# Patient Record
Sex: FEMALE | Race: WHITE | NOT HISPANIC OR LATINO | Employment: OTHER | URBAN - NONMETROPOLITAN AREA
[De-identification: names, ages, dates, MRNs, and addresses within clinical notes are randomized per-mention and may not be internally consistent; named-entity substitution may affect disease eponyms.]

---

## 2019-09-13 ENCOUNTER — OFFICE VISIT CONVERTED (OUTPATIENT)
Dept: FAMILY MEDICINE CLINIC | Age: 69
End: 2019-09-13
Attending: FAMILY MEDICINE

## 2019-10-09 ENCOUNTER — OFFICE VISIT CONVERTED (OUTPATIENT)
Dept: FAMILY MEDICINE CLINIC | Age: 69
End: 2019-10-09
Attending: FAMILY MEDICINE

## 2019-10-23 ENCOUNTER — HOSPITAL ENCOUNTER (OUTPATIENT)
Dept: OTHER | Facility: HOSPITAL | Age: 69
Discharge: HOME | End: 2019-10-23
Attending: FAMILY MEDICINE

## 2019-10-23 LAB
ALBUMIN SERPL-MCNC: 4.6 G/DL (ref 3.5–5)
ALBUMIN/GLOB SERPL: 1.4 {RATIO} (ref 1.4–2.6)
ALP SERPL-CCNC: 68 U/L (ref 43–160)
ALT SERPL-CCNC: 14 U/L (ref 10–40)
ANION GAP SERPL CALC-SCNC: 19 MMOL/L (ref 8–19)
AST SERPL-CCNC: 22 U/L (ref 15–50)
BASOPHILS # BLD MANUAL: 0.05 10*3/UL (ref 0–0.2)
BASOPHILS NFR BLD MANUAL: 0.7 % (ref 0–3)
BILIRUB SERPL-MCNC: 0.59 MG/DL (ref 0.2–1.3)
BUN SERPL-MCNC: 22 MG/DL (ref 5–25)
BUN/CREAT SERPL: 18 {RATIO} (ref 6–20)
CALCIUM SERPL-MCNC: 9.6 MG/DL (ref 8.7–10.4)
CHLORIDE SERPL-SCNC: 99 MMOL/L (ref 99–111)
CHOLEST SERPL-MCNC: 169 MG/DL (ref 107–200)
CHOLEST/HDLC SERPL: 2.5 {RATIO} (ref 3–6)
CONV CO2: 28 MMOL/L (ref 22–32)
CONV TOTAL PROTEIN: 7.8 G/DL (ref 6.3–8.2)
CREAT UR-MCNC: 1.19 MG/DL (ref 0.5–0.9)
DEPRECATED RDW RBC AUTO: 44.8 FL
EOSINOPHIL # BLD MANUAL: 0.17 10*3/UL (ref 0–0.7)
EOSINOPHIL NFR BLD MANUAL: 2.3 % (ref 0–7)
ERYTHROCYTE [DISTWIDTH] IN BLOOD BY AUTOMATED COUNT: 13.2 % (ref 11.5–14.5)
GFR SERPLBLD BASED ON 1.73 SQ M-ARVRAT: 46 ML/MIN/{1.73_M2}
GLOBULIN UR ELPH-MCNC: 3.2 G/DL (ref 2–3.5)
GLUCOSE SERPL-MCNC: 139 MG/DL (ref 65–99)
GRANS (ABSOLUTE): 5.63 10*3/UL (ref 2–8)
GRANS: 76 % (ref 30–85)
HBA1C MFR BLD: 12.6 G/DL (ref 12–16)
HCT VFR BLD AUTO: 37.6 % (ref 37–47)
HDLC SERPL-MCNC: 68 MG/DL (ref 40–60)
IMM GRANULOCYTES # BLD: 0.02 10*3/UL (ref 0–0.54)
IMM GRANULOCYTES NFR BLD: 0.3 % (ref 0–0.43)
LDLC SERPL CALC-MCNC: 83 MG/DL (ref 70–100)
LYMPHOCYTES # BLD MANUAL: 0.97 10*3/UL (ref 1–5)
LYMPHOCYTES NFR BLD MANUAL: 7.6 % (ref 3–10)
MCH RBC QN AUTO: 30.4 PG (ref 27–31)
MCHC RBC AUTO-ENTMCNC: 33.5 G/DL (ref 33–37)
MCV RBC AUTO: 90.6 FL (ref 81–99)
MONOCYTES # BLD AUTO: 0.56 10*3/UL (ref 0.2–1.2)
OSMOLALITY SERPL CALC.SUM OF ELEC: 302 MOSM/KG (ref 273–304)
PLATELET # BLD AUTO: 171 10*3/UL (ref 130–400)
PMV BLD AUTO: 9.5 FL (ref 7.4–10.4)
POTASSIUM SERPL-SCNC: 3 MMOL/L (ref 3.5–5.3)
RBC # BLD AUTO: 4.15 10*6/UL (ref 4.2–5.4)
SODIUM SERPL-SCNC: 143 MMOL/L (ref 135–147)
TRIGL SERPL-MCNC: 88 MG/DL (ref 40–150)
TSH SERPL-ACNC: 1 M[IU]/L (ref 0.27–4.2)
VARIANT LYMPHS NFR BLD MANUAL: 13.1 % (ref 20–45)
VLDLC SERPL-MCNC: 18 MG/DL (ref 5–37)
WBC # BLD AUTO: 7.4 10*3/UL (ref 4.8–10.8)

## 2019-11-04 ENCOUNTER — HOSPITAL ENCOUNTER (OUTPATIENT)
Dept: OTHER | Facility: HOSPITAL | Age: 69
Discharge: HOME | End: 2019-11-04
Attending: FAMILY MEDICINE

## 2019-11-04 LAB
ANION GAP SERPL CALC-SCNC: 19 MMOL/L (ref 8–19)
BUN SERPL-MCNC: 15 MG/DL (ref 5–25)
BUN/CREAT SERPL: 15 {RATIO} (ref 6–20)
CALCIUM SERPL-MCNC: 9.7 MG/DL (ref 8.7–10.4)
CHLORIDE SERPL-SCNC: 99 MMOL/L (ref 99–111)
CONV CO2: 25 MMOL/L (ref 22–32)
CREAT UR-MCNC: 0.98 MG/DL (ref 0.5–0.9)
EST. AVERAGE GLUCOSE BLD GHB EST-MCNC: 134 MG/DL
GFR SERPLBLD BASED ON 1.73 SQ M-ARVRAT: 58 ML/MIN/{1.73_M2}
GLUCOSE SERPL-MCNC: 118 MG/DL (ref 65–99)
HBA1C MFR BLD: 6.3 % (ref 3.5–5.7)
OSMOLALITY SERPL CALC.SUM OF ELEC: 292 MOSM/KG (ref 273–304)
POTASSIUM SERPL-SCNC: 3.3 MMOL/L (ref 3.5–5.3)
SODIUM SERPL-SCNC: 140 MMOL/L (ref 135–147)

## 2020-01-08 ENCOUNTER — HOSPITAL ENCOUNTER (OUTPATIENT)
Dept: OTHER | Facility: HOSPITAL | Age: 70
Discharge: HOME | End: 2020-01-08
Attending: FAMILY MEDICINE

## 2020-01-08 LAB
ANION GAP SERPL CALC-SCNC: 18 MMOL/L (ref 8–19)
BUN SERPL-MCNC: 19 MG/DL (ref 5–25)
BUN/CREAT SERPL: 21 {RATIO} (ref 6–20)
CALCIUM SERPL-MCNC: 9.7 MG/DL (ref 8.7–10.4)
CHLORIDE SERPL-SCNC: 98 MMOL/L (ref 99–111)
CONV CO2: 27 MMOL/L (ref 22–32)
CREAT UR-MCNC: 0.91 MG/DL (ref 0.5–0.9)
GFR SERPLBLD BASED ON 1.73 SQ M-ARVRAT: >60 ML/MIN/{1.73_M2}
GLUCOSE SERPL-MCNC: 144 MG/DL (ref 65–99)
OSMOLALITY SERPL CALC.SUM OF ELEC: 293 MOSM/KG (ref 273–304)
POTASSIUM SERPL-SCNC: 3.7 MMOL/L (ref 3.5–5.3)
SODIUM SERPL-SCNC: 139 MMOL/L (ref 135–147)

## 2020-01-16 ENCOUNTER — OFFICE VISIT CONVERTED (OUTPATIENT)
Dept: FAMILY MEDICINE CLINIC | Age: 70
End: 2020-01-16
Attending: FAMILY MEDICINE

## 2020-02-25 ENCOUNTER — HOSPITAL ENCOUNTER (OUTPATIENT)
Dept: OTHER | Facility: HOSPITAL | Age: 70
Discharge: HOME | End: 2020-02-25
Attending: FAMILY MEDICINE

## 2020-03-12 ENCOUNTER — HOSPITAL ENCOUNTER (OUTPATIENT)
Dept: OTHER | Facility: HOSPITAL | Age: 70
Discharge: HOME | End: 2020-03-12
Attending: ANESTHESIOLOGY

## 2020-04-16 ENCOUNTER — OFFICE VISIT CONVERTED (OUTPATIENT)
Dept: FAMILY MEDICINE CLINIC | Age: 70
End: 2020-04-16
Attending: FAMILY MEDICINE

## 2020-07-08 ENCOUNTER — HOSPITAL ENCOUNTER (OUTPATIENT)
Dept: OTHER | Facility: HOSPITAL | Age: 70
Discharge: HOME | End: 2020-07-08

## 2020-07-11 LAB
BARBITURATES SERPLBLD QL: NEGATIVE NG/ML
CONV AMPHETAMINE SCREEN (SERUM): NEGATIVE NG/ML
CONV BENZODIAZEPINES SCREEN (SERUM): NEGATIVE NG/ML
CONV BUPRENORPHINE SCREEN (SERUM): NEGATIVE NG/ML
CONV CANNABINOIDS SCREEN (SERUM): NEGATIVE NG/ML
CONV COCAINE SCREEN (SERUM): NEGATIVE NG/ML
CONV METHAMPHETAMINE SCREEN (SERUM): NEGATIVE NG/ML
Lab: NORMAL
METHADONE BLD QL SCN: NEGATIVE NG/ML
OPIATES UR QL SCN: POSITIVE NG/ML
OXYCODONE SERPL-MCNC: NEGATIVE NG/ML
PCP SPEC-MCNC: NEGATIVE NG/ML

## 2020-07-13 LAB — OPIATES UR QL SCN: NORMAL

## 2020-12-17 ENCOUNTER — OFFICE VISIT CONVERTED (OUTPATIENT)
Dept: FAMILY MEDICINE CLINIC | Age: 70
End: 2020-12-17
Attending: FAMILY MEDICINE

## 2020-12-17 ENCOUNTER — HOSPITAL ENCOUNTER (OUTPATIENT)
Dept: OTHER | Facility: HOSPITAL | Age: 70
Discharge: HOME | End: 2020-12-17
Attending: FAMILY MEDICINE

## 2020-12-17 LAB
ALBUMIN SERPL-MCNC: 4.5 G/DL (ref 3.5–5)
ALBUMIN/GLOB SERPL: 1.7 {RATIO} (ref 1.4–2.6)
ALP SERPL-CCNC: 77 U/L (ref 43–160)
ALT SERPL-CCNC: 11 U/L (ref 10–40)
ANION GAP SERPL CALC-SCNC: 13 MMOL/L (ref 8–19)
AST SERPL-CCNC: 18 U/L (ref 15–50)
BILIRUB SERPL-MCNC: 0.56 MG/DL (ref 0.2–1.3)
BUN SERPL-MCNC: 17 MG/DL (ref 5–25)
BUN/CREAT SERPL: 17 {RATIO} (ref 6–20)
CALCIUM SERPL-MCNC: 9.7 MG/DL (ref 8.7–10.4)
CHLORIDE SERPL-SCNC: 96 MMOL/L (ref 99–111)
CHOLEST SERPL-MCNC: 164 MG/DL (ref 107–200)
CHOLEST/HDLC SERPL: 2 {RATIO} (ref 3–6)
CONV CO2: 34 MMOL/L (ref 22–32)
CONV TOTAL PROTEIN: 7.2 G/DL (ref 6.3–8.2)
CREAT UR-MCNC: 1.03 MG/DL (ref 0.5–0.9)
EST. AVERAGE GLUCOSE BLD GHB EST-MCNC: 140 MG/DL
GFR SERPLBLD BASED ON 1.73 SQ M-ARVRAT: 54 ML/MIN/{1.73_M2}
GLOBULIN UR ELPH-MCNC: 2.7 G/DL (ref 2–3.5)
GLUCOSE SERPL-MCNC: 159 MG/DL (ref 65–99)
HBA1C MFR BLD: 6.5 % (ref 3.5–5.7)
HDLC SERPL-MCNC: 81 MG/DL (ref 40–60)
LDLC SERPL CALC-MCNC: 70 MG/DL (ref 70–100)
OSMOLALITY SERPL CALC.SUM OF ELEC: 295 MOSM/KG (ref 273–304)
POTASSIUM SERPL-SCNC: 3.3 MMOL/L (ref 3.5–5.3)
SODIUM SERPL-SCNC: 140 MMOL/L (ref 135–147)
TRIGL SERPL-MCNC: 66 MG/DL (ref 40–150)
TSH SERPL-ACNC: 0.3 M[IU]/L (ref 0.27–4.2)
VLDLC SERPL-MCNC: 13 MG/DL (ref 5–37)

## 2021-03-29 ENCOUNTER — HOSPITAL ENCOUNTER (OUTPATIENT)
Dept: OTHER | Facility: HOSPITAL | Age: 71
Discharge: HOME | End: 2021-03-29
Attending: FAMILY MEDICINE

## 2021-03-31 ENCOUNTER — OFFICE VISIT CONVERTED (OUTPATIENT)
Dept: FAMILY MEDICINE CLINIC | Age: 71
End: 2021-03-31
Attending: FAMILY MEDICINE

## 2021-05-18 NOTE — PROGRESS NOTES
Odalis Espino 1950     Office/Outpatient Visit    Visit Date: Wed, Mar 31, 2021 3:20 pm    Provider: Elkin Bear MD (Assistant: Bridget Manzanares,  )    Location: [facility]        Electronically signed by Elkin Bear MD on  03/31/2021 05:43:25 PM                             Subjective:        CC: Ms. Espino is a 70 year old White female.  This is a follow-up visit.          HPI:           Ms. Espino presents with hypothyroidism, unspecified.  She is currently taking Synthroid, 125 mcg daily.  The result was reported as normal ( 0.297 on 12/17/20 mU/L ).  She denies any related symptoms.  She reports no symptoms suggestive of adverse medication effect.        Pertaining to anxiety, Odalis reports that her sx are currently stable.  Her current regimen includes Zoloft 100 mg twice daily and Klonopin 1 mg daily as needed.  She says her mood is stable. She denies SI or HI.          Additionally, she presents with history of essential (primary) hypertension.  her current cardiac medication regimen includes a diuretic ( HCTZ 25 mg daily ) and a calcium channel blocker ( Amlodipine 5 mg daily ).  Compliance with treatment has been good.  She is tolerating the medication well without side effects.  She has not kept a blood pressure diary, but states that pressures have been okay.  Denies headache, blurry vision, chest pain, palpiations, shortness of breath or edema       Pertaining to GERD, Odalis reports that her sx have been stable. Her current regimen includes omeprazole 20 mg daily.  No dysphagia,  abdominal pain, nausea, vomiting, melena or hematochezia.          Odalis has a longstanding history of chronic low back pain. Her symptoms are daily but are stable. She follows with a pain management  ([facility]). Her current regimen includes gabapentin 600 mg 3 times daily, Norco  mg 4 times daily and Mobic 15 mg. daily.  She denies weakness or numbness/tingling.   No saddle anesthesia or bowel/bladder incontinence.          Dx with hyperlipidemia, unspecified; current treatment includes Lipitor.  Compliance with treatment has been good; she takes her medication as directed and follows up as directed.  She denies experiencing any hypercholesterolemia related symptoms.  Most recent lab tests include Total Cholesterol:  164 (mg/dL) (12/17/2020), HDL:  81 (mg/dL) (12/17/2020), Triglycerides:  66 (mg/dL) (12/17/2020), LDL:  70 (mg/dL) (12/17/2020).  Concurrent health problems include hypertension and hypothyroidism.            With regard to the type 2 diabetes mellitus without complications, specifically, this is type 2, non-insulin requiring diabetes without complications.  Compliance with treatment has been good; she takes her medication as directed and follows up as directed.  She denies experiencing any diabetes related symptoms.  Current meds include an oral hypoglycemic ( Glucophage ( 1000mg bid ) ).  She reports home blood glucose readings have been fairly good, with average fasting glucoses running in the 120-150 mg/dL range.  Most recent lab results include Hemoglobin A1c:  6.5 (%) (12/17/2020).  Concurrent health problems include hypertension and hypercholesterolemia.      ROS:     CONSTITUTIONAL:  Negative for chills, fatigue and fever.      EYES:  Negative for blurred vision.      CARDIOVASCULAR:  Negative for chest pain, dizziness, palpitations and edema.      RESPIRATORY:  Negative for dyspnea and cough.      GASTROINTESTINAL:  Positive for heartburn ( stable ).   Negative for abdominal pain, dysphagia, diarrhea, nausea or vomiting.      MUSCULOSKELETAL:  Positive for back pain.      NEUROLOGICAL:  Negative for headaches, paresthesias and weakness.      ENDOCRINE:  Negative for hair loss, temperature intolerances, polydipsia and polyphagia.      PSYCHIATRIC:  Positive for anxiety ( (stable) ).   Negative for depression or suicidal thoughts.          Past Medical  History / Family History / Social History:         Last Reviewed on 3/31/2021 05:43 PM by Elkin Bear    Past Medical History:             PAST MEDICAL HISTORY         Positive for    Hypertension;     Postive for    Gastroesophageal Reflux Disease;     Positive for    Chronic Pain: affecting the low back; ;     Positive for    Hypothyroidism;     Positive for    Anxiety;         PREVENTIVE HEALTH MAINTENANCE             BONE DENSITY: Reported up to date; records requested     COLORECTAL CANCER SCREENING: Reported up to date; records requested     DENTAL CLEANIN years ago     EYE EXAM: was last done one year ago     Hepatitis C Medicare Screening: Never drawn     LOW DOSE CT: has never been done     MAMMOGRAM: Done within last 2 years and results in are chart was last done 3/30/21 with normal results     PAP SMEAR: No longer indicated due to age and history         Surgical History:         Positive for    Hysterectomy and    Tonsillectomy; ;         Family History:     Father: Lung Cancer     Brother(s): Type 2 Diabetes     Sister(s): Lung Cancer     Daughter(s): Healthy         Social History:     Occupation: Retired (Prior occupation: [employer] - )     Marital Status:      Children: 1 child         Tobacco/Alcohol/Supplements:     Last Reviewed on 3/31/2021 05:43 PM by Elkin Bear    Tobacco: Current Smoker: She currently smokes every day, 1 pack per day.          Substance Abuse History:     Last Reviewed on 3/31/2021 05:43 PM by Elkin Bear    None         Mental Health History:     Last Reviewed on 3/31/2021 05:43 PM by Elkin Bear        Generalized Anxiety Disorder         Communicable Diseases (eg STDs):     Last Reviewed on 3/31/2021 05:43 PM by Elkin Bear    Reportable health conditions; NEGATIVE         Current Problems:     Last Reviewed on 3/31/2021 05:43 PM by Elkin Bear    Hypothyroidism, unspecified    Anxiety disorder, unspecified    Essential (primary)  hypertension    Gastro-esophageal reflux disease without esophagitis    Low back pain    Encounter for screening for depression    Long term (current) use of opiate analgesic    Other abnormal glucose    Hypokalemia    Encounter for screening mammogram for malignant neoplasm of breast    Encounter for screening for malignant neoplasm of colon    Encounter for immunization    Hyperlipidemia, unspecified    Menopausal and female climacteric states    Periapical abscess without sinus    Type 2 diabetes mellitus without complications        Immunizations:     Influenza, high dose seasonal 11/13/2017    influenza, high-dose, quadrivalent (FLUZONE HIGH-DOSE QUAD 2020-21) 12/17/2020    Fluzone High-Dose pf (>=65 yr) 10/9/2019        Allergies:     Last Reviewed on 3/31/2021 05:43 PM by Elkin Bear    Zithromax Z-Vic:          Current Medications:     Last Reviewed on 3/31/2021 05:43 PM by Elkin Bear    gabapentin 600 mg oral tablet [TAKE ONE TABLET BY MOUTH THREE TIMES A DAY]    atorvastatin 20 mg oral tablet [1 tab daily]    sertraline 100 mg oral tablet [1 po bid]    Amlodipine  5 mg oral tablet [1 tab daily]    levothyroxine 125 mcg oral tablet [1 tab daily]    Meloxicam 15 mg oral tablet [1 tab daily]    hydroCHLOROthiazide 25 mg oral tablet [1 tab daily]    omeprazole 20 mg oral capsule,delayed release (enteric coated) [1 capsule daily]    HYDROcodone-acetaminophen  mg oral tablet [1 QID PRN]    cyclobenzaprine 10 mg oral tablet [take 1 tablet (10 mg) by oral route 3 times per day as needed]    albuterol sulfate 2.5 mg /3 mL (0.083 %) Inhalation Solution for Nebulization [inhale 3 milliliters (2.5 mg) by nebulization route 3 times per day]    amoxicillin-pot clavulanate 875-125 mg oral tablet [take 1 tablet by oral route every 12 hours]    metFORMIN 500 mg oral tablet [take  2 tabs twice daily thereafter. ]    blood sugar diagnostic strips  [use to check blood sugar bid for R73.03]    lancets 28 gauge  [use  "to check blood sugar bid for R73.03]    blood sugar diagnostic kit  [use to check blood sugar bid for R73.03]    clonazePAM 1 mg oral tablet [TAKE ONE TABLET BY MOUTH DAILY]        Objective:        Vitals:         Current: 3/31/2021 3:27:02 PM    Ht:  5 ft, 3.5 in;  Wt: 146.4 lbs;  BMI: 25.5T: 97.1 F (temporal);  BP: 126/75 mm Hg (right arm, sitting);  P: 68 bpm (right arm (BP Cuff), sitting);  sCr: 1.03 mg/dL;  GFR: 48.52        Exams:     PHYSICAL EXAM:     GENERAL: vital signs recorded - well developed, well nourished;  no apparent distress;     EYES: conjunctiva and cornea are normal;     E/N/T:  normal EACs, TMs, nasal/oral mucosa, teeth, gingiva, and oropharynx;     NECK:  supple, full ROM; no thyromegaly; no carotid bruits;     RESPIRATORY: CABL; no rales (\"crackles\") present; no rhonchi; no wheezes;     CARDIOVASCULAR: normal rate; rhythm is regular;  No murmurs. clicks, gallops or rubs appreciated; no edema;     BREAST/INTEGUMENT: No significant rashes, lesions or suspicious moles within limits of examination;     NEUROLOGIC: Grossly intact; mental status: alert and oriented x 3;     PSYCHIATRIC: appropriate affect and demeanor; normal speech pattern; Normal behavior;         Lab/Test Results:         Hemoglobin A1c: 6.2 (03/31/2021),     Performed by:: gracie (03/31/2021),             Assessment:         E03.9   Hypothyroidism, unspecified       F41.9   Anxiety disorder, unspecified       I10   Essential (primary) hypertension       K21.9   Gastro-esophageal reflux disease without esophagitis       M54.5   Low back pain       E78.5   Hyperlipidemia, unspecified       E11.9   Type 2 diabetes mellitus without complications           ORDERS:         Lab Orders:       [id number]*  Hgb A1c fast lab  (In-House)              Procedures Ordered:       [id number]  Collection of capillary blood specimen (eg, finger, heel, ear stick)  (In-House)    Right index finger                  Plan:         Hypothyroidism, unspecified- " Stable.  Continue Synthroid 125 mcg daily            Anxiety disorder, unspecified- Stable.  Continue Zoloft 100 mg twice daily and Klonopin 1 mg daily as needed.        Essential (primary) hypertension- Stable.  Continue amlodipine 5 mg daily hydrochlorthiazide 25 mg daily.        Gastro-esophageal reflux disease without esophagitis- Stable.  Omeprazole 20 mg daily.        Low back pain- Stable.  Continue gabapentin 600 mg 3 times daily, Norco  mg 4 times daily, Flexeril 10 mg 3 times daily as needed and Mobic 15 mg daily.  Continue to follow with pain management as directed.        Hyperlipidemia, unspecified- Stable. Continue Lipitor 20 mg daily.        Type 2 diabetes mellitus without complications- At goal. A1C  6.2 % in office today. Continue metformin 1000 mg BID.    LABORATORY:  Labs ordered to be performed today include Hgb A1c inhouse fast lab.            Orders:       [id number]*  Hgb A1c fast lab  (In-House)            59331  Collection of capillary blood specimen (eg, finger, heel, ear stick)  (In-House)    Right index finger              Charge Capture:         Primary Diagnosis:     E03.9  Hypothyroidism, unspecified           Orders:      [id number]  Office/outpatient visit; established patient, level 4  (In-House)              F41.9  Anxiety disorder, unspecified     I10  Essential (primary) hypertension     K21.9  Gastro-esophageal reflux disease without esophagitis     M54.5  Low back pain     E78.5  Hyperlipidemia, unspecified     E11.9  Type 2 diabetes mellitus without complications           Orders:      [id number]*  Hgb A1c fast lab  (In-House)            53795  Collection of capillary blood specimen (eg, finger, heel, ear stick)  (In-House)    Right index finger

## 2021-05-18 NOTE — PROGRESS NOTES
Odalis Espino  1950     Office/Outpatient Visit    Visit Date: Thu, Apr 16, 2020 11:42 am    Provider: Elkin Bear MD (Assistant: Spurling, Sarah C, MA)    Location: [facility]        Electronically signed by Elkin Bear MD on  04/16/2020 12:22:51 PM                             Subjective:        CC: Ms. Espino is a 69 year old White female.  This is a follow-up visit.  routine and refills if needed. discuss gabapentin.; .    TELEMEDICINE VISIT:    - Patient consented to this telemedicine visit. Consent obtained by Sarah Spurling, MA and Elkin Bear MD.    - Persons present during the telemedicine consultation include:  Patient, Dr. Bear    - This visit is being conducted via telephone.            HPI:       Patient reports that her anxiety is currently stable.  Her current regimen includes Zoloft 100 mg twice daily and Klonopin 1 mg daily as needed.  She says her mood is stable. She denies SI or HI.              In regard to the essential (primary) hypertension, her current cardiac medication regimen includes a diuretic ( HCTZ 25 mg daily ) and a calcium channel blocker ( Amlodipine 5 mg daily ).  Compliance with treatment has been good.  She is tolerating the medication well without side effects.  She has not kept a blood pressure diary, but states that pressures have been okay.  Denies headache, blurry vision, chest pain, palpiations, shortness of breath or edema       Patient reports that her reflux has been stable. Her current regimen includes omeprazole 20 mg daily.  No dysphagia,  abdominal pain, nausea, vomiting, melena or hematochezia.          Patient has a longstanding history of chronic low back pain. She had previously followed with a pain clinic in [facility] but at last visit she was referred to [facility] as this is closer to where she lives.  She has since established with them.  They have made no changes to her care regimen.  Her current  regimen includes gabapentin 600 mg 3 times daily, Norco  mg 4 times daily and Mobic 15 mg daily.  She denies weakness or numbness/tingling.  No saddle anesthesia or bowel/bladder incontinence.          Concerning hypothyroidism, unspecified, she is currently taking Synthroid, 125 mcg daily.  The result was reported as normal ( 0.996 on 10/23/2019 mU/L ).  She denies any related symptoms.  She reports no symptoms suggestive of adverse medication effect.      ROS:     CONSTITUTIONAL:  Negative for chills, fatigue, fever, and weight change.      EYES:  Negative for blurred vision.      CARDIOVASCULAR:  Negative for chest pain, dizziness, palpitations and edema.      RESPIRATORY:  Negative for dyspnea and cough.      GASTROINTESTINAL:  Negative for abdominal pain, dysphagia, diarrhea, heartburn, nausea and vomiting.      MUSCULOSKELETAL:  Positive for back pain.      NEUROLOGICAL:  Negative for headaches, paresthesias and weakness.      PSYCHIATRIC:  Positive for anxiety.   Negative for depression or suicidal thoughts.          Past Medical History / Family History / Social History:         Last Reviewed on 2020 12:22 PM by Elkin Bear    Past Medical History:             PAST MEDICAL HISTORY         Positive for    Hypertension;     Postive for    Gastroesophageal Reflux Disease;     Positive for    Chronic Pain: affecting the low back; ;     Positive for    Hypothyroidism;     Positive for    Anxiety;         PREVENTIVE HEALTH MAINTENANCE             BONE DENSITY: Reported up to date; records requested     COLORECTAL CANCER SCREENING: Reported up to date; records requested     DENTAL CLEANIN years ago     EYE EXAM: was last done one year ago     Hepatitis C Medicare Screening: Never drawn     LOW DOSE CT: has never been done     MAMMOGRAM: was last done 10/25/18 with normal results The next one is due  10/25/19-10/25/20     PAP SMEAR: No longer indicated due to age and history         Surgical  History:         Positive for    Hysterectomy and    Tonsillectomy; ;         Family History:     Father: Lung Cancer     Brother(s): Type 2 Diabetes     Sister(s): Lung Cancer     Daughter(s): Healthy         Social History:     Occupation: Retired (Prior occupation: [employer] - )     Marital Status:      Children: 1 child         Tobacco/Alcohol/Supplements:     Last Reviewed on 4/16/2020 12:22 PM by Elkin Bear    Tobacco: Current Smoker: She currently smokes every day, 1 pack per day.          Substance Abuse History:     Last Reviewed on 4/16/2020 12:22 PM by Elkin Bear    None         Mental Health History:     Last Reviewed on 4/16/2020 12:22 PM by Elkin Bear        Generalized Anxiety Disorder         Communicable Diseases (eg STDs):     Last Reviewed on 4/16/2020 12:22 PM by Elkin Bear    Reportable health conditions; NEGATIVE         Current Problems:     Last Reviewed on 4/16/2020 12:22 PM by Elkin Bear    Anxiety disorder, unspecified    Essential (primary) hypertension    Gastro-esophageal reflux disease without esophagitis    Low back pain    Hypothyroidism, unspecified    Encounter for screening for depression    Long term (current) use of opiate analgesic    Hypokalemia    Other abnormal glucose    Encounter for screening mammogram for malignant neoplasm of breast        Immunizations:     Influenza, high dose seasonal 11/13/2017    Fluzone High-Dose pf (>=65 yr) 10/9/2019        Allergies:     Last Reviewed on 4/16/2020 12:22 PM by Elkin Bear    Zithromax Z-Vic:          Current Medications:     Last Reviewed on 4/16/2020 12:22 PM by Elkin Bear    atorvastatin 20 mg oral tablet [1 tab daily]    sertraline 100 mg oral tablet [1 po bid]    Amlodipine  5 mg oral tablet [1 tab daily]    levothyroxine 125 mcg oral tablet [1 tab daily]    Meloxicam 15 mg oral tablet [1 tab daily]    hydroCHLOROthiazide 25 mg oral tablet [1 tab daily]    omeprazole 20 mg oral  capsule,delayed release (enteric coated) [1 capsule daily]    clonazePAM 1 mg oral tablet [take 1 tablet (1 mg) by oral route daily]    Gabapentin 600 mg oral tablet [1 po tid]    HYDROcodone-acetaminophen  mg oral tablet [1 QID PRN]    cyclobenzaprine 10 mg oral tablet [take 1 tablet (10 mg) by oral route 3 times per day as needed]        Assessment:         F41.9   Anxiety disorder, unspecified       I10   Essential (primary) hypertension       K21.9   Gastro-esophageal reflux disease without esophagitis       M54.5   Low back pain       E03.9   Hypothyroidism, unspecified           ORDERS:         Meds Prescribed:       [Refilled] gabapentin 600 mg oral tablet [1 po tid], #90 (ninety) tablets, Refills: 0 (zero)                 Plan:         Anxiety disorder, unspecified- Stable.  Continue Zoloft 100 mg type daily and Klonopin 1 mg daily as needed    Telehealth: Verbal consent obtained for visit to occur via phone call; Total time spent was 15 minutes; [id number]--Viehdeatf E/M 11-20 minutes         Essential (primary) hypertension- Continue amlodipine 5 mg daily and hydrochlorothiazide 25 mg daily        Gastro-esophageal reflux disease without esophagitis- Stable.  Continue omeprazole 20 mg daily        Low back pain- Stable.  Continue gabapentin 600 mg 3 times daily, Norco  mg 4 times daily, Flexeril 10 mg 3 times daily as needed and Mobic 15 mg daily. Continue to follow with pain management as directed.          Prescriptions:       [Refilled] gabapentin 600 mg oral tablet [1 po tid], #90 (ninety) tablets, Refills: 0 (zero)         Hypothyroidism, unspecified- Stable.  Continue Synthroid 125 mcg daily            Charge Capture:         Primary Diagnosis:     F41.9  Anxiety disorder, unspecified           Orders:      [id number]  Office/outpatient visit; established patient, level 4  (In-House)            15549  Phys/[facility] telephone evaluation 11-20 minutes  (In-House)              I10  Essential (primary)  hypertension     K21.9  Gastro-esophageal reflux disease without esophagitis     M54.5  Low back pain     E03.9  Hypothyroidism, unspecified

## 2021-05-18 NOTE — PROGRESS NOTES
Odalis Espino 1950     Office/Outpatient Visit    Visit Date: Wed, Oct 9, 2019 01:43 pm    Provider: Elkin Bear MD (Assistant: Sarah Spurling, MA)    Location: [facility]        Electronically signed by Elkin Bear MD on  10/09/2019 06:14:35 PM                             SUBJECTIVE:        CC:     Ms. Espino is a 69 year old White female.  medicare wellness.;         HPI:         Ms. Espino is here for a Medicare wellness visit.  The required HRA questions are integrated within this visit note. Family medical history and individual medical/surgical history were reviewed and updated.  A current height, weight, BMI, blood pressure, and pulse were recorded in the vitals section of the note and have been reviewed. Patient's medications, including supplements, were recorded in the chart and reviewed.  Current providers and suppliers were reviewed and updated.          Self-Assessment of Health: She rates her health as good. She rates her confidence of being able to control/manage most of her health problems as very confident. Her physical/emotional health has limited her social activites not at all.  A review of cognitive impairment was performed, including ability to drive a car, manage finances, and any memory changes, and was found to be negative.  A review of functional ability, including bathing, dressing, walking, and urine/bowel continence as well as level of safety was performed and was found to be negative.  Falls Risk: Has not had any falls or only one fall without injury in the past year.  In regard to hearing, she reports having trouble hearing the TV/radio when others do not and having to strain to hear or understand conversations, but not wearing hearing aid(s).  Concerning home safety, she reports that at home she DOES have adequate lighting, a skid resistant shower/tub, grab bars in the bath, handrails on stairs, functioning smoke alarms and absence of throw rugs.           "Immunization Status: [facility] ([facility]); Physical Activity: She never excercises.; Type of diet patient normally eats is described as poor--needs improvement.  Tobacco: Current Smoker: She currently smokes every day, 1-1/2 packs per day.  Preventative Health updated today     Patient reports that her back pain symptoms have been stable.  Her current regimen includes gabapentin 600 mg 3 times daily, Norco 10 mg / 325 mg 4 times daily as needed and Mobic 15 mg daily.  She denies weakness or numbness/tingling.  No saddle anesthesia or bowel/bladder incontinence.     Patient reports that her reflux has been stable. However, She notes that over the last several weeks she has begun to develop dysphagia to solids.  She does not experience the same symptoms with liquids.  She notes that her mouth feels dry a lot of the time and when attempting to swallow food it feels as though it gets \"stuck.\"  She does not regurgitate or choke. No vomiting/hematemesis.  No melena or hematochezia. Her current regimen includes omeprazole 20 mg daily. Her weight is been stable.     As for her hypothyroidism, patient denies current signs or symptoms of thyroid disease.  Her last TSH was 0.425 on 3/21/2019.  A repeat test was ordered last visit but this is not yet been drawn. She follows with an outside endocrinologist.  Her current regimen includes Synthroid 125 mcg daily with which she reports compliance without adverse effects.         Additionally, she presents with history of essential hypertension.  her current cardiac medication regimen includes a diuretic ( HCTZ 25 mg daily ) and a calcium channel blocker ( Amlodipine 5 mg daily ).  Compliance with treatment has been good.  She is tolerating the medication well without side effects.  She has not kept a blood pressure diary, but states that pressures have been okay.  Denies headache, blurry vision, chest pain, palpiations, shortness of breath or edema         In " regard to the generalized anxiety disorder, her symptom complex includes fear of dying.  The frequency symptoms is several times per month.  Current treatment includes a p.r.n.-dosed benzodiazepine and Zoloft.  Overall she says her symptoms are stable.      Patient continues to be on several medications that are considered high risk.  Her current regimen includes Klonopin 1 mg daily as needed, Norco 10 mg / 325 mg 4 times daily as needed and gabapentin 600 mg 3 times daily.  She notes that since last visit she has not experienced any new falls.  She signed a pain contract at last visit and underwent urine drug screening that was appropriate.     ROS:     CONSTITUTIONAL:  Negative for chills, fatigue, fever, and weight change.      EYES:  Negative for blurred vision.      CARDIOVASCULAR:  Negative for chest pain, dizziness, palpitations and edema.      RESPIRATORY:  Negative for dyspnea and cough.      GASTROINTESTINAL:  Positive for dysphagia.   Negative for abdominal pain, diarrhea, heartburn, nausea or vomiting.      GENITOURINARY:  Negative for dysuria, hematuria and polyuria.      MUSCULOSKELETAL:  Positive for back pain.      NEUROLOGICAL:  Negative for headaches, paresthesias and weakness.      ENDOCRINE:  Negative for hair loss, heat/cold intolerance, polydipsia, and polyphagia.      PSYCHIATRIC:  Positive for anxiety.   Negative for depression or suicidal thoughts.          PMH/FMH/SH:     Last Reviewed on 10/09/2019 06:14 PM by Elkin Bear    Past Medical History:             PAST MEDICAL HISTORY         Positive for    Hypertension;     Postive for    Gastroesophageal Reflux Disease;     Positive for    Chronic Pain: affecting the low back; ;     Positive for    Hypothyroidism;     Positive for    Anxiety;         PREVENTIVE HEALTH MAINTENANCE             BONE DENSITY: Reported up to date; records requested     COLORECTAL CANCER SCREENING: Reported up to date; records requested     DENTAL CLEANIN  years ago     EYE EXAM: was last done one year ago     Hepatitis C Medicare Screening: Never drawn     LOW DOSE CT: has never been done     MAMMOGRAM: Reported up to date; records requested     PAP SMEAR: No longer indicated due to age and history         Surgical History:         Positive for    Hysterectomy and    Tonsillectomy; ;         Family History:     Father: Lung Cancer     Brother(s): Type 2 Diabetes     Sister(s): Lung Cancer     Daughter(s): Healthy         Social History:     Occupation: Retired (Prior occupation: [employer] - )     Marital Status:      Children: 1 child         Tobacco/Alcohol/Supplements:     Last Reviewed on 10/09/2019 06:14 PM by Elkin Bear    Tobacco: Current Smoker: She currently smokes every day, 1 pack per day.          Substance Abuse History:     Last Reviewed on 10/09/2019 06:14 PM by Elkin Bear    None         Mental Health History:     Last Reviewed on 10/09/2019 06:14 PM by Elkin Bear        Generalized Anxiety Disorder         Communicable Diseases (eg STDs):     Last Reviewed on 10/09/2019 06:14 PM by Elkin Bear    Reportable health conditions; NEGATIVE             Current Problems:     Last Reviewed on 10/09/2019 06:14 PM by Elkin Bear    Use of high risk medications     Generalized anxiety disorder     Essential hypertension     Hypothyroidism     GERD     Low back pain     Chronic low back pain     Dysphagia, unspecified     Screening for depression         Immunizations:     Fluzone High-Dose pf (>=65 yr) 10/9/2019         Allergies:     Last Reviewed on 10/09/2019 06:14 PM by Elkin Bear    Zithromax Z-Vic:        Current Medications:     Last Reviewed on 10/09/2019 06:14 PM by Elkin Bear    Clonazepam 1mg Tablet 1 po q day prn     Atorvastatin Calcium 20mg Tablet 1 tab daily     Gabapentin 600mg Tablet 1 po tid     Hydrochlorothiazide (HCTZ) 25mg Tablet 1 tab daily     Hydrocodone/Acetaminophen 10mg/325mg Tablet 1 QID PRN      "Sertraline HCl 100mg Tablet 1 po bid     Amlodipine  5mg Tablet 1 tab daily     Levothyroxine Sodium 0.125mg Tablet 1 tab daily     Meloxicam 15mg Tablet 1 tab daily     Omeprazole 20mg Capsules, Extended Release 1 capsule daily         OBJECTIVE:        Vitals:         Current: 10/9/2019 1:52:32 PM    Ht:  5 ft, 3.5 in;  Wt: 155.4 lbs;  BMI: 27.1    T: 98.4 F (oral);  BP: 117/63 mm Hg (right arm, sitting);  P: 67 bpm (right arm (BP Cuff), sitting)    VA: 20/60 OD, 20/80 OS (near, without correction)        Exams:     PHYSICAL EXAM:     GENERAL: vital signs recorded - well developed, well nourished;  no apparent distress;     EYES: conjunctiva and cornea are normal;     E/N/T:  normal EACs, TMs, nasal/oral mucosa, teeth, gingiva, and oropharynx;     NECK:  supple, full ROM; no thyromegaly; no carotid bruits;     RESPIRATORY: coarse breath sounds throughout; no rales (\"crackles\") present; no rhonchi; no wheezes;     CARDIOVASCULAR: normal rate; rhythm is regular;  No murmurs. clicks, gallops or rubs appreciated; no edema;     GASTROINTESTINAL: nontender; Soft and nondistended; normal bowel sounds; no organomegaly; no masses;     BREAST/INTEGUMENT: No significant rashes, lesions or suspicious moles within limits of examination;     NEUROLOGIC: Grossly intact; mental status: alert and oriented x 3;     PSYCHIATRIC: appropriate affect and demeanor; normal speech pattern; Normal behavior;         Procedures:     Influenza vaccination     1. Influenza high dose 0.5 ml unit dose, [facility], ABN signed given IM in the right upper arm; administered by [facility];  lot number [id number]; expires 5/16/20             ASSESSMENT           V70.0   Z00.00  Health checkup              DDx:     724.2   M54.5  Low back pain - Stable              DDx:     787.20   R13.0   R13.10  Dysphagia, unspecified - Concerning finding given longstanding history of GERD              DDx: - Barretts vs esophageal dysmotility vs malignancy vs zenker diverticulum vs " esophageal spasm vs esophageal stricture     530.81   K21.9  GERD - Reflux symptoms stable but unclear whether it is actually well controlled given concurrent dysphagia.              DDx:     244.9   E03.9  Hypothyroidism - Stable              DDx:     401.1   I10  Essential hypertension - Stable              DDx:     300.02   F41.9  Generalized anxiety disorder - Stable              DDx:     V58.69   Z79.891  Use of high risk medications -UDS appropriate              DDx:     V04.81   Z23  Influenza vaccination              DDx:         ORDERS:         Lab Orders:       [id number]  TSH - [facility] TSH  (Send-Out)         69964  COMP - [facility] Comp. Metabolic Panel  (Send-Out)         31354  LPDP - [facility] Lipid Panel  (Send-Out)         98884  BDCBC - [facility] CBC with 3 part diff  (Send-Out)         APPTO  Appointment need  (In-House)           Procedures Ordered:       33111  Fluzone High Dose  (In-House)           Other Orders:       1100F  Pt screen for fall risk; document 2+ falls in the past yr or any fall w/injury in past year (RAJEEV)  (In-House)           Administration of influenza virus vaccine (x1)                 PLAN:          Health checkup - Appropriate screenings and vaccinations were reviewed with the patient and offered as indicated.  Patient counseled on healthy lifestyle including balanced diet and adequate physical activity.     MIPS Has fallen 2+ times in the past year or had one fall with an injury in the past year Vaccines Flu and Pneumonia updated in Shot record   Smoking cessation encouraged. Counseling for less than 3 minutes.      FOLLOW-UP: Schedule a follow-up visit in 3 months.:.            Orders:       1100F  Pt screen for fall risk; document 2+ falls in the past yr or any fall w/injury in past year (RAJEEV)  (In-House)         APPTO  Appointment need  (In-House)            Low back pain - Continue current medication regimen of gabapentin 600 mg 3 times daily, Norco 10 mg / 325 mg 4 times daily as needed  and Mobic 15 mg daily.  Will attempt to wean Norco dosage in the future as able.          Dysphagia, unspecified - Will refer to gastroenterology for endoscopy.  Continue omeprazole daily.  ED/return precautions given.          GERD - See plan for dysphagia above     LABORATORY:  Labs ordered to be performed today include CBC.            Orders:       [id number]  BDCBC [facility] CBC with 3 part diff  (Send-Out)            Hypothyroidism - TSH ordered has not drawn at last visit.  Continue Synthroid 125 mcg daily.  Continue to follow with endocrinology as directed.     LABORATORY:  Labs ordered to be performed today include TSH.            Orders:       [id number]  TSH - [facility] TSH  (Send-Out)            Essential hypertension - Labs ordered today including CBC, CMP, TSH and lipid panel. Continue current medication regimen of amlodipine 5 mg daily and hydrochlorothiazide 25 mg daily.     LABORATORY:  Labs ordered to be performed today include Comprehensive metabolic panel and lipid panel.            Orders:       [id number]  COMP - [facility] Comp. Metabolic Panel  (Send-Out)         99519  LPDP - [facility] Lipid Panel  (Send-Out)            Generalized anxiety disorder - Continue current medication regimen of Zoloft 100 mg twice daily and Klonopin 1 mg daily as needed.          Use of high risk medications     Controlled substance documentation: Ángel reviewed; prior drug screen consistent; consent is reviewed and signed and on the chart.  She is aware of risk of addiction on this medication, understands that she will need to follow up for a review every 3 months and her medications will be adjusted or decreased as deemed appropriate at each visit.  No history of drug or alcohol abuse.  No concerns about diversion or abuse. She denies side effects related to the medication.  She is aware that she may be called in for pill counts.  The dosing of this medication will be reviewed on a regular basis and reduced if possible..  Ongoing use of a controlled  substance is necessary for this patient to have a normal quality of life          Influenza vaccination - Vaccine given in office today.  Patient tolerated well without complications.         IMMUNIZATIONS given today: Influenza HIGH Dose.            Orders:       [id number]  Fluzone High Dose  (In-House)                     Administration of influenza virus vaccine (x1)             Patient Recommendations:        For  Health checkup:     Schedule a follow-up visit in 3 months.                APPOINTMENT INFORMATION:        Monday Tuesday Wednesday Thursday Friday Saturday Sunday            Time:___________________AM  PM   Date:_____________________             CHARGE CAPTURE           **Please note: ICD descriptions below are intended for billing purposes only and may not represent clinical diagnoses**        Primary Diagnosis:         V70.0 Health checkup            Z00.00    Encounter for general adult medical examination without abnormal findings              Orders:          50247   Preventive medicine, established patient, age 65+ years  (In-House)             1100F   Pt screen for fall risk; document 2+ falls in the past yr or any fall w/injury in past year (RAJEEV)  (In-House)             APPTO   Appointment need  (In-House)           724.2 Low back pain            M54.5    Low back pain              Orders:          [id number] -25  Office/outpatient visit; established patient, level 3  (In-House)           787.20 Dysphagia, unspecified            R13.0    Aphagia           R13.10    Dysphagia, unspecified    530.81 GERD            K21.9    Gastro-esophageal reflux disease without esophagitis    244.9 Hypothyroidism            E03.9    Hypothyroidism, unspecified    401.1 Essential hypertension            I10    Essential (primary) hypertension    300.02 Generalized anxiety disorder            F41.9    Anxiety disorder, unspecified    V58.69 Use of high risk medications            Z79.891    Long term  (current) use of opiate analgesic    V04.81 Influenza vaccination            Z23    Encounter for immunization              Orders:          [id number]   Fluzone High Dose  (In-House)                                           Administration of influenza virus vaccine (x1)             ADDENDUMS:      ____________________________________    Addendum: 10/09/2019 06:16 PM - Elkin Bear        Orders:     Please remove [id number]    Please add     Please retain [id number]        -mja

## 2021-05-18 NOTE — PROGRESS NOTES
Odalis Espino  1950     Office/Outpatient Visit    Visit Date: Thu, Dec 17, 2020 10:33 am    Provider: Elkin Bear MD (Assistant: Catalina Bhatia MA)    Location: [facility]        Electronically signed by Elkin Bear MD on  03/31/2021 03:52:06 PM                             Subjective:        CC: Ms. Espino is a 70 year old White female.  This is a follow-up visit.          HPI:       Odalis presents to clinic today for f/u. She is requesting a flu shot.       Pertaining to anxiery, Odalis reports that her anxiety is currently stable.  Her current regimen includes Zoloft 100 mg twice daily and Klonopin 1 mg daily as needed.  She says her mood is stable. She denies SI or HI.          Dx with essential (primary) hypertension; her current cardiac medication regimen includes a diuretic ( HCTZ 25 mg daily ) and a calcium channel blocker ( Amlodipine 5 mg daily ).  Compliance with treatment has been good.  She is tolerating the medication well without side effects.  She has not kept a blood pressure diary, but states that pressures have been okay.  Denies headache, blurry vision, chest pain, palpiations, shortness of breath or edema       Pertaining to GERD, Odalis reports that her sx have been stable. Her current regimen includes omeprazole 20 mg daily.  No dysphagia,  abdominal pain, nausea, vomiting, melena or hematochezia.      Odalis has a longstanding history of chronic low back pain. Her symptoms are daily but are stable. She follows with a pain management  ([facility]). Her current regimen includes gabapentin 600 mg 3 times daily, Norco  mg 4 times daily and Mobic 15 mg. daily.  She denies weakness or numbness/tingling.  No saddle anesthesia or bowel/bladder incontinence.          Dx with hypothyroidism, unspecified; she is currently taking Synthroid, 125 mcg daily.  The result was reported as normal ( 0.996 on 10/23/2019 mU/L ).  She denies any related  symptoms.  She reports no symptoms suggestive of adverse medication effect.            Additionally, she presents with history of hyperlipidemia, unspecified.  current treatment includes Lipitor.  Compliance with treatment has been good; she takes her medication as directed and follows up as directed.  She denies experiencing any hypercholesterolemia related symptoms.  Most recent lab tests include Total Cholesterol:  169 (mg/dL) (10/23/2019), HDL:  68 (mg/dL) (10/23/2019), Triglycerides:  88 (mg/dL) (10/23/2019), LDL:  83 (mg/dL) (10/23/2019).  Concurrent health problems include hypertension and hypothyroidism.        Odalis has a known history of prediabetes.  Her last A1c was 6.3% on 11/4/2019.  She is not currently on any medications for this.       Odalis is overdue for colon cancer screening, breast cancer screening and osteoporosis screening.  She is willing to have mammogram, DEXA scan and Cologuard ordered today.    Finally, Odalis reports that she is scheduled to have teeth pulled with her dentist in 2 weeks.  However, she notes that she is having significant pain and swelling in the right lower quadrant of her mouth.  She believes she has an infection and is requesting an antibiotic.      Odalis is overdue for breast cancer screening is willing to have a mammogram ordered.        Odalis is postmenopausal and has never had a bone scan.  She is willing to have this ordered today.        Finally, addition of a chronic condition above, Odalis reports that she has an infected tooth/abscess.  She has an appointment to see her dentist but this is not till next week.  She is wondering she get an antibiotic to help with her symptoms as it is incredibly painful.    ROS:     CONSTITUTIONAL:  Negative for chills, fatigue and fever.      EYES:  Negative for blurred vision.      E/N/T:  Positive for tooth pain.      CARDIOVASCULAR:  Negative for chest pain, dizziness, palpitations and edema.      RESPIRATORY:  Negative  for dyspnea and cough.      GASTROINTESTINAL:  Positive for heartburn ( stable ).   Negative for abdominal pain, dysphagia, diarrhea, nausea or vomiting.      MUSCULOSKELETAL:  Positive for back pain.      NEUROLOGICAL:  Negative for headaches, paresthesias and weakness.      PSYCHIATRIC:  Positive for anxiety ( (stable) ).   Negative for depression or suicidal thoughts.          Past Medical History / Family History / Social History:         Last Reviewed on 3/31/2021 03:51 PM by Elkin Bear    Past Medical History:             PAST MEDICAL HISTORY         Positive for    Hypertension;     Postive for    Gastroesophageal Reflux Disease;     Positive for    Chronic Pain: affecting the low back; ;     Positive for    Hypothyroidism;     Positive for    Anxiety;         PREVENTIVE HEALTH MAINTENANCE             BONE DENSITY: Reported up to date; records requested     COLORECTAL CANCER SCREENING: Reported up to date; records requested     DENTAL CLEANIN years ago     EYE EXAM: was last done one year ago     Hepatitis C Medicare Screening: Never drawn     LOW DOSE CT: has never been done     MAMMOGRAM: Done within last 2 years and results in are chart was last done 3/30/21 with normal results     PAP SMEAR: No longer indicated due to age and history         Surgical History:         Positive for    Hysterectomy and    Tonsillectomy; ;         Family History:     Father: Lung Cancer     Brother(s): Type 2 Diabetes     Sister(s): Lung Cancer     Daughter(s): Healthy         Social History:     Occupation: Retired (Prior occupation: [employer] - )     Marital Status:      Children: 1 child         Tobacco/Alcohol/Supplements:     Last Reviewed on 3/31/2021 03:51 PM by Elkin Bear    Tobacco: Current Smoker: She currently smokes every day, 1 pack per day.          Substance Abuse History:     Last Reviewed on 3/31/2021 03:51 PM by Elkin Bear    None         Mental Health History:     Last Reviewed on  3/31/2021 03:51 PM by Elkin Bear        Generalized Anxiety Disorder         Communicable Diseases (eg STDs):     Last Reviewed on 3/31/2021 03:51 PM by Elkin Bear    Reportable health conditions; NEGATIVE         Current Problems:     Last Reviewed on 3/31/2021 03:51 PM by Elkin Bear    Hypothyroidism, unspecified    Anxiety disorder, unspecified    Essential (primary) hypertension    Gastro-esophageal reflux disease without esophagitis    Low back pain    Encounter for screening for depression    Long term (current) use of opiate analgesic    Hypokalemia    Encounter for screening mammogram for malignant neoplasm of breast    Encounter for screening for malignant neoplasm of colon    Other abnormal glucose    Encounter for immunization    Hyperlipidemia, unspecified    Menopausal and female climacteric states    Periapical abscess without sinus    Type 2 diabetes mellitus without complications        Immunizations:     Influenza, high dose seasonal 11/13/2017    Fluzone High-Dose pf (>=65 yr) 10/9/2019        Allergies:     Last Reviewed on 3/31/2021 03:51 PM by Elkin Bear    Zithromax Z-Vic:          Current Medications:     Last Reviewed on 3/31/2021 03:51 PM by Elkin Bear    HYDROcodone-acetaminophen  mg oral tablet [1 QID PRN]    cyclobenzaprine 10 mg oral tablet [take 1 tablet (10 mg) by oral route 3 times per day as needed]    albuterol sulfate 2.5 mg /3 mL (0.083 %) Inhalation Solution for Nebulization [inhale 3 milliliters (2.5 mg) by nebulization route 3 times per day]    gabapentin 600 mg oral tablet [TAKE ONE TABLET BY MOUTH THREE TIMES A DAY]    atorvastatin 20 mg oral tablet [1 tab daily]    sertraline 100 mg oral tablet [1 po bid]    Amlodipine  5 mg oral tablet [1 tab daily]    levothyroxine 125 mcg oral tablet [1 tab daily]    Meloxicam 15 mg oral tablet [1 tab daily]    hydroCHLOROthiazide 25 mg oral tablet [1 tab daily]    omeprazole 20 mg oral capsule,delayed release  "(enteric coated) [1 capsule daily]    clonazePAM 1 mg oral tablet [TAKE ONE TABLET BY MOUTH DAILY]        Objective:        Vitals:         Current: 12/17/2020 10:42:39 AM    Ht:  5 ft, 3.5 in;  Wt: 152.8 lbs;  BMI: 26.6T: 97.1 F (temporal);  BP: 129/74 mm Hg (left arm, sitting);  P: 62 bpm (left arm (BP Cuff), sitting);  sCr: 0.91 mg/dL;  GFR: 55.93        Exams:     PHYSICAL EXAM:     GENERAL: vital signs recorded - well developed, well nourished;  no apparent distress;     EYES: conjunctiva and cornea are normal;     E/N/T:  normal EACs, TMs, nasal/oral mucosa, teeth, gingiva, and oropharynx;     NECK:  supple, full ROM; no thyromegaly; no carotid bruits;     RESPIRATORY: CABL; no rales (\"crackles\") present; no rhonchi; no wheezes;     CARDIOVASCULAR: normal rate; rhythm is regular;  No murmurs. clicks, gallops or rubs appreciated; no edema;     BREAST/INTEGUMENT: No significant rashes, lesions or suspicious moles within limits of examination;     NEUROLOGIC: Grossly intact; mental status: alert and oriented x 3;     PSYCHIATRIC: appropriate affect and demeanor; normal speech pattern; Normal behavior;         Lab/Test Results:         LABORATORY RESULTS: Advance Beneficiary Notice An Advance Beneficiary Notice is on file for the yes HD flu     Amphetamines Screen, Urin: Negative (12/17/2020),     BAR-Barbiturates Screen, Urin: Negative (12/17/2020),     Buprenorphine: Negative (12/17/2020),     BZO-Benzodiazepines Screen,Ur: Negative (12/17/2020),     Cocaine(Metab.)Screen, Ur: Negative (12/17/2020),     MDMA-Ecstasy: Negative (12/17/2020),     Met-Methamphetamine: Negative (12/17/2020),     MTD-Methadone Screen, Urine: Negative (12/17/2020),     Opiate Screen, Urine: Positive (12/17/2020),     OXY-Oxycodone: Negative (12/17/2020),     PCP-Phencyclidine Screen, Uri: Negative (12/17/2020),     THC Cannabinoids Screen, Urin: Negative (12/17/2020),     Urine temperature: urine did not temp up (12/17/2020),     Date " and time of last pill: Hydrocodone 12/17 @8am Gabapentin 12/17 @8pm (12/17/2020),     Performed by: tls (12/17/2020),     Collection Time: 1330 (12/17/2020),             Procedures:     Encounter for immunization    1. Patient experienced no reaction.              Assessment:         F41.9   Anxiety disorder, unspecified       I10   Essential (primary) hypertension       K21.9   Gastro-esophageal reflux disease without esophagitis       M54.5   Low back pain       E03.9   Hypothyroidism, unspecified       Z79.891   Long term (current) use of opiate analgesic       E78.5   Hyperlipidemia, unspecified       R73.03   Prediabetes       K04.7   Periapical abscess without sinus       Z12.11   Encounter for screening for malignant neoplasm of colon       Z12.31   Encounter for screening mammogram for malignant neoplasm of breast       N95.1   Menopausal and female climacteric states       Z23   Encounter for immunization           ORDERS:         Meds Prescribed:       [New Rx] amoxicillin-pot clavulanate 875-125 mg oral tablet [take 1 tablet by oral route every 12 hours], #14 (fourteen) tablets, Refills: 0 (zero)         Radiology/Test Orders:       37373  DXA, bone density study, 1 or more sites; axial skeleton (eg hips, pelvis, spine)  (Send-Out)            83332  Screening digital breast tomosynthesis bi  (Send-Out)              Lab Orders:       [id number]  TSH - [facility] TSH  (Send-Out)            50310  Comprehensive metabolic panel  (Send-Out)            46360  Lipid panel (total cholesterol, HDL, triglycerides)  (Send-Out)            [facility]  [facility]  [facility]            79588  Drug test prsmv read direct optical obs pr date  (In-House)              Procedures Ordered:       [id number]  Fluzone High Dose  (In-House)              Other Orders:       [id number]  Cologuard  (Send-Out)                      Plan:         Anxiety disorder, unspecified- Stable.  Continue Zoloft 100 mg twice daily and Klonopin 1 mg daily  as needed.        Essential (primary) hypertension- Stable.  Continue amlodipine 5 mg daily hydrochlorthiazide 25 mg daily.          Orders:       [id number]  Comprehensive metabolic panel  (Send-Out)            09737  Lipid panel (total cholesterol, HDL, triglycerides)  (Send-Out)              Gastro-esophageal reflux disease without esophagitis- Stable.  Omeprazole 20 mg daily.        Low back pain- Stable.  Continue gabapentin 600 mg 3 times daily, Norco  mg 4 times daily, Flexeril 10 mg 3 times daily as needed and Mobic 15 mg daily.  Continue to follow with pain management as directed.        Hypothyroidism, unspecified- Stable.  Continue Synthroid 125 mcg daily          Orders:       [id number]  TSH - [facility] TSH  (Send-Out)              Long term (current) use of opiate analgesic- Urine drug screen ordered today.          Orders:       06083  Drug test prsmv read direct optical obs pr date  (In-House)              Hyperlipidemia, unspecified- Continue Lipitor 20 mg daily.        Prediabetes- Repeat A1c ordered today.          Orders:       60756  A1CEG - [facility] Hemoglobin A1C  (Send-Out)              Periapical abscess without sinus- We will cover with Augmentin 875–1 25 daily x1 week.          Prescriptions:       [New Rx] amoxicillin-pot clavulanate 875-125 mg oral tablet [take 1 tablet by oral route every 12 hours], #14 (fourteen) tablets, Refills: 0 (zero)         Encounter for screening for malignant neoplasm of colon- Cologuard ordered.          Orders:       [id number]  Cologuard  (Send-Out)              Encounter for screening mammogram for malignant neoplasm of breast- Mammogram ordered.        RADIOLOGY:  I have ordered Mammogram Bilateral Screening 3D to be done today.            Orders:       [id number]  Screening digital breast tomosynthesis bi  (Send-Out)              Menopausal and female climacteric states- DEXA scan ordered        RADIOLOGY:  I have ordered Dexa Scan to be done today.            Orders:        35302  DXA, bone density study, 1 or more sites; axial skeleton (eg hips, pelvis, spine)  (Send-Out)              Encounter for immunization- Flu shot given today          Immunizations:       97267  Fluzone High Dose  (In-House)                Dose (ml): 0.7  Site: left deltoid  Route: intramuscular  Administered by: Catalina Bhatia          : [employer]  Lot #: [id number]  Exp: 06/30/2021          NDC: [address]-39            Charge Capture:         Primary Diagnosis:     F41.9  Anxiety disorder, unspecified           Orders:      64028  Office/outpatient visit; established patient, level 4  (In-House)              I10  Essential (primary) hypertension     K21.9  Gastro-esophageal reflux disease without esophagitis     M54.5  Low back pain     E03.9  Hypothyroidism, unspecified     Z79.891  Long term (current) use of opiate analgesic           Orders:      66775  Drug test prsmv read direct optical obs pr date  (In-House)              E78.5  Hyperlipidemia, unspecified     R73.03  Prediabetes     K04.7  Periapical abscess without sinus     Z12.11  Encounter for screening for malignant neoplasm of colon     Z12.31  Encounter for screening mammogram for malignant neoplasm of breast     N95.1  Menopausal and female climacteric states     Z23  Encounter for immunization           Orders:      [id number]  Fluzone High Dose  (In-House)                  ADDENDUMS:      ____________________________________    Addendum: 04/09/2021 02:25 PM - Catalina Bhatia        ADD:  (Flu vaccine admin)

## 2021-05-18 NOTE — PROGRESS NOTES
Odalis Espino 1950     Office/Outpatient Visit    Visit Date: Fri, Sep 13, 2019 08:45 am    Provider: Elkin Bear MD (Assistant: Martha Bella MA)    Location: [facility]        Electronically signed by Elkin Bear MD on  09/13/2019 03:43:36 PM                             SUBJECTIVE:        CC: PT IS HERE TODAY TO ESTABLISH CARE   (PT HAD PNEUMONIA VACCINES WITH DR BIANCHI OFFICE) (DISCUSS HEPATITIS VACCINE)         HPI:         PHQ-9 Depression Screening: Completed form scanned and in chart; Total Score 5     Patient has a longstanding history of low back pain due to degenerative arthritis and disc disease. She says her symptoms have been stable.  Her current regimen includes gabapentin 600 mg 3 times daily, Norco 10 mg / 325 mg 4 times daily as needed and Mobic 15 mg daily. She denies weakness or numbness or tingling. No saddle anesthesia or bowel/bladder incontinence.     Patient reports that her reflux has been well controlled with current regimen 20 mg daily.  She says that she is currently asymptomatic.     Patient reports that she has a long-standing history of hypothyroid for which she follows with an outside allergist.  Current regimen includes Synthroid 25 mcg daily.  She denies any current signs or symptoms of thyroid dysfunction.         With regard to the essential hypertension, her current cardiac medication regimen includes a diuretic ( HCTZ 25 mg daily ) and a calcium channel blocker ( Amlodipine 5 mg daily ).  Compliance with treatment has been good.  She is tolerating the medication well without side effects.  She has not kept a blood pressure diary, but states that pressures have been okay.  Denies headache, blurry vision, chest pain, palpiations, shortness of breath or edema         Additionally, she presents with history of generalized anxiety disorder.  her symptom complex includes fear of dying.  The frequency symptoms is several times per month.  Current  treatment includes a p.r.n.-dosed benzodiazepine and Zoloft.  Overall she says her symptoms are stable.      Patient is on several medications that are considered high risk.  Her current regimen includes Klonopin 1 mg daily as needed, Norco 10 mg / 325 mg 4 times daily as needed and gabapentin 600 mg 3 times daily.  She notes that she has experienced occasional falls over the last several years but these are infrequent.  She also notes that she has not seriously injured herself during any of these incidents. She is willing to sign a pain agreement today and undergo urine drug screening.     ROS:     CONSTITUTIONAL:  Negative for chills, fatigue, fever, and weight change.      EYES:  Negative for blurred vision.      CARDIOVASCULAR:  Negative for chest pain, dizziness, palpitations and edema.      RESPIRATORY:  Negative for dyspnea and cough.      GASTROINTESTINAL:  Negative for abdominal pain, diarrhea, heartburn, nausea and vomiting.      GENITOURINARY:  Negative for dysuria, hematuria and polyuria.      MUSCULOSKELETAL:  Positive for back pain.      NEUROLOGICAL:  Negative for headaches, paresthesias and weakness.      ENDOCRINE:  Negative for hair loss, heat/cold intolerance, polydipsia, and polyphagia.      PSYCHIATRIC:  Positive for anxiety.   Negative for depression or suicidal thoughts.          PMH/FMH/SH:     Last Reviewed on 9/13/2019 02:22 PM by Elkin Bear    Past Medical History:             PAST MEDICAL HISTORY         Positive for    Hypertension;     Postive for    Gastroesophageal Reflux Disease;     Positive for    Chronic Pain: affecting the low back; ;     Positive for    Hypothyroidism;     Positive for    Anxiety;         PREVENTIVE HEALTH MAINTENANCE             BONE DENSITY: Reported up to date; records requested     COLORECTAL CANCER SCREENING: Reported up to date; records requested     Hepatitis C Medicare Screening: Never drawn     LOW DOSE CT: has never been done     MAMMOGRAM: Reported  up to date; records requested     PAP SMEAR: No longer indicated due to age and history         Surgical History:         Positive for    Hysterectomy and    Tonsillectomy; ;         Family History:     Father: Lung Cancer     Brother(s): Type 2 Diabetes     Sister(s): Lung Cancer     Daughter(s): Healthy         Social History:     Occupation: Retired (Prior occupation: [employer] - )     Marital Status:      Children: 1 child         Tobacco/Alcohol/Supplements:     Last Reviewed on 9/13/2019 02:22 PM by Elkin Bear    Tobacco: Current Smoker: She currently smokes every day, 1 pack per day.          Substance Abuse History:     Last Reviewed on 9/13/2019 02:22 PM by Elkin Bear    None         Mental Health History:     Last Reviewed on 9/13/2019 02:22 PM by Elkin Bear        Generalized Anxiety Disorder         Communicable Diseases (eg STDs):     Last Reviewed on 9/13/2019 02:22 PM by Elkin Bear    Reportable health conditions; NEGATIVE             Current Problems:     Last Reviewed on 9/13/2019 02:22 PM by Elkin Bear    Use of high risk medications     Generalized anxiety disorder     Essential hypertension     Hypothyroidism     GERD     Low back pain     Chronic low back pain     Screening for depression         Immunizations:     None        Allergies:     Last Reviewed on 9/13/2019 02:22 PM by Elkin Bear    Zithromax Z-Vic:        Current Medications:     Last Reviewed on 9/13/2019 02:22 PM by Elkin Bear    Amlodipine  5mg Tablet 1 tab daily     Atorvastatin Calcium 20mg Tablet 1 tab daily     Clonazepam 1mg Tablet 1 po q day prn     Gabapentin 600mg Tablet 1 po tid     Hydrochlorothiazide (HCTZ) 25mg Tablet 1 tab daily     Hydrocodone/Acetaminophen 10mg/325mg Tablet 1 QID PRN     Levothyroxine Sodium 0.125mg Tablet 1 tab daily     Meloxicam 15mg Tablet 1 tab daily     Omeprazole 20mg Capsules, Extended Release 1 capsule daily     Sertraline HCl 100mg Tablet 1 po bid          "OBJECTIVE:        Vitals:         Current: 9/13/2019 9:01:50 AM    Ht:  5 ft, 3.5 in;  Wt: 155.4 lbs;  BMI: 27.1    T: 97.9 F (oral);  BP: 93/59 mm Hg (left arm, sitting);  P: 74 bpm (left arm (BP Cuff), sitting)        Repeat:     9:03:13 AM     BP:   110/60mm Hg (left arm, sitting, 74)         Exams:     PHYSICAL EXAM:     GENERAL: vital signs recorded - well developed, well nourished;  no apparent distress;     EYES: conjunctiva and cornea are normal;     RESPIRATORY: Clear to auscultation bilaterally; no rales (\"crackles\") present; no rhonchi; no wheezes;     CARDIOVASCULAR: normal rate; rhythm is regular;  No murmurs. clicks, gallops or rubs appreciated; no edema;     GASTROINTESTINAL: nontender; Soft and nondistended; normal bowel sounds; no organomegaly; no masses;     BREAST/INTEGUMENT: No significant rashes, lesions or suspicious moles within limits of examination;     NEUROLOGIC: Grossly intact; mental status: alert and oriented x 3;     PSYCHIATRIC: appropriate affect and demeanor; normal speech pattern; Normal behavior;         Lab/Test Results:             Amphetamines Screen, Urin:  Negative (09/13/2019),     BAR-Barbiturates Screen, Urin:  Negative (09/13/2019),     Buprenorphine:  Negative (09/13/2019),     BZO-Benzodiazepines Screen,Ur:  Negative (09/13/2019),     Cocaine(Metab.)Screen, Ur:  Negative (09/13/2019),     MDMA-Ecstasy:  Negative (09/13/2019),     Met-Methamphetamine:  Negative (09/13/2019),     MTD-Methadone Screen, Urine:  Negative (09/13/2019),     Opiate Screen, Urine:  Positive (09/13/2019),     OXY-Oxycodone:  Negative (09/13/2019),     PCP-Phencyclidine Screen, Uri:  Negative (09/13/2019),     THC Cannabinoids Screen, Urin:  Negative (09/13/2019),     Urine temperature:  confirmed (09/13/2019),     Date and time of last pill:  Hydrocodone 9/13/19 @ 0700, gabapentin 9/13/19 @ 0700, clonazepam 9/13/19 @ 0700   /mnp (09/13/2019),     Performed by:  tls (09/13/2019),     Collection " Time:  0955 (09/13/2019),             ASSESSMENT           401.1   I10  Essential hypertension - Stable and at goal              DDx:     724.2   M54.5  Low back pain - stable              DDx:     530.81   K21.9  GERD - stable              DDx:     244.9   E03.9  Hypothyroidism - stable              DDx:     300.02   F41.9  Generalized anxiety disorder - stable              DDx:     V58.69   Z79.891  Use of high risk medications - stable - Urine drug screen appropriate              DDx:     V79.0   Z13.31  Screening for depression - No depression              DDx:         ORDERS:         Lab Orders:       [id number]  Drug test prsmv qual dir optical obs per day  (In-House)         FUTURE  Future order to be done at patients convenience  (Send-Out)         40101  TSH - [facility] TSH  (Send-Out)         FUTURE  Future order to be done at patients convenience  (Send-Out)         57473  [facility] CMP AND LIPID: [id number], [id number]  (Send-Out)         APPTO  Appointment need  (In-House)           Other Orders:         Depression screen negative  (In-House)         1100F  Pt screen for fall risk; document 2+ falls in the past yr or any fall w/injury in past year (RAJEEV)  (In-House)                   PLAN:          Essential hypertension - Continue current medication regimen of amlodipine 5 mg daily and hydrochlorothiazide 25 mg daily. CMP and lipid panel ordered and patient will return to have these drawn when fasting. She will return in 2 weeks for Medicare wellness visit.     MIPS Vaccines Flu and Pneumonia updated in Shot record     FOLLOW-UP: Schedule a follow-up appointment in 2 weeks.:.:for Medicare Wellness Visit     FOLLOW-UP TESTING #1: FOLLOW-UP LABORATORY:  Labs to be scheduled in the future include HTN/Lipid Panel: CMP, Lipid.            Orders:       FUTURE  Future order to be done at patients convenience  (Send-Out)         69902  [facility] CMP AND LIPID: [id number]  (Send-Out)         APPTO  Appointment need   (In-House)            Low back pain - Continue current medication regimen of gabapentin 600 mg 3 times daily, Norco 10 mg / 325 mg 4 times daily as needed and Mobic 15 mg daily. Will attempt to wean Norco dosage in the future as able.     MIPS Has fallen 2+ times in the past year or had one fall with an injury in the past year   Smoking cessation encouraged. Counseling for less than 3 minutes.            Orders:       1100F  Pt screen for fall risk; document 2+ falls in the past yr or any fall w/injury in past year (RAJEEV)  (In-House)            GERD - Continue current medication regimen of omeprazole 20 mg daily.          Hypothyroidism - Continue current medication regimen of Synthroid 125 mg daily.  TSH ordered. Continue to follow-up with endocrinology as directed.         FOLLOW-UP TESTING #1: FOLLOW-UP LABORATORY:  Labs to be scheduled in the future include TSH.            Orders:       FUTURE  Future order to be done at patients convenience  (Send-Out)         78879  TSH - [facility] TSH  (Send-Out)            Generalized anxiety disorder - Continue current medication regimen of Zoloft 100 mg twice daily and Klonopin 1 mg daily as needed.          Use of high risk medications     LABORATORY:  Labs ordered to be performed today include Drug screen.  Controlled substance documentation: Ángel reviewed; drug screen performed and appropriate; consent is reviewed and signed and on the chart.  She is aware of risk of addiction on this medication, understands that she will need to follow up for a review every 3 months and her medications will be adjusted or decreased as deemed appropriate at each visit.  No history of drug or alcohol abuse.  No concerns about diversion or abuse. She denies side effects related to the medication.  She is aware that she may be called in for pill counts.  The dosing of this medication will be reviewed on a regular basis and reduced if possible..  Ongoing use of a controlled substance is  necessary for this patient to have a normal quality of life           Orders:       [id number]  Drug test prsmv qual dir optical obs per day  (In-House)            Screening for depression     MIPS PHQ-9 Depression Screening: Completed form scanned and in chart; Total Score 5; Positive Depression Screen but after further evaluation the patient does not have a diagnosis of depression.              Other Orders:         Depression screen negative  (In-House)           Patient Recommendations:        For  Essential hypertension:     Schedule a follow-up visit in 2 weeks.                APPOINTMENT INFORMATION:        Monday Tuesday Wednesday Thursday Friday Saturday Sunday            Time:___________________AM  PM   Date:_____________________         The following laboratory testing has been ordered:         For  Hypothyroidism:             The following laboratory testing has been ordered: TSH             CHARGE CAPTURE           **Please note: ICD descriptions below are intended for billing purposes only and may not represent clinical diagnoses**        Primary Diagnosis:         401.1 Essential hypertension            I10    Essential (primary) hypertension              Orders:          [id number]   Office visit - new pt, level 3  (In-House)             APPTO   Appointment need  (In-House)           724.2 Low back pain            M54.5    Low back pain              Orders:          1100F   Pt screen for fall risk; document 2+ falls in the past yr or any fall w/injury in past year (RAJEEV)  (In-House)           530.81 GERD            K21.9    Gastro-esophageal reflux disease without esophagitis    244.9 Hypothyroidism            E03.9    Hypothyroidism, unspecified    300.02 Generalized anxiety disorder            F41.9    Anxiety disorder, unspecified    V58.69 Use of high risk medications            Z79.891    Long term (current) use of opiate analgesic              Orders:          85202   Drug test prsmv qual dir  optical obs per day  (In-House)           V79.0 Screening for depression            Z13.31    Encounter for screening for depression        Other Orders:              Depression screen negative  (In-House)           ADDENDUMS:      ____________________________________    Addendum: 09/17/2019 01:12 PM - Elkin Bear        Remove [id number]    Add [id number]    -MJA

## 2021-05-18 NOTE — PROGRESS NOTES
Odalis Espino  1950     Office/Outpatient Visit    Visit Date: Thu, Jan 16, 2020 11:29 am    Provider: Elkin Bear MD (Assistant: Spurling, Sarah C, MA)    Location: [facility]        Electronically signed by Elkin Bear MD on  01/16/2020 06:19:40 PM                             Subjective:        CC: Ms. Espino is a 69 year old White female.  This is a follow-up visit.  routine follow up - talk about meds.;         HPI:       Patient reports that her anxiety is currently stable.  Her current regimen includes Zoloft 100 mg twice daily and Klonopin 1 mg daily as needed.  She says her mood is stable. She denies SI or HI.          Additionally, she presents with history of essential (primary) hypertension.  her current cardiac medication regimen includes a diuretic ( HCTZ 25 mg daily ) and a calcium channel blocker ( Amlodipine 5 mg daily ).  Compliance with treatment has been good.  She is tolerating the medication well without side effects.  She has not kept a blood pressure diary, but states that pressures have been okay.  Denies headache, blurry vision, chest pain, palpiations, shortness of breath or edema       Patient reports that her reflux has been stable.  Dysphagia that she had mentioned at last visit has improved.  She never did end up seeing gastroenterology for an endoscopy.  Her current regimen includes omeprazole 20 mg daily.  No abdominal pain, nausea, vomiting, melena or hematochezia.          In regard to the hypothyroidism, unspecified, she is currently taking Synthroid, 125 mcg daily.  The result was reported as normal ( 0.996 on 10/23/2019 mU/L ).  She denies any related symptoms.  She reports no symptoms suggestive of adverse medication effect.        Patient has a longstanding history of chronic low back pain for which she follows with a pain clinic in [facility].  She says her pain is acutely worsened over the last 1 week.  She says that the right side of  her lower back feels extremely tight and the pain radiates down the back of her right leg. She denies any known inciting event or injury.  Her current regimen includes gabapentin 600 mg 3 times daily, Norco  mg 4 times daily and Mobic 15 mg daily she denies weakness or numbness/tingling.  No saddle anesthesia or bowel/bladder incontinence. She says that she would like to transfer to a pain clinic closer to her home here in [place].       Patient is overdue for breast cancer screening is willing to have a mammogram ordered today.    ROS:     CONSTITUTIONAL:  Negative for chills, fatigue, fever, and weight change.      EYES:  Negative for blurred vision.      CARDIOVASCULAR:  Negative for chest pain, dizziness, palpitations and edema.      RESPIRATORY:  Negative for dyspnea and cough.      GASTROINTESTINAL:  Negative for abdominal pain, dysphagia, diarrhea, heartburn, nausea and vomiting.      MUSCULOSKELETAL:  Positive for back pain.      NEUROLOGICAL:  Negative for headaches, paresthesias and weakness.      PSYCHIATRIC:  Positive for anxiety.   Negative for depression or suicidal thoughts.          Past Medical History / Family History / Social History:         Last Reviewed on 2020 06:19 PM by Elkin Bear    Past Medical History:             PAST MEDICAL HISTORY         Positive for    Hypertension;     Postive for    Gastroesophageal Reflux Disease;     Positive for    Chronic Pain: affecting the low back; ;     Positive for    Hypothyroidism;     Positive for    Anxiety;         PREVENTIVE HEALTH MAINTENANCE             BONE DENSITY: Reported up to date; records requested     COLORECTAL CANCER SCREENING: Reported up to date; records requested     DENTAL CLEANIN years ago     EYE EXAM: was last done one year ago     Hepatitis C Medicare Screening: Never drawn     LOW DOSE CT: has never been done     MAMMOGRAM: was last done 10/25/18 with normal results The next one is due  10/25/19-10/25/20      PAP SMEAR: No longer indicated due to age and history         Surgical History:         Positive for    Hysterectomy and    Tonsillectomy; ;         Family History:     Father: Lung Cancer     Brother(s): Type 2 Diabetes     Sister(s): Lung Cancer     Daughter(s): Healthy         Social History:     Occupation: Retired (Prior occupation: [employer] - )     Marital Status:      Children: 1 child         Tobacco/Alcohol/Supplements:     Last Reviewed on 1/16/2020 06:19 PM by Elkin Bear    Tobacco: Current Smoker: She currently smokes every day, 1 pack per day.          Substance Abuse History:     Last Reviewed on 1/16/2020 06:19 PM by Elkin Bear    None         Mental Health History:     Last Reviewed on 1/16/2020 06:19 PM by Elkin eBar        Generalized Anxiety Disorder         Communicable Diseases (eg STDs):     Last Reviewed on 1/16/2020 06:19 PM by Elkin Bear    Reportable health conditions; NEGATIVE         Current Problems:     Last Reviewed on 1/16/2020 06:19 PM by Elkin Bear    Anxiety disorder, unspecified    Essential (primary) hypertension    Gastro-esophageal reflux disease without esophagitis    Low back pain    Hypothyroidism, unspecified    Encounter for screening for depression    Long term (current) use of opiate analgesic    Other abnormal glucose    Encounter for screening mammogram for malignant neoplasm of breast    Hypokalemia        Immunizations:     Fluzone High-Dose pf (>=65 yr) 10/9/2019        Allergies:     Last Reviewed on 1/16/2020 06:19 PM by Elkin Bear    Zithromax Z-Vic:          Current Medications:     Last Reviewed on 1/16/2020 06:19 PM by Elkin Bear    Gabapentin 600 mg oral tablet [1 po tid]    hydroCHLOROthiazide 25 mg oral tablet [1 tab daily]    atorvastatin 20 mg oral tablet [1 tab daily]    HYDROcodone-acetaminophen  mg oral tablet [1 QID PRN]    sertraline 100 mg oral tablet [1 po bid]    Amlodipine  5 mg oral tablet [1 tab  "daily]    levothyroxine 125 mcg oral tablet [1 tab daily]    Meloxicam 15 mg oral tablet [1 tab daily]    Omeprazole 20 mg oral capsule,delayed release (enteric coated) [1 capsule daily]    clonazePAM 1 mg oral tablet [take 1 tablet (1 mg) by oral route daily]        Objective:        Vitals:         Current: 1/16/2020 11:36:27 AM    Ht:  5 ft, 3.5 in;  Wt: 156.4 lbs;  BMI: 27.3T: 98.3 F (oral);  BP: 140/87 mm Hg (right arm, sitting);  P: 65 bpm (right arm (BP Cuff), sitting);  sCr: 0.91 mg/dL;  GFR: 57.29        Exams:     PHYSICAL EXAM:     GENERAL: vital signs recorded - well developed, well nourished;  no apparent distress;     EYES: conjunctiva and cornea are normal;     E/N/T:  normal EACs, TMs, nasal/oral mucosa, teeth, gingiva, and oropharynx;     NECK:  supple, full ROM; no thyromegaly; no carotid bruits;     RESPIRATORY: CABL; no rales (\"crackles\") present; no rhonchi; no wheezes;     CARDIOVASCULAR: normal rate; rhythm is regular;  No murmurs. clicks, gallops or rubs appreciated; no edema;     BREAST/INTEGUMENT: No significant rashes, lesions or suspicious moles within limits of examination;     NEUROLOGIC: Grossly intact; mental status: alert and oriented x 3;     PSYCHIATRIC: appropriate affect and demeanor; normal speech pattern; Normal behavior;         Assessment:         F41.9   Anxiety disorder, unspecified       I10   Essential (primary) hypertension       K21.9   Gastro-esophageal reflux disease without esophagitis       E03.9   Hypothyroidism, unspecified       M54.5   Low back pain       Z12.31   Encounter for screening mammogram for malignant neoplasm of breast           ORDERS:         Meds Prescribed:       [New Rx] cyclobenzaprine 10 mg oral tablet [take 1 tablet (10 mg) by oral route 3 times per day as needed], #30 (thirty) tablets, Refills: 0 (zero)         Radiology/Test Orders:       [id number]  Screening digital breast tomosynthesis bi  (Send-Out)              Procedures Ordered:       " REFER  Referral to Specialist or Other Facility  (Send-Out)                      Plan:         Anxiety disorder, unspecifiedStable.  Continue Zoloft 100 mg twice daily and Klonopin 1 mg daily as needed        Essential (primary) hypertension- Borderline but stable.  Continue amlodipine 5 mg daily and hydrochlorothiazide 25 mg daily.        Gastro-esophageal reflux disease without esophagitis- Stable.  Continue omeprazole 20 mg daily.        Hypothyroidism, unspecified- Stable.  Continue Synthroid 125 mcg daily.        Low back pain- Acute on chronic.  Likely sciatica in the setting of degenerative changes.  Continue current regimen of gabapentin 600 mg 3 times daily, Norco  mg 4 times daily and Mobic 15 mg daily.  Will add Flexeril 10 mg 3 times daily as needed for muscle spasm.  We will also refer the patient, at her request, to a pain management clinic closer to her home.        REFERRALS:  Referral initiated to a chronic pain specialist ( [facility] ).            Prescriptions:       [New Rx] cyclobenzaprine 10 mg oral tablet [take 1 tablet (10 mg) by oral route 3 times per day as needed], #30 (thirty) tablets, Refills: 0 (zero)           Orders:       REFER  Referral to Specialist or Other Facility  (Send-Out)              Encounter for screening mammogram for malignant neoplasm of breast- Mammogram ordered.        RADIOLOGY:  I have ordered Mammogram Bilateral Screening 3D to be done today.            Orders:       [id number]  Screening digital breast tomosynthesis bi  (Send-Out)                  Charge Capture:         Primary Diagnosis:     F41.9  Anxiety disorder, unspecified           Orders:      [id number]  Office/outpatient visit; established patient, level 4  (In-House)              I10  Essential (primary) hypertension     K21.9  Gastro-esophageal reflux disease without esophagitis     E03.9  Hypothyroidism, unspecified     M54.5  Low back pain     Z12.31  Encounter for screening  mammogram for malignant neoplasm of breast

## 2021-06-07 ENCOUNTER — TELEPHONE (OUTPATIENT)
Dept: FAMILY MEDICINE CLINIC | Age: 71
End: 2021-06-07

## 2021-06-07 NOTE — TELEPHONE ENCOUNTER
Caller: Odalis Espino    Relationship: Self    Best call back number: [phone]    What is the best time to reach you: ANY    Who are you requesting to speak with (clinical staff, provider,  specific staff member): CLINICAL    What was the call regarding: PATIENT STATES HER MEDICATION CLONAZEPAM WAS DENIED AND WOULD LIKE TO DISCUSS WHY    Do you require a callback: YES

## 2021-06-08 ENCOUNTER — TELEPHONE (OUTPATIENT)
Dept: FAMILY MEDICINE CLINIC | Age: 71
End: 2021-06-08

## 2021-06-08 RX ORDER — GABAPENTIN 600 MG/1
TABLET ORAL
COMMUNITY
Start: 2021-05-05 | End: 2021-06-08 | Stop reason: SDUPTHER

## 2021-06-08 RX ORDER — CLONAZEPAM 1 MG/1
TABLET ORAL
COMMUNITY
Start: 2021-05-04 | End: 2021-06-08 | Stop reason: SDUPTHER

## 2021-06-08 RX ORDER — CLONAZEPAM 1 MG/1
1 TABLET ORAL DAILY
Qty: 30 TABLET | Refills: 0 | Status: SHIPPED | OUTPATIENT
Start: 2021-06-08 | End: 2021-07-07

## 2021-06-08 RX ORDER — GABAPENTIN 600 MG/1
600 TABLET ORAL 3 TIMES DAILY
Qty: 90 TABLET | Refills: 0 | Status: SHIPPED | OUTPATIENT
Start: 2021-06-08 | End: 2021-09-18

## 2021-06-08 NOTE — TELEPHONE ENCOUNTER
giuseppe added to chart, please resend rx of Klonopin and Gabapentin, [facility] cancelled rx from 6/3/21

## 2021-06-08 NOTE — TELEPHONE ENCOUNTER
Caller: Odalis Espino    Relationship: Self    Best call back number: [phone]    Who are you requesting to speak with (clinical staff, provider,  specific staff member): NADER    What was the call regarding: NEEDS A CALL BACK IN REGARDS TO HER MEDICATION REFILL REQUEST YESTERDAY AS IT WAS SENT TO WRONG PHARMACY    Do you require a callback: YES

## 2021-06-28 NOTE — TELEPHONE ENCOUNTER
LAST OV: 3/31/21  NEXT OV: 6/30/21  LAST LAB: 12/17/20    PLEASE SIGN OR ADVISE    Lab Results   Component Value Date    BUN 17 12/17/2020    CREATININE 1.03 (H) 12/17/2020    BCR 17 12/17/2020    K 3.3 (L) 12/17/2020    CO2 34 (H) 12/17/2020    CALCIUM 9.7 12/17/2020    ALBUMIN 4.5 12/17/2020    LABIL2 1.7 12/17/2020    AST 18 12/17/2020    ALT 11 12/17/2020     Lipid Panel    Lipid Panel 12/17/20   Total Cholesterol 164   Triglycerides 66   HDL Cholesterol 81 (A)   VLDL Cholesterol 13   LDL Cholesterol  70   (A) Abnormal value       Comments are available for some flowsheets but are not being displayed.

## 2021-06-29 RX ORDER — HYDROCHLOROTHIAZIDE 25 MG/1
TABLET ORAL
Qty: 90 TABLET | Refills: 0 | Status: SHIPPED | OUTPATIENT
Start: 2021-06-29 | End: 2021-09-24

## 2021-06-29 RX ORDER — OMEPRAZOLE 20 MG/1
CAPSULE, DELAYED RELEASE ORAL
Qty: 90 CAPSULE | Refills: 0 | Status: SHIPPED | OUTPATIENT
Start: 2021-06-29 | End: 2021-09-24

## 2021-07-01 VITALS
HEART RATE: 67 BPM | TEMPERATURE: 98.4 F | WEIGHT: 155.4 LBS | DIASTOLIC BLOOD PRESSURE: 63 MMHG | BODY MASS INDEX: 26.53 KG/M2 | HEIGHT: 64 IN | SYSTOLIC BLOOD PRESSURE: 117 MMHG

## 2021-07-01 VITALS
DIASTOLIC BLOOD PRESSURE: 60 MMHG | SYSTOLIC BLOOD PRESSURE: 110 MMHG | WEIGHT: 155.4 LBS | TEMPERATURE: 97.9 F | HEIGHT: 64 IN | HEART RATE: 74 BPM | BODY MASS INDEX: 26.53 KG/M2

## 2021-07-02 VITALS
SYSTOLIC BLOOD PRESSURE: 129 MMHG | HEIGHT: 64 IN | DIASTOLIC BLOOD PRESSURE: 74 MMHG | WEIGHT: 152.8 LBS | TEMPERATURE: 97.1 F | HEART RATE: 62 BPM | BODY MASS INDEX: 26.09 KG/M2

## 2021-07-02 VITALS
TEMPERATURE: 98.3 F | DIASTOLIC BLOOD PRESSURE: 87 MMHG | HEIGHT: 64 IN | BODY MASS INDEX: 26.7 KG/M2 | HEART RATE: 65 BPM | WEIGHT: 156.4 LBS | SYSTOLIC BLOOD PRESSURE: 140 MMHG

## 2021-07-02 VITALS
HEIGHT: 64 IN | DIASTOLIC BLOOD PRESSURE: 75 MMHG | TEMPERATURE: 97.1 F | BODY MASS INDEX: 25 KG/M2 | HEART RATE: 68 BPM | WEIGHT: 146.4 LBS | SYSTOLIC BLOOD PRESSURE: 126 MMHG

## 2021-07-07 RX ORDER — CLONAZEPAM 1 MG/1
TABLET ORAL
Qty: 30 TABLET | Refills: 0 | Status: SHIPPED | OUTPATIENT
Start: 2021-07-07 | End: 2021-08-10

## 2021-07-07 NOTE — TELEPHONE ENCOUNTER
LAST OV: 3/31/21  NEXT OV: 7/8/21  LAST REFILL: 6/8/21 #30  LAST UDS: 12/17/20    PLEASE SIGN OR ADVISE

## 2021-07-08 ENCOUNTER — OFFICE VISIT (OUTPATIENT)
Dept: FAMILY MEDICINE CLINIC | Age: 71
End: 2021-07-08

## 2021-07-08 ENCOUNTER — LAB (OUTPATIENT)
Dept: LAB | Facility: HOSPITAL | Age: 71
End: 2021-07-08

## 2021-07-08 VITALS
HEIGHT: 64 IN | BODY MASS INDEX: 23.63 KG/M2 | SYSTOLIC BLOOD PRESSURE: 129 MMHG | WEIGHT: 138.4 LBS | HEART RATE: 67 BPM | DIASTOLIC BLOOD PRESSURE: 67 MMHG

## 2021-07-08 DIAGNOSIS — H91.93 BILATERAL HEARING LOSS, UNSPECIFIED HEARING LOSS TYPE: ICD-10-CM

## 2021-07-08 DIAGNOSIS — E03.9 HYPOTHYROIDISM, UNSPECIFIED TYPE: ICD-10-CM

## 2021-07-08 DIAGNOSIS — Z72.0 TOBACCO USE: ICD-10-CM

## 2021-07-08 DIAGNOSIS — K21.9 GASTROESOPHAGEAL REFLUX DISEASE WITHOUT ESOPHAGITIS: ICD-10-CM

## 2021-07-08 DIAGNOSIS — I10 ESSENTIAL HYPERTENSION: ICD-10-CM

## 2021-07-08 DIAGNOSIS — E78.49 OTHER HYPERLIPIDEMIA: ICD-10-CM

## 2021-07-08 DIAGNOSIS — E11.9 TYPE 2 DIABETES MELLITUS WITHOUT COMPLICATION, WITHOUT LONG-TERM CURRENT USE OF INSULIN (HCC): ICD-10-CM

## 2021-07-08 DIAGNOSIS — M51.36 DEGENERATION OF LUMBAR INTERVERTEBRAL DISC: ICD-10-CM

## 2021-07-08 DIAGNOSIS — F41.9 ANXIETY: ICD-10-CM

## 2021-07-08 DIAGNOSIS — I10 ESSENTIAL HYPERTENSION: Primary | ICD-10-CM

## 2021-07-08 PROBLEM — M51.369 DEGENERATION OF LUMBAR INTERVERTEBRAL DISC: Status: ACTIVE | Noted: 2021-07-08

## 2021-07-08 PROBLEM — Z79.4 ENCOUNTER FOR LONG-TERM (CURRENT) USE OF INSULIN: Status: RESOLVED | Noted: 2020-12-10 | Resolved: 2021-07-08

## 2021-07-08 PROBLEM — Z79.4 ENCOUNTER FOR LONG-TERM (CURRENT) USE OF INSULIN (HCC): Status: ACTIVE | Noted: 2020-12-10

## 2021-07-08 LAB
ALBUMIN SERPL-MCNC: 4.1 G/DL (ref 3.5–5.2)
ALBUMIN/GLOB SERPL: 1.8 G/DL
ALP SERPL-CCNC: 52 U/L (ref 39–117)
ALT SERPL W P-5'-P-CCNC: 12 U/L (ref 1–33)
ANION GAP SERPL CALCULATED.3IONS-SCNC: 11.5 MMOL/L (ref 5–15)
AST SERPL-CCNC: 19 U/L (ref 1–32)
BASOPHILS # BLD AUTO: 0.03 10*3/MM3 (ref 0–0.2)
BASOPHILS NFR BLD AUTO: 0.5 % (ref 0–1.5)
BILIRUB SERPL-MCNC: 0.4 MG/DL (ref 0–1.2)
BUN SERPL-MCNC: 18 MG/DL (ref 8–23)
BUN/CREAT SERPL: 13.5 (ref 7–25)
CALCIUM SPEC-SCNC: 8.5 MG/DL (ref 8.6–10.5)
CHLORIDE SERPL-SCNC: 103 MMOL/L (ref 98–107)
CHOLEST SERPL-MCNC: 141 MG/DL (ref 0–200)
CO2 SERPL-SCNC: 26.5 MMOL/L (ref 22–29)
CREAT SERPL-MCNC: 1.33 MG/DL (ref 0.57–1)
DEPRECATED RDW RBC AUTO: 44.7 FL (ref 37–54)
EOSINOPHIL # BLD AUTO: 0.14 10*3/MM3 (ref 0–0.4)
EOSINOPHIL NFR BLD AUTO: 2.2 % (ref 0.3–6.2)
ERYTHROCYTE [DISTWIDTH] IN BLOOD BY AUTOMATED COUNT: 13.2 % (ref 12.3–15.4)
GFR SERPL CREATININE-BSD FRML MDRD: 39 ML/MIN/1.73
GLOBULIN UR ELPH-MCNC: 2.3 GM/DL
GLUCOSE SERPL-MCNC: 97 MG/DL (ref 65–99)
HBA1C MFR BLD: 5.7 % (ref 4.8–5.6)
HCT VFR BLD AUTO: 31 % (ref 34–46.6)
HDLC SERPL-MCNC: 66 MG/DL (ref 40–60)
HGB BLD-MCNC: 10.3 G/DL (ref 12–15.9)
IMM GRANULOCYTES # BLD AUTO: 0 10*3/MM3 (ref 0–0.05)
IMM GRANULOCYTES NFR BLD AUTO: 0 % (ref 0–0.5)
LDLC SERPL CALC-MCNC: 60 MG/DL (ref 0–100)
LDLC/HDLC SERPL: 0.9 {RATIO}
LYMPHOCYTES # BLD AUTO: 1.21 10*3/MM3 (ref 0.7–3.1)
LYMPHOCYTES NFR BLD AUTO: 19.1 % (ref 19.6–45.3)
MCH RBC QN AUTO: 30.3 PG (ref 26.6–33)
MCHC RBC AUTO-ENTMCNC: 33.2 G/DL (ref 31.5–35.7)
MCV RBC AUTO: 91.2 FL (ref 79–97)
MONOCYTES # BLD AUTO: 0.36 10*3/MM3 (ref 0.1–0.9)
MONOCYTES NFR BLD AUTO: 5.7 % (ref 5–12)
NEUTROPHILS NFR BLD AUTO: 4.61 10*3/MM3 (ref 1.7–7)
NEUTROPHILS NFR BLD AUTO: 72.5 % (ref 42.7–76)
PLATELET # BLD AUTO: 145 10*3/MM3 (ref 140–450)
PMV BLD AUTO: 9.9 FL (ref 6–12)
POTASSIUM SERPL-SCNC: 3.6 MMOL/L (ref 3.5–5.2)
PROT SERPL-MCNC: 6.4 G/DL (ref 6–8.5)
RBC # BLD AUTO: 3.4 10*6/MM3 (ref 3.77–5.28)
SODIUM SERPL-SCNC: 141 MMOL/L (ref 136–145)
TRIGL SERPL-MCNC: 79 MG/DL (ref 0–150)
TSH SERPL DL<=0.05 MIU/L-ACNC: 0.04 UIU/ML (ref 0.27–4.2)
VLDLC SERPL-MCNC: 15 MG/DL (ref 5–40)
WBC # BLD AUTO: 6.35 10*3/MM3 (ref 3.4–10.8)

## 2021-07-08 PROCEDURE — 83036 HEMOGLOBIN GLYCOSYLATED A1C: CPT

## 2021-07-08 PROCEDURE — 85025 COMPLETE CBC W/AUTO DIFF WBC: CPT

## 2021-07-08 PROCEDURE — 99214 OFFICE O/P EST MOD 30 MIN: CPT | Performed by: FAMILY MEDICINE

## 2021-07-08 PROCEDURE — 84443 ASSAY THYROID STIM HORMONE: CPT

## 2021-07-08 PROCEDURE — 80061 LIPID PANEL: CPT

## 2021-07-08 PROCEDURE — 36415 COLL VENOUS BLD VENIPUNCTURE: CPT

## 2021-07-08 PROCEDURE — 80053 COMPREHEN METABOLIC PANEL: CPT

## 2021-07-08 RX ORDER — HYDROCODONE BITARTRATE AND ACETAMINOPHEN 10; 325 MG/1; MG/1
3 TABLET ORAL EVERY 8 HOURS PRN
COMMUNITY
Start: 2021-06-14

## 2021-07-08 RX ORDER — SERTRALINE HYDROCHLORIDE 100 MG/1
1 TABLET, FILM COATED ORAL DAILY
COMMUNITY
Start: 2021-05-25 | End: 2022-01-05 | Stop reason: SDUPTHER

## 2021-07-08 RX ORDER — MELOXICAM 15 MG/1
TABLET ORAL
COMMUNITY
Start: 2021-06-28 | End: 2022-01-05 | Stop reason: SDUPTHER

## 2021-07-08 RX ORDER — LEVOTHYROXINE SODIUM 0.12 MG/1
1 TABLET ORAL DAILY
COMMUNITY
Start: 2021-05-25 | End: 2022-01-05 | Stop reason: SDUPTHER

## 2021-07-08 RX ORDER — AMLODIPINE BESYLATE 5 MG/1
1 TABLET ORAL DAILY
COMMUNITY
Start: 2021-05-25 | End: 2022-01-05 | Stop reason: SDUPTHER

## 2021-07-08 RX ORDER — ATORVASTATIN CALCIUM 20 MG/1
1 TABLET, FILM COATED ORAL NIGHTLY
COMMUNITY
Start: 2021-05-25 | End: 2022-01-05 | Stop reason: SDUPTHER

## 2021-07-08 NOTE — PROGRESS NOTES
[facility]  Office Visit Note    Odalis Espino presents to [facility]  Encounter Date: 07/08/2021     Chief Complaint  Hyperlipidemia (Follow up), Hyperthyroidism (Follow up), and Hypertension (Follow up)    Subjective    History of Present Illness:    1.  Hypertension: Pertaining hypertension, Odalis reports that she does not check her blood pressure regularly when she does it has been good.  Her current regimen includes amlodipine 5 mg daily and HCTZ 25 mg daily.  2.  Hyperlipidemia: Pertaining to hyperlipidemia, Odalis reports that she has been taking Lipitor 20 mg daily.  Her last lipid panel showed good control in December 2020.  3.  Hypothyroidism: Pertaining to thyroid dysfunction, Odalis takes Synthroid 125 mcg daily with which she reports compliance without adverse effects.  Her last TSH was normal in December 2020.  She denies signs or symptoms of thyroid dysfunction.  4.  DM2: Pertaining to type 2 diabetes, Odalis does not check her blood sugar regularly.  Her current regimen includes Metformin 500 mg twice daily.  Her last A1c was 6.5% on 12/17/2020.  5.  GERD: Pertaining to GERD, Odalis reports that her sx have been stable. Her current regimen includes omeprazole 20 mg daily.  No dysphagia,  abdominal pain, nausea, vomiting, melena or hematochezia.  6.  Anxiety: Pertaining to anxiety, Odalis reports that her sx are currently stable.  Her current regimen includes Zoloft 100 mg twice daily and Klonopin 1 mg daily as needed.  She says her mood is stable. She denies SI or HI.  7.  Chronic back pain:  Odalis has a longstanding history of chronic low back pain. Her symptoms are daily but are stable. She follows with a [facility] management  [facility] Her current regimen includes gabapentin 600 mg 3 times daily, Norco  mg 4 times daily and Mobic 15 mg. daily.  She denies weakness or numbness/tingling.  No saddle anesthesia or  "bowel/bladder incontinence.  8.  Hearing loss: Odalis reports for the last several months she has been experiencing progressive hearing loss in both her ears.  She denies tinnitus.  No drainage.  No exposure to loud noises regularly.    Review of Systems:  Review of Systems   Constitutional: Positive for fatigue. Negative for chills, fever and unexpected weight loss.   HENT: Positive for hearing loss. Negative for tinnitus and trouble swallowing.    Eyes: Negative for blurred vision.   Respiratory: Negative for cough and shortness of breath.    Cardiovascular: Negative for chest pain, palpitations and leg swelling.   Gastrointestinal: Positive for GERD. Negative for abdominal pain, blood in stool, constipation, diarrhea, nausea and vomiting.   Endocrine: Negative for cold intolerance, heat intolerance, polydipsia, polyphagia and polyuria.   Musculoskeletal: Positive for back pain.   Neurological: Negative for dizziness, weakness, numbness and headache.   Psychiatric/Behavioral: Negative for sleep disturbance, suicidal ideas and depressed mood. The patient is nervous/anxious.         Objective   Vital Signs:  /67 (BP Location: Right arm, Patient Position: Sitting, Cuff Size: Adult)   Pulse 67   Ht 161.3 cm (63.5\")   Wt 62.8 kg (138 lb 6.4 oz)   BMI 24.13 kg/m²      Physical Examination:  Physical Exam  Vitals reviewed.   Constitutional:       General: She is not in acute distress.     Appearance: Normal appearance.   HENT:      Head: Normocephalic and atraumatic.      Right Ear: Tympanic membrane, ear canal and external ear normal.      Left Ear: Tympanic membrane, ear canal and external ear normal.   Eyes:      Conjunctiva/sclera: Conjunctivae normal.   Cardiovascular:      Rate and Rhythm: Normal rate and regular rhythm.      Heart sounds: No murmur heard.     Pulmonary:      Effort: Pulmonary effort is normal. No respiratory distress.      Breath sounds: Normal breath sounds.   Musculoskeletal:      " Right lower leg: No edema.      Left lower leg: No edema.   Skin:     General: Skin is warm and dry.      Findings: No rash.   Neurological:      General: No focal deficit present.      Mental Status: She is alert and oriented to person, place, and time.   Psychiatric:         Mood and Affect: Mood normal.         Behavior: Behavior normal.         Result Review   The following data was reviewed by: Elkin Bear MD on 07/08/2021:  TSH (12/17/2020 12:04)  Lipid Panel (12/17/2020 12:04)  Comprehensive Metabolic Panel (12/17/2020 12:04)  Hemoglobin A1C With EAG (12/17/2020 12:04)    Procedures:    No procedures associated with this visit.     Assessment and Plan:  Diagnoses and all orders for this visit:    1. Essential hypertension (Primary)  Assessment & Plan:  Stable.  Continue amlodipine 5 mg daily and HCTZ 25 mg daily.    Orders:  -     TSH; Future  -     Comprehensive metabolic panel; Future    2. Other hyperlipidemia  Assessment & Plan:  Stable.  Continue Lipitor 20 mg daily.    Orders:  -     Lipid panel; Future    3. Hypothyroidism, unspecified type  Assessment & Plan:  Stable.  Continue Synthroid 125 micrograms daily.    Orders:  -     TSH; Future  -     CBC w AUTO Differential; Future    4. Type 2 diabetes mellitus without complication, without long-term current use of insulin (CMS/[facility])  Assessment & Plan:  Controlled.  Continue Metformin 500 mg twice daily.    Orders:  -     Hemoglobin A1c; Future    5. Gastroesophageal reflux disease without esophagitis  Assessment & Plan:  Stable.  Continue omeprazole 20 mg daily.      6. Anxiety  Assessment & Plan:  Stable.  Continue Zoloft 100 mg twice daily and Klonopin 1 mg daily as needed.      7. Degeneration of lumbar intervertebral disc  Assessment & Plan:  Chronic/persistent.  Continue gabapentin 60 mg 3 times daily, Norco  mg 4 times daily and Mobic 15 mg daily.  Continue to follow with pain management as directed.      8. Tobacco use  - Odalis Espino   reports that she has been smoking cigarettes. She has a 40.00 pack-year smoking history. She has never used smokeless tobacco.. I have educated her on the risk of diseases from using tobacco products such as cancer, COPD and heart disease.     I advised her to quit and she is not willing to quit.    I spent 3  minutes counseling the patient.       9. Bilateral hearing loss, unspecified hearing loss type  -     Ambulatory Referral to Audiology      Return in about 6 months (around 1/8/2022).    Patient was given instructions and counseling regarding her condition or for health maintenance advice. Please see specific information pulled into the AVS if appropriate.      Elkin Bear MD   7/8/2021

## 2021-07-08 NOTE — ASSESSMENT & PLAN NOTE
Chronic/persistent.  Continue gabapentin 60 mg 3 times daily, Norco  mg 4 times daily and Mobic 15 mg daily.  Continue to follow with pain management as directed.

## 2021-07-09 DIAGNOSIS — E03.9 HYPOTHYROIDISM, UNSPECIFIED TYPE: Primary | ICD-10-CM

## 2021-07-09 DIAGNOSIS — R79.89 ELEVATED SERUM CREATININE: ICD-10-CM

## 2021-07-09 DIAGNOSIS — D64.9 ANEMIA, UNSPECIFIED TYPE: ICD-10-CM

## 2021-08-03 ENCOUNTER — TELEPHONE (OUTPATIENT)
Dept: FAMILY MEDICINE CLINIC | Age: 71
End: 2021-08-03

## 2021-08-03 NOTE — TELEPHONE ENCOUNTER
Caller: Odalis Espino    Relationship: Self    Best call back number: [phone]     What is the medical concern/diagnosis: HEARING    What specialty or service is being requested: ENT    What is the provider, practice or medical service name:     What is the office location:     What is the office phone number:     Any additional details: PLEASE CALL AND ADVISE

## 2021-08-04 NOTE — TELEPHONE ENCOUNTER
Call to pt who stated she didn't know who referral was to for her hearing loss, she is still waiting on this referral, inf pt someone would be contacting her, will forward to referrals so they can update pt.

## 2021-08-09 DIAGNOSIS — F41.9 ANXIETY: Primary | ICD-10-CM

## 2021-08-10 RX ORDER — CLONAZEPAM 1 MG/1
TABLET ORAL
Qty: 30 TABLET | Refills: 0 | Status: SHIPPED | OUTPATIENT
Start: 2021-08-10 | End: 2021-09-16 | Stop reason: SDUPTHER

## 2021-08-12 ENCOUNTER — TELEPHONE (OUTPATIENT)
Dept: FAMILY MEDICINE CLINIC | Age: 71
End: 2021-08-12

## 2021-08-12 NOTE — TELEPHONE ENCOUNTER
Hub staff attempted to follow warm transfer process and was unsuccessful     Caller: Odalis Espino    Relationship to patient: Self    Best call back number: [phone]    Patient is needing: MAGDALENOEINLONNY STATED: AT LAST APPOINTMENT REFERRAL ABOUT HEARING LOSS WAS MENTIONED AND SHE HAS NOT RECEIVED INFORMATION ABOUT THIS, REQUESTING CALL BACK

## 2021-08-17 ENCOUNTER — TELEPHONE (OUTPATIENT)
Dept: OTOLARYNGOLOGY | Facility: CLINIC | Age: 71
End: 2021-08-17

## 2021-08-17 NOTE — TELEPHONE ENCOUNTER
Left message with patient to let them know we had to reschedule her appointment on 8/19/2021 to 8/31/2021 due to no audiologist here that day.

## 2021-08-31 ENCOUNTER — OFFICE VISIT (OUTPATIENT)
Dept: OTOLARYNGOLOGY | Facility: CLINIC | Age: 71
End: 2021-08-31

## 2021-08-31 ENCOUNTER — PROCEDURE VISIT (OUTPATIENT)
Dept: OTOLARYNGOLOGY | Facility: CLINIC | Age: 71
End: 2021-08-31

## 2021-08-31 VITALS — HEIGHT: 64 IN | TEMPERATURE: 96.9 F | WEIGHT: 134.2 LBS | BODY MASS INDEX: 22.91 KG/M2

## 2021-08-31 DIAGNOSIS — H90.3 SENSORINEURAL HEARING LOSS (SNHL) OF BOTH EARS: Primary | ICD-10-CM

## 2021-08-31 DIAGNOSIS — H90.3 SENSORY HEARING LOSS, BILATERAL: ICD-10-CM

## 2021-08-31 DIAGNOSIS — H61.21 IMPACTED CERUMEN OF RIGHT EAR: ICD-10-CM

## 2021-08-31 PROCEDURE — 99202 OFFICE O/P NEW SF 15 MIN: CPT | Performed by: NURSE PRACTITIONER

## 2021-08-31 PROCEDURE — 69210 REMOVE IMPACTED EAR WAX UNI: CPT | Performed by: NURSE PRACTITIONER

## 2021-08-31 PROCEDURE — 92557 COMPREHENSIVE HEARING TEST: CPT | Performed by: AUDIOLOGIST

## 2021-08-31 PROCEDURE — 92567 TYMPANOMETRY: CPT | Performed by: AUDIOLOGIST

## 2021-08-31 NOTE — PROGRESS NOTES
Patient Name: Odalis Espino   Visit Date: 08/31/2021   Patient ID: [id number]  Provider: BAMBI Stone    Sex: female  Location: [facility]   YOB: 1950  Location Address: [address] [place] [address], [address],?[place]?[address]    Primary Care Provider Elkin Bear MD  Location Phone: [phone]    Referring Provider: Elkin Bear MD        Chief Complaint  Hearing Loss    Subjective   Odalis Espino is a 71 y.o. female who presents to [facility] today as a consult from Elkin Bear MD for evaluation of her hearing.  She states that for several years she has noticed a decline in her hearing.  She feels like it has been worsening over the past year and she has noticed that she is having a difficult time understanding people that are wearing a mask.  She denies any tinnitus symptoms.  She denies any recurrent infection, otalgia or otorrhea.  She has never had any ear surgeries.  She denies significant noise exposure history.      Current Outpatient Medications on File Prior to Visit   Medication Sig   • amLODIPine (NORVASC) 5 MG tablet Take 1 tablet by mouth Daily.   • atorvastatin (LIPITOR) 20 MG tablet Take 1 tablet by mouth Every Night.   • clonazePAM (KlonoPIN) 1 MG tablet TAKE ONE TABLET BY MOUTH DAILY   • hydroCHLOROthiazide (HYDRODIURIL) 25 MG tablet TAKE 1 TABLET DAILY   • HYDROcodone-acetaminophen (NORCO)  MG per tablet    • levothyroxine (SYNTHROID, LEVOTHROID) 125 MCG tablet Take 1 tablet by mouth Daily.   • meloxicam (MOBIC) 15 MG tablet    • metFORMIN (GLUCOPHAGE) 500 MG tablet TAKE 2 TABLETS TWICE A DAY   • omeprazole (priLOSEC) 20 MG capsule TAKE 1 CAPSULE DAILY   • sertraline (ZOLOFT) 100 MG tablet Take 1 tablet by mouth Daily.   • gabapentin (NEURONTIN) 600 MG tablet Take 1 tablet by mouth 3 (Three) Times a Day for 30 days.     No current facility-administered medications on file prior to visit.        Social History  "    Tobacco Use   • Smoking status: Current Every Day Smoker     Packs/day: 1.00     Years: 40.00     Pack years: 40.00     Types: Cigarettes   • Smokeless tobacco: Never Used   Vaping Use   • Vaping Use: Never used   Substance Use Topics   • Alcohol use: Yes     Alcohol/week: 1.0 standard drinks     Types: 1 Glasses of wine per week   • Drug use: Never       Objective     Vital Signs:   Temp 96.9 °F (36.1 °C) (Temporal)   Ht 161.3 cm (63.5\")   Wt 60.9 kg (134 lb 3.2 oz)   BMI 23.40 kg/m²       Physical Exam  Constitutional:       General: She is not in acute distress.     Appearance: Normal appearance. She is not ill-appearing.   HENT:      Head: Normocephalic and atraumatic.      Jaw: There is normal jaw occlusion. No tenderness or pain on movement.      Salivary Glands: Right salivary gland is not diffusely enlarged or tender. Left salivary gland is not diffusely enlarged or tender.      Right Ear: Tympanic membrane, ear canal and external ear normal. There is impacted cerumen.      Left Ear: Tympanic membrane, ear canal and external ear normal.      Nose: Nose normal. No septal deviation.      Right Sinus: No maxillary sinus tenderness or frontal sinus tenderness.      Left Sinus: No maxillary sinus tenderness or frontal sinus tenderness.      Mouth/Throat:      Lips: Pink. No lesions.      Mouth: Mucous membranes are moist. No oral lesions.      Dentition: Normal dentition.      Tongue: No lesions.      Palate: No mass and lesions.      Pharynx: Oropharynx is clear. Uvula midline.      Tonsils: No tonsillar exudate.   Eyes:      Extraocular Movements: Extraocular movements intact.      Conjunctiva/sclera: Conjunctivae normal.      Pupils: Pupils are equal, round, and reactive to light.   Neck:      Thyroid: No thyroid mass, thyromegaly or thyroid tenderness.      Trachea: Trachea normal.   Pulmonary:      Effort: Pulmonary effort is normal. No respiratory distress.   Musculoskeletal:         General: Normal " range of motion.      Cervical back: Normal range of motion and neck supple. No tenderness.   Lymphadenopathy:      Cervical: No cervical adenopathy.   Skin:     General: Skin is warm and dry.   Neurological:      General: No focal deficit present.      Mental Status: She is alert and oriented to person, place, and time.      Cranial Nerves: No cranial nerve deficit.      Motor: No weakness.      Gait: Gait normal.   Psychiatric:         Mood and Affect: Mood normal.         Behavior: Behavior normal.         Thought Content: Thought content normal.         Judgment: Judgment normal.         Ear Cerumen Removal    Date/Time: 8/31/2021 1:46 PM  Performed by: Fe Jose APRN  Authorized by: Fe Jose APRN     Anesthesia:  Local Anesthetic: none  Location details: right ear  Patient tolerance: patient tolerated the procedure well with no immediate complications  Comments: Right cerumen impaction cleared under the microscope using alligator forceps  Procedure type: instrumentation   Sedation:  Patient sedated: no             Result Review :              Assessment and Plan    Diagnoses and all orders for this visit:    1. Sensorineural hearing loss (SNHL) of both ears (Primary)    2. Impacted cerumen of right ear    Other orders  -     Audiometry With Tympanometry; Future  -     Ear Cerumen Removal    Audiogram and tympanogram testing was performed in clinic today.  Audiogram shows a mild to severe sensorineural hearing loss bilaterally.  Speech reception threshold was at 35 dB on the right and 45 dB on the left.  Word discrimination scores 100% on the right ear at 75 dB and 96% on the left ear at 85 dB.  Tympanograms were normal bilaterally.  Based on her speech audiometry scores she is a good candidate for hearing aid and that is what I would recommend for her as I do think this would be beneficial for her.  I have given her copy of her audiogram and also information on the [facility] and she  will call for consultation.  I will plan to see her back on an as-needed basis.       Follow Up   No follow-ups on file.  Patient was given instructions and counseling regarding her condition or for health maintenance advice. Please see specific information pulled into the AVS if appropriate.      BAMBI Stone

## 2021-09-09 DIAGNOSIS — F41.9 ANXIETY: ICD-10-CM

## 2021-09-09 RX ORDER — CLONAZEPAM 1 MG/1
1 TABLET ORAL DAILY
Qty: 30 TABLET | Refills: 0 | Status: CANCELLED | OUTPATIENT
Start: 2021-09-09

## 2021-09-10 NOTE — TELEPHONE ENCOUNTER
Rx Refill Note  Requested Prescriptions     Pending Prescriptions Disp Refills   • clonazePAM (KlonoPIN) 1 MG tablet 30 tablet 0     Sig: Take 1 tablet by mouth Daily.      Last office visit with prescribing clinician: 7/8/2021      Next office visit with prescribing clinician: 1/11/2022            Catalina Bhatia  09/10/21, 10:43 EDT

## 2021-09-16 ENCOUNTER — TELEPHONE (OUTPATIENT)
Dept: FAMILY MEDICINE CLINIC | Age: 71
End: 2021-09-16

## 2021-09-16 DIAGNOSIS — F41.9 ANXIETY: ICD-10-CM

## 2021-09-16 DIAGNOSIS — M51.36 DEGENERATION OF LUMBAR INTERVERTEBRAL DISC: Primary | ICD-10-CM

## 2021-09-16 RX ORDER — CLONAZEPAM 1 MG/1
1 TABLET ORAL DAILY PRN
Qty: 30 TABLET | Refills: 0 | Status: SHIPPED | OUTPATIENT
Start: 2021-09-16 | End: 2021-10-18 | Stop reason: SDUPTHER

## 2021-09-16 NOTE — TELEPHONE ENCOUNTER
appt made for pt for uri on 9/17/21. Pt asking for refill on med, will send to on call provider.    Rx Refill Note  Requested Prescriptions     Pending Prescriptions Disp Refills   • clonazePAM (KlonoPIN) 1 MG tablet 30 tablet 0     Sig: Take 1 tablet by mouth Daily.      Last office visit with prescribing clinician: 3/31/21   Next office visit with prescribing clinician: Visit date not found   LAST FILLED-8/10/21  Kristie Schilling LPN  09/16/21, 16:08 EDT

## 2021-09-16 NOTE — TELEPHONE ENCOUNTER
Caller: Odalis Espino    Relationship to patient: Self    Best call back number:[phone]    COVID19 symptoms: CONGESTION    Date of initial quarantine: MONDAY    Additional information or concerns: PATIENT STATES THAT THERE IS A  IN THE HOME AND WANTS TO BE SAFE.     What is the patients preferred pharmacy: [facility] [address] [place] [address], [place] - [address]  - [phone]  - [phone]   [phone]

## 2021-09-17 ENCOUNTER — OFFICE VISIT (OUTPATIENT)
Dept: FAMILY MEDICINE CLINIC | Age: 71
End: 2021-09-17

## 2021-09-17 ENCOUNTER — HOSPITAL ENCOUNTER (OUTPATIENT)
Dept: GENERAL RADIOLOGY | Facility: HOSPITAL | Age: 71
Discharge: HOME | End: 2021-09-17
Admitting: NURSE PRACTITIONER

## 2021-09-17 VITALS
BODY MASS INDEX: 21.89 KG/M2 | WEIGHT: 128.2 LBS | HEIGHT: 64 IN | SYSTOLIC BLOOD PRESSURE: 148 MMHG | HEART RATE: 72 BPM | DIASTOLIC BLOOD PRESSURE: 71 MMHG

## 2021-09-17 DIAGNOSIS — R05.9 COUGH: Primary | ICD-10-CM

## 2021-09-17 DIAGNOSIS — R05.9 COUGH: ICD-10-CM

## 2021-09-17 LAB
EXPIRATION DATE: NORMAL
INTERNAL CONTROL: NORMAL
Lab: NORMAL
SARS-COV-2 AG UPPER RESP QL IA.RAPID: NOT DETECTED

## 2021-09-17 PROCEDURE — 71046 X-RAY EXAM CHEST 2 VIEWS: CPT

## 2021-09-17 PROCEDURE — 87426 SARSCOV CORONAVIRUS AG IA: CPT | Performed by: NURSE PRACTITIONER

## 2021-09-17 PROCEDURE — 99213 OFFICE O/P EST LOW 20 MIN: CPT | Performed by: NURSE PRACTITIONER

## 2021-09-17 RX ORDER — BENZONATATE 100 MG/1
100 CAPSULE ORAL 3 TIMES DAILY PRN
Qty: 30 CAPSULE | Refills: 0 | Status: SHIPPED | OUTPATIENT
Start: 2021-09-17 | End: 2022-01-05

## 2021-09-17 NOTE — PROGRESS NOTES
"Chief Complaint  Cough (productive)    Subjective          Odalis Espino presents to [facility]  Symptoms started Monday.  Has been vaccinated against COVID.    Cough  This is a new problem. The current episode started in the past 7 days. The problem has been gradually improving. The cough is productive of sputum (\"white stuff\"). Associated symptoms include headaches (Resolved) and rhinorrhea. Pertinent negatives include no chills, fever, nasal congestion, postnasal drip, sore throat, shortness of breath or wheezing. Associated symptoms comments: Admits: sinus pressure  Denies: loss of taste and smell. Exacerbated by: laughing. Risk factors for lung disease include smoking/tobacco exposure. Treatments tried: antihistamine. The treatment provided mild relief. Her past medical history is significant for environmental allergies. There is no history of asthma, COPD, emphysema or pneumonia.       Objective   Vital Signs:   /71 (BP Location: Right arm, Patient Position: Sitting)   Pulse 72   Ht 161.3 cm (63.5\")   Wt 58.2 kg (128 lb 3.2 oz)   BMI 22.35 kg/m²     Physical Exam  Constitutional:       General: She is not in acute distress.     Appearance: Normal appearance. She is normal weight.   HENT:      Head: Normocephalic.   Eyes:      Pupils: Pupils are equal, round, and reactive to light.      Visual Fields: Right eye visual fields normal and left eye visual fields normal.   Neck:      Trachea: Trachea normal.   Cardiovascular:      Rate and Rhythm: Normal rate and regular rhythm.      Heart sounds: Normal heart sounds.   Pulmonary:      Effort: Pulmonary effort is normal.      Breath sounds: Normal air entry. Rales (RLL) present.   Musculoskeletal:      Right lower leg: No edema.      Left lower leg: No edema.   Skin:     General: Skin is warm and dry.   Neurological:      Mental Status: She is alert and oriented to person, place, and time.   Psychiatric:         Mood and " Affect: Mood and affect normal.         Behavior: Behavior normal.         Thought Content: Thought content normal.        Result Review :                 Assessment and Plan    Diagnoses and all orders for this visit:    1. Cough (Primary)  -     POCT SARS-CoV-2 Antigen ENRIQEU  -     XR Chest PA & Lateral; Future  -     benzonatate (Tessalon Perles) 100 MG capsule; Take 1 capsule by mouth 3 (Three) Times a Day As Needed for Cough.  Dispense: 30 capsule; Refill: 0    Is negative for Covid.  We will send her for chest x-ray.  After chest x-ray shows pneumonia, I will send in an antibiotic.  I have recommended that she try increasing her fluid intake to help with her cough, as she did not like Mucinex.  We will send in Tessalon Perles for her to take at night.    Follow Up   Return for Pending test results.  Patient was given instructions and counseling regarding her condition or for health maintenance advice. Please see specific information pulled into the AVS if appropriate.

## 2021-09-17 NOTE — PATIENT INSTRUCTIONS
Benzonatate capsules  What is this medicine?  BENZONATATE (hyun TERESA na paez) is used to treat cough.  This medicine may be used for other purposes; ask your health care provider or pharmacist if you have questions.  COMMON BRAND NAME(S): Corin Shields  What should I tell my health care provider before I take this medicine?  They need to know if you have any of these conditions:  · kidney or liver disease  · an unusual or allergic reaction to benzonatate, anesthetics, other medicines, foods, dyes, or preservatives  · pregnant or trying to get pregnant  · breast-feeding  How should I use this medicine?  Take this medicine by mouth with a glass of water. Follow the directions on the prescription label. Avoid breaking, chewing, or sucking the capsule, as this can cause serious side effects. Take your medicine at regular intervals. Do not take your medicine more often than directed.  Talk to your pediatrician regarding the use of this medicine in children. While this drug may be prescribed for children as young as 10 years old for selected conditions, precautions do apply.  Overdosage: If you think you have taken too much of this medicine contact a poison control center or emergency room at once.  NOTE: This medicine is only for you. Do not share this medicine with others.  What if I miss a dose?  If you miss a dose, take it as soon as you can. If it is almost time for your next dose, take only that dose. Do not take double or extra doses.  What may interact with this medicine?  Do not take this medicine with any of the following medications:  · MAOIs like Carbex, Eldepryl, Marplan, Nardil, and Parnate  This list may not describe all possible interactions. Give your health care provider a list of all the medicines, herbs, non-prescription drugs, or dietary supplements you use. Also tell them if you smoke, drink alcohol, or use illegal drugs. Some items may interact with your medicine.  What should I watch for  while using this medicine?  Tell your doctor if your symptoms do not improve or if they get worse. If you have a high fever, skin rash, or headache, see your health care professional.  You may get drowsy or dizzy. Do not drive, use machinery, or do anything that needs mental alertness until you know how this medicine affects you. Do not sit or stand up quickly, especially if you are an older patient. This reduces the risk of dizzy or fainting spells.  What side effects may I notice from receiving this medicine?  Side effects that you should report to your doctor or health care professional as soon as possible:  · allergic reactions like skin rash, itching or hives, swelling of the face, lips, or tongue  · breathing problems  · chest pain  · confusion or hallucinations  · irregular heartbeat  · numbness of mouth or throat  · seizures  Side effects that usually do not require medical attention (report to your doctor or health care professional if they continue or are bothersome):  · burning feeling in the eyes  · constipation  · headache  · nasal congestion  · stomach upset  This list may not describe all possible side effects. Call your doctor for medical advice about side effects. You may report side effects to FDA at 1-467-FDA-0670.  Where should I keep my medicine?  Keep out of the reach of children.  Store at room temperature between 15 and 30 degrees C (59 and 86 degrees F). Keep tightly closed. Protect from light and moisture. Throw away any unused medicine after the expiration date.  NOTE: This sheet is a summary. It may not cover all possible information. If you have questions about this medicine, talk to your doctor, pharmacist, or health care provider.  © 2021 [employer]vier/Gold Standard (2009-03-18 14:52:56)

## 2021-09-17 NOTE — TELEPHONE ENCOUNTER
Rx Refill Note  Requested Prescriptions     Pending Prescriptions Disp Refills   • gabapentin (NEURONTIN) 600 MG tablet [Pharmacy Med Name: GABAPENTIN 600 MG TABLET] 90 tablet      Sig: TAKE ONE TABLET BY MOUTH THREE TIMES A DAY      Last office visit with prescribing clinician: 7/8/2021      Next office visit with prescribing clinician: STEWART SOTO 1/12/22 WITH CARMITA     LAST FILL: 6/8/21 #90       {TIP  Is Refill Pharmacy correct?YES  Catalina Bhatia  09/17/21, 10:23 EDT

## 2021-09-18 RX ORDER — GABAPENTIN 600 MG/1
TABLET ORAL
Qty: 90 TABLET | Refills: 0 | Status: SHIPPED | OUTPATIENT
Start: 2021-09-18 | End: 2022-01-05 | Stop reason: SDUPTHER

## 2021-09-24 RX ORDER — OMEPRAZOLE 20 MG/1
CAPSULE, DELAYED RELEASE ORAL
Qty: 90 CAPSULE | Refills: 0 | Status: SHIPPED | OUTPATIENT
Start: 2021-09-24 | End: 2022-01-05 | Stop reason: SDUPTHER

## 2021-09-24 RX ORDER — HYDROCHLOROTHIAZIDE 25 MG/1
TABLET ORAL
Qty: 90 TABLET | Refills: 0 | Status: SHIPPED | OUTPATIENT
Start: 2021-09-24 | End: 2022-01-05 | Stop reason: SDUPTHER

## 2021-09-24 NOTE — TELEPHONE ENCOUNTER
Rx Refill Note  Requested Prescriptions     Pending Prescriptions Disp Refills   • metFORMIN (GLUCOPHAGE) 500 MG tablet [Pharmacy Med Name: METFORMIN TAB 500MG] 360 tablet 0     Sig: TAKE 2 TABLETS TWICE A DAY   • omeprazole (priLOSEC) 20 MG capsule [Pharmacy Med Name: OMEPRAZOL RX CAP 20MG] 90 capsule 0     Sig: TAKE 1 CAPSULE DAILY   • hydroCHLOROthiazide (HYDRODIURIL) 25 MG tablet [Pharmacy Med Name: HYDROCHLOROT TAB 25MG] 90 tablet 0     Sig: TAKE 1 TABLET DAILY      Last office visit with prescribing clinician: 7/8/2021      Next office visit with prescribing clinician:1/12/21 WITH CARMITA      {TIP  Please add Last Relevant Lab Date if appropriate:   Lab Results   Component Value Date    GLUCOSE 97 07/08/2021    BUN 18 07/08/2021    CREATININE 1.33 (H) 07/08/2021    EGFRIFNONA 39 (L) 07/08/2021    BCR 13.5 07/08/2021    K 3.6 07/08/2021    CO2 26.5 07/08/2021    CALCIUM 8.5 (L) 07/08/2021    ALBUMIN 4.10 07/08/2021    LABIL2 1.7 12/17/2020    AST 19 07/08/2021    ALT 12 07/08/2021     Lipid Panel    Lipid Panel 12/17/20 7/8/21   Total Cholesterol  141   Total Cholesterol 164    Triglycerides 66 79   HDL Cholesterol 81 (A) 66 (A)   VLDL Cholesterol 13 15   LDL Cholesterol  70 60   LDL/HDL Ratio  0.90   (A) Abnormal value       Comments are available for some flowsheets but are not being displayed.           WBC   Date Value Ref Range Status   07/08/2021 6.35 3.40 - 10.80 10*3/mm3 Final   10/23/2019 7.40 4.80 - 10.80 10*3/uL Final     RBC   Date Value Ref Range Status   07/08/2021 3.40 (L) 3.77 - 5.28 10*6/mm3 Final     Hemoglobin   Date Value Ref Range Status   07/08/2021 10.3 (L) 12.0 - 15.9 g/dL Final     Hematocrit   Date Value Ref Range Status   07/08/2021 31.0 (L) 34.0 - 46.6 % Final     MCV   Date Value Ref Range Status   07/08/2021 91.2 79.0 - 97.0 fL Final     MCH   Date Value Ref Range Status   07/08/2021 30.3 26.6 - 33.0 pg Final     MCHC   Date Value Ref Range Status   07/08/2021 33.2 31.5 - 35.7 g/dL  Final     RDW   Date Value Ref Range Status   07/08/2021 13.2 12.3 - 15.4 % Final     RDW-SD   Date Value Ref Range Status   07/08/2021 44.7 37.0 - 54.0 fl Final     MPV   Date Value Ref Range Status   07/08/2021 9.9 6.0 - 12.0 fL Final     Platelets   Date Value Ref Range Status   07/08/2021 145 140 - 450 10*3/mm3 Final     Neutrophil %   Date Value Ref Range Status   07/08/2021 72.5 42.7 - 76.0 % Final     Lymphocyte %   Date Value Ref Range Status   07/08/2021 19.1 (L) 19.6 - 45.3 % Final     Monocyte %   Date Value Ref Range Status   07/08/2021 5.7 5.0 - 12.0 % Final     Eosinophil %   Date Value Ref Range Status   07/08/2021 2.2 0.3 - 6.2 % Final     Basophil %   Date Value Ref Range Status   07/08/2021 0.5 0.0 - 1.5 % Final     Immature Grans %   Date Value Ref Range Status   07/08/2021 0.0 0.0 - 0.5 % Final     Neutrophils, Absolute   Date Value Ref Range Status   07/08/2021 4.61 1.70 - 7.00 10*3/mm3 Final     Lymphocytes, Absolute   Date Value Ref Range Status   07/08/2021 1.21 0.70 - 3.10 10*3/mm3 Final     Monocytes, Absolute   Date Value Ref Range Status   07/08/2021 0.36 0.10 - 0.90 10*3/mm3 Final     Eosinophils, Absolute   Date Value Ref Range Status   07/08/2021 0.14 0.00 - 0.40 10*3/mm3 Final     Basophils, Absolute   Date Value Ref Range Status   07/08/2021 0.03 0.00 - 0.20 10*3/mm3 Final     Immature Grans, Absolute   Date Value Ref Range Status   07/08/2021 0.00 0.00 - 0.05 10*3/mm3 Final        {TIP  Is Refill Pharmacy correct?YES  Catalina Bhatia  09/24/21, 12:16 EDT

## 2021-10-18 DIAGNOSIS — F41.9 ANXIETY: ICD-10-CM

## 2021-10-18 NOTE — TELEPHONE ENCOUNTER
Rx Refill Note  Requested Prescriptions     Pending Prescriptions Disp Refills   • clonazePAM (KlonoPIN) 1 MG tablet 30 tablet 0     Sig: Take 1 tablet by mouth Daily As Needed for Anxiety.      Last office visit with prescribing clinician: Visit date not found      Next office visit with prescribing clinician: V01/12/2022  Kristie Schilling LPN  10/18/21, 16:22 EDT     -9/16/21

## 2021-10-19 RX ORDER — CLONAZEPAM 1 MG/1
1 TABLET ORAL DAILY PRN
Qty: 30 TABLET | Refills: 0 | Status: SHIPPED | OUTPATIENT
Start: 2021-10-19 | End: 2021-11-11 | Stop reason: SDUPTHER

## 2021-11-07 ENCOUNTER — HOSPITAL ENCOUNTER (EMERGENCY)
Dept: HOSPITAL 49 - FER | Age: 71
Discharge: HOME | End: 2021-11-07
Payer: COMMERCIAL

## 2021-11-07 DIAGNOSIS — R42: ICD-10-CM

## 2021-11-07 DIAGNOSIS — Z79.84: ICD-10-CM

## 2021-11-07 DIAGNOSIS — E11.9: ICD-10-CM

## 2021-11-07 DIAGNOSIS — F17.210: ICD-10-CM

## 2021-11-07 DIAGNOSIS — Z88.1: ICD-10-CM

## 2021-11-07 DIAGNOSIS — Z79.899: ICD-10-CM

## 2021-11-07 DIAGNOSIS — J42: Primary | ICD-10-CM

## 2021-11-07 LAB
ALBUMIN SERPL-MCNC: 4 G/DL (ref 3.4–5)
ALKALINE PHOSHATASE: 61 U/L (ref 46–116)
ALT SERPL-CCNC: 21 U/L (ref 14–59)
AST: 19 U/L (ref 15–37)
BASOPHIL: 0.5 % (ref 0–2)
BILIRUBIN - TOTAL: 0.5 MG/DL (ref 0.2–1)
BILIRUBIN: NEGATIVE MG/DL
BLOOD: NEGATIVE ERY/UL
BUN SERPL-MCNC: 19 MG/DL (ref 7–18)
BUN/CREAT RATIO (CALC): 20 RATIO
CHLORIDE: 102 MMOL/L (ref 98–107)
CLARITY UR: CLEAR
CO2 (BICARBONATE): 33 MMOL/L (ref 21–32)
COLOR: YELLOW
CORONAVIRUS 2019 SARS-COV-2: NEGATIVE
CREATININE: 0.95 MG/DL (ref 0.51–0.95)
EOSINOPHIL: 2.4 % (ref 0–7)
GLOBULIN (CALCULATION): 3.3 G/DL
GLUCOSE (U): (no result) MG/DL
GLUCOSE SERPL-MCNC: 158 MG/DL (ref 74–106)
HCT: 32.9 % (ref 37–47)
HGB BLD-MCNC: 11.2 G/DL (ref 12.5–16)
INFLUENZA A NAA: NEGATIVE
LEUKOCYTES: NEGATIVE LEU/UL
LYMPHOCYTE: 11 % (ref 15–48)
MCH RBC QN AUTO: 30.9 PG (ref 25–31)
MCHC RBC AUTO-ENTMCNC: 34 G/DL (ref 32–36)
MCV: 90.6 FL (ref 78–100)
MONOCYTE: 5.7 % (ref 0–12)
MPV: 10.1 FL (ref 6–9.5)
NEUTROPHIL: 79.8 % (ref 41–80)
NITRITE: NEGATIVE MG/DL
NRBC: 0
PLT: 140 K/UL (ref 150–400)
POTASSIUM: 3.3 MMOL/L (ref 3.5–5.1)
PROTEIN: NEGATIVE MG/DL
RBC MORPHOLOGY: NORMAL
RBC: 3.63 M/UL (ref 4.2–5.4)
RDW: 12.7 % (ref 11.5–14)
SPECIFIC GRAVITY: 1.02 (ref 1–1.03)
TOTAL PROTEIN: 7.3 G/DL (ref 6.4–8.2)
UROBILINOGEN: 0.2 MG/DL (ref 0.2–1)
WBC: 6.3 K/UL (ref 4–10.5)

## 2021-11-07 PROCEDURE — U0002 COVID-19 LAB TEST NON-CDC: HCPCS

## 2021-11-11 ENCOUNTER — OFFICE VISIT (OUTPATIENT)
Dept: FAMILY MEDICINE CLINIC | Age: 71
End: 2021-11-11

## 2021-11-11 VITALS
BODY MASS INDEX: 21.85 KG/M2 | SYSTOLIC BLOOD PRESSURE: 127 MMHG | DIASTOLIC BLOOD PRESSURE: 76 MMHG | WEIGHT: 128 LBS | HEIGHT: 64 IN | HEART RATE: 74 BPM | OXYGEN SATURATION: 96 % | TEMPERATURE: 98.7 F

## 2021-11-11 DIAGNOSIS — Z23 NEED FOR IMMUNIZATION AGAINST INFLUENZA: ICD-10-CM

## 2021-11-11 DIAGNOSIS — H65.93 MIDDLE EAR EFFUSION, BILATERAL: Primary | ICD-10-CM

## 2021-11-11 DIAGNOSIS — F41.9 ANXIETY: ICD-10-CM

## 2021-11-11 PROCEDURE — G0008 ADMIN INFLUENZA VIRUS VAC: HCPCS | Performed by: NURSE PRACTITIONER

## 2021-11-11 PROCEDURE — 99213 OFFICE O/P EST LOW 20 MIN: CPT | Performed by: NURSE PRACTITIONER

## 2021-11-11 PROCEDURE — 90662 IIV NO PRSV INCREASED AG IM: CPT | Performed by: NURSE PRACTITIONER

## 2021-11-11 RX ORDER — MECLIZINE HYDROCHLORIDE 25 MG/1
1 TABLET ORAL 3 TIMES DAILY
COMMUNITY
Start: 2021-11-08 | End: 2021-12-20 | Stop reason: SDUPTHER

## 2021-11-11 RX ORDER — IPRATROPIUM BROMIDE 17 UG/1
AEROSOL, METERED RESPIRATORY (INHALATION)
COMMUNITY
Start: 2021-11-08 | End: 2022-05-05

## 2021-11-11 RX ORDER — CLONAZEPAM 1 MG/1
1 TABLET ORAL DAILY PRN
Qty: 30 TABLET | Refills: 0 | Status: SHIPPED | OUTPATIENT
Start: 2021-11-11 | End: 2021-11-19 | Stop reason: SDUPTHER

## 2021-11-11 RX ORDER — METHYLPREDNISOLONE 4 MG/1
TABLET ORAL
Qty: 21 EACH | Refills: 0 | Status: SHIPPED | OUTPATIENT
Start: 2021-11-11 | End: 2022-01-05

## 2021-11-11 NOTE — PROGRESS NOTES
"Chief Complaint  No chief complaint on file.    Subjective    Patient is a 71 year old female in today with complaitns of dizziness that has been going on for about one week. Patient went to the ER on Sunday where they did an EKG, CXR and labs and diagnosed her with dehydration but patient reports she did not receive any fluids there. Patient is a smoker and denies increase in cough or shortness of breath and denies fever.          Odalis Espino presents to [facility]  Dizziness  This is a new problem. The current episode started in the past 7 days. The problem occurs intermittently. The problem has been waxing and waning. Associated symptoms include fatigue, headaches and vertigo. Pertinent negatives include no fever. She has tried position changes for the symptoms. The treatment provided mild relief.       Objective   Vital Signs:   /76 (BP Location: Left arm, Patient Position: Sitting)   Pulse 74   Temp 98.7 °F (37.1 °C)   Ht 161.3 cm (63.5\")   Wt 58.1 kg (128 lb)   SpO2 96%   BMI 22.32 kg/m²     Physical Exam  HENT:      Head: Normocephalic.      Right Ear: A middle ear effusion is present.      Left Ear: A middle ear effusion is present.      Nose: Rhinorrhea present.      Mouth/Throat:      Mouth: Mucous membranes are moist.      Pharynx: Oropharynx is clear. No posterior oropharyngeal erythema.   Cardiovascular:      Rate and Rhythm: Normal rate and regular rhythm.      Pulses: Normal pulses.      Heart sounds: Normal heart sounds.   Pulmonary:      Effort: Pulmonary effort is normal. No respiratory distress.      Breath sounds: No stridor. Wheezing present. No rhonchi or rales.   Skin:     General: Skin is warm and dry.   Neurological:      Mental Status: She is alert and oriented to person, place, and time.   Psychiatric:         Mood and Affect: Mood normal.        Result Review :                 Assessment and Plan    Diagnoses and all orders for this " visit:    1. Middle ear effusion, bilateral (Primary)  -     methylPREDNISolone (MEDROL) 4 MG dose pack; Take as directed on package instructions.  Dispense: 21 each; Refill: 0    2. Anxiety  -     clonazePAM (KlonoPIN) 1 MG tablet; Take 1 tablet by mouth Daily As Needed for Anxiety.  Dispense: 30 tablet; Refill: 0    3. Need for immunization against influenza  -     Fluzone High-Dose 65+yrs ([id number])        Follow Up   Return in about 2 months (around 1/11/2022), or if symptoms worsen or fail to improve.  Patient was given instructions and counseling regarding her condition or for health maintenance advice. Please see specific information pulled into the AVS if appropriate.

## 2021-11-18 DIAGNOSIS — F41.9 ANXIETY: ICD-10-CM

## 2021-11-18 RX ORDER — CLONAZEPAM 1 MG/1
TABLET ORAL
Qty: 30 TABLET | OUTPATIENT
Start: 2021-11-18

## 2021-11-19 DIAGNOSIS — F41.9 ANXIETY: ICD-10-CM

## 2021-11-19 RX ORDER — CLONAZEPAM 1 MG/1
1 TABLET ORAL DAILY PRN
Qty: 30 TABLET | Refills: 0 | Status: SHIPPED | OUTPATIENT
Start: 2021-11-19 | End: 2021-12-21 | Stop reason: SDUPTHER

## 2021-11-19 RX ORDER — CLONAZEPAM 1 MG/1
1 TABLET ORAL DAILY PRN
Qty: 30 TABLET | Refills: 0 | Status: SHIPPED | OUTPATIENT
Start: 2021-11-19 | End: 2021-11-19 | Stop reason: SDUPTHER

## 2021-11-19 RX ORDER — CLONAZEPAM 1 MG/1
TABLET ORAL
Qty: 30 TABLET | OUTPATIENT
Start: 2021-11-19

## 2021-11-19 NOTE — TELEPHONE ENCOUNTER
Med was sent 11/11/21 by Wilda Stephenson, per [facility] they did not receive, can you please resend?

## 2021-11-19 NOTE — TELEPHONE ENCOUNTER
Problem was that prescription was set to print instead of E-prescribed.  I accidentally printed one copy as well (this has been shredded) and then sent a new prescription electronically to [facility].  Issue should be resolved.  Thanks, MARK

## 2021-12-13 ENCOUNTER — LAB (OUTPATIENT)
Dept: LAB | Facility: HOSPITAL | Age: 71
End: 2021-12-13

## 2021-12-13 ENCOUNTER — OFFICE VISIT (OUTPATIENT)
Dept: FAMILY MEDICINE CLINIC | Age: 71
End: 2021-12-13

## 2021-12-13 ENCOUNTER — TELEPHONE (OUTPATIENT)
Dept: FAMILY MEDICINE CLINIC | Age: 71
End: 2021-12-13

## 2021-12-13 VITALS
HEIGHT: 64 IN | WEIGHT: 127.6 LBS | HEART RATE: 69 BPM | BODY MASS INDEX: 21.78 KG/M2 | DIASTOLIC BLOOD PRESSURE: 70 MMHG | SYSTOLIC BLOOD PRESSURE: 158 MMHG

## 2021-12-13 DIAGNOSIS — Z00.00 MEDICARE ANNUAL WELLNESS VISIT, SUBSEQUENT: Primary | ICD-10-CM

## 2021-12-13 DIAGNOSIS — Z11.59 NEED FOR HEPATITIS C SCREENING TEST: ICD-10-CM

## 2021-12-13 LAB — HCV AB SER DONR QL: NORMAL

## 2021-12-13 PROCEDURE — 1159F MED LIST DOCD IN RCRD: CPT | Performed by: NURSE PRACTITIONER

## 2021-12-13 PROCEDURE — G0439 PPPS, SUBSEQ VISIT: HCPCS | Performed by: NURSE PRACTITIONER

## 2021-12-13 PROCEDURE — 86803 HEPATITIS C AB TEST: CPT

## 2021-12-13 PROCEDURE — 1170F FXNL STATUS ASSESSED: CPT | Performed by: NURSE PRACTITIONER

## 2021-12-13 PROCEDURE — 36415 COLL VENOUS BLD VENIPUNCTURE: CPT

## 2021-12-13 RX ORDER — ONDANSETRON 4 MG/1
TABLET, ORALLY DISINTEGRATING ORAL
COMMUNITY
End: 2022-01-05

## 2021-12-13 NOTE — PROGRESS NOTES
The ABCs of the Annual Wellness Visit  Subsequent Medicare Wellness Visit    Chief Complaint   Patient presents with   • Medicare Wellness-subsequent      Subjective    History of Present Illness:  Odalis Espino is a 71 y.o. female who presents for a Subsequent Medicare Wellness Visit.    The following portions of the patient's history were reviewed and   updated as appropriate: allergies, current medications, past family history, past medical history, past social history, past surgical history and problem list.    Compared to one year ago, the patient feels her physical   health is worse.  She reports that she can't do as much as she did last year.     Compared to one year ago, the patient feels her mental   health is the same.    Recent Hospitalizations:  She was admitted within the past 365 days at [facility]. She was not admitted, but was seen at [facility]'s ER for dizziness and dehydration, which was about a month.  She reports that all that she had done was that she had blood drawn, but no IV fluids.      Current Medical Providers:  Patient Care Team:  Elkin Bear MD as PCP - General (Family Medicine)  She has an appt next month w/ Dr. Cochran.      Outpatient Medications Prior to Visit   Medication Sig Dispense Refill   • amLODIPine (NORVASC) 5 MG tablet Take 1 tablet by mouth Daily.     • atorvastatin (LIPITOR) 20 MG tablet Take 1 tablet by mouth Every Night.     • Atrovent HFA 17 MCG/ACT inhaler      • benzonatate (Tessalon Perles) 100 MG capsule Take 1 capsule by mouth 3 (Three) Times a Day As Needed for Cough. 30 capsule 0   • clonazePAM (KlonoPIN) 1 MG tablet Take 1 tablet by mouth Daily As Needed for Anxiety. 30 tablet 0   • gabapentin (NEURONTIN) 600 MG tablet TAKE ONE TABLET BY MOUTH THREE TIMES A DAY 90 tablet 0   • hydroCHLOROthiazide (HYDRODIURIL) 25 MG tablet TAKE 1 TABLET DAILY 90 tablet 0   • HYDROcodone-acetaminophen (NORCO)  MG per tablet      • levothyroxine (SYNTHROID, LEVOTHROID)  125 MCG tablet Take 1 tablet by mouth Daily.     • meclizine (ANTIVERT) 25 MG tablet Take 1 tablet by mouth 3 (Three) Times a Day.     • meloxicam (MOBIC) 15 MG tablet      • metFORMIN (GLUCOPHAGE) 500 MG tablet Take 2 tablets by mouth 2 (Two) Times a Day. 360 tablet 0   • methylPREDNISolone (MEDROL) 4 MG dose pack Take as directed on package instructions. 21 each 0   • omeprazole (priLOSEC) 20 MG capsule TAKE 1 CAPSULE DAILY 90 capsule 0   • ondansetron ODT (ZOFRAN-ODT) 4 MG disintegrating tablet ondansetron 4 mg disintegrating tablet     • sertraline (ZOLOFT) 100 MG tablet Take 1 tablet by mouth Daily.       No facility-administered medications prior to visit.       Opioid medication/s are on active medication list.  and I have evaluated her active treatment plan and pain score trends (see table).  There were no vitals filed for this visit.  I have reviewed the chart for potential of high risk medication and harmful drug interactions in the elderly.          She reports that she takes clonazepam for anxiety and as a anti-depressant.  She does report taking the medication once a day.  She takes gabapentin for the pain in her back and legs.  She takes the hydrocodone for back and leg pain.  We have discussed that these medications have abuse potential and can have significant side effects of the medication.  Aspirin is not on active medication list.  Aspirin use is not indicated based on review of current medical condition/s. Risk of harm outweighs potential benefits.  .    Patient Active Problem List   Diagnosis   • Degeneration of lumbar intervertebral disc   • Hypothyroid   • Anxiety   • Essential hypertension   • Other hyperlipidemia   • Type 2 diabetes mellitus without complication, without long-term current use of insulin ([facility])   • Gastroesophageal reflux disease without esophagitis     Advance Care Planning  Advance Directive is not on file.  ACP discussion was held with the patient during this visit. Patient  "has an advance directive (not in EMR), copy requested.    Review of Systems   Constitutional: Positive for activity change. Negative for fatigue.   HENT: Positive for hearing loss.    Eyes: Positive for discharge (a little). Negative for pain.   Respiratory: Positive for cough and shortness of breath (\"a little bit\").    Cardiovascular: Negative for chest pain.   Gastrointestinal: Negative for abdominal pain, blood in stool, nausea and vomiting.   Endocrine: Negative for cold intolerance and heat intolerance.   Genitourinary: Negative for dysuria and hematuria.   Musculoskeletal: Positive for arthralgias.   Allergic/Immunologic: Negative for environmental allergies.   Neurological: Negative for dizziness and light-headedness.   Psychiatric/Behavioral: The patient is nervous/anxious.         Objective    Vitals:    12/13/21 0905   BP: 158/70   BP Location: Left arm   Patient Position: Sitting   Pulse: 69   Weight: 57.9 kg (127 lb 9.6 oz)   Height: 161.3 cm (63.5\")     BMI Readings from Last 1 Encounters:   12/13/21 22.25 kg/m²   BMI is within normal parameters. No follow-up required.    Does the patient have evidence of cognitive impairment? No    Physical Exam  Constitutional:       General: She is not in acute distress.     Appearance: Normal appearance. She is normal weight.   HENT:      Head: Normocephalic.   Eyes:      Pupils: Pupils are equal, round, and reactive to light.      Visual Fields: Right eye visual fields normal and left eye visual fields normal.   Neck:      Trachea: Trachea normal.   Cardiovascular:      Rate and Rhythm: Normal rate and regular rhythm.      Heart sounds: Normal heart sounds.   Pulmonary:      Effort: Pulmonary effort is normal.      Breath sounds: Normal breath sounds and air entry.   Musculoskeletal:      Right lower leg: No edema.      Left lower leg: No edema.   Skin:     General: Skin is warm and dry.   Neurological:      Mental Status: She is alert and oriented to person, " place, and time.   Psychiatric:         Mood and Affect: Mood and affect normal.         Behavior: Behavior normal.         Thought Content: Thought content normal.                 HEALTH RISK ASSESSMENT    Smoking Status:  Social History     Tobacco Use   Smoking Status Current Every Day Smoker   • Packs/day: 1.00   • Years: 40.00   • Pack years: 40.00   • Types: Cigarettes   Smokeless Tobacco Never Used     Alcohol Consumption:  Social History     Substance and Sexual Activity   Alcohol Use Yes   • Alcohol/week: 1.0 standard drink   • Types: 1 Glasses of wine per week     Fall Risk Screen:    DARELL Fall Risk Assessment was completed, and patient is at LOW risk for falls.Assessment completed on:7/8/2021    Depression Screening:  PHQ-2/PHQ-9 Depression Screening 12/13/2021   Little interest or pleasure in doing things 0   Feeling down, depressed, or hopeless 0   Total Score 0       Health Habits and Functional and Cognitive Screening:  Functional & Cognitive Status 12/13/2021   Do you have difficulty preparing food and eating? No   Do you have difficulty bathing yourself, getting dressed or grooming yourself? No   Do you have difficulty using the toilet? No   Do you have difficulty moving around from place to place? No   Do you have trouble with steps or getting out of a bed or a chair? No   Current Diet Other        Current Diet Comment in between, well balanced, junk food   Dental Exam Up to date   Eye Exam Up to date   Exercise (times per week) 7 times per week   Current Exercises Include Walking   Do you need help using the phone?  No   Are you deaf or do you have serious difficulty hearing?  No   Do you need help with transportation? No   Do you need help shopping? No   Do you need help preparing meals?  No   Do you need help with housework?  No   Do you need help with laundry? No   Do you need help taking your medications? No   Do you need help managing money? No   Do you ever drive or ride in a car without  wearing a seat belt? No   Have you felt unusual stress, anger or loneliness in the last month? No   Who do you live with? Child   If you need help, do you have trouble finding someone available to you? No   Do you have difficulty concentrating, remembering or making decisions? No       Age-appropriate Screening Schedule:  Refer to the list below for future screening recommendations based on patient's age, sex and/or medical conditions. Orders for these recommended tests are listed in the plan section. The patient has been provided with a written plan.    Health Maintenance   Topic Date Due   • URINE MICROALBUMIN  Never done   • TDAP/TD VACCINES (1 - Tdap) Never done   • ZOSTER VACCINE (1 of 2) Never done   • DIABETIC FOOT EXAM  Never done   • DIABETIC EYE EXAM  Never done   • HEMOGLOBIN A1C  01/08/2022   • LIPID PANEL  07/08/2022   • DXA SCAN  03/29/2023   • MAMMOGRAM  03/30/2023   • INFLUENZA VACCINE  Completed              Assessment/Plan   CMS Preventative Services Quick Reference  Risk Factors Identified During Encounter  Cardiovascular Disease  Depression/Dysphoria  Immunizations Discussed/Encouraged (specific Immunizations; Tdap, Shingrix and COVID19  Tobacco Use/Dependance (use dotphrase .tobaccocessation for documentation)  The above risks/problems have been discussed with the patient.  Follow up actions/plans if indicated are seen below in the Assessment/Plan Section.  Pertinent information has been shared with the patient in the After Visit Summary.    Diagnoses and all orders for this visit:    1. Medicare annual wellness visit, subsequent (Primary)    2. Need for hepatitis C screening test  -     Hepatitis C Antibody; Future    Refuses lung cancer screening.  We have discussed shingles vaccine.    Follow Up:   Return for Next scheduled follow up.     An After Visit Summary and PPPS were made available to the patient.    {Optional Chart Navigation Links Wrapup  Review (Popup)  Advance Care Planning   Labs  CC  Problem List  Visit Diagnosis  Medications  Result Review  Imaging  Health Maintenance  Quality  BestPractice  SmartSets  SnapShot  Encounters  Notes  Media  Procedures :23}

## 2021-12-13 NOTE — TELEPHONE ENCOUNTER
HUB TO READ    PT CAME IN OFFICE REQUESTING THAT SHE COMES BY TO  RECENT COPIES OF MAMMO,COLONOSCOPY, AND OFFICE VISIT. SHE WOULD LIKE A CALL WHEN THESE ARE READY FOR .

## 2021-12-16 ENCOUNTER — TELEPHONE (OUTPATIENT)
Dept: FAMILY MEDICINE CLINIC | Age: 71
End: 2021-12-16

## 2021-12-16 NOTE — TELEPHONE ENCOUNTER
Caller: Odalis Espino    Relationship: Self    Best call back number: [phone]    Who is your current provider: DR. VIZCARRA     Who would you like your new provider to be:      What are your reasons for transferring care: PATIENT HAS NOT HEARD GOOD THINGS ABOUT DR. VIZCARRA AND WOULD LIKE TO BEGIN CARE UNDER DR. RODRIGUEZ.

## 2021-12-20 RX ORDER — MECLIZINE HYDROCHLORIDE 25 MG/1
25 TABLET ORAL 3 TIMES DAILY
Qty: 90 TABLET | Refills: 0 | Status: SHIPPED | OUTPATIENT
Start: 2021-12-20 | End: 2022-01-05

## 2021-12-20 NOTE — TELEPHONE ENCOUNTER
Rx Refill Note  Requested Prescriptions     Pending Prescriptions Disp Refills   • meclizine (ANTIVERT) 25 MG tablet       Sig: Take 1 tablet by mouth 3 (Three) Times a Day.      Last office visit with prescribing clinician: Dr Bear pt     Next office visit with prescribing clinician: 1/5/2022 to establish care with Dr CLARE Langford  Not filled here before.     Eunice Omalley LPN  12/20/21, 17:31 EST

## 2021-12-21 DIAGNOSIS — F41.9 ANXIETY: ICD-10-CM

## 2021-12-21 RX ORDER — CLONAZEPAM 1 MG/1
1 TABLET ORAL DAILY PRN
Qty: 30 TABLET | Refills: 0 | Status: SHIPPED | OUTPATIENT
Start: 2021-12-21 | End: 2022-01-05 | Stop reason: SDUPTHER

## 2022-01-05 ENCOUNTER — OFFICE VISIT (OUTPATIENT)
Dept: FAMILY MEDICINE CLINIC | Age: 72
End: 2022-01-05

## 2022-01-05 VITALS
DIASTOLIC BLOOD PRESSURE: 70 MMHG | SYSTOLIC BLOOD PRESSURE: 127 MMHG | BODY MASS INDEX: 21.85 KG/M2 | HEIGHT: 64 IN | TEMPERATURE: 98.3 F | HEART RATE: 69 BPM | WEIGHT: 128 LBS

## 2022-01-05 DIAGNOSIS — E11.65 TYPE 2 DIABETES MELLITUS WITH HYPERGLYCEMIA, WITHOUT LONG-TERM CURRENT USE OF INSULIN: Primary | ICD-10-CM

## 2022-01-05 DIAGNOSIS — E03.9 ACQUIRED HYPOTHYROIDISM: ICD-10-CM

## 2022-01-05 DIAGNOSIS — I10 ESSENTIAL HYPERTENSION: ICD-10-CM

## 2022-01-05 DIAGNOSIS — M54.41 CHRONIC RIGHT-SIDED LOW BACK PAIN WITH RIGHT-SIDED SCIATICA: ICD-10-CM

## 2022-01-05 DIAGNOSIS — K21.9 GASTROESOPHAGEAL REFLUX DISEASE WITHOUT ESOPHAGITIS: ICD-10-CM

## 2022-01-05 DIAGNOSIS — E78.00 PURE HYPERCHOLESTEROLEMIA: ICD-10-CM

## 2022-01-05 DIAGNOSIS — Z79.899 LONG TERM CURRENT USE OF THERAPEUTIC DRUG: ICD-10-CM

## 2022-01-05 DIAGNOSIS — F41.9 ANXIETY: ICD-10-CM

## 2022-01-05 DIAGNOSIS — Z72.0 TOBACCO ABUSE: ICD-10-CM

## 2022-01-05 DIAGNOSIS — M51.36 DEGENERATION OF LUMBAR INTERVERTEBRAL DISC: ICD-10-CM

## 2022-01-05 DIAGNOSIS — D64.9 ANEMIA, UNSPECIFIED TYPE: ICD-10-CM

## 2022-01-05 DIAGNOSIS — G89.29 CHRONIC RIGHT-SIDED LOW BACK PAIN WITH RIGHT-SIDED SCIATICA: ICD-10-CM

## 2022-01-05 DIAGNOSIS — R42 DIZZINESS: ICD-10-CM

## 2022-01-05 LAB
AMPHET+METHAMPHET UR QL: NEGATIVE
AMPHETAMINES UR QL: NEGATIVE
BARBITURATES UR QL SCN: NEGATIVE
BENZODIAZ UR QL SCN: NEGATIVE
BUPRENORPHINE SERPL-MCNC: NEGATIVE NG/ML
CANNABINOIDS SERPL QL: NEGATIVE
COCAINE UR QL: NEGATIVE
EXPIRATION DATE: NORMAL
Lab: NORMAL
MDMA UR QL SCN: NEGATIVE
METHADONE UR QL SCN: NEGATIVE
OPIATES UR QL: NEGATIVE
OXYCODONE UR QL SCN: NEGATIVE
PCP UR QL SCN: NEGATIVE

## 2022-01-05 PROCEDURE — 99214 OFFICE O/P EST MOD 30 MIN: CPT | Performed by: FAMILY MEDICINE

## 2022-01-05 PROCEDURE — 80305 DRUG TEST PRSMV DIR OPT OBS: CPT | Performed by: FAMILY MEDICINE

## 2022-01-05 RX ORDER — MELOXICAM 15 MG/1
15 TABLET ORAL DAILY
Qty: 90 TABLET | Refills: 1 | Status: SHIPPED | OUTPATIENT
Start: 2022-01-05 | End: 2022-06-30

## 2022-01-05 RX ORDER — CLONAZEPAM 1 MG/1
1 TABLET ORAL DAILY PRN
Qty: 30 TABLET | Refills: 2 | Status: SHIPPED | OUTPATIENT
Start: 2022-01-05 | End: 2022-04-22

## 2022-01-05 RX ORDER — OMEPRAZOLE 20 MG/1
20 CAPSULE, DELAYED RELEASE ORAL DAILY
Qty: 90 CAPSULE | Refills: 1 | Status: SHIPPED | OUTPATIENT
Start: 2022-01-05 | End: 2022-01-05 | Stop reason: SDUPTHER

## 2022-01-05 RX ORDER — GABAPENTIN 600 MG/1
600 TABLET ORAL 3 TIMES DAILY
Qty: 270 TABLET | Refills: 1 | Status: SHIPPED | OUTPATIENT
Start: 2022-01-05 | End: 2022-02-07 | Stop reason: SDUPTHER

## 2022-01-05 RX ORDER — LEVOTHYROXINE SODIUM 0.12 MG/1
125 TABLET ORAL DAILY
Qty: 90 TABLET | Refills: 1 | Status: SHIPPED | OUTPATIENT
Start: 2022-01-05 | End: 2022-01-13

## 2022-01-05 RX ORDER — HYDROCHLOROTHIAZIDE 25 MG/1
25 TABLET ORAL DAILY
Qty: 90 TABLET | Refills: 1 | Status: SHIPPED | OUTPATIENT
Start: 2022-01-05 | End: 2022-01-05 | Stop reason: SDUPTHER

## 2022-01-05 RX ORDER — ATORVASTATIN CALCIUM 20 MG/1
20 TABLET, FILM COATED ORAL NIGHTLY
Qty: 90 TABLET | Refills: 1 | Status: SHIPPED | OUTPATIENT
Start: 2022-01-05 | End: 2022-07-05

## 2022-01-05 RX ORDER — SERTRALINE HYDROCHLORIDE 100 MG/1
100 TABLET, FILM COATED ORAL DAILY
Qty: 90 TABLET | Refills: 1 | Status: SHIPPED | OUTPATIENT
Start: 2022-01-05 | End: 2022-06-30

## 2022-01-05 RX ORDER — AMLODIPINE BESYLATE 5 MG/1
5 TABLET ORAL DAILY
Qty: 90 TABLET | Refills: 1 | Status: SHIPPED | OUTPATIENT
Start: 2022-01-05 | End: 2022-07-05

## 2022-01-05 RX ORDER — HYDROCHLOROTHIAZIDE 25 MG/1
25 TABLET ORAL DAILY
Qty: 30 TABLET | Refills: 0 | Status: SHIPPED | OUTPATIENT
Start: 2022-01-05 | End: 2022-06-30

## 2022-01-05 RX ORDER — OMEPRAZOLE 20 MG/1
20 CAPSULE, DELAYED RELEASE ORAL DAILY
Qty: 30 CAPSULE | Refills: 0 | Status: SHIPPED | OUTPATIENT
Start: 2022-01-05 | End: 2022-06-30

## 2022-01-05 NOTE — PROGRESS NOTES
Odalis Espino presents to [facility].    Chief Complaint:    Subjective       History of Present Illness:  HPI      Chronic back pain and she is overall stable and functional with her pain meds and seh is managed by pain management  (Formerly [facility]). Her current regimen includes gabapentin 600 mg 3 times daily, Norco  mg 4 times daily and Mobic 15 mg. daily.  She denies weakness or numbness/tingling.  No saddle anesthesia or bowel/bladder incontinence.      Hypertension:  Oadlis reports that she does not check her blood pressure regularly when she does it has been good.  Her current regimen includes amlodipine 5 mg daily and HCTZ 25 mg daily.      Hyperlipidemia: Pertaining to hyperlipidemia, Odalis reports that she has been taking Lipitor 20 mg daily.  Her last lipid panel showed good control in December 2020.    Hypothyroidism: Odalis takes Synthroid 125 mcg daily with which she reports compliance without adverse effects.  Her last TSH was normal in December 2020.  She denies signs or symptoms of thyroid dysfunction.     Type 2 diabetes, Odalis does not check her blood sugar regularly.  Her current regimen includes Metformin 500 mg twice daily.  Her last A1c was 5.7%     chronic GERD is stable on omeprazole   Her current regimen includes omeprazole 20 mg daily.  No dysphagia,  abdominal pain, nausea, vomiting, melena or hematochezia.    Chronic anxiety and she is overall stable and well controlled on zoloft and klonopin 1 mg daily as needed.  She says her mood is stable. She denies SI or HI.    She has a bad smokers cough and she continues to smoke a pack per day.     Complains of dizziness, can be severe, comes and goes, she has been to ER and told she had anemia with dehydration and sent home and told to drink more fluids.       Review of Systems:  Review of Systems   Constitutional: Positive for fatigue. Negative for chills and fever.   HENT: Negative for ear  pain and sore throat.    Eyes: Negative for blurred vision and double vision.   Respiratory: Positive for cough. Negative for shortness of breath and wheezing.    Cardiovascular: Negative for chest pain, palpitations and leg swelling.   Gastrointestinal: Negative for abdominal pain, blood in stool, constipation, diarrhea, nausea and vomiting.   Skin: Negative for rash.   Neurological: Positive for dizziness. Negative for headache.   Psychiatric/Behavioral: Negative for sleep disturbance and suicidal ideas.        Objective   Medical History:  Past Medical History:   • Hepatitis   • HL (hearing loss)     Past Surgical History:   • EYE SURGERY   • HYSTERECTOMY   • TONSILLECTOMY      Family History   Problem Relation Age of Onset   • Cancer Father    • Cancer Sister    • Diabetes Brother    • Asthma Other    • Seizures Other      Social History     Tobacco Use   • Smoking status: Current Every Day Smoker     Packs/day: 1.00     Years: 40.00     Pack years: 40.00     Types: Cigarettes   • Smokeless tobacco: Never Used   Substance Use Topics   • Alcohol use: Yes     Alcohol/week: 1.0 standard drink     Types: 1 Glasses of wine per week       Health Maintenance Due   Topic Date Due   • URINE MICROALBUMIN  Never done   • TDAP/TD VACCINES (1 - Tdap) Never done   • ZOSTER VACCINE (1 of 2) Never done   • DIABETIC FOOT EXAM  Never done   • DIABETIC EYE EXAM  Never done        Immunization History   Administered Date(s) Administered   • COVID-19 (MODERNA) 1st, 2nd, 3rd Dose Only 03/17/2021, 04/15/2021, 11/03/2021   • Flu Vaccine Quad PF >36MO 10/09/2018   • Fluzone High Dose =>65 Years ([employer] ONLY) 11/13/2017   • Fluzone High-Dose 65+yrs 11/11/2021   • Pneumococcal Conjugate 13-Valent (PCV13) 10/18/2018   • Pneumococcal Polysaccharide (PPSV23) 01/01/2016       Allergies   Allergen Reactions   • Azithromycin Hives        Medications:  Current Outpatient Medications on File Prior to Visit   Medication Sig   • Atrovent HFA 17  "MCG/ACT inhaler    • HYDROcodone-acetaminophen (NORCO)  MG per tablet    • metFORMIN (GLUCOPHAGE) 500 MG tablet Take 2 tablets by mouth 2 (Two) Times a Day.   • [DISCONTINUED] amLODIPine (NORVASC) 5 MG tablet Take 1 tablet by mouth Daily.   • [DISCONTINUED] atorvastatin (LIPITOR) 20 MG tablet Take 1 tablet by mouth Every Night.   • [DISCONTINUED] clonazePAM (KlonoPIN) 1 MG tablet Take 1 tablet by mouth Daily As Needed for Anxiety.   • [DISCONTINUED] gabapentin (NEURONTIN) 600 MG tablet TAKE ONE TABLET BY MOUTH THREE TIMES A DAY   • [DISCONTINUED] hydroCHLOROthiazide (HYDRODIURIL) 25 MG tablet TAKE 1 TABLET DAILY   • [DISCONTINUED] levothyroxine (SYNTHROID, LEVOTHROID) 125 MCG tablet Take 1 tablet by mouth Daily.   • [DISCONTINUED] meclizine (ANTIVERT) 25 MG tablet Take 1 tablet by mouth 3 (Three) Times a Day.   • [DISCONTINUED] meloxicam (MOBIC) 15 MG tablet    • [DISCONTINUED] omeprazole (priLOSEC) 20 MG capsule TAKE 1 CAPSULE DAILY   • [DISCONTINUED] sertraline (ZOLOFT) 100 MG tablet Take 1 tablet by mouth Daily.   • [DISCONTINUED] benzonatate (Tessalon Perles) 100 MG capsule Take 1 capsule by mouth 3 (Three) Times a Day As Needed for Cough.   • [DISCONTINUED] methylPREDNISolone (MEDROL) 4 MG dose pack Take as directed on package instructions.   • [DISCONTINUED] ondansetron ODT (ZOFRAN-ODT) 4 MG disintegrating tablet ondansetron 4 mg disintegrating tablet     No current facility-administered medications on file prior to visit.       Vital Signs:   /70   Pulse 69   Temp 98.3 °F (36.8 °C) (Oral)   Ht 161.3 cm (63.5\")   Wt 58.1 kg (128 lb)   BMI 22.32 kg/m²       Physical Exam:  Physical Exam  Vitals and nursing note reviewed.   Constitutional:       General: She is not in acute distress.     Appearance: Normal appearance. She is not ill-appearing, toxic-appearing or diaphoretic.   HENT:      Head: Normocephalic and atraumatic.      Right Ear: Tympanic membrane, ear canal and external ear normal.     "  Left Ear: Tympanic membrane, ear canal and external ear normal.      Nose: No congestion or rhinorrhea.      Mouth/Throat:      Pharynx: Oropharynx is clear. No oropharyngeal exudate or posterior oropharyngeal erythema.   Eyes:      Extraocular Movements: Extraocular movements intact.      Conjunctiva/sclera: Conjunctivae normal.   Cardiovascular:      Rate and Rhythm: Normal rate and regular rhythm.      Pulses:           Dorsalis pedis pulses are 2+ on the right side and 2+ on the left side.      Heart sounds: Normal heart sounds.   Pulmonary:      Breath sounds: Normal breath sounds. No wheezing, rhonchi or rales.   Abdominal:      General: Abdomen is flat.      Palpations: Abdomen is soft.   Musculoskeletal:      Cervical back: Neck supple. No rigidity.      Comments: 5 out of 5 musculoskeletal strength in the lower extremity bilaterally with normal sensory exam in the lower extremity bilaterally.  2/4 patellar DTR bilaterally.  Negative straight leg raise bilaterally.  Nontender to palpation over the lumbar spine.  No muscle spasm noted in the paralumbar muscles bilaterally     Feet:      Right foot:      Protective Sensation: 7 sites tested. 7 sites sensed.      Skin integrity: Skin integrity normal.      Left foot:      Protective Sensation: 7 sites tested. 7 sites sensed.      Skin integrity: Skin integrity normal.   Lymphadenopathy:      Cervical: No cervical adenopathy.   Skin:     General: Skin is warm and dry.   Neurological:      Mental Status: She is alert and oriented to person, place, and time.   Psychiatric:         Mood and Affect: Mood normal.         Behavior: Behavior normal.         Result Review      The following data was reviewed by Karina Langford MD on 01/05/2022.  Lab Results   Component Value Date    WBC 6.35 07/08/2021    HGB 10.3 (L) 07/08/2021    HCT 31.0 (L) 07/08/2021    MCV 91.2 07/08/2021     07/08/2021     Lab Results   Component Value Date    GLUCOSE 97 07/08/2021     BUN 18 07/08/2021    CREATININE 1.33 (H) 07/08/2021    EGFRIFNONA 39 (L) 07/08/2021    BCR 13.5 07/08/2021    K 3.6 07/08/2021    CO2 26.5 07/08/2021    CALCIUM 8.5 (L) 07/08/2021    ALBUMIN 4.10 07/08/2021    LABIL2 1.7 12/17/2020    AST 19 07/08/2021    ALT 12 07/08/2021     Lab Results   Component Value Date    CHOL 141 07/08/2021    CHLPL 164 12/17/2020    TRIG 79 07/08/2021    HDL 66 (H) 07/08/2021    LDL 60 07/08/2021     Lab Results   Component Value Date    TSH 0.039 (L) 07/08/2021     Lab Results   Component Value Date    HGBA1C 5.70 (H) 07/08/2021     No results found for: PSA                    Assessment and Plan:          Diagnoses and all orders for this visit:    1. Type 2 diabetes mellitus with hyperglycemia, without long-term current use of insulin (HCC) (Primary)  Comments:  stable and well controlled, recommend yearly flu vaccine and eye exams (sched this Friday).  Orders:  -     Hemoglobin A1c; Future  -     Microalbumin / Creatinine Urine Ratio - Urine, Clean Catch; Future    2. Essential hypertension  Comments:  stable and well controlled on meds  Orders:  -     Comprehensive Metabolic Panel; Future  -     Discontinue: hydroCHLOROthiazide (HYDRODIURIL) 25 MG tablet; Take 1 tablet by mouth Daily.  Dispense: 90 tablet; Refill: 1  -     amLODIPine (NORVASC) 5 MG tablet; Take 1 tablet by mouth Daily.  Dispense: 90 tablet; Refill: 1  -     hydroCHLOROthiazide (HYDRODIURIL) 25 MG tablet; Take 1 tablet by mouth Daily.  Dispense: 30 tablet; Refill: 0    3. Pure hypercholesterolemia  Comments:  stable and well controlled on meds  Orders:  -     Lipid Panel; Future  -     atorvastatin (LIPITOR) 20 MG tablet; Take 1 tablet by mouth Every Night.  Dispense: 90 tablet; Refill: 1    4. Chronic right-sided low back pain with right-sided sciatica  Comments:  gabapentin refilled, cont pain management  Orders:  -     gabapentin (NEURONTIN) 600 MG tablet; Take 1 tablet by mouth 3 (Three) Times a Day.  Dispense:  270 tablet; Refill: 1  -     meloxicam (MOBIC) 15 MG tablet; Take 1 tablet by mouth Daily.  Dispense: 90 tablet; Refill: 1    5. Acquired hypothyroidism  Comments:  STable and well-controlled on medication.  Due for labs  Orders:  -     TSH+Free T4; Future  -     levothyroxine (SYNTHROID, LEVOTHROID) 125 MCG tablet; Take 1 tablet by mouth Daily.  Dispense: 90 tablet; Refill: 1    6. Tobacco abuse  Comments:  Counseled on cessation    7. Long term current use of therapeutic drug  -     POC Urine Drug Screen [facility]    8. Dizziness  Comments:  Due to orthostatic hypotension.  Patient advised she needs to drink 64 ounces of fluids daily and take her time going from laying down to sitting up    9. Anemia, unspecified type  Comments:  We will repeat labs.  She is off iron due to constipation issues  Orders:  -     CBC (No Diff); Future    10. Degeneration of lumbar intervertebral disc  -     gabapentin (NEURONTIN) 600 MG tablet; Take 1 tablet by mouth 3 (Three) Times a Day.  Dispense: 270 tablet; Refill: 1    11. Gastroesophageal reflux disease without esophagitis  Comments:  Stable and well-controlled on Prilosec  Orders:  -     Discontinue: omeprazole (priLOSEC) 20 MG capsule; Take 1 capsule by mouth Daily.  Dispense: 90 capsule; Refill: 1  -     omeprazole (priLOSEC) 20 MG capsule; Take 1 capsule by mouth Daily.  Dispense: 30 capsule; Refill: 0    12. Anxiety  Comments:  Overall stable and she is doing well with her Zoloft 100 mg daily  Orders:  -     sertraline (ZOLOFT) 100 MG tablet; Take 1 tablet by mouth Daily.  Dispense: 90 tablet; Refill: 1  -     clonazePAM (KlonoPIN) 1 MG tablet; Take 1 tablet by mouth Daily As Needed for Anxiety.  Dispense: 30 tablet; Refill: 2          Follow Up   Return in about 6 months (around 7/5/2022) for Recheck.

## 2022-01-12 ENCOUNTER — LAB (OUTPATIENT)
Dept: LAB | Facility: HOSPITAL | Age: 72
End: 2022-01-12

## 2022-01-12 DIAGNOSIS — I10 ESSENTIAL HYPERTENSION: ICD-10-CM

## 2022-01-12 DIAGNOSIS — E11.65 TYPE 2 DIABETES MELLITUS WITH HYPERGLYCEMIA, WITHOUT LONG-TERM CURRENT USE OF INSULIN: ICD-10-CM

## 2022-01-12 DIAGNOSIS — E78.00 PURE HYPERCHOLESTEROLEMIA: ICD-10-CM

## 2022-01-12 DIAGNOSIS — D64.9 ANEMIA, UNSPECIFIED TYPE: ICD-10-CM

## 2022-01-12 DIAGNOSIS — E03.9 ACQUIRED HYPOTHYROIDISM: ICD-10-CM

## 2022-01-12 LAB
ALBUMIN SERPL-MCNC: 4.3 G/DL (ref 3.5–5.2)
ALBUMIN UR-MCNC: 4.3 MG/DL
ALBUMIN/GLOB SERPL: 1.5 G/DL
ALP SERPL-CCNC: 57 U/L (ref 39–117)
ALT SERPL W P-5'-P-CCNC: 10 U/L (ref 1–33)
ANION GAP SERPL CALCULATED.3IONS-SCNC: 8.9 MMOL/L (ref 5–15)
AST SERPL-CCNC: 18 U/L (ref 1–32)
BILIRUB SERPL-MCNC: 0.4 MG/DL (ref 0–1.2)
BUN SERPL-MCNC: 31 MG/DL (ref 8–23)
BUN/CREAT SERPL: 19.6 (ref 7–25)
CALCIUM SPEC-SCNC: 9.1 MG/DL (ref 8.6–10.5)
CHLORIDE SERPL-SCNC: 99 MMOL/L (ref 98–107)
CHOLEST SERPL-MCNC: 120 MG/DL (ref 0–200)
CO2 SERPL-SCNC: 35.1 MMOL/L (ref 22–29)
CREAT SERPL-MCNC: 1.58 MG/DL (ref 0.57–1)
CREAT UR-MCNC: 236 MG/DL
DEPRECATED RDW RBC AUTO: 45.3 FL (ref 37–54)
ERYTHROCYTE [DISTWIDTH] IN BLOOD BY AUTOMATED COUNT: 13.3 % (ref 12.3–15.4)
GFR SERPL CREATININE-BSD FRML MDRD: 32 ML/MIN/1.73
GLOBULIN UR ELPH-MCNC: 2.9 GM/DL
GLUCOSE SERPL-MCNC: 101 MG/DL (ref 65–99)
HBA1C MFR BLD: 6.64 % (ref 4.8–5.6)
HCT VFR BLD AUTO: 30.1 % (ref 34–46.6)
HDLC SERPL-MCNC: 51 MG/DL (ref 40–60)
HGB BLD-MCNC: 10.2 G/DL (ref 12–15.9)
LDLC SERPL CALC-MCNC: 54 MG/DL (ref 0–100)
LDLC/HDLC SERPL: 1.07 {RATIO}
MCH RBC QN AUTO: 30.8 PG (ref 26.6–33)
MCHC RBC AUTO-ENTMCNC: 33.9 G/DL (ref 31.5–35.7)
MCV RBC AUTO: 90.9 FL (ref 79–97)
MICROALBUMIN/CREAT UR: 18.2 MG/G
PLATELET # BLD AUTO: 149 10*3/MM3 (ref 140–450)
PMV BLD AUTO: 10.2 FL (ref 6–12)
POTASSIUM SERPL-SCNC: 3 MMOL/L (ref 3.5–5.2)
PROT SERPL-MCNC: 7.2 G/DL (ref 6–8.5)
RBC # BLD AUTO: 3.31 10*6/MM3 (ref 3.77–5.28)
SODIUM SERPL-SCNC: 143 MMOL/L (ref 136–145)
T4 FREE SERPL-MCNC: 1.41 NG/DL (ref 0.93–1.7)
TRIGL SERPL-MCNC: 71 MG/DL (ref 0–150)
TSH SERPL DL<=0.05 MIU/L-ACNC: 0.22 UIU/ML (ref 0.27–4.2)
VLDLC SERPL-MCNC: 15 MG/DL (ref 5–40)
WBC NRBC COR # BLD: 5.45 10*3/MM3 (ref 3.4–10.8)

## 2022-01-12 PROCEDURE — 82043 UR ALBUMIN QUANTITATIVE: CPT

## 2022-01-12 PROCEDURE — 80061 LIPID PANEL: CPT

## 2022-01-12 PROCEDURE — 36415 COLL VENOUS BLD VENIPUNCTURE: CPT

## 2022-01-12 PROCEDURE — 84443 ASSAY THYROID STIM HORMONE: CPT

## 2022-01-12 PROCEDURE — 85027 COMPLETE CBC AUTOMATED: CPT

## 2022-01-12 PROCEDURE — 83036 HEMOGLOBIN GLYCOSYLATED A1C: CPT

## 2022-01-12 PROCEDURE — 84439 ASSAY OF FREE THYROXINE: CPT

## 2022-01-12 PROCEDURE — 82570 ASSAY OF URINE CREATININE: CPT

## 2022-01-12 PROCEDURE — 80053 COMPREHEN METABOLIC PANEL: CPT

## 2022-01-13 DIAGNOSIS — E03.9 ACQUIRED HYPOTHYROIDISM: Primary | ICD-10-CM

## 2022-01-13 RX ORDER — LEVOTHYROXINE SODIUM 0.1 MG/1
100 TABLET ORAL DAILY
Qty: 90 TABLET | Refills: 1 | Status: SHIPPED | OUTPATIENT
Start: 2022-01-13 | End: 2022-01-20

## 2022-01-20 DIAGNOSIS — N17.9 ACUTE RENAL FAILURE SUPERIMPOSED ON STAGE 3B CHRONIC KIDNEY DISEASE, UNSPECIFIED ACUTE RENAL FAILURE TYPE: Primary | ICD-10-CM

## 2022-01-20 DIAGNOSIS — E03.9 ACQUIRED HYPOTHYROIDISM: Primary | ICD-10-CM

## 2022-01-20 DIAGNOSIS — N18.32 ACUTE RENAL FAILURE SUPERIMPOSED ON STAGE 3B CHRONIC KIDNEY DISEASE, UNSPECIFIED ACUTE RENAL FAILURE TYPE: Primary | ICD-10-CM

## 2022-01-20 RX ORDER — LEVOTHYROXINE SODIUM 0.07 MG/1
75 TABLET ORAL DAILY
Qty: 90 TABLET | Refills: 1 | Status: SHIPPED | OUTPATIENT
Start: 2022-01-20 | End: 2022-05-16 | Stop reason: DRUGHIGH

## 2022-01-21 ENCOUNTER — OFFICE VISIT (OUTPATIENT)
Dept: FAMILY MEDICINE CLINIC | Age: 72
End: 2022-01-21

## 2022-01-21 VITALS
DIASTOLIC BLOOD PRESSURE: 72 MMHG | TEMPERATURE: 98.4 F | WEIGHT: 124 LBS | HEART RATE: 74 BPM | SYSTOLIC BLOOD PRESSURE: 136 MMHG | OXYGEN SATURATION: 99 % | BODY MASS INDEX: 21.17 KG/M2 | HEIGHT: 64 IN

## 2022-01-21 DIAGNOSIS — J06.9 UPPER RESPIRATORY INFECTION WITH COUGH AND CONGESTION: Primary | ICD-10-CM

## 2022-01-21 PROCEDURE — 99213 OFFICE O/P EST LOW 20 MIN: CPT | Performed by: NURSE PRACTITIONER

## 2022-01-21 PROCEDURE — 96372 THER/PROPH/DIAG INJ SC/IM: CPT | Performed by: NURSE PRACTITIONER

## 2022-01-21 PROCEDURE — U0004 COV-19 TEST NON-CDC HGH THRU: HCPCS | Performed by: NURSE PRACTITIONER

## 2022-01-21 RX ORDER — TRIAMCINOLONE ACETONIDE 40 MG/ML
40 INJECTION, SUSPENSION INTRA-ARTICULAR; INTRAMUSCULAR ONCE
Status: COMPLETED | OUTPATIENT
Start: 2022-01-21 | End: 2022-01-21

## 2022-01-21 RX ORDER — DEXTROMETHORPHAN HYDROBROMIDE AND PROMETHAZINE HYDROCHLORIDE 15; 6.25 MG/5ML; MG/5ML
5 SYRUP ORAL 4 TIMES DAILY PRN
Qty: 240 ML | Refills: 0 | Status: SHIPPED | OUTPATIENT
Start: 2022-01-21 | End: 2022-07-14

## 2022-01-21 RX ORDER — ALBUTEROL SULFATE 90 UG/1
2 AEROSOL, METERED RESPIRATORY (INHALATION) EVERY 4 HOURS PRN
Qty: 18 G | Refills: 0 | Status: SHIPPED | OUTPATIENT
Start: 2022-01-21 | End: 2022-04-14

## 2022-01-21 RX ADMIN — TRIAMCINOLONE ACETONIDE 40 MG: 40 INJECTION, SUSPENSION INTRA-ARTICULAR; INTRAMUSCULAR at 15:33

## 2022-01-21 NOTE — PROGRESS NOTES
Chief Complaint  Odalis Espino presents to [facility] for URI    Subjective          History of Present Illness    Amaris is here today with c/o cough, sinus congestion. Denies ear pain, fever, wheezing. Reports symptoms started about 1 1/2 weeks ago. She has been taking shaji seltzer cold plus, mucinex, and using vicks. She denies any known ill exposures. UTD influenza and covid vaccine.     Review of Systems      Allergies   Allergen Reactions   • Azithromycin Hives      Past Medical History:   Diagnosis Date   • Hepatitis    • HL (hearing loss)      Current Outpatient Medications   Medication Sig Dispense Refill   • amLODIPine (NORVASC) 5 MG tablet Take 1 tablet by mouth Daily. 90 tablet 1   • atorvastatin (LIPITOR) 20 MG tablet Take 1 tablet by mouth Every Night. 90 tablet 1   • Atrovent HFA 17 MCG/ACT inhaler      • clonazePAM (KlonoPIN) 1 MG tablet Take 1 tablet by mouth Daily As Needed for Anxiety. 30 tablet 2   • gabapentin (NEURONTIN) 600 MG tablet Take 1 tablet by mouth 3 (Three) Times a Day. 270 tablet 1   • hydroCHLOROthiazide (HYDRODIURIL) 25 MG tablet Take 1 tablet by mouth Daily. 30 tablet 0   • HYDROcodone-acetaminophen (NORCO)  MG per tablet      • levothyroxine (SYNTHROID, LEVOTHROID) 75 MCG tablet Take 1 tablet by mouth Daily. 90 tablet 1   • meloxicam (MOBIC) 15 MG tablet Take 1 tablet by mouth Daily. 90 tablet 1   • metFORMIN (GLUCOPHAGE) 500 MG tablet Take 2 tablets by mouth 2 (Two) Times a Day. 360 tablet 0   • omeprazole (priLOSEC) 20 MG capsule Take 1 capsule by mouth Daily. 30 capsule 0   • sertraline (ZOLOFT) 100 MG tablet Take 1 tablet by mouth Daily. 90 tablet 1   • albuterol sulfate  (90 Base) MCG/ACT inhaler Inhale 2 puffs Every 4 (Four) Hours As Needed for Wheezing or Shortness of Air. 18 g 0   • promethazine-dextromethorphan (PROMETHAZINE-DM) 6.25-15 MG/5ML syrup Take 5 mL by mouth 4 (Four) Times a Day As Needed for Cough. 240 mL 0     No  "current facility-administered medications for this visit.     Past Surgical History:   Procedure Laterality Date   • EYE SURGERY     • HYSTERECTOMY     • TONSILLECTOMY        Social History     Tobacco Use   • Smoking status: Current Every Day Smoker     Packs/day: 1.00     Years: 40.00     Pack years: 40.00     Types: Cigarettes   • Smokeless tobacco: Never Used   Vaping Use   • Vaping Use: Never used   Substance Use Topics   • Alcohol use: Yes     Alcohol/week: 1.0 standard drink     Types: 1 Glasses of wine per week   • Drug use: Never     Family History   Problem Relation Age of Onset   • Cancer Father    • Cancer Sister    • Diabetes Brother    • Asthma Other    • Seizures Other      Health Maintenance Due   Topic Date Due   • TDAP/TD VACCINES (1 - Tdap) Never done   • ZOSTER VACCINE (1 of 2) Never done   • DIABETIC FOOT EXAM  Never done   • DIABETIC EYE EXAM  Never done      Immunization History   Administered Date(s) Administered   • COVID-19 (MODERNA) 1st, 2nd, 3rd Dose Only 03/17/2021, 04/15/2021, 11/03/2021   • Flu Vaccine Quad PF >36MO 10/09/2018   • Fluzone High Dose =>65 Years ([employer] ONLY) 11/13/2017   • Fluzone High-Dose 65+yrs 11/11/2021   • Pneumococcal Conjugate 13-Valent (PCV13) 10/18/2018   • Pneumococcal Polysaccharide (PPSV23) 01/01/2016        Objective     Vitals:    01/21/22 1504   BP: 136/72   Pulse: 74   Temp: 98.4 °F (36.9 °C)   SpO2: 99%   Weight: 56.2 kg (124 lb)   Height: 162.6 cm (64\")     Body mass index is 21.28 kg/m².     Physical Exam  Vitals reviewed.   Constitutional:       General: She is not in acute distress.     Appearance: Normal appearance. She is well-developed.   HENT:      Head: Normocephalic and atraumatic.      Right Ear: Tympanic membrane and ear canal normal.      Left Ear: Tympanic membrane and ear canal normal.      Nose: Nose normal.      Mouth/Throat:      Mouth: Mucous membranes are moist.   Eyes:      Extraocular Movements: Extraocular movements intact.      " Pupils: Pupils are equal, round, and reactive to light.   Cardiovascular:      Rate and Rhythm: Normal rate and regular rhythm.   Pulmonary:      Effort: Pulmonary effort is normal.      Breath sounds: Normal breath sounds.   Neurological:      Mental Status: She is alert and oriented to person, place, and time.   Psychiatric:         Mood and Affect: Mood and affect normal.           Result Review :     The following data was reviewed by: BAMBI Alvarez on 01/21/2022:          Hemoglobin A1c (01/12/2022 08:16)  Lipid Panel (01/12/2022 08:16)  TSH+Free T4 (01/12/2022 08:16)  CBC (No Diff) (01/12/2022 08:16)  Comprehensive Metabolic Panel (01/12/2022 08:16)  Microalbumin / Creatinine Urine Ratio - Urine, Clean Catch (01/12/2022 08:16)                  Assessment and Plan      Diagnoses and all orders for this visit:    1. Upper respiratory infection with cough and congestion (Primary)  Comments:  Moods.  Symptomatic treatment discussed.  Quarantined discussed and handout provided.  Await PCR.  Orders:  -     COVID-19,APTIMA PANTHER(SKY),[facility]/ [facility], NP/OP SWAB IN UTM/VTM/[facility]ALINE TRANSPORT MEDIA,24 HR TAT - Swab, Nasopharynx  -     albuterol sulfate  (90 Base) MCG/ACT inhaler; Inhale 2 puffs Every 4 (Four) Hours As Needed for Wheezing or Shortness of Air.  Dispense: 18 g; Refill: 0  -     promethazine-dextromethorphan (PROMETHAZINE-DM) 6.25-15 MG/5ML syrup; Take 5 mL by mouth 4 (Four) Times a Day As Needed for Cough.  Dispense: 240 mL; Refill: 0  -     triamcinolone acetonide (KENALOG-40) injection 40 mg              Follow Up     Return for As needed for persistent or worsening symptoms.

## 2022-01-23 LAB — SARS-COV-2 RNA PNL SPEC NAA+PROBE: NOT DETECTED

## 2022-01-27 ENCOUNTER — TELEPHONE (OUTPATIENT)
Dept: FAMILY MEDICINE CLINIC | Age: 72
End: 2022-01-27

## 2022-01-27 NOTE — TELEPHONE ENCOUNTER
----- Message from Rosa Dobbs LPN sent at 1/14/2022  8:36 AM EST -----  Regarding: pill count  See drug screen [place] pill count for clonazepam and gabapentin

## 2022-01-28 ENCOUNTER — CLINICAL SUPPORT (OUTPATIENT)
Dept: FAMILY MEDICINE CLINIC | Age: 72
End: 2022-01-28

## 2022-01-28 VITALS — SYSTOLIC BLOOD PRESSURE: 114 MMHG | HEART RATE: 80 BPM | DIASTOLIC BLOOD PRESSURE: 77 MMHG

## 2022-01-28 DIAGNOSIS — Z79.899 LONG TERM CURRENT USE OF THERAPEUTIC DRUG: Primary | ICD-10-CM

## 2022-01-28 LAB
AMPHET+METHAMPHET UR QL: NEGATIVE
AMPHETAMINES UR QL: NEGATIVE
BARBITURATES UR QL SCN: NEGATIVE
BENZODIAZ UR QL SCN: NEGATIVE
BUPRENORPHINE SERPL-MCNC: NEGATIVE NG/ML
COCAINE UR QL: NEGATIVE
EXPIRATION DATE: ABNORMAL
Lab: ABNORMAL
MDMA UR QL SCN: NEGATIVE
METHADONE UR QL SCN: NEGATIVE
OPIATES UR QL: NEGATIVE
OXYCODONE UR QL SCN: POSITIVE
PCP UR QL SCN: NEGATIVE

## 2022-01-28 PROCEDURE — 80305 DRUG TEST PRSMV DIR OPT OBS: CPT | Performed by: FAMILY MEDICINE

## 2022-02-07 DIAGNOSIS — M54.41 CHRONIC RIGHT-SIDED LOW BACK PAIN WITH RIGHT-SIDED SCIATICA: ICD-10-CM

## 2022-02-07 DIAGNOSIS — G89.29 CHRONIC RIGHT-SIDED LOW BACK PAIN WITH RIGHT-SIDED SCIATICA: ICD-10-CM

## 2022-02-07 DIAGNOSIS — M51.36 DEGENERATION OF LUMBAR INTERVERTEBRAL DISC: ICD-10-CM

## 2022-02-08 RX ORDER — GABAPENTIN 600 MG/1
600 TABLET ORAL 3 TIMES DAILY
Qty: 270 TABLET | Refills: 1 | Status: SHIPPED | OUTPATIENT
Start: 2022-02-08 | End: 2022-02-15 | Stop reason: SDUPTHER

## 2022-02-15 DIAGNOSIS — M54.41 CHRONIC RIGHT-SIDED LOW BACK PAIN WITH RIGHT-SIDED SCIATICA: ICD-10-CM

## 2022-02-15 DIAGNOSIS — M51.36 DEGENERATION OF LUMBAR INTERVERTEBRAL DISC: ICD-10-CM

## 2022-02-15 DIAGNOSIS — G89.29 CHRONIC RIGHT-SIDED LOW BACK PAIN WITH RIGHT-SIDED SCIATICA: ICD-10-CM

## 2022-02-15 RX ORDER — GABAPENTIN 600 MG/1
600 TABLET ORAL 3 TIMES DAILY
Qty: 90 TABLET | Refills: 0 | Status: SHIPPED | OUTPATIENT
Start: 2022-02-15

## 2022-04-14 ENCOUNTER — OFFICE VISIT (OUTPATIENT)
Dept: FAMILY MEDICINE CLINIC | Age: 72
End: 2022-04-14

## 2022-04-14 VITALS
SYSTOLIC BLOOD PRESSURE: 106 MMHG | BODY MASS INDEX: 21.21 KG/M2 | TEMPERATURE: 98.3 F | DIASTOLIC BLOOD PRESSURE: 67 MMHG | HEART RATE: 70 BPM | HEIGHT: 64 IN | OXYGEN SATURATION: 95 % | WEIGHT: 124.2 LBS

## 2022-04-14 DIAGNOSIS — E78.49 OTHER HYPERLIPIDEMIA: ICD-10-CM

## 2022-04-14 DIAGNOSIS — F41.9 ANXIETY: ICD-10-CM

## 2022-04-14 DIAGNOSIS — N18.32 STAGE 3B CHRONIC KIDNEY DISEASE (CKD): ICD-10-CM

## 2022-04-14 DIAGNOSIS — I10 ESSENTIAL HYPERTENSION: Primary | ICD-10-CM

## 2022-04-14 DIAGNOSIS — E11.9 TYPE 2 DIABETES MELLITUS WITHOUT COMPLICATION, WITHOUT LONG-TERM CURRENT USE OF INSULIN: ICD-10-CM

## 2022-04-14 DIAGNOSIS — Z79.899 HIGH RISK MEDICATION USE: ICD-10-CM

## 2022-04-14 DIAGNOSIS — E03.9 ACQUIRED HYPOTHYROIDISM: ICD-10-CM

## 2022-04-14 DIAGNOSIS — K21.9 GASTROESOPHAGEAL REFLUX DISEASE WITHOUT ESOPHAGITIS: ICD-10-CM

## 2022-04-14 DIAGNOSIS — Z79.899 HIGH RISK MEDICATION USE: Primary | ICD-10-CM

## 2022-04-14 LAB
AMPHET+METHAMPHET UR QL: NEGATIVE
AMPHETAMINES UR QL: NEGATIVE
BARBITURATES UR QL SCN: NEGATIVE
BENZODIAZ UR QL SCN: NEGATIVE
BUPRENORPHINE SERPL-MCNC: NEGATIVE NG/ML
CANNABINOIDS SERPL QL: NEGATIVE
COCAINE UR QL: NEGATIVE
EXPIRATION DATE: ABNORMAL
Lab: ABNORMAL
MDMA UR QL SCN: NEGATIVE
METHADONE UR QL SCN: NEGATIVE
OPIATES UR QL: POSITIVE
OXYCODONE UR QL SCN: NEGATIVE
PCP UR QL SCN: NEGATIVE

## 2022-04-14 PROCEDURE — G0480 DRUG TEST DEF 1-7 CLASSES: HCPCS | Performed by: FAMILY MEDICINE

## 2022-04-14 PROCEDURE — 80305 DRUG TEST PRSMV DIR OPT OBS: CPT | Performed by: FAMILY MEDICINE

## 2022-04-14 PROCEDURE — 99214 OFFICE O/P EST MOD 30 MIN: CPT | Performed by: FAMILY MEDICINE

## 2022-04-14 NOTE — PROGRESS NOTES
Odalis Espino presents to [facility].    Chief Complaint:  Diabetes follow up    Subjective       History of Present Illness:  HPI  Acute worsening of kidney function, GFR down to low 30's, she has a referral in place to nephrology and her appointment is soon    Chronic back pain, she sees pain management and is pending spinal steroid shots, she is fighting with her insurance Co. About paying for the shop. She sees [facility]. Her current regimen includes gabapentin 600 mg 3 times daily, Norco  mg 4 times daily and Mobic 15 mg. daily.  She denies weakness or numbness/tingling.  No saddle anesthesia or bowel/bladder incontinence. She is on omeprazole for GERD and to protect her stomach.        Hypertension: blood pressure regularly when she does it has been good.  Her current regimen includes amlodipine 5 mg daily and HCTZ 25 mg daily. She tolerates meds well.        Hyperlipidemia:  Odalis reports that she has been taking Lipitor 20 mg daily. Labs reviewed, chol well controlled     Hypothyroidism: Odalis takes Synthroid 100 mcg daily with which she reports compliance without adverse effects.  Her last TSH was low in Jan-she was decreased to 75 mcg and she never changed her dose.  She will now.   She denies signs or symptoms of thyroid dysfunction.      Type 2 diabetes, Odalis does not check her blood sugar regularly and cant remember last BS.  Her current regimen includes Metformin 500 mg twice daily.  Her last A1c was 6.64%       Chronic anxiety and she is well controlled on zoloft and klonopin 1 mg daily as needed.  She says her mood is stable.  No suicidal or homicidal thoughts, she is sleeping well.  Today is her F2F for the benzo-she is aware of the increased risk of benzo and opiate SE with decrease respiratory drive, confusion, falls.      She has a bad smokers cough and she continues to smoke a pack per day. She defers quitting today        Review of  Systems:  Review of Systems   Constitutional: Negative for chills and fever.   HENT: Negative for ear pain, sinus pressure and sore throat.    Eyes: Negative for blurred vision and double vision.   Respiratory: Negative for cough, shortness of breath and wheezing.    Cardiovascular: Negative for chest pain and palpitations.   Gastrointestinal: Negative for abdominal pain, blood in stool, constipation, diarrhea, nausea and vomiting.   Skin: Negative for rash.   Neurological: Negative for dizziness and headache.   Psychiatric/Behavioral: Negative for depressed mood.        Objective   Medical History:  Past Medical History:   • Hepatitis   • HL (hearing loss)     Past Surgical History:   • EYE SURGERY   • HYSTERECTOMY   • TONSILLECTOMY      Family History   Problem Relation Age of Onset   • Cancer Father    • Cancer Sister    • Diabetes Brother    • Asthma Other    • Seizures Other      Social History     Tobacco Use   • Smoking status: Current Every Day Smoker     Packs/day: 1.00     Years: 40.00     Pack years: 40.00     Types: Cigarettes   • Smokeless tobacco: Never Used   Substance Use Topics   • Alcohol use: Yes     Alcohol/week: 1.0 standard drink     Types: 1 Glasses of wine per week     Comment: occasional       Health Maintenance Due   Topic Date Due   • TDAP/TD VACCINES (1 - Tdap) Never done   • ZOSTER VACCINE (1 of 2) Never done   • DIABETIC FOOT EXAM  Never done        Immunization History   Administered Date(s) Administered   • COVID-19 (MODERNA) 1st, 2nd, 3rd Dose Only 03/17/2021, 04/15/2021, 11/03/2021   • Flu Vaccine Quad PF >36MO 10/09/2018   • Fluzone High Dose =>65 Years ([employer] ONLY) 11/13/2017   • Fluzone High-Dose 65+yrs 11/11/2021   • Pneumococcal Conjugate 13-Valent (PCV13) 10/18/2018   • Pneumococcal Polysaccharide (PPSV23) 01/01/2016       Allergies   Allergen Reactions   • Azithromycin Hives        Medications:  Current Outpatient Medications on File Prior to Visit   Medication Sig   •  "amLODIPine (NORVASC) 5 MG tablet Take 1 tablet by mouth Daily.   • atorvastatin (LIPITOR) 20 MG tablet Take 1 tablet by mouth Every Night.   • Atrovent HFA 17 MCG/ACT inhaler    • clonazePAM (KlonoPIN) 1 MG tablet Take 1 tablet by mouth Daily As Needed for Anxiety.   • gabapentin (NEURONTIN) 600 MG tablet Take 1 tablet by mouth 3 (Three) Times a Day.   • hydroCHLOROthiazide (HYDRODIURIL) 25 MG tablet Take 1 tablet by mouth Daily.   • HYDROcodone-acetaminophen (NORCO)  MG per tablet    • levothyroxine (SYNTHROID, LEVOTHROID) 75 MCG tablet Take 1 tablet by mouth Daily.   • meloxicam (MOBIC) 15 MG tablet Take 1 tablet by mouth Daily.   • metFORMIN (GLUCOPHAGE) 500 MG tablet Take 2 tablets by mouth 2 (Two) Times a Day.   • omeprazole (priLOSEC) 20 MG capsule Take 1 capsule by mouth Daily.   • promethazine-dextromethorphan (PROMETHAZINE-DM) 6.25-15 MG/5ML syrup Take 5 mL by mouth 4 (Four) Times a Day As Needed for Cough.   • sertraline (ZOLOFT) 100 MG tablet Take 1 tablet by mouth Daily.   • [DISCONTINUED] albuterol sulfate  (90 Base) MCG/ACT inhaler Inhale 2 puffs Every 4 (Four) Hours As Needed for Wheezing or Shortness of Air.     No current facility-administered medications on file prior to visit.       Vital Signs:   /67 (BP Location: Left arm, Patient Position: Sitting, Cuff Size: Adult)   Pulse 70   Temp 98.3 °F (36.8 °C) (Oral)   Ht 162.6 cm (64\")   Wt 56.3 kg (124 lb 3.2 oz)   SpO2 95%   BMI 21.32 kg/m²       Physical Exam:  Physical Exam  Vitals and nursing note reviewed.   Constitutional:       General: She is not in acute distress.     Appearance: Normal appearance. She is not ill-appearing, toxic-appearing or diaphoretic.   HENT:      Head: Normocephalic and atraumatic.      Right Ear: Tympanic membrane, ear canal and external ear normal.      Left Ear: Tympanic membrane, ear canal and external ear normal.      Nose: No congestion or rhinorrhea.      Mouth/Throat:      Mouth: Mucous " membranes are moist.      Pharynx: Oropharynx is clear. No oropharyngeal exudate or posterior oropharyngeal erythema.   Eyes:      Extraocular Movements: Extraocular movements intact.      Conjunctiva/sclera: Conjunctivae normal.      Pupils: Pupils are equal, round, and reactive to light.   Cardiovascular:      Rate and Rhythm: Normal rate and regular rhythm.      Pulses:           Dorsalis pedis pulses are 2+ on the right side and 2+ on the left side.      Heart sounds: Normal heart sounds.   Pulmonary:      Effort: Pulmonary effort is normal.      Breath sounds: Normal breath sounds. No wheezing, rhonchi or rales.   Abdominal:      General: Abdomen is flat.      Palpations: Abdomen is soft.   Musculoskeletal:      Cervical back: Neck supple. No rigidity.   Feet:      Right foot:      Protective Sensation: 7 sites tested. 7 sites sensed.      Skin integrity: Skin integrity normal. No ulcer or blister.      Toenail Condition: Right toenails are normal.      Left foot:      Protective Sensation: 7 sites tested. 7 sites sensed.      Skin integrity: Skin integrity normal. No ulcer or blister.      Toenail Condition: Left toenails are normal.      Comments: Diabetic Foot Exam Performed and Monofilament Test Performed     Lymphadenopathy:      Cervical: No cervical adenopathy.   Skin:     General: Skin is warm and dry.   Neurological:      Mental Status: She is alert and oriented to person, place, and time.   Psychiatric:         Mood and Affect: Mood normal.         Behavior: Behavior normal.         Result Review      The following data was reviewed by Karina Langford MD on 04/14/2022.  Lab Results   Component Value Date    WBC 5.45 01/12/2022    HGB 10.2 (L) 01/12/2022    HCT 30.1 (L) 01/12/2022    MCV 90.9 01/12/2022     01/12/2022     Lab Results   Component Value Date    GLUCOSE 101 (H) 01/12/2022    BUN 31 (H) 01/12/2022    CREATININE 1.58 (H) 01/12/2022    EGFRIFNONA 32 (L) 01/12/2022    BCR 19.6  01/12/2022    K 3.0 (L) 01/12/2022    CO2 35.1 (H) 01/12/2022    CALCIUM 9.1 01/12/2022    ALBUMIN 4.30 01/12/2022    LABIL2 1.7 12/17/2020    AST 18 01/12/2022    ALT 10 01/12/2022     Lab Results   Component Value Date    CHOL 120 01/12/2022    CHLPL 164 12/17/2020    TRIG 71 01/12/2022    HDL 51 01/12/2022    LDL 54 01/12/2022     Lab Results   Component Value Date    TSH 0.220 (L) 01/12/2022     Lab Results   Component Value Date    HGBA1C 6.64 (H) 01/12/2022     No results found for: PSA                    Assessment and Plan:          Diagnoses and all orders for this visit:    1. Essential hypertension (Primary)  Comments:  stable and well controlled    2. Acquired hypothyroidism  Comments:   Her last TSH was low in Jan-she was decreased to 75 mcg and she never changed her dose.  She will now.     3. High risk medication use  -     POC Urine Drug Screen [facility]    4. Other hyperlipidemia    5. Type 2 diabetes mellitus without complication, without long-term current use of insulin ([facility])  Comments:  well controlled, no changes needed in current treatment plan, recommend yearly eye exam and foot exam    6. Gastroesophageal reflux disease without esophagitis  Comments:  well controlled on omeprazole    7. Stage 3b chronic kidney disease (CKD) ([facility])  Comments:  nephrology appt in place, she is off all nsaids, may need to stop PPI omeprazole, too    8. Anxiety  Comments:  Chronic anxiety and she is well controlled on zoloft and klonopin 1 mg daily as needed. F@F today, no si of diversion or abuse          Follow Up   Return in about 3 months (around 7/14/2022) for Recheck.

## 2022-04-19 ENCOUNTER — PATIENT MESSAGE (OUTPATIENT)
Dept: FAMILY MEDICINE CLINIC | Age: 72
End: 2022-04-19

## 2022-04-19 DIAGNOSIS — F41.9 ANXIETY: ICD-10-CM

## 2022-04-20 NOTE — TELEPHONE ENCOUNTER
Rx Refill Note  Requested Prescriptions     Pending Prescriptions Disp Refills   • clonazePAM (KlonoPIN) 1 MG tablet [Pharmacy Med Name: clonazePAM 1 MG TABLET] 30 tablet      Sig: TAKE ONE TABLET BY MOUTH DAILY AS NEEDED FOR ANXIETY      Last office visit with prescribing clinician: 4/14/2022      Next office visit with prescribing clinician: 7/14/2022     Please add Last Relevant Lab Date if appropriate: POC Urine Drug Screen [facility] (04/14/2022 14:25)     Is Refill Pharmacy correct? yes  Saba Anderson LPN  04/20/22, 10:00 EDT

## 2022-04-22 RX ORDER — CLONAZEPAM 1 MG/1
TABLET ORAL
Qty: 30 TABLET | Refills: 0 | Status: SHIPPED | OUTPATIENT
Start: 2022-04-22 | End: 2022-05-05

## 2022-04-22 NOTE — TELEPHONE ENCOUNTER
Nothing was wrong with it, it just hasn't finished yet per Emma     I asked when to expect it and she said They're not sure exactly, but it didn't get sent off from the hospital until the 15th so that's probably why it's a little delayed. It shouldn't be too much longer, though. Probably by Monday. If it's not back by then, i'll call again

## 2022-04-22 NOTE — TELEPHONE ENCOUNTER
I spoke with pt and she is very upset she has been out of med for 7 days  I explained neg preliminary urine tox times 2 and that we are waiting for the lab for confirmation . Pt said she takes it as she should I called the lab and Emma is calling lab brii to check on why it has not been resulted I informed pt pcp is out of the office today and that I will message her and also the on call MD which is autumn

## 2022-04-26 LAB — BENZODIAZ UR QL: NEGATIVE

## 2022-04-26 NOTE — TELEPHONE ENCOUNTER
I refilled her medication.  I have been waiting for her drug screen to come back because it has been abnormal for the last 2 screens.  Dr. Langford

## 2022-05-05 ENCOUNTER — OFFICE VISIT (OUTPATIENT)
Dept: FAMILY MEDICINE CLINIC | Age: 72
End: 2022-05-05

## 2022-05-05 VITALS
BODY MASS INDEX: 20.35 KG/M2 | WEIGHT: 119.2 LBS | SYSTOLIC BLOOD PRESSURE: 107 MMHG | OXYGEN SATURATION: 97 % | HEIGHT: 64 IN | HEART RATE: 73 BPM | DIASTOLIC BLOOD PRESSURE: 69 MMHG

## 2022-05-05 DIAGNOSIS — I10 ESSENTIAL HYPERTENSION: ICD-10-CM

## 2022-05-05 DIAGNOSIS — F41.9 ANXIETY: Primary | ICD-10-CM

## 2022-05-05 DIAGNOSIS — Z79.899 HIGH RISK MEDICATION USE: ICD-10-CM

## 2022-05-05 PROCEDURE — 99214 OFFICE O/P EST MOD 30 MIN: CPT | Performed by: FAMILY MEDICINE

## 2022-05-05 RX ORDER — HYDROXYZINE HYDROCHLORIDE 25 MG/1
25 TABLET, FILM COATED ORAL 3 TIMES DAILY PRN
Qty: 40 TABLET | Refills: 2 | Status: SHIPPED | OUTPATIENT
Start: 2022-05-05 | End: 2022-05-10

## 2022-05-05 RX ORDER — CLONAZEPAM 0.5 MG/1
TABLET ORAL
Qty: 15 TABLET | Refills: 0 | Status: SHIPPED | OUTPATIENT
Start: 2022-05-05 | End: 2022-07-14 | Stop reason: ALTCHOICE

## 2022-05-05 RX ORDER — ALBUTEROL SULFATE 90 UG/1
1 AEROSOL, METERED RESPIRATORY (INHALATION) EVERY 4 HOURS PRN
COMMUNITY
Start: 2022-01-21 | End: 2022-12-12 | Stop reason: SDUPTHER

## 2022-05-05 RX ORDER — BUSPIRONE HYDROCHLORIDE 15 MG/1
15 TABLET ORAL DAILY
Qty: 30 TABLET | Refills: 2 | Status: SHIPPED | OUTPATIENT
Start: 2022-05-05 | End: 2022-12-12

## 2022-05-05 NOTE — PROGRESS NOTES
"Odalis Espino presents to [facility].    Chief Complaint: anxiety follow up    Subjective       History of Present Illness:  HPI     Chronic anxiety, she feels panicky all the time, clonazepam calms her nerves, without clonazepam she is short and \"nasty\" in her interactions with family.  She has been on zoloft and clonazepam.  I am stopping her clonazepam because she tested negative twice and I sent out for confirmation and it was neg, she also tested positive for oxycodone, but I was unable to send this out for confirmation.  She goes to pain management and is now on hydrocodone.  Looking back at their notes she was positive for tramadol at one time this year and had another neg test for clonazepam through them.  She is tearful and understandably upset I am stopping her meds.  I told her I would be happy to set her up with Carrie Mcmillna to re eval the needs for her to be on a benzodiazepine.  I did not start her on the medication.  I inherited her from Dr. Bear who left the practice.  She has a lot of stressors in life.  She has a granddaughter on heroin and she has difficulty seeing a lot of her grandbabies that she wants to see.  And she states she is just an overall jittery person all the time.    Review of Systems:  Review of Systems   Constitutional: Positive for fatigue. Negative for chills and fever.   Respiratory: Negative for cough, shortness of breath and wheezing.    Cardiovascular: Negative for chest pain and palpitations.   Gastrointestinal: Negative for abdominal pain, blood in stool, constipation, diarrhea, nausea and vomiting.   Skin: Negative for rash.   Neurological: Negative for dizziness and headache.   Psychiatric/Behavioral: Positive for depressed mood. Negative for suicidal ideas. The patient is nervous/anxious.         Objective   Medical History:  Past Medical History:   • Hepatitis   • HL (hearing loss)     Past Surgical History:   • EYE SURGERY   • " HYSTERECTOMY   • TONSILLECTOMY      Family History   Problem Relation Age of Onset   • Cancer Father    • Cancer Sister    • Diabetes Brother    • Asthma Other    • Seizures Other      Social History     Tobacco Use   • Smoking status: Current Every Day Smoker     Packs/day: 1.00     Years: 58.00     Pack years: 58.00     Types: Cigarettes     Start date: 1964   • Smokeless tobacco: Never Used   Substance Use Topics   • Alcohol use: Yes     Alcohol/week: 1.0 standard drink     Types: 1 Glasses of wine per week     Comment: occasional       Health Maintenance Due   Topic Date Due   • TDAP/TD VACCINES (1 - Tdap) Never done   • ZOSTER VACCINE (1 of 2) Never done        Immunization History   Administered Date(s) Administered   • COVID-19 (MODERNA) 1st, 2nd, 3rd Dose Only 03/17/2021, 04/15/2021, 11/03/2021   • Flu Vaccine Quad PF >36MO 10/09/2018   • Fluzone High Dose =>65 Years ([employer] ONLY) 11/13/2017   • Fluzone High-Dose 65+yrs 11/11/2021   • Pneumococcal Conjugate 13-Valent (PCV13) 10/18/2018   • Pneumococcal Polysaccharide (PPSV23) 01/01/2016       Allergies   Allergen Reactions   • Azithromycin Hives        Medications:  Current Outpatient Medications on File Prior to Visit   Medication Sig   • albuterol sulfate  (90 Base) MCG/ACT inhaler Inhale 1 puff Every 4 (Four) Hours As Needed.   • amLODIPine (NORVASC) 5 MG tablet Take 1 tablet by mouth Daily.   • atorvastatin (LIPITOR) 20 MG tablet Take 1 tablet by mouth Every Night.   • gabapentin (NEURONTIN) 600 MG tablet Take 1 tablet by mouth 3 (Three) Times a Day.   • hydroCHLOROthiazide (HYDRODIURIL) 25 MG tablet Take 1 tablet by mouth Daily.   • HYDROcodone-acetaminophen (NORCO)  MG per tablet Take 3 tablets by mouth Every 8 (Eight) Hours As Needed.   • levothyroxine (SYNTHROID, LEVOTHROID) 75 MCG tablet Take 1 tablet by mouth Daily.   • meloxicam (MOBIC) 15 MG tablet Take 1 tablet by mouth Daily.   • metFORMIN (GLUCOPHAGE) 500 MG tablet TAKE 2 TABLETS  "TWICE A DAY   • omeprazole (priLOSEC) 20 MG capsule Take 1 capsule by mouth Daily.   • promethazine-dextromethorphan (PROMETHAZINE-DM) 6.25-15 MG/5ML syrup Take 5 mL by mouth 4 (Four) Times a Day As Needed for Cough.   • sertraline (ZOLOFT) 100 MG tablet Take 1 tablet by mouth Daily.   • [DISCONTINUED] clonazePAM (KlonoPIN) 1 MG tablet TAKE ONE TABLET BY MOUTH DAILY AS NEEDED FOR ANXIETY   • [DISCONTINUED] Atrovent HFA 17 MCG/ACT inhaler Inhale 2 (Two) Times a Day.     No current facility-administered medications on file prior to visit.       Vital Signs:   /69   Pulse 73   Ht 162.6 cm (64.02\")   Wt 54.1 kg (119 lb 3.2 oz)   SpO2 97%   BMI 20.45 kg/m²       Physical Exam:  Physical Exam  Vitals and nursing note reviewed.   Constitutional:       General: She is not in acute distress.     Appearance: Normal appearance. She is not ill-appearing, toxic-appearing or diaphoretic.   HENT:      Head: Normocephalic and atraumatic.      Nose: No congestion or rhinorrhea.      Mouth/Throat:      Pharynx: No oropharyngeal exudate or posterior oropharyngeal erythema.   Eyes:      Extraocular Movements: Extraocular movements intact.      Conjunctiva/sclera: Conjunctivae normal.   Cardiovascular:      Rate and Rhythm: Normal rate and regular rhythm.      Heart sounds: Normal heart sounds.   Pulmonary:      Effort: Pulmonary effort is normal.      Breath sounds: Normal breath sounds. No wheezing, rhonchi or rales.   Abdominal:      General: Abdomen is flat.      Palpations: Abdomen is soft.   Musculoskeletal:      Cervical back: Neck supple.   Lymphadenopathy:      Cervical: No cervical adenopathy.   Skin:     General: Skin is warm and dry.   Neurological:      Mental Status: She is alert and oriented to person, place, and time.   Psychiatric:         Mood and Affect: Mood is anxious.         Behavior: Behavior is agitated.         Result Review      The following data was reviewed by Karina Langford MD on " 05/05/2022.  Lab Results   Component Value Date    WBC 5.45 01/12/2022    HGB 10.2 (L) 01/12/2022    HCT 30.1 (L) 01/12/2022    MCV 90.9 01/12/2022     01/12/2022     Lab Results   Component Value Date    GLUCOSE 101 (H) 01/12/2022    BUN 31 (H) 01/12/2022    CREATININE 1.58 (H) 01/12/2022    EGFRIFNONA 32 (L) 01/12/2022    BCR 19.6 01/12/2022    K 3.0 (L) 01/12/2022    CO2 35.1 (H) 01/12/2022    CALCIUM 9.1 01/12/2022    ALBUMIN 4.30 01/12/2022    LABIL2 1.7 12/17/2020    AST 18 01/12/2022    ALT 10 01/12/2022     Lab Results   Component Value Date    CHOL 120 01/12/2022    CHLPL 164 12/17/2020    TRIG 71 01/12/2022    HDL 51 01/12/2022    LDL 54 01/12/2022     Lab Results   Component Value Date    TSH 0.220 (L) 01/12/2022     Lab Results   Component Value Date    HGBA1C 6.64 (H) 01/12/2022     No results found for: PSA                    Assessment and Plan:          Diagnoses and all orders for this visit:    1. Anxiety (Primary)  -     busPIRone (BUSPAR) 15 MG tablet; Take 1 tablet by mouth Daily for 60 days.  Dispense: 30 tablet; Refill: 2  -     hydrOXYzine (ATARAX) 25 MG tablet; Take 1 tablet by mouth 3 (Three) Times a Day As Needed for Anxiety.  Dispense: 40 tablet; Refill: 2  -     clonazePAM (KlonoPIN) 0.5 MG tablet; 1 po daily x 1 week, then 1/2 po daily x 1 week, the QOD x 1 week, then stop  Dispense: 15 tablet; Refill: 0    2. High risk medication use  -     clonazePAM (KlonoPIN) 0.5 MG tablet; 1 po daily x 1 week, then 1/2 po daily x 1 week, the QOD x 1 week, then stop  Dispense: 15 tablet; Refill: 0    3. Essential hypertension  Comments:  BP well controlled          Follow Up   Return in about 3 months (around 8/5/2022) for Recheck.

## 2022-05-10 ENCOUNTER — TELEPHONE (OUTPATIENT)
Dept: FAMILY MEDICINE CLINIC | Age: 72
End: 2022-05-10

## 2022-05-10 DIAGNOSIS — F41.9 ANXIETY: Primary | ICD-10-CM

## 2022-05-10 RX ORDER — HYDROXYZINE PAMOATE 25 MG/1
25 CAPSULE ORAL 3 TIMES DAILY PRN
Qty: 40 CAPSULE | Refills: 2 | Status: SHIPPED | OUTPATIENT
Start: 2022-05-10 | End: 2022-07-14 | Stop reason: SDUPTHER

## 2022-05-10 NOTE — TELEPHONE ENCOUNTER
PA or change med?    Drug  hydrOXYzine HCl 25MG tablets    Key: [id number]    Form  [employer] Electronic Prior Authorization Request Form (2017 [place])

## 2022-05-12 DIAGNOSIS — E03.9 ACQUIRED HYPOTHYROIDISM: Primary | ICD-10-CM

## 2022-05-13 ENCOUNTER — LAB (OUTPATIENT)
Dept: LAB | Facility: HOSPITAL | Age: 72
End: 2022-05-13

## 2022-05-13 DIAGNOSIS — E03.9 ACQUIRED HYPOTHYROIDISM: ICD-10-CM

## 2022-05-13 LAB
T4 FREE SERPL-MCNC: 1.4 NG/DL (ref 0.93–1.7)
TSH SERPL DL<=0.05 MIU/L-ACNC: 0.67 UIU/ML (ref 0.27–4.2)

## 2022-05-13 PROCEDURE — 84439 ASSAY OF FREE THYROXINE: CPT

## 2022-05-13 PROCEDURE — 84443 ASSAY THYROID STIM HORMONE: CPT

## 2022-05-13 PROCEDURE — 36415 COLL VENOUS BLD VENIPUNCTURE: CPT

## 2022-05-16 ENCOUNTER — TELEPHONE (OUTPATIENT)
Dept: FAMILY MEDICINE CLINIC | Age: 72
End: 2022-05-16

## 2022-05-16 RX ORDER — LEVOTHYROXINE SODIUM 0.1 MG/1
100 TABLET ORAL DAILY
COMMUNITY
End: 2022-07-05 | Stop reason: SDUPTHER

## 2022-06-29 DIAGNOSIS — K21.9 GASTROESOPHAGEAL REFLUX DISEASE WITHOUT ESOPHAGITIS: ICD-10-CM

## 2022-06-29 DIAGNOSIS — F41.9 ANXIETY: ICD-10-CM

## 2022-06-29 DIAGNOSIS — I10 ESSENTIAL HYPERTENSION: ICD-10-CM

## 2022-06-29 DIAGNOSIS — G89.29 CHRONIC RIGHT-SIDED LOW BACK PAIN WITH RIGHT-SIDED SCIATICA: ICD-10-CM

## 2022-06-29 DIAGNOSIS — M54.41 CHRONIC RIGHT-SIDED LOW BACK PAIN WITH RIGHT-SIDED SCIATICA: ICD-10-CM

## 2022-06-30 RX ORDER — HYDROCHLOROTHIAZIDE 25 MG/1
TABLET ORAL
Qty: 90 TABLET | Refills: 0 | Status: SHIPPED | OUTPATIENT
Start: 2022-06-30 | End: 2022-08-04

## 2022-06-30 RX ORDER — SERTRALINE HYDROCHLORIDE 100 MG/1
TABLET, FILM COATED ORAL
Qty: 90 TABLET | Refills: 0 | Status: SHIPPED | OUTPATIENT
Start: 2022-06-30 | End: 2022-09-22

## 2022-06-30 RX ORDER — MELOXICAM 15 MG/1
TABLET ORAL
Qty: 90 TABLET | Refills: 0 | Status: SHIPPED | OUTPATIENT
Start: 2022-06-30 | End: 2022-09-22 | Stop reason: SDUPTHER

## 2022-06-30 RX ORDER — OMEPRAZOLE 20 MG/1
CAPSULE, DELAYED RELEASE ORAL
Qty: 90 CAPSULE | Refills: 0 | Status: SHIPPED | OUTPATIENT
Start: 2022-06-30 | End: 2022-09-22

## 2022-07-01 DIAGNOSIS — I10 ESSENTIAL HYPERTENSION: ICD-10-CM

## 2022-07-01 DIAGNOSIS — E78.00 PURE HYPERCHOLESTEROLEMIA: ICD-10-CM

## 2022-07-05 ENCOUNTER — TELEPHONE (OUTPATIENT)
Dept: FAMILY MEDICINE CLINIC | Age: 72
End: 2022-07-05

## 2022-07-05 RX ORDER — AMLODIPINE BESYLATE 5 MG/1
TABLET ORAL
Qty: 90 TABLET | Refills: 1 | Status: SHIPPED | OUTPATIENT
Start: 2022-07-05 | End: 2023-01-03

## 2022-07-05 RX ORDER — LEVOTHYROXINE SODIUM 0.1 MG/1
100 TABLET ORAL DAILY
Qty: 90 TABLET | Refills: 0 | Status: SHIPPED | OUTPATIENT
Start: 2022-07-05 | End: 2022-07-14

## 2022-07-05 RX ORDER — ATORVASTATIN CALCIUM 20 MG/1
TABLET, FILM COATED ORAL
Qty: 90 TABLET | Refills: 1 | Status: SHIPPED | OUTPATIENT
Start: 2022-07-05 | End: 2023-01-03

## 2022-07-14 ENCOUNTER — OFFICE VISIT (OUTPATIENT)
Dept: FAMILY MEDICINE CLINIC | Age: 72
End: 2022-07-14

## 2022-07-14 VITALS
WEIGHT: 118.8 LBS | HEART RATE: 66 BPM | OXYGEN SATURATION: 95 % | BODY MASS INDEX: 20.28 KG/M2 | SYSTOLIC BLOOD PRESSURE: 132 MMHG | DIASTOLIC BLOOD PRESSURE: 76 MMHG | HEIGHT: 64 IN

## 2022-07-14 DIAGNOSIS — Z23 ENCOUNTER FOR IMMUNIZATION: Primary | ICD-10-CM

## 2022-07-14 DIAGNOSIS — M51.36 DEGENERATION OF LUMBAR INTERVERTEBRAL DISC: ICD-10-CM

## 2022-07-14 DIAGNOSIS — F41.9 ANXIETY: ICD-10-CM

## 2022-07-14 DIAGNOSIS — E78.49 OTHER HYPERLIPIDEMIA: ICD-10-CM

## 2022-07-14 DIAGNOSIS — E11.9 TYPE 2 DIABETES MELLITUS WITHOUT COMPLICATION, WITHOUT LONG-TERM CURRENT USE OF INSULIN: Primary | ICD-10-CM

## 2022-07-14 DIAGNOSIS — N18.32 STAGE 3B CHRONIC KIDNEY DISEASE (CKD): ICD-10-CM

## 2022-07-14 DIAGNOSIS — K21.9 GASTROESOPHAGEAL REFLUX DISEASE WITHOUT ESOPHAGITIS: ICD-10-CM

## 2022-07-14 DIAGNOSIS — I10 ESSENTIAL HYPERTENSION: ICD-10-CM

## 2022-07-14 DIAGNOSIS — E03.9 ACQUIRED HYPOTHYROIDISM: ICD-10-CM

## 2022-07-14 DIAGNOSIS — Z23 NEED FOR VACCINATION: ICD-10-CM

## 2022-07-14 DIAGNOSIS — Z79.899 HIGH RISK MEDICATION USE: ICD-10-CM

## 2022-07-14 PROCEDURE — 99214 OFFICE O/P EST MOD 30 MIN: CPT | Performed by: FAMILY MEDICINE

## 2022-07-14 PROCEDURE — 80305 DRUG TEST PRSMV DIR OPT OBS: CPT | Performed by: FAMILY MEDICINE

## 2022-07-14 PROCEDURE — 0054A COVID-19 (PFIZER) 12+ YRS: CPT | Performed by: FAMILY MEDICINE

## 2022-07-14 PROCEDURE — 91305 COVID-19 (PFIZER) 12+ YRS: CPT | Performed by: FAMILY MEDICINE

## 2022-07-14 RX ORDER — HYDROXYZINE PAMOATE 25 MG/1
25 CAPSULE ORAL 3 TIMES DAILY PRN
Qty: 100 CAPSULE | Refills: 2 | Status: SHIPPED | OUTPATIENT
Start: 2022-07-14

## 2022-07-14 RX ORDER — LEVOTHYROXINE SODIUM 0.07 MG/1
75 TABLET ORAL DAILY
Qty: 90 TABLET | Refills: 1 | Status: SHIPPED | OUTPATIENT
Start: 2022-07-14 | End: 2022-07-14

## 2022-07-14 RX ORDER — LEVOTHYROXINE SODIUM 0.07 MG/1
75 TABLET ORAL DAILY
Qty: 90 TABLET | Refills: 1 | Status: SHIPPED | OUTPATIENT
Start: 2022-07-14 | End: 2023-01-19

## 2022-07-14 NOTE — PROGRESS NOTES
Odalis Espino presents to [facility].    Chief Complaint: diabetes and BP follow up, other concerns    Subjective       History of Present Illness:  HPI   Concerns about cyst on her R posterior hand    R LE radicular pain    No falls in past 6 months    Chronic kidney function, GFR down to low 30's, she has been referred to nephrology , appt is August 5th, she is to avoid NSAIDs and PPIs-recommend she stop her mobic/omeprazole and she defers stopping until she sees the kidney doctor     Chronic back pain, she sees pain management and is pending spinal steroid shots-HAVING TROUBLE WITH INSURANCE APPROVAL.  She sees [facility]. Her current regimen includes gabapentin 600 mg 3 times daily (TODAY IS HER F2F), Norco  mg 4 times daily and Mobic 15 mg. daily.  She is now having R LE weakness or numbness/tingling.  No saddle anesthesia or bowel/bladder incontinence.     She is on omeprazole for GERD and to protect her stomach.      Hypertension: blood pressure regularly when she does it has been good.  Her current regimen includes amlodipine 5 mg daily and HCTZ 25 mg daily. She tolerates meds well.      Hyperlipidemia: stable on Lipitor 20 mg daily. Labs reviewed, chol well controlled     Hypothyroidism: well controlled Synthroid 100 mcg daily with which she reports compliance without adverse effects.  Her last TSH was normal in May 0.63, she is on 100 mcg.   She denies signs or symptoms of thyroid dysfunction. She is losing weight      Type 2 diabetes is stable and well controlled, she needs to check her BS daily but she does not check her blood sugar regularly and cant remember last BS.  Her current regimen includes Metformin 500 mg twice daily.  Her last A1c was 6.64%        Chronic anxiety and she is well controlled on zoloft and hydroxyzine, did not tolerate buspar, she is doing great, feels good, no depression, sleeping well, moods are good, no  suicidal or  homicidal thoughts.     She has a bad smokers cough and she continues to smoke a pack per day. She defers quitting, recommend she quit, counseled on nicotine patches          Review of Systems:  Review of Systems   Constitutional: Negative for chills, fatigue and fever.   HENT: Negative for congestion, ear pain, rhinorrhea, sinus pressure and sore throat.    Eyes: Negative for blurred vision, double vision and visual disturbance.   Respiratory: Negative for cough, shortness of breath and wheezing.    Cardiovascular: Negative for chest pain and palpitations.   Gastrointestinal: Negative for abdominal pain, blood in stool, constipation, diarrhea, nausea, vomiting and GERD.   Endocrine: Negative for polyuria.   Genitourinary: Negative for dysuria and hematuria.   Musculoskeletal: Negative for arthralgias and myalgias.   Skin: Negative for rash.   Neurological: Negative for dizziness, weakness and headache.   Psychiatric/Behavioral: Negative for sleep disturbance, suicidal ideas and depressed mood. The patient is nervous/anxious.         Objective   Medical History:  Past Medical History:   • Hepatitis   • HL (hearing loss)     Past Surgical History:   • EYE SURGERY   • HYSTERECTOMY   • TONSILLECTOMY      Family History   Problem Relation Age of Onset   • Cancer Father    • Cancer Sister    • Diabetes Brother    • Asthma Other    • Seizures Other      Social History     Tobacco Use   • Smoking status: Current Every Day Smoker     Packs/day: 1.00     Years: 58.00     Pack years: 58.00     Types: Cigarettes     Start date: 1964   • Smokeless tobacco: Never Used   Substance Use Topics   • Alcohol use: Yes     Alcohol/week: 1.0 standard drink     Types: 1 Glasses of wine per week     Comment: occasional       Health Maintenance Due   Topic Date Due   • TDAP/TD VACCINES (1 - Tdap) Never done   • ZOSTER VACCINE (1 of 2) Never done   • COVID-19 Vaccine (4 - Booster for Moderna series) 03/03/2022   • HEMOGLOBIN A1C  07/12/2022         Immunization History   Administered Date(s) Administered   • COVID-19 (MODERNA) 1st, 2nd, 3rd Dose Only 03/17/2021, 04/15/2021, 11/03/2021   • Flu Vaccine Quad PF >36MO 10/09/2018   • Fluzone High Dose =>65 Years ([facility] ONLY) 11/13/2017   • Fluzone High-Dose 65+yrs 11/11/2021   • Pneumococcal Conjugate 13-Valent (PCV13) 10/18/2018   • Pneumococcal Polysaccharide (PPSV23) 01/01/2016       Allergies   Allergen Reactions   • Azithromycin Hives        Medications:  Current Outpatient Medications on File Prior to Visit   Medication Sig   • albuterol sulfate  (90 Base) MCG/ACT inhaler Inhale 1 puff Every 4 (Four) Hours As Needed.   • amLODIPine (NORVASC) 5 MG tablet TAKE 1 TABLET DAILY   • atorvastatin (LIPITOR) 20 MG tablet TAKE 1 TABLET EVERY NIGHT   • gabapentin (NEURONTIN) 600 MG tablet Take 1 tablet by mouth 3 (Three) Times a Day.   • hydroCHLOROthiazide (HYDRODIURIL) 25 MG tablet TAKE 1 TABLET DAILY   • HYDROcodone-acetaminophen (NORCO)  MG per tablet Take 3 tablets by mouth Every 8 (Eight) Hours As Needed.   • meloxicam (MOBIC) 15 MG tablet TAKE 1 TABLET DAILY   • metFORMIN (GLUCOPHAGE) 500 MG tablet TAKE 2 TABLETS TWICE A DAY   • omeprazole (priLOSEC) 20 MG capsule TAKE 1 CAPSULE DAILY   • sertraline (ZOLOFT) 100 MG tablet TAKE 1 TABLET DAILY   • [DISCONTINUED] hydrOXYzine pamoate (VISTARIL) 25 MG capsule Take 1 capsule by mouth 3 (Three) Times a Day As Needed for Itching or Anxiety.   • [DISCONTINUED] levothyroxine (SYNTHROID, LEVOTHROID) 100 MCG tablet Take 1 tablet by mouth Daily.   • busPIRone (BUSPAR) 15 MG tablet Take 1 tablet by mouth Daily for 60 days.   • [DISCONTINUED] clonazePAM (KlonoPIN) 0.5 MG tablet 1 po daily x 1 week, then 1/2 po daily x 1 week, the QOD x 1 week, then stop   • [DISCONTINUED] promethazine-dextromethorphan (PROMETHAZINE-DM) 6.25-15 MG/5ML syrup Take 5 mL by mouth 4 (Four) Times a Day As Needed for Cough.     No current facility-administered medications on file  "prior to visit.       Vital Signs:   /76 (BP Location: Left arm, Patient Position: Sitting, Cuff Size: Adult)   Pulse 66   Ht 162.6 cm (64.02\")   Wt 53.9 kg (118 lb 12.8 oz)   SpO2 95% Comment: Room air  BMI 20.38 kg/m²       Physical Exam:  Physical Exam  Vitals and nursing note reviewed.   Constitutional:       General: She is not in acute distress.     Appearance: Normal appearance. She is not ill-appearing, toxic-appearing or diaphoretic.   HENT:      Head: Normocephalic and atraumatic.      Right Ear: Tympanic membrane, ear canal and external ear normal.      Left Ear: Tympanic membrane, ear canal and external ear normal.      Nose: No congestion or rhinorrhea.      Mouth/Throat:      Mouth: Mucous membranes are moist.      Pharynx: Oropharynx is clear. No oropharyngeal exudate or posterior oropharyngeal erythema.   Eyes:      Extraocular Movements: Extraocular movements intact.      Conjunctiva/sclera: Conjunctivae normal.      Pupils: Pupils are equal, round, and reactive to light.   Cardiovascular:      Rate and Rhythm: Normal rate and regular rhythm.      Pulses:           Dorsalis pedis pulses are 2+ on the right side and 2+ on the left side.      Heart sounds: Normal heart sounds.   Pulmonary:      Effort: Pulmonary effort is normal.      Breath sounds: Normal breath sounds. No wheezing, rhonchi or rales.   Abdominal:      General: Abdomen is flat.      Palpations: Abdomen is soft.   Musculoskeletal:      Cervical back: Neck supple. No rigidity.   Feet:      Right foot:      Protective Sensation: 7 sites tested. 7 sites sensed.      Skin integrity: Skin integrity normal. No ulcer or blister.      Toenail Condition: Right toenails are normal.      Left foot:      Protective Sensation: 7 sites tested. 7 sites sensed.      Skin integrity: Skin integrity normal. No ulcer or blister.      Toenail Condition: Left toenails are normal.      Comments: Diabetic Foot Exam Performed and Monofilament Test " Performed     Lymphadenopathy:      Cervical: No cervical adenopathy.   Skin:     General: Skin is warm and dry.   Neurological:      Mental Status: She is alert and oriented to person, place, and time.   Psychiatric:         Mood and Affect: Mood normal.         Behavior: Behavior normal.         Result Review      The following data was reviewed by Karina Langford MD on 07/14/2022.  Lab Results   Component Value Date    WBC 5.45 01/12/2022    HGB 10.2 (L) 01/12/2022    HCT 30.1 (L) 01/12/2022    MCV 90.9 01/12/2022     01/12/2022     Lab Results   Component Value Date    GLUCOSE 101 (H) 01/12/2022    BUN 31 (H) 01/12/2022    CREATININE 1.58 (H) 01/12/2022    EGFRIFNONA 32 (L) 01/12/2022    BCR 19.6 01/12/2022    K 3.0 (L) 01/12/2022    CO2 35.1 (H) 01/12/2022    CALCIUM 9.1 01/12/2022    ALBUMIN 4.30 01/12/2022    LABIL2 1.7 12/17/2020    AST 18 01/12/2022    ALT 10 01/12/2022     Lab Results   Component Value Date    CHOL 120 01/12/2022    CHLPL 164 12/17/2020    TRIG 71 01/12/2022    HDL 51 01/12/2022    LDL 54 01/12/2022     Lab Results   Component Value Date    TSH 0.673 05/13/2022     Lab Results   Component Value Date    HGBA1C 6.64 (H) 01/12/2022     No results found for: PSA                    Assessment and Plan:          Diagnoses and all orders for this visit:    1. Type 2 diabetes mellitus without complication, without long-term current use of insulin (HCC) (Primary)  Comments:  stable and well controlled on metformin, foot exam UTD, last eye exam was 1 year ago-needs to update her eye exam and check her BS daily, follow low carb diet  Orders:  -     Hemoglobin A1c; Future  -     Microalbumin / Creatinine Urine Ratio - Urine, Clean Catch; Future    2. High risk medication use  -     POC Urine Drug Screen [facility]    3. Acquired hypothyroidism  Comments:  she is losing wt so I will decrease her levothyroxine to 75 mcg daily  Orders:  -     TSH+Free T4; Future    4. Gastroesophageal  reflux disease without esophagitis  Comments:  stable on omeprazole, recommend she stop this med and change to pepcid due to renal failure-she defers at this time    5. Degeneration of lumbar intervertebral disc  Comments:  stable, today is her F2F for her gabapentin, she also sees pain management.      6. Essential hypertension  Comments:  stable and well controlled on current meds, no changes needed in current meds  Orders:  -     Comprehensive Metabolic Panel; Future  -     CBC (No Diff); Future    7. Other hyperlipidemia  Comments:  stable on crestor, due for labs, tolerates meds well  Orders:  -     Lipid Panel; Future    8. Stage 3b chronic kidney disease (CKD) ([facility])  Comments:  nephrology appt in place,  she is to avoid NSAIDs and PPIs-recommend she stop her mobic/omeprazole and she defers stopping until she sees the kidney doctor    9. Anxiety  -     hydrOXYzine pamoate (VISTARIL) 25 MG capsule; Take 1 capsule by mouth 3 (Three) Times a Day As Needed for Itching or Anxiety.  Dispense: 100 capsule; Refill: 2    Other orders  -     Discontinue: levothyroxine (SYNTHROID, LEVOTHROID) 75 MCG tablet; Take 1 tablet by mouth Daily.  Dispense: 90 tablet; Refill: 1  -     levothyroxine (SYNTHROID, LEVOTHROID) 75 MCG tablet; Take 1 tablet by mouth Daily.  Dispense: 90 tablet; Refill: 1          Follow Up   No follow-ups on file.

## 2022-07-18 ENCOUNTER — PRIOR AUTHORIZATION (OUTPATIENT)
Dept: FAMILY MEDICINE CLINIC | Age: 72
End: 2022-07-18

## 2022-07-18 NOTE — TELEPHONE ENCOUNTER
Approved     Approved. This drug has been approved under the Member's Medicare Part D benefit. Approved quantity: 100 units per 30 day(s). You may fill up to a 90 day supply except for those on Specialty Tier 5, which can be filled up to a 30 day supply.

## 2022-07-25 ENCOUNTER — LAB (OUTPATIENT)
Dept: LAB | Facility: HOSPITAL | Age: 72
End: 2022-07-25

## 2022-07-25 DIAGNOSIS — E03.9 ACQUIRED HYPOTHYROIDISM: ICD-10-CM

## 2022-07-25 DIAGNOSIS — E87.6 HYPOKALEMIA: Primary | ICD-10-CM

## 2022-07-25 DIAGNOSIS — E78.49 OTHER HYPERLIPIDEMIA: ICD-10-CM

## 2022-07-25 DIAGNOSIS — I10 ESSENTIAL HYPERTENSION: ICD-10-CM

## 2022-07-25 DIAGNOSIS — E11.9 TYPE 2 DIABETES MELLITUS WITHOUT COMPLICATION, WITHOUT LONG-TERM CURRENT USE OF INSULIN: ICD-10-CM

## 2022-07-25 LAB
ALBUMIN SERPL-MCNC: 4.2 G/DL (ref 3.5–5.2)
ALBUMIN UR-MCNC: <1.2 MG/DL
ALBUMIN/GLOB SERPL: 1.5 G/DL
ALP SERPL-CCNC: 55 U/L (ref 39–117)
ALT SERPL W P-5'-P-CCNC: 6 U/L (ref 1–33)
ANION GAP SERPL CALCULATED.3IONS-SCNC: 14 MMOL/L (ref 5–15)
AST SERPL-CCNC: 14 U/L (ref 1–32)
BILIRUB SERPL-MCNC: 0.4 MG/DL (ref 0–1.2)
BUN SERPL-MCNC: 25 MG/DL (ref 8–23)
BUN/CREAT SERPL: 18.8 (ref 7–25)
CALCIUM SPEC-SCNC: 8.3 MG/DL (ref 8.6–10.5)
CHLORIDE SERPL-SCNC: 101 MMOL/L (ref 98–107)
CHOLEST SERPL-MCNC: 164 MG/DL (ref 0–200)
CO2 SERPL-SCNC: 29 MMOL/L (ref 22–29)
CREAT SERPL-MCNC: 1.33 MG/DL (ref 0.57–1)
CREAT UR-MCNC: 96.8 MG/DL
DEPRECATED RDW RBC AUTO: 42.9 FL (ref 37–54)
EGFRCR SERPLBLD CKD-EPI 2021: 42.1 ML/MIN/1.73
ERYTHROCYTE [DISTWIDTH] IN BLOOD BY AUTOMATED COUNT: 12.9 % (ref 12.3–15.4)
GLOBULIN UR ELPH-MCNC: 2.8 GM/DL
GLUCOSE SERPL-MCNC: 114 MG/DL (ref 65–99)
HBA1C MFR BLD: 6 % (ref 4.8–5.6)
HCT VFR BLD AUTO: 32.7 % (ref 34–46.6)
HDLC SERPL-MCNC: 62 MG/DL (ref 40–60)
HGB BLD-MCNC: 10.8 G/DL (ref 12–15.9)
LDLC SERPL CALC-MCNC: 84 MG/DL (ref 0–100)
LDLC/HDLC SERPL: 1.33 {RATIO}
MCH RBC QN AUTO: 29.8 PG (ref 26.6–33)
MCHC RBC AUTO-ENTMCNC: 33 G/DL (ref 31.5–35.7)
MCV RBC AUTO: 90.1 FL (ref 79–97)
MICROALBUMIN/CREAT UR: NORMAL MG/G{CREAT}
PLATELET # BLD AUTO: 177 10*3/MM3 (ref 140–450)
PMV BLD AUTO: 9.1 FL (ref 6–12)
POTASSIUM SERPL-SCNC: 2.9 MMOL/L (ref 3.5–5.2)
PROT SERPL-MCNC: 7 G/DL (ref 6–8.5)
RBC # BLD AUTO: 3.63 10*6/MM3 (ref 3.77–5.28)
SODIUM SERPL-SCNC: 144 MMOL/L (ref 136–145)
T4 FREE SERPL-MCNC: 0.88 NG/DL (ref 0.93–1.7)
TRIGL SERPL-MCNC: 97 MG/DL (ref 0–150)
TSH SERPL DL<=0.05 MIU/L-ACNC: 3.62 UIU/ML (ref 0.27–4.2)
VLDLC SERPL-MCNC: 18 MG/DL (ref 5–40)
WBC NRBC COR # BLD: 7.71 10*3/MM3 (ref 3.4–10.8)

## 2022-07-25 PROCEDURE — 83036 HEMOGLOBIN GLYCOSYLATED A1C: CPT

## 2022-07-25 PROCEDURE — 36415 COLL VENOUS BLD VENIPUNCTURE: CPT

## 2022-07-25 PROCEDURE — 84443 ASSAY THYROID STIM HORMONE: CPT

## 2022-07-25 PROCEDURE — 82043 UR ALBUMIN QUANTITATIVE: CPT

## 2022-07-25 PROCEDURE — 80053 COMPREHEN METABOLIC PANEL: CPT

## 2022-07-25 PROCEDURE — 82570 ASSAY OF URINE CREATININE: CPT

## 2022-07-25 PROCEDURE — 85027 COMPLETE CBC AUTOMATED: CPT

## 2022-07-25 PROCEDURE — 84439 ASSAY OF FREE THYROXINE: CPT

## 2022-07-25 PROCEDURE — 80061 LIPID PANEL: CPT

## 2022-08-03 ENCOUNTER — TELEPHONE (OUTPATIENT)
Dept: FAMILY MEDICINE CLINIC | Age: 72
End: 2022-08-03

## 2022-08-03 DIAGNOSIS — Z12.31 ENCOUNTER FOR SCREENING MAMMOGRAM FOR BREAST CANCER: Primary | ICD-10-CM

## 2022-08-04 ENCOUNTER — OFFICE VISIT (OUTPATIENT)
Dept: FAMILY MEDICINE CLINIC | Age: 72
End: 2022-08-04

## 2022-08-04 VITALS
WEIGHT: 124 LBS | DIASTOLIC BLOOD PRESSURE: 77 MMHG | BODY MASS INDEX: 21.17 KG/M2 | HEART RATE: 65 BPM | HEIGHT: 64 IN | OXYGEN SATURATION: 98 % | SYSTOLIC BLOOD PRESSURE: 135 MMHG

## 2022-08-04 DIAGNOSIS — E87.6 HYPOKALEMIA: ICD-10-CM

## 2022-08-04 DIAGNOSIS — E78.49 OTHER HYPERLIPIDEMIA: ICD-10-CM

## 2022-08-04 DIAGNOSIS — Z23 ENCOUNTER FOR IMMUNIZATION: ICD-10-CM

## 2022-08-04 DIAGNOSIS — Z78.0 POSTMENOPAUSAL STATE: ICD-10-CM

## 2022-08-04 DIAGNOSIS — K21.9 GASTROESOPHAGEAL REFLUX DISEASE WITHOUT ESOPHAGITIS: ICD-10-CM

## 2022-08-04 DIAGNOSIS — F41.9 ANXIETY: ICD-10-CM

## 2022-08-04 DIAGNOSIS — Z12.31 ENCOUNTER FOR SCREENING MAMMOGRAM FOR BREAST CANCER: ICD-10-CM

## 2022-08-04 DIAGNOSIS — R60.0 BILATERAL LEG EDEMA: ICD-10-CM

## 2022-08-04 DIAGNOSIS — I10 ESSENTIAL HYPERTENSION: ICD-10-CM

## 2022-08-04 DIAGNOSIS — H91.93 BILATERAL HEARING LOSS, UNSPECIFIED HEARING LOSS TYPE: ICD-10-CM

## 2022-08-04 DIAGNOSIS — Z00.00 ENCOUNTER FOR ANNUAL WELLNESS EXAM IN MEDICARE PATIENT: Primary | ICD-10-CM

## 2022-08-04 DIAGNOSIS — Z13.6 ENCOUNTER FOR SCREENING FOR CARDIOVASCULAR DISORDERS: ICD-10-CM

## 2022-08-04 DIAGNOSIS — N18.32 STAGE 3B CHRONIC KIDNEY DISEASE (CKD): ICD-10-CM

## 2022-08-04 DIAGNOSIS — E03.9 ACQUIRED HYPOTHYROIDISM: ICD-10-CM

## 2022-08-04 DIAGNOSIS — Z72.0 TOBACCO ABUSE: ICD-10-CM

## 2022-08-04 DIAGNOSIS — E11.9 TYPE 2 DIABETES MELLITUS WITHOUT COMPLICATION, WITHOUT LONG-TERM CURRENT USE OF INSULIN: ICD-10-CM

## 2022-08-04 DIAGNOSIS — Z12.11 COLON CANCER SCREENING: ICD-10-CM

## 2022-08-04 PROCEDURE — G0439 PPPS, SUBSEQ VISIT: HCPCS | Performed by: FAMILY MEDICINE

## 2022-08-04 PROCEDURE — 99213 OFFICE O/P EST LOW 20 MIN: CPT | Performed by: FAMILY MEDICINE

## 2022-08-04 PROCEDURE — 1159F MED LIST DOCD IN RCRD: CPT | Performed by: FAMILY MEDICINE

## 2022-08-04 PROCEDURE — 1170F FXNL STATUS ASSESSED: CPT | Performed by: FAMILY MEDICINE

## 2022-08-04 NOTE — ASSESSMENT & PLAN NOTE
MEDICARE WELLNESS EXAM-SHE IS DUE FOR MAMMOGRAM, UTD on DEXA, UTD  COLONOSCOPY (normal Sept 2018),  DEFERS HER PAP AND PELVIC EXAM-stopped due to age, UTD ON VACCINATIONS INCLUDING:PREVNAR/ PNEUMOVAX/TD/COVID/FLU VACCINE, NO FALL RISK, NO MEMORY ISSUES, NO SIGNS/SYMPTOMS OF DEPRESSION, SHE LIVES WITH HER , SHE IS ABLE TO DRIVE AND PERFORM ADL'S/FINANCES INDEPENDENTLY, HEARING IS ADEQUATE-PT DEFERS SCREENING TEST, SHE HAS A LIVING WILL. CURRENT DOCTOR LIST UPDATED.  RECOMMEND YEARLY EYE EXAMS, RECOMMEND DIET AND WEIGHT LOSS.

## 2022-08-04 NOTE — PROGRESS NOTES
The ABCs of the Annual Wellness Visit  Subsequent Medicare Wellness Visit    Chief Complaint   Patient presents with   • Medicare Wellness-subsequent      Subjective    History of Present Illness:  Odalis Espino is a 72 y.o. female who presents for a Subsequent Medicare Wellness Visit.    The following portions of the patient's history were reviewed and   updated as appropriate: allergies, current medications, past family history, past medical history, past social history, past surgical history and problem list.    Compared to one year ago, the patient feels her physical   health is the same.    Compared to one year ago, the patient feels her mental   health is better.    Recent Hospitalizations:  She was not admitted to the hospital during the last year.     Feet are swelling more the past few days, she has been out in the heat cutting grass.    Chronic kidney function, GFR down to low 30's,  she is to avoid NSAIDs and PPIs     Chronic back pain, she sees pain management and is pending spinal steroid shots-approved by insurance but she was sick and couldn't have performed, so now dealing with getting reapproval again.  She sees [facility]. Her current regimen includes gabapentin 600 mg 3 times daily (TODAY IS HER F2F), Norco  mg 4 times daily and Mobic 15 mg. daily.  She is now having R LE weakness or numbness/tingling.  No saddle anesthesia or bowel/bladder incontinence.      She is on omeprazole for GERD and to protect her stomach.      Hypertension: Home BP are still normal even off the HCTZ, she is on current regimen includes amlodipine 5 mg daily. She tolerates meds well.      Hyperlipidemia: stable on Lipitor 20 mg daily. Labs reviewed, chol well controlled     Hypothyroidism: well controlled Synthroid 100 mcg daily without adverse effects.  Her last TSH was normal in May 0.22, she is newly on 75 mcg.   She denies signs or symptoms of thyroid dysfunction. She is losing  weight      Type 2 diabetes is stable and well controlled, she needs to check her BS daily but she does not check her blood sugar regularly and cant remember last BS.  Her current regimen includes Metformin 500 mg twice daily.  Her last A1c was 6%       Chronic anxiety and she is well controlled on zoloft and hydroxyzine, did not tolerate buspar, she is doing great, feels good, no depression, sleeping well, moods are good, no  suicidal or homicidal thoughts.     She has a bad smokers cough and she continues to smoke a pack per day. She defers quitting, recommend she quit, counseled on nicotine patches    Current Medical Providers:  Patient Care Team:  Karina Langford MD as PCP - General (Family Medicine)    Outpatient Medications Prior to Visit   Medication Sig Dispense Refill   • albuterol sulfate  (90 Base) MCG/ACT inhaler Inhale 1 puff Every 4 (Four) Hours As Needed.     • amLODIPine (NORVASC) 5 MG tablet TAKE 1 TABLET DAILY 90 tablet 1   • atorvastatin (LIPITOR) 20 MG tablet TAKE 1 TABLET EVERY NIGHT 90 tablet 1   • gabapentin (NEURONTIN) 600 MG tablet Take 1 tablet by mouth 3 (Three) Times a Day. 90 tablet 0   • HYDROcodone-acetaminophen (NORCO)  MG per tablet Take 3 tablets by mouth Every 8 (Eight) Hours As Needed.     • hydrOXYzine pamoate (VISTARIL) 25 MG capsule Take 1 capsule by mouth 3 (Three) Times a Day As Needed for Itching or Anxiety. 100 capsule 2   • levothyroxine (SYNTHROID, LEVOTHROID) 75 MCG tablet Take 1 tablet by mouth Daily. 90 tablet 1   • meloxicam (MOBIC) 15 MG tablet TAKE 1 TABLET DAILY 90 tablet 0   • metFORMIN (GLUCOPHAGE) 500 MG tablet TAKE 2 TABLETS TWICE A  tablet 0   • omeprazole (priLOSEC) 20 MG capsule TAKE 1 CAPSULE DAILY 90 capsule 0   • sertraline (ZOLOFT) 100 MG tablet TAKE 1 TABLET DAILY 90 tablet 0   • hydroCHLOROthiazide (HYDRODIURIL) 25 MG tablet TAKE 1 TABLET DAILY 90 tablet 0   • busPIRone (BUSPAR) 15 MG tablet Take 1 tablet by mouth Daily for 60  days. 30 tablet 2     No facility-administered medications prior to visit.       Opioid medication/s are on active medication list.  and I have evaluated her active treatment plan and pain score trends (see table).  There were no vitals filed for this visit.  I have reviewed the chart for potential of high risk medication and harmful drug interactions in the elderly.            Aspirin is not on active medication list.  Aspirin use is not indicated based on review of current medical condition/s. Risk of harm outweighs potential benefits.  .    Patient Active Problem List   Diagnosis   • Encounter for annual wellness exam in Medicare patient   • Degeneration of lumbar intervertebral disc   • Acquired hypothyroidism   • Anxiety   • Essential hypertension   • Other hyperlipidemia   • Type 2 diabetes mellitus without complication, without long-term current use of insulin ([facility])   • Gastroesophageal reflux disease without esophagitis   • Stage 3b chronic kidney disease (CKD) ([facility])     Advance Care Planning  Advance Directive is not on file.  ACP discussion was held with the patient during this visit. Patient has an advance directive (not in EMR), copy requested.    Review of Systems   Constitutional: Negative for chills and fever.   HENT: Negative for ear pain, sinus pressure and sore throat.    Respiratory: Positive for cough. Negative for shortness of breath and wheezing.    Cardiovascular: Positive for leg swelling. Negative for chest pain and palpitations.   Gastrointestinal: Negative for abdominal pain, blood in stool, constipation, diarrhea, nausea and vomiting.   Genitourinary: Negative for dysuria.   Skin: Negative for rash.   Neurological: Negative for dizziness.   Psychiatric/Behavioral: Negative for sleep disturbance and suicidal ideas.        Objective    Vitals:    08/04/22 1128   BP: 135/77   BP Location: Right arm   Patient Position: Sitting   Cuff Size: Adult   Pulse: 65   SpO2: 98%  Comment: Room air  "  Weight: 56.2 kg (124 lb)   Height: 162.6 cm (64.02\")     BMI Readings from Last 1 Encounters:   08/04/22 21.27 kg/m²   BMI is within normal parameters. No follow-up required.    Does the patient have evidence of cognitive impairment? No    Physical Exam  Vitals and nursing note reviewed.   Constitutional:       General: She is not in acute distress.     Appearance: Normal appearance. She is not ill-appearing, toxic-appearing or diaphoretic.   HENT:      Head: Normocephalic and atraumatic.      Right Ear: Tympanic membrane, ear canal and external ear normal.      Left Ear: Tympanic membrane, ear canal and external ear normal.      Nose: No congestion or rhinorrhea.      Mouth/Throat:      Mouth: Mucous membranes are moist.      Pharynx: Oropharynx is clear. No oropharyngeal exudate or posterior oropharyngeal erythema.   Eyes:      Extraocular Movements: Extraocular movements intact.      Conjunctiva/sclera: Conjunctivae normal.      Pupils: Pupils are equal, round, and reactive to light.   Cardiovascular:      Rate and Rhythm: Normal rate and regular rhythm.      Heart sounds: Normal heart sounds.   Pulmonary:      Effort: Pulmonary effort is normal.      Breath sounds: Normal breath sounds. No wheezing, rhonchi or rales.   Chest:   Breasts:      Right: Normal.      Left: Normal.       Abdominal:      General: Abdomen is flat.      Palpations: Abdomen is soft.   Musculoskeletal:      Cervical back: Neck supple. No rigidity.   Lymphadenopathy:      Cervical: No cervical adenopathy.   Skin:     General: Skin is warm and dry.   Neurological:      Mental Status: She is alert and oriented to person, place, and time.      Comments: MMS exam was normal   Psychiatric:         Mood and Affect: Mood normal.         Behavior: Behavior normal.       Lab Results   Component Value Date    TRIG 97 07/25/2022    HDL 62 (H) 07/25/2022    LDL 84 07/25/2022    VLDL 18 07/25/2022    HGBA1C 6.00 (H) 07/25/2022            HEALTH RISK " ASSESSMENT    Smoking Status:  Social History     Tobacco Use   Smoking Status Current Every Day Smoker   • Packs/day: 1.00   • Years: 58.00   • Pack years: 58.00   • Types: Cigarettes   • Start date: 1964   Smokeless Tobacco Never Used     Alcohol Consumption:  Social History     Substance and Sexual Activity   Alcohol Use Yes   • Alcohol/week: 1.0 standard drink   • Types: 1 Glasses of wine per week    Comment: occasional     Fall Risk Screen:    DARELL Fall Risk Assessment was completed, and patient is at MODERATE risk for falls. Assessment completed on:8/4/2022    Depression Screening:  PHQ-2/PHQ-9 Depression Screening 8/4/2022   Retired PHQ-9 Total Score -   Retired Total Score -   Little Interest or Pleasure in Doing Things 0-->not at all   Feeling Down, Depressed or Hopeless 0-->not at all   PHQ-9: Brief Depression Severity Measure Score 0       Health Habits and Functional and Cognitive Screening:  Functional & Cognitive Status 8/4/2022   Do you have difficulty preparing food and eating? No   Do you have difficulty bathing yourself, getting dressed or grooming yourself? No   Do you have difficulty using the toilet? No   Do you have difficulty moving around from place to place? No   Do you have trouble with steps or getting out of a bed or a chair? No   Current Diet Well Balanced Diet        Current Diet Comment -   Dental Exam Not up to date   Eye Exam Up to date   Exercise (times per week) 2 times per week   Current Exercises Include Yard Work   Do you need help using the phone?  No   Are you deaf or do you have serious difficulty hearing?  Yes   Do you need help with transportation? No   Do you need help shopping? No   Do you need help preparing meals?  No   Do you need help with housework?  No   Do you need help with laundry? No   Do you need help taking your medications? No   Do you need help managing money? No   Do you ever drive or ride in a car without wearing a seat belt? No   Have you felt unusual  stress, anger or loneliness in the last month? No   Who do you live with? Other   If you need help, do you have trouble finding someone available to you? No   Have you been bothered in the last four weeks by sexual problems? No   Do you have difficulty concentrating, remembering or making decisions? No       Age-appropriate Screening Schedule:  Refer to the list below for future screening recommendations based on patient's age, sex and/or medical conditions. Orders for these recommended tests are listed in the plan section. The patient has been provided with a written plan.    Health Maintenance   Topic Date Due   • TDAP/TD VACCINES (1 - Tdap) 12/01/2022 (Originally 7/13/1969)   • ZOSTER VACCINE (1 of 2) 12/01/2022 (Originally 7/13/1998)   • INFLUENZA VACCINE  10/01/2022   • HEMOGLOBIN A1C  01/25/2023   • DIABETIC EYE EXAM  02/18/2023   • DXA SCAN  03/29/2023   • MAMMOGRAM  03/30/2023   • DIABETIC FOOT EXAM  07/14/2023   • LIPID PANEL  07/25/2023   • URINE MICROALBUMIN  07/25/2023                The following data was reviewed by: Karina Langford MD on 08/04/2022:  CMP    CMP 1/12/22 7/25/22   Glucose 101 (A) 114 (A)   BUN 31 (A) 25 (A)   Creatinine 1.58 (A) 1.33 (A)   eGFR Non African Am 32 (A)    Sodium 143 144   Potassium 3.0 (A) 2.9 (A)   Chloride 99 101   Calcium 9.1 8.3 (A)   Albumin 4.30 4.20   Total Bilirubin 0.4 0.4   Alkaline Phosphatase 57 55   AST (SGOT) 18 14   ALT (SGPT) 10 6   (A) Abnormal value            CBC    CBC 1/12/22 7/25/22   WBC 5.45 7.71   RBC 3.31 (A) 3.63 (A)   Hemoglobin 10.2 (A) 10.8 (A)   Hematocrit 30.1 (A) 32.7 (A)   MCV 90.9 90.1   MCH 30.8 29.8   MCHC 33.9 33.0   RDW 13.3 12.9   Platelets 149 177   (A) Abnormal value            Lipid Panel    Lipid Panel 1/12/22 7/25/22   Total Cholesterol 120 164   Triglycerides 71 97   HDL Cholesterol 51 62 (A)   VLDL Cholesterol 15 18   LDL Cholesterol  54 84   LDL/HDL Ratio 1.07 1.33   (A) Abnormal value            TSH    TSH 1/12/22  5/13/22 7/25/22   TSH 0.220 (A) 0.673 3.620   (A) Abnormal value              HgB    HGB 1/12/22 7/25/22   Hemoglobin 10.2 (A) 10.8 (A)   (A) Abnormal value                         Assessment & Plan   CMS Preventative Services Quick Reference  Risk Factors Identified During Encounter  Cardiovascular Disease  Chronic Pain   Immunizations Discussed/Encouraged (specific Immunizations; Tdap, Influenza and Shingrix  Tobacco Use/Dependance (use dotphrase .tobaccocessation for documentation)  The above risks/problems have been discussed with the patient.  Follow up actions/plans if indicated are seen below in the Assessment/Plan Section.  Pertinent information has been shared with the patient in the After Visit Summary.    Diagnoses and all orders for this visit:    1. Encounter for annual wellness exam in Medicare patient (Primary)  Assessment & Plan:  MEDICARE WELLNESS EXAM-SHE IS DUE FOR MAMMOGRAM, UTD on DEXA, UTD  COLONOSCOPY (normal Sept 2018),  DEFERS HER PAP AND PELVIC EXAM-stopped due to age, UTD ON VACCINATIONS INCLUDING:PREVNAR/ PNEUMOVAX/TD/COVID/FLU VACCINE, NO FALL RISK, NO MEMORY ISSUES, NO SIGNS/SYMPTOMS OF DEPRESSION, SHE LIVES WITH HER , SHE IS ABLE TO DRIVE AND PERFORM ADL'S/FINANCES INDEPENDENTLY, HEARING IS ADEQUATE-PT DEFERS SCREENING TEST, SHE HAS A LIVING WILL. CURRENT DOCTOR LIST UPDATED.  RECOMMEND YEARLY EYE EXAMS, RECOMMEND DIET AND WEIGHT LOSS.      2. Encounter for screening mammogram for breast cancer  Comments:  Due for mammogram    3. Postmenopausal state  Comments:  DEXA up-to-date    4. Colon cancer screening  Comments:  Colonoscopy up-to-date    5. Encounter for screening for cardiovascular disorders    6. Encounter for immunization    7. Acquired hypothyroidism  Comments:  On levothyroxine and not at goal.  Needs to start the 75 mcg daily.  Okay to take 100 mcg 6 days a week until she picks up her 75 mcg    8. Type 2 diabetes mellitus without complication, without long-term  current use of insulin ([facility])  Comments:  stable and well contrelled on metformin, BS < 135, eye exams are UTD    9. Gastroesophageal reflux disease without esophagitis  Comments:  Stable    10. Other hyperlipidemia    11. Essential hypertension  Comments:  Stable on amlodipine.  Off HCTZ.  Overall doing well.  Continue current meds    12. Stage 3b chronic kidney disease (CKD) ([facility])  Comments:  Labs reviewed with her today.  She should avoid PPIs and NSAIDs.  Push fluids 64 ounces a day    13. Anxiety  Comments:  She is overall doing much better with her anxiety.  Continue current medications.  No changes in treatment plan at this time    14. Bilateral hearing loss, unspecified hearing loss type  -     Ambulatory Referral to Audiology    15. Tobacco abuse  Comments:  counseled on cessation, she defers quitting at this time    16. Bilateral leg edema  Comments:  off the hctz due to low K , may need to restart, she is to avoid salt in her diet    17. Hypokalemia  Comments:  stopped hctz, will recheck bmp      Follow Up:   Return in about 6 months (around 2/4/2023) for Recheck.     An After Visit Summary and PPPS were made available to the patient.

## 2022-08-15 ENCOUNTER — LAB (OUTPATIENT)
Dept: LAB | Facility: HOSPITAL | Age: 72
End: 2022-08-15

## 2022-08-15 DIAGNOSIS — E87.6 HYPOKALEMIA: ICD-10-CM

## 2022-08-15 LAB
ANION GAP SERPL CALCULATED.3IONS-SCNC: 8 MMOL/L (ref 5–15)
BUN SERPL-MCNC: 17 MG/DL (ref 8–23)
BUN/CREAT SERPL: 15.3 (ref 7–25)
CALCIUM SPEC-SCNC: 9.4 MG/DL (ref 8.6–10.5)
CHLORIDE SERPL-SCNC: 103 MMOL/L (ref 98–107)
CO2 SERPL-SCNC: 28 MMOL/L (ref 22–29)
CREAT SERPL-MCNC: 1.11 MG/DL (ref 0.57–1)
EGFRCR SERPLBLD CKD-EPI 2021: 52.3 ML/MIN/1.73
GLUCOSE SERPL-MCNC: 95 MG/DL (ref 65–99)
POTASSIUM SERPL-SCNC: 3.8 MMOL/L (ref 3.5–5.2)
SODIUM SERPL-SCNC: 139 MMOL/L (ref 136–145)

## 2022-08-15 PROCEDURE — 36415 COLL VENOUS BLD VENIPUNCTURE: CPT

## 2022-08-15 PROCEDURE — 80048 BASIC METABOLIC PNL TOTAL CA: CPT

## 2022-08-18 ENCOUNTER — HOSPITAL ENCOUNTER (OUTPATIENT)
Dept: MAMMOGRAPHY | Facility: HOSPITAL | Age: 72
Discharge: HOME | End: 2022-08-18
Admitting: FAMILY MEDICINE

## 2022-08-18 DIAGNOSIS — Z12.31 ENCOUNTER FOR SCREENING MAMMOGRAM FOR BREAST CANCER: ICD-10-CM

## 2022-08-18 PROCEDURE — 77067 SCR MAMMO BI INCL CAD: CPT

## 2022-08-18 PROCEDURE — 77063 BREAST TOMOSYNTHESIS BI: CPT

## 2022-09-20 DIAGNOSIS — F41.9 ANXIETY: ICD-10-CM

## 2022-09-20 DIAGNOSIS — G89.29 CHRONIC RIGHT-SIDED LOW BACK PAIN WITH RIGHT-SIDED SCIATICA: ICD-10-CM

## 2022-09-20 DIAGNOSIS — M54.41 CHRONIC RIGHT-SIDED LOW BACK PAIN WITH RIGHT-SIDED SCIATICA: ICD-10-CM

## 2022-09-20 DIAGNOSIS — K21.9 GASTROESOPHAGEAL REFLUX DISEASE WITHOUT ESOPHAGITIS: ICD-10-CM

## 2022-09-20 DIAGNOSIS — I10 ESSENTIAL HYPERTENSION: ICD-10-CM

## 2022-09-22 RX ORDER — SERTRALINE HYDROCHLORIDE 100 MG/1
TABLET, FILM COATED ORAL
Qty: 90 TABLET | Refills: 1 | Status: SHIPPED | OUTPATIENT
Start: 2022-09-22 | End: 2023-03-27

## 2022-09-22 RX ORDER — OMEPRAZOLE 20 MG/1
CAPSULE, DELAYED RELEASE ORAL
Qty: 90 CAPSULE | Refills: 1 | Status: SHIPPED | OUTPATIENT
Start: 2022-09-22

## 2022-09-22 RX ORDER — HYDROCHLOROTHIAZIDE 25 MG/1
TABLET ORAL
Qty: 90 TABLET | Refills: 0 | OUTPATIENT
Start: 2022-09-22

## 2022-09-23 RX ORDER — MELOXICAM 15 MG/1
TABLET ORAL
Qty: 90 TABLET | Refills: 1 | Status: SHIPPED | OUTPATIENT
Start: 2022-09-23

## 2022-12-09 ENCOUNTER — PATIENT MESSAGE (OUTPATIENT)
Dept: FAMILY MEDICINE CLINIC | Age: 72
End: 2022-12-09

## 2022-12-12 ENCOUNTER — TELEMEDICINE (OUTPATIENT)
Dept: FAMILY MEDICINE CLINIC | Age: 72
End: 2022-12-12

## 2022-12-12 VITALS — BODY MASS INDEX: 21.17 KG/M2 | WEIGHT: 124 LBS | HEIGHT: 64 IN

## 2022-12-12 DIAGNOSIS — U07.1 COVID-19: Primary | ICD-10-CM

## 2022-12-12 PROCEDURE — 99214 OFFICE O/P EST MOD 30 MIN: CPT | Performed by: PHYSICIAN ASSISTANT

## 2022-12-12 RX ORDER — ALBUTEROL SULFATE 90 UG/1
1 AEROSOL, METERED RESPIRATORY (INHALATION) EVERY 4 HOURS PRN
Qty: 18 G | Refills: 0 | Status: SHIPPED | OUTPATIENT
Start: 2022-12-12

## 2022-12-12 NOTE — PROGRESS NOTES
Subjective     CHIEF COMPLAINT    Chief Complaint   Patient presents with   • Cough     Ongoing since Friday night. Pt took at home test yesterday and it was positive.    • Generalized Body Aches   • Chills            History of Present Illness  Odalis Espino has consented to use a telehealth visit for medical care today: Yes.   This telehealth visit was conducted today via video conference.  I am present in the office and conducting the visit via [employer] on my phone.    Odalis Espino is present at home and conducting their end of the visit using smart phone.    This is a 72-year-old female presenting for a telemedicine visit after testing positive for COVID-19 at home yesterday.  Her symptoms began on 12/9/2022 and she has had cough, body aches, sore throat, and runny nose.  She also endorses some chills but no fevers and states she has had itchy and watery eyes.  She does not have any chest pain or difficulty breathing.            Review of Systems   Constitutional: Positive for chills. Negative for fatigue and fever.   HENT: Positive for rhinorrhea and sore throat.    Eyes: Positive for itching.   Respiratory: Positive for cough. Negative for shortness of breath and wheezing.    Cardiovascular: Negative for chest pain.   Gastrointestinal: Negative for abdominal pain, diarrhea, nausea and vomiting.   Musculoskeletal: Positive for myalgias.   Skin: Negative for rash.   Neurological: Negative for headaches.            Past Medical History:   Diagnosis Date   • Hepatitis    • HL (hearing loss)             Past Surgical History:   Procedure Laterality Date   • EYE SURGERY     • HYSTERECTOMY     • TONSILLECTOMY              Family History   Problem Relation Age of Onset   • Cancer Father    • Cancer Sister    • Diabetes Brother    • Asthma Other    • Seizures Other             Social History     Socioeconomic History   • Marital status:    Tobacco Use   • Smoking status: Every Day     Packs/day: 1.00      "Years: 58.00     Pack years: 58.00     Types: Cigarettes     Start date: 1964   • Smokeless tobacco: Never   Vaping Use   • Vaping Use: Never used   Substance and Sexual Activity   • Alcohol use: Yes     Alcohol/week: 1.0 standard drink     Types: 1 Glasses of wine per week     Comment: occasional   • Drug use: Never   • Sexual activity: Defer            Allergies   Allergen Reactions   • Azithromycin Hives            Current Outpatient Medications on File Prior to Visit   Medication Sig Dispense Refill   • albuterol sulfate  (90 Base) MCG/ACT inhaler Inhale 1 puff Every 4 (Four) Hours As Needed.     • amLODIPine (NORVASC) 5 MG tablet TAKE 1 TABLET DAILY 90 tablet 1   • atorvastatin (LIPITOR) 20 MG tablet TAKE 1 TABLET EVERY NIGHT 90 tablet 1   • gabapentin (NEURONTIN) 600 MG tablet Take 1 tablet by mouth 3 (Three) Times a Day. 90 tablet 0   • HYDROcodone-acetaminophen (NORCO)  MG per tablet Take 3 tablets by mouth Every 8 (Eight) Hours As Needed.     • hydrOXYzine pamoate (VISTARIL) 25 MG capsule Take 1 capsule by mouth 3 (Three) Times a Day As Needed for Itching or Anxiety. 100 capsule 2   • levothyroxine (SYNTHROID, LEVOTHROID) 75 MCG tablet Take 1 tablet by mouth Daily. 90 tablet 1   • meloxicam (MOBIC) 15 MG tablet TAKE 1 TABLET DAILY 90 tablet 1   • metFORMIN (GLUCOPHAGE) 500 MG tablet TAKE 2 TABLETS TWICE A  tablet 0   • omeprazole (priLOSEC) 20 MG capsule TAKE 1 CAPSULE DAILY 90 capsule 1   • sertraline (ZOLOFT) 100 MG tablet TAKE 1 TABLET DAILY 90 tablet 1   • [DISCONTINUED] busPIRone (BUSPAR) 15 MG tablet Take 1 tablet by mouth Daily for 60 days. 30 tablet 2     No current facility-administered medications on file prior to visit.            Ht 162.6 cm (64.02\")   Wt 56.2 kg (124 lb)   BMI 21.27 kg/m²          Objective     Physical Exam  Vitals and nursing note reviewed.   Constitutional:       General: She is not in acute distress.     Appearance: Normal appearance.   HENT:      " Head: Normocephalic and atraumatic.   Pulmonary:      Effort: Pulmonary effort is normal. No respiratory distress.      Comments: Speaking in complete sentences without difficulty  Neurological:      Mental Status: She is alert and oriented to person, place, and time.   Psychiatric:         Mood and Affect: Mood normal.         Behavior: Behavior normal.              Procedures                    Lab Results (last 24 hours)     ** No results found for the last 24 hours. **                No Radiology Exams Resulted Within Past 24 Hours             Patient is a candidate for antiviral therapy given age, DM2, and CKD, .  We discussed treatment options including risks/benefits/side effects.  Patient agrees to Paxlovid.  Most recent GFR from 8/15/2022 was 52.3.  I reviewed medications for any interactions. She was instructed to hold atorvastatin and to take half of her normal dose of Norco while taking Paxlovid.  She should resume normal dosing of all medications 3 days after completion of Paxlovid.    For any shortness of breath, chest pain, or leg swelling they were instructed to proceed directly to the ER.  Patient voiced understanding of all instructions and has no questions at this time.         Diagnoses and all orders for this visit:    1. COVID-19 (Primary)  -     Nirmatrelvir&Ritonavir 300/100 (PAXLOVID) 20 x 150 MG & 10 x 100MG tablet therapy pack tablet; Take 2 tablets by mouth 2 (Two) Times a Day for 5 days. Do NOT take Atorvastatin while taking this medication. Take 1/2 of your regular hydrocodone-acetaminophen dose while taking this medication. You may resume normal dose of all medications 3 days after completing Paxlovid.  Dispense: 20 tablet; Refill: 0      Total time for this encounter was >30 minutes including obtaining history and physical exam; reviewing prior lab results as mentioned above; discussing COVID treatment options including risks, benefits, and side effects of those options; reviewing and  discussing medications for potential interactions with Paxlovid; reviewing isolation guidelines; and discussing return and ER precautions.                        FOR FULL DISCHARGE INSTRUCTIONS/COMMENTS/HANDOUTS please see the   AVS

## 2023-01-03 DIAGNOSIS — I10 ESSENTIAL HYPERTENSION: ICD-10-CM

## 2023-01-03 DIAGNOSIS — E78.00 PURE HYPERCHOLESTEROLEMIA: ICD-10-CM

## 2023-01-03 RX ORDER — ATORVASTATIN CALCIUM 20 MG/1
TABLET, FILM COATED ORAL
Qty: 90 TABLET | Refills: 1 | Status: SHIPPED | OUTPATIENT
Start: 2023-01-03

## 2023-01-03 RX ORDER — AMLODIPINE BESYLATE 5 MG/1
TABLET ORAL
Qty: 90 TABLET | Refills: 1 | Status: SHIPPED | OUTPATIENT
Start: 2023-01-03 | End: 2023-01-16

## 2023-01-16 ENCOUNTER — OFFICE VISIT (OUTPATIENT)
Dept: FAMILY MEDICINE CLINIC | Age: 73
End: 2023-01-16
Payer: MEDICARE

## 2023-01-16 VITALS
DIASTOLIC BLOOD PRESSURE: 85 MMHG | HEART RATE: 68 BPM | SYSTOLIC BLOOD PRESSURE: 155 MMHG | WEIGHT: 127.2 LBS | BODY MASS INDEX: 21.72 KG/M2 | HEIGHT: 64 IN | OXYGEN SATURATION: 94 %

## 2023-01-16 DIAGNOSIS — G89.29 CHRONIC RIGHT-SIDED LOW BACK PAIN WITH RIGHT-SIDED SCIATICA: ICD-10-CM

## 2023-01-16 DIAGNOSIS — N18.32 STAGE 3B CHRONIC KIDNEY DISEASE (CKD): ICD-10-CM

## 2023-01-16 DIAGNOSIS — L01.00 IMPETIGO: ICD-10-CM

## 2023-01-16 DIAGNOSIS — E03.9 ACQUIRED HYPOTHYROIDISM: ICD-10-CM

## 2023-01-16 DIAGNOSIS — E01.0 THYROMEGALY: ICD-10-CM

## 2023-01-16 DIAGNOSIS — Z23 FLU VACCINE NEED: ICD-10-CM

## 2023-01-16 DIAGNOSIS — M54.41 CHRONIC RIGHT-SIDED LOW BACK PAIN WITH RIGHT-SIDED SCIATICA: ICD-10-CM

## 2023-01-16 DIAGNOSIS — I10 ESSENTIAL HYPERTENSION: Primary | ICD-10-CM

## 2023-01-16 DIAGNOSIS — K21.9 GASTROESOPHAGEAL REFLUX DISEASE WITHOUT ESOPHAGITIS: ICD-10-CM

## 2023-01-16 DIAGNOSIS — E11.9 TYPE 2 DIABETES MELLITUS WITHOUT COMPLICATION, WITHOUT LONG-TERM CURRENT USE OF INSULIN: ICD-10-CM

## 2023-01-16 DIAGNOSIS — F41.9 ANXIETY: ICD-10-CM

## 2023-01-16 DIAGNOSIS — E78.00 PURE HYPERCHOLESTEROLEMIA: ICD-10-CM

## 2023-01-16 DIAGNOSIS — Z79.899 HIGH RISK MEDICATION USE: ICD-10-CM

## 2023-01-16 PROCEDURE — 80305 DRUG TEST PRSMV DIR OPT OBS: CPT | Performed by: FAMILY MEDICINE

## 2023-01-16 PROCEDURE — 99214 OFFICE O/P EST MOD 30 MIN: CPT | Performed by: FAMILY MEDICINE

## 2023-01-16 RX ORDER — HYDROCHLOROTHIAZIDE 12.5 MG/1
12.5 TABLET ORAL DAILY
Qty: 90 TABLET | Refills: 1 | Status: SHIPPED | OUTPATIENT
Start: 2023-01-16

## 2023-01-16 RX ORDER — MUPIROCIN CALCIUM 20 MG/G
1 CREAM TOPICAL 3 TIMES DAILY
Qty: 15 G | Refills: 0 | Status: SHIPPED | OUTPATIENT
Start: 2023-01-16

## 2023-01-16 NOTE — PROGRESS NOTES
Answers for HPI/ROS submitted by the patient on 1/15/2023  Please describe your symptoms.: Check Up  Have you had these symptoms before?: No  How long have you been having these symptoms?: 1-4 days  Please list any medications you are currently taking for this condition.: None  Please describe any probable cause for these symptoms. : None  What is the primary reason for your visit?: Other    Odalis Espino presents to [facility].    Chief Complaint:  BP follow up    Subjective       History of Present Illness:  HPI    Chronic back pain.  [facility]. Her current regimen includes gabapentin 600 mg 3 times daily (TODAY IS HER F2F), Norco  mg 4 times daily and Mobic 15 mg. daily.  She is now having R LE weakness or numbness/tingling.  No saddle anesthesia or bowel/bladder incontinence.      She is on omeprazole for GERD and to protect her stomach. She tolerates meds well.        Underlying Hypertension, stable on amlodipine 5 mg daily. She tolerates meds well. Home BP checks have been normal but yesterday she stated it was higher yesterday.  Chronic kidney function, GFR down to low 30's,  she is to avoid NSAIDs and PPIs.  C/O INTERMITTENT LE SWELLING     Hyperlipidemia: stable on Lipitor 20 mg daily, tolerates med well, chol well controlled, due for repeat labs     Hypothyroidism is well controlled Synthroid 75 mcg daily without adverse effects.  Her last TSH was normal in July 3.6.   She denies signs or symptoms of thyroid dysfunction.      Type 2 diabetes is stable and well controlled, she needs to check her BS daily but she does not check her blood sugar regularly and cant remember last BS.  Her current regimen includes Metformin 500 mg twice daily.  Her last A1c was 6%     Chronic anxiety and she is well controlled on zoloft and hydroxyzine, did not tolerate buspar, she is doing great, feels good, no  depression, sleeping well, moods are good, no  suicidal or homicidal thoughts.     She has a bad smokers cough and she continues to smoke a pack per day. She defers quitting, recommend she quit, counseled on nicotine patches           Review of Systems:  Review of Systems   Constitutional: Negative for chills and fever.   HENT: Negative for ear pain, sinus pressure and sore throat.    Eyes: Negative for blurred vision and double vision.   Respiratory: Negative for cough, shortness of breath and wheezing.    Cardiovascular: Negative for chest pain and palpitations.   Gastrointestinal: Negative for abdominal pain, blood in stool, constipation, diarrhea, nausea and vomiting.   Genitourinary: Negative for dysuria and hematuria.   Musculoskeletal: Positive for back pain.   Skin: Negative for rash.   Neurological: Negative for dizziness and headache.   Psychiatric/Behavioral: Negative for sleep disturbance and suicidal ideas.        Objective   Medical History:  Past Medical History:   • Hepatitis   • HL (hearing loss)     Past Surgical History:   • EYE SURGERY   • HYSTERECTOMY   • TONSILLECTOMY      Family History   Problem Relation Age of Onset   • Cancer Father    • Cancer Sister    • Diabetes Brother    • Asthma Other    • Seizures Other      Social History     Tobacco Use   • Smoking status: Every Day     Packs/day: 1.00     Years: 58.00     Pack years: 58.00     Types: Cigarettes     Start date: 1964   • Smokeless tobacco: Never   Substance Use Topics   • Alcohol use: Yes     Alcohol/week: 1.0 standard drink     Types: 1 Glasses of wine per week     Comment: occasional       Health Maintenance Due   Topic Date Due   • TDAP/TD VACCINES (1 - Tdap) Never done   • ZOSTER VACCINE (1 of 2) Never done   • LUNG CANCER SCREENING  Never done   • INFLUENZA VACCINE  08/01/2022   • COVID-19 Vaccine (5 - Booster for Moderna series) 09/08/2022        Immunization History   Administered Date(s) Administered   • COVID-19 (MODERNA)  "1st, 2nd, 3rd Dose Only 03/17/2021, 04/15/2021, 11/03/2021   • Covid-19 (Pfizer) Gray Cap 07/14/2022   • Flu Vaccine Quad PF >36MO 10/09/2018   • Fluzone High Dose =>65 Years ([employer] ONLY) 11/13/2017   • Fluzone High-Dose 65+yrs 11/11/2021   • Pneumococcal Conjugate 13-Valent (PCV13) 10/18/2018   • Pneumococcal Polysaccharide (PPSV23) 01/01/2016       Allergies   Allergen Reactions   • Azithromycin Hives        Medications:  Current Outpatient Medications on File Prior to Visit   Medication Sig   • albuterol sulfate  (90 Base) MCG/ACT inhaler Inhale 1 puff Every 4 (Four) Hours As Needed for Wheezing or Shortness of Air.   • atorvastatin (LIPITOR) 20 MG tablet TAKE 1 TABLET EVERY NIGHT   • gabapentin (NEURONTIN) 600 MG tablet Take 1 tablet by mouth 3 (Three) Times a Day.   • HYDROcodone-acetaminophen (NORCO)  MG per tablet Take 3 tablets by mouth Every 8 (Eight) Hours As Needed.   • hydrOXYzine pamoate (VISTARIL) 25 MG capsule Take 1 capsule by mouth 3 (Three) Times a Day As Needed for Itching or Anxiety.   • levothyroxine (SYNTHROID, LEVOTHROID) 75 MCG tablet Take 1 tablet by mouth Daily.   • meloxicam (MOBIC) 15 MG tablet TAKE 1 TABLET DAILY   • metFORMIN (GLUCOPHAGE) 500 MG tablet TAKE 2 TABLETS TWICE A DAY   • omeprazole (priLOSEC) 20 MG capsule TAKE 1 CAPSULE DAILY   • sertraline (ZOLOFT) 100 MG tablet TAKE 1 TABLET DAILY   • [DISCONTINUED] amLODIPine (NORVASC) 5 MG tablet TAKE 1 TABLET DAILY     No current facility-administered medications on file prior to visit.       Vital Signs:   /85 (BP Location: Right arm, Patient Position: Sitting, Cuff Size: Adult)   Pulse 68   Ht 162.6 cm (64.02\")   Wt 57.7 kg (127 lb 3.2 oz)   SpO2 94%   BMI 21.82 kg/m²       Physical Exam:  Physical Exam  Vitals and nursing note reviewed.   Constitutional:       General: She is not in acute distress.     Appearance: Normal appearance. She is not ill-appearing, toxic-appearing or diaphoretic.   HENT:      " Head: Normocephalic and atraumatic.      Right Ear: Tympanic membrane, ear canal and external ear normal.      Left Ear: Tympanic membrane, ear canal and external ear normal.      Nose: No congestion or rhinorrhea.      Mouth/Throat:      Mouth: Mucous membranes are moist.      Pharynx: Oropharynx is clear. No oropharyngeal exudate or posterior oropharyngeal erythema.   Eyes:      Extraocular Movements: Extraocular movements intact.      Conjunctiva/sclera: Conjunctivae normal.      Pupils: Pupils are equal, round, and reactive to light.   Neck:      Thyroid: Thyromegaly present. No thyroid tenderness.   Cardiovascular:      Rate and Rhythm: Normal rate and regular rhythm.      Heart sounds: Normal heart sounds.   Pulmonary:      Effort: Pulmonary effort is normal.      Breath sounds: Normal breath sounds. No wheezing, rhonchi or rales.   Abdominal:      General: Abdomen is flat.      Palpations: Abdomen is soft.   Musculoskeletal:      Cervical back: Neck supple. No rigidity.   Lymphadenopathy:      Cervical: No cervical adenopathy.   Skin:     General: Skin is warm and dry.          Neurological:      Mental Status: She is alert and oriented to person, place, and time.   Psychiatric:         Mood and Affect: Mood normal.         Behavior: Behavior normal.         Result Review      The following data was reviewed by Karina Langford MD on 01/16/2023.  Lab Results   Component Value Date    WBC 7.71 07/25/2022    HGB 10.8 (L) 07/25/2022    HCT 32.7 (L) 07/25/2022    MCV 90.1 07/25/2022     07/25/2022     Lab Results   Component Value Date    GLUCOSE 95 08/15/2022    BUN 17 08/15/2022    CREATININE 1.11 (H) 08/15/2022    EGFRIFNONA 32 (L) 01/12/2022    BCR 15.3 08/15/2022    K 3.8 08/15/2022    CO2 28.0 08/15/2022    CALCIUM 9.4 08/15/2022    ALBUMIN 4.20 07/25/2022    LABIL2 1.7 12/17/2020    AST 14 07/25/2022    ALT 6 07/25/2022     Lab Results   Component Value Date    CHOL 164 07/25/2022    CHLPL 164  12/17/2020    TRIG 97 07/25/2022    HDL 62 (H) 07/25/2022    LDL 84 07/25/2022     Lab Results   Component Value Date    TSH 3.620 07/25/2022     Lab Results   Component Value Date    HGBA1C 6.00 (H) 07/25/2022     No results found for: PSA                    Assessment and Plan:          Diagnoses and all orders for this visit:    1. Essential hypertension (Primary)  Comments:  elevated with h/o LE edema on amlodipine, will change her to HCTZ  Orders:  -     Comprehensive Metabolic Panel; Future  -     CBC (No Diff); Future  -     hydroCHLOROthiazide (HYDRODIURIL) 12.5 MG tablet; Take 1 tablet by mouth Daily.  Dispense: 90 tablet; Refill: 1    2. High risk medication use  -     POC Urine Drug Screen [facility]    3. Flu vaccine need  -     Fluzone High-Dose 65+yrs ([id number])    4. Pure hypercholesterolemia  Comments:   stable on Lipitor 20 mg daily, tolerates med well, chol well controlled, due for repeat labs  Orders:  -     Lipid Panel; Future    5. Chronic right-sided low back pain with right-sided sciatica    6. Gastroesophageal reflux disease without esophagitis  Comments:  Pt is stable on meds, omeprazole, tolerating medications well, no changes are needed in current meds or treatment plan    7. Anxiety  Comments:  well controlled on zoloft and hydroxyzine    8. Type 2 diabetes mellitus without complication, without long-term current use of insulin (HCC)  Comments:  Pt is stable on meds, tolerating medications well, no changes are needed in current meds or treatment plan, [facility] YEARLY EYE EXAMS  Orders:  -     Hemoglobin A1c; Future  -     Microalbumin / Creatinine Urine Ratio - Urine, Clean Catch; Future    9. Acquired hypothyroidism  Comments:  Pt is stable on meds, tolerating medications well, no changes are needed in current meds or treatment plan  Orders:  -     TSH+Free T4; Future    10. Stage 3b chronic kidney disease (CKD) (HCC)  Comments:  Avoid NSAIDs, push fluids, will repeat labs today    11.  Thyromegaly  -     US Thyroid    12. Impetigo  -     mupirocin (Bactroban) 2 % cream; Apply 1 application topically to the appropriate area as directed 3 (Three) Times a Day.  Dispense: 15 g; Refill: 0          Follow Up   Return in about 6 months (around 7/16/2023) for Recheck.

## 2023-01-18 ENCOUNTER — LAB (OUTPATIENT)
Dept: LAB | Facility: HOSPITAL | Age: 73
End: 2023-01-18
Payer: MEDICARE

## 2023-01-18 DIAGNOSIS — I10 ESSENTIAL HYPERTENSION: ICD-10-CM

## 2023-01-18 DIAGNOSIS — E03.9 ACQUIRED HYPOTHYROIDISM: ICD-10-CM

## 2023-01-18 DIAGNOSIS — E11.9 TYPE 2 DIABETES MELLITUS WITHOUT COMPLICATION, WITHOUT LONG-TERM CURRENT USE OF INSULIN: ICD-10-CM

## 2023-01-18 DIAGNOSIS — E78.00 PURE HYPERCHOLESTEROLEMIA: ICD-10-CM

## 2023-01-18 LAB
ALBUMIN UR-MCNC: 1.4 MG/DL
CREAT UR-MCNC: 27.3 MG/DL
DEPRECATED RDW RBC AUTO: 45.9 FL (ref 37–54)
ERYTHROCYTE [DISTWIDTH] IN BLOOD BY AUTOMATED COUNT: 13.9 % (ref 12.3–15.4)
HCT VFR BLD AUTO: 31.4 % (ref 34–46.6)
HGB BLD-MCNC: 10.1 G/DL (ref 12–15.9)
MCH RBC QN AUTO: 28.7 PG (ref 26.6–33)
MCHC RBC AUTO-ENTMCNC: 32.2 G/DL (ref 31.5–35.7)
MCV RBC AUTO: 89.2 FL (ref 79–97)
MICROALBUMIN/CREAT UR: 51.3 MG/G
PLATELET # BLD AUTO: 132 10*3/MM3 (ref 140–450)
PMV BLD AUTO: 9.2 FL (ref 6–12)
RBC # BLD AUTO: 3.52 10*6/MM3 (ref 3.77–5.28)
WBC NRBC COR # BLD: 6.51 10*3/MM3 (ref 3.4–10.8)

## 2023-01-18 PROCEDURE — 83036 HEMOGLOBIN GLYCOSYLATED A1C: CPT

## 2023-01-18 PROCEDURE — 84439 ASSAY OF FREE THYROXINE: CPT

## 2023-01-18 PROCEDURE — 85027 COMPLETE CBC AUTOMATED: CPT

## 2023-01-18 PROCEDURE — 80061 LIPID PANEL: CPT

## 2023-01-18 PROCEDURE — 82570 ASSAY OF URINE CREATININE: CPT

## 2023-01-18 PROCEDURE — 82043 UR ALBUMIN QUANTITATIVE: CPT

## 2023-01-18 PROCEDURE — 80053 COMPREHEN METABOLIC PANEL: CPT

## 2023-01-18 PROCEDURE — 84443 ASSAY THYROID STIM HORMONE: CPT

## 2023-01-18 PROCEDURE — 36415 COLL VENOUS BLD VENIPUNCTURE: CPT

## 2023-01-19 DIAGNOSIS — D64.9 ANEMIA, UNSPECIFIED TYPE: Primary | ICD-10-CM

## 2023-01-19 DIAGNOSIS — E03.9 ACQUIRED HYPOTHYROIDISM: ICD-10-CM

## 2023-01-19 LAB
ALBUMIN SERPL-MCNC: 4.4 G/DL (ref 3.5–5.2)
ALBUMIN/GLOB SERPL: 1.5 G/DL
ALP SERPL-CCNC: 55 U/L (ref 39–117)
ALT SERPL W P-5'-P-CCNC: 9 U/L (ref 1–33)
ANION GAP SERPL CALCULATED.3IONS-SCNC: 6.2 MMOL/L (ref 5–15)
AST SERPL-CCNC: 14 U/L (ref 1–32)
BILIRUB SERPL-MCNC: <0.2 MG/DL (ref 0–1.2)
BUN SERPL-MCNC: 24 MG/DL (ref 8–23)
BUN/CREAT SERPL: 22.4 (ref 7–25)
CALCIUM SPEC-SCNC: 9.7 MG/DL (ref 8.6–10.5)
CHLORIDE SERPL-SCNC: 103 MMOL/L (ref 98–107)
CHOLEST SERPL-MCNC: 157 MG/DL (ref 0–200)
CO2 SERPL-SCNC: 29.8 MMOL/L (ref 22–29)
CREAT SERPL-MCNC: 1.07 MG/DL (ref 0.57–1)
EGFRCR SERPLBLD CKD-EPI 2021: 54.6 ML/MIN/1.73
GLOBULIN UR ELPH-MCNC: 2.9 GM/DL
GLUCOSE SERPL-MCNC: 125 MG/DL (ref 65–99)
HBA1C MFR BLD: 6.3 % (ref 4.8–5.6)
HDLC SERPL-MCNC: 83 MG/DL (ref 40–60)
LDLC SERPL CALC-MCNC: 61 MG/DL (ref 0–100)
LDLC/HDLC SERPL: 0.74 {RATIO}
POTASSIUM SERPL-SCNC: 3.6 MMOL/L (ref 3.5–5.2)
PROT SERPL-MCNC: 7.3 G/DL (ref 6–8.5)
SODIUM SERPL-SCNC: 139 MMOL/L (ref 136–145)
T4 FREE SERPL-MCNC: 1.15 NG/DL (ref 0.93–1.7)
TRIGL SERPL-MCNC: 64 MG/DL (ref 0–150)
TSH SERPL DL<=0.05 MIU/L-ACNC: 5.41 UIU/ML (ref 0.27–4.2)
VLDLC SERPL-MCNC: 13 MG/DL (ref 5–40)

## 2023-01-19 RX ORDER — LEVOTHYROXINE SODIUM 88 UG/1
88 TABLET ORAL DAILY
Qty: 90 TABLET | Refills: 1 | Status: SHIPPED | OUTPATIENT
Start: 2023-01-19

## 2023-01-30 ENCOUNTER — LAB (OUTPATIENT)
Dept: LAB | Facility: HOSPITAL | Age: 73
End: 2023-01-30
Payer: MEDICARE

## 2023-01-30 ENCOUNTER — TELEPHONE (OUTPATIENT)
Dept: FAMILY MEDICINE CLINIC | Age: 73
End: 2023-01-30
Payer: MEDICARE

## 2023-01-30 ENCOUNTER — HOSPITAL ENCOUNTER (OUTPATIENT)
Dept: ULTRASOUND IMAGING | Facility: HOSPITAL | Age: 73
Discharge: HOME | End: 2023-01-30
Payer: MEDICARE

## 2023-01-30 DIAGNOSIS — E03.9 ACQUIRED HYPOTHYROIDISM: ICD-10-CM

## 2023-01-30 DIAGNOSIS — D64.9 ANEMIA, UNSPECIFIED TYPE: ICD-10-CM

## 2023-01-30 LAB
RETICS # AUTO: 0.05 10*6/MM3 (ref 0.02–0.13)
RETICS/RBC NFR AUTO: 1.58 % (ref 0.7–1.9)

## 2023-01-30 PROCEDURE — 82728 ASSAY OF FERRITIN: CPT

## 2023-01-30 PROCEDURE — 84439 ASSAY OF FREE THYROXINE: CPT

## 2023-01-30 PROCEDURE — 82607 VITAMIN B-12: CPT

## 2023-01-30 PROCEDURE — 84443 ASSAY THYROID STIM HORMONE: CPT

## 2023-01-30 PROCEDURE — 83540 ASSAY OF IRON: CPT

## 2023-01-30 PROCEDURE — 85045 AUTOMATED RETICULOCYTE COUNT: CPT

## 2023-01-30 PROCEDURE — 82746 ASSAY OF FOLIC ACID SERUM: CPT

## 2023-01-30 PROCEDURE — 36415 COLL VENOUS BLD VENIPUNCTURE: CPT

## 2023-01-30 PROCEDURE — 76536 US EXAM OF HEAD AND NECK: CPT

## 2023-01-30 NOTE — TELEPHONE ENCOUNTER
Called patient about overdue labs from 1/19/2023 unable to leave voicemail due to incomplete verbal release, overdue labs due in march. alice

## 2023-01-30 NOTE — TELEPHONE ENCOUNTER
----- Message from Nydia Baumann sent at 1/30/2023  9:11 AM EST -----      ----- Message -----  From: SYSTEM  Sent: 1/29/2023   1:17 AM EST  To: [facility]

## 2023-01-31 ENCOUNTER — LAB (OUTPATIENT)
Dept: LAB | Facility: HOSPITAL | Age: 73
End: 2023-01-31
Payer: MEDICARE

## 2023-01-31 DIAGNOSIS — E06.9 THYROIDITIS: ICD-10-CM

## 2023-01-31 DIAGNOSIS — E03.9 ACQUIRED HYPOTHYROIDISM: ICD-10-CM

## 2023-01-31 DIAGNOSIS — E06.9 THYROIDITIS: Primary | ICD-10-CM

## 2023-01-31 DIAGNOSIS — E03.9 ACQUIRED HYPOTHYROIDISM: Primary | ICD-10-CM

## 2023-01-31 LAB
FERRITIN SERPL-MCNC: 17 NG/ML (ref 13–150)
FOLATE SERPL-MCNC: 9.82 NG/ML (ref 4.78–24.2)
IRON 24H UR-MRATE: 52 MCG/DL (ref 37–145)
T4 FREE SERPL-MCNC: 1.14 NG/DL (ref 0.93–1.7)
T4 FREE SERPL-MCNC: 1.2 NG/DL (ref 0.93–1.7)
TSH SERPL DL<=0.05 MIU/L-ACNC: 3.49 UIU/ML (ref 0.27–4.2)
TSH SERPL DL<=0.05 MIU/L-ACNC: 7.48 UIU/ML (ref 0.27–4.2)
VIT B12 BLD-MCNC: 342 PG/ML (ref 211–946)

## 2023-01-31 PROCEDURE — 36415 COLL VENOUS BLD VENIPUNCTURE: CPT

## 2023-01-31 PROCEDURE — 86376 MICROSOMAL ANTIBODY EACH: CPT

## 2023-01-31 PROCEDURE — 84445 ASSAY OF TSI GLOBULIN: CPT

## 2023-01-31 PROCEDURE — 84443 ASSAY THYROID STIM HORMONE: CPT

## 2023-01-31 PROCEDURE — 84439 ASSAY OF FREE THYROXINE: CPT

## 2023-02-02 LAB — THYROPEROXIDASE AB SERPL-ACNC: 63 IU/ML (ref 0–34)

## 2023-02-03 LAB — TSI SER-ACNC: <0.1 IU/L (ref 0–0.55)

## 2023-03-15 ENCOUNTER — OFFICE VISIT (OUTPATIENT)
Dept: OTOLARYNGOLOGY | Facility: CLINIC | Age: 73
End: 2023-03-15
Payer: MEDICARE

## 2023-03-15 VITALS — TEMPERATURE: 97.6 F | HEIGHT: 64 IN | BODY MASS INDEX: 21.95 KG/M2 | WEIGHT: 128.6 LBS

## 2023-03-15 DIAGNOSIS — Z72.0 TOBACCO ABUSE: ICD-10-CM

## 2023-03-15 DIAGNOSIS — E03.8 HYPOTHYROIDISM DUE TO HASHIMOTO'S THYROIDITIS: Primary | ICD-10-CM

## 2023-03-15 DIAGNOSIS — E06.3 HYPOTHYROIDISM DUE TO HASHIMOTO'S THYROIDITIS: Primary | ICD-10-CM

## 2023-03-15 PROCEDURE — 99214 OFFICE O/P EST MOD 30 MIN: CPT | Performed by: OTOLARYNGOLOGY

## 2023-03-15 PROCEDURE — 1160F RVW MEDS BY RX/DR IN RCRD: CPT | Performed by: OTOLARYNGOLOGY

## 2023-03-15 PROCEDURE — 1159F MED LIST DOCD IN RCRD: CPT | Performed by: OTOLARYNGOLOGY

## 2023-03-16 PROBLEM — Z72.0 TOBACCO ABUSE: Status: ACTIVE | Noted: 2023-03-16

## 2023-03-16 PROBLEM — E06.3 HYPOTHYROIDISM DUE TO HASHIMOTO'S THYROIDITIS: Status: ACTIVE | Noted: 2023-03-16

## 2023-03-16 PROBLEM — E03.8 HYPOTHYROIDISM DUE TO HASHIMOTO'S THYROIDITIS: Status: ACTIVE | Noted: 2023-03-16

## 2023-03-16 NOTE — PROGRESS NOTES
Patient Name: Odalis Espino   Visit Date: 03/15/2023   Patient ID: [id number]  Provider: Tejinder Patel MD    Sex: female  Location: [facility]   YOB: 1950  Location Address: [address] [place] [address], [address],?[place]?[address]    Primary Care Provider Karina Langford MD  Location Phone: [phone]    Referring Provider: Karina Langford MD        Chief Complaint  Follow-up (New problem thyroiditis )    Subjective    History of Present Illness  Odalis Espino is a 72 y.o. female who presents to [facility] today as a consult from Karina Langford MD.    She presents to the clinic today for evaluation of hypothyroidism and possible Hashimoto's thyroiditis.  I had previously seen her in 2021, but at that point she presented for hearing loss.  I recommended hearing aids, but she informs me today that she was not able to afford them, and still has some difficulty in day-to-day situations, but manages with the problem.  Denies any ear disease issues.    She has had hypothyroidism for quite some time, has been on Synthroid for this.  An ultrasound was recently done in January, and I reviewed the results with her today.  This shows slight enlargement of the thyroid especially on the right side and hypervascular appearance consistent with thyroiditis.  No nodules were seen.  She denies any symptoms.  Lab work was performed and revealed her thyroid-stimulating immunoglobulin to be within normal limits.  Her thyroid peroxidase antibody is elevated at 63.  Based on the history it is likely she has had this condition for quite some time, and seems to be doing well on Synthroid.    Past Medical History:   Diagnosis Date   • Hepatitis    • HL (hearing loss)    • Thyroiditis        Past Surgical History:   Procedure Laterality Date   • EYE SURGERY     • HYSTERECTOMY     • TONSILLECTOMY           Current Outpatient Medications:   •  albuterol  "sulfate  (90 Base) MCG/ACT inhaler, Inhale 1 puff Every 4 (Four) Hours As Needed for Wheezing or Shortness of Air., Disp: 18 g, Rfl: 0  •  atorvastatin (LIPITOR) 20 MG tablet, TAKE 1 TABLET EVERY NIGHT, Disp: 90 tablet, Rfl: 1  •  gabapentin (NEURONTIN) 600 MG tablet, Take 1 tablet by mouth 3 (Three) Times a Day., Disp: 90 tablet, Rfl: 0  •  hydroCHLOROthiazide (HYDRODIURIL) 12.5 MG tablet, Take 1 tablet by mouth Daily., Disp: 90 tablet, Rfl: 1  •  HYDROcodone-acetaminophen (NORCO)  MG per tablet, Take 3 tablets by mouth Every 8 (Eight) Hours As Needed., Disp: , Rfl:   •  hydrOXYzine pamoate (VISTARIL) 25 MG capsule, Take 1 capsule by mouth 3 (Three) Times a Day As Needed for Itching or Anxiety., Disp: 100 capsule, Rfl: 2  •  levothyroxine (SYNTHROID, LEVOTHROID) 88 MCG tablet, Take 1 tablet by mouth Daily., Disp: 90 tablet, Rfl: 1  •  meloxicam (MOBIC) 15 MG tablet, TAKE 1 TABLET DAILY, Disp: 90 tablet, Rfl: 1  •  metFORMIN (GLUCOPHAGE) 500 MG tablet, TAKE 2 TABLETS TWICE A DAY, Disp: 360 tablet, Rfl: 0  •  mupirocin (Bactroban) 2 % cream, Apply 1 application topically to the appropriate area as directed 3 (Three) Times a Day., Disp: 15 g, Rfl: 0  •  omeprazole (priLOSEC) 20 MG capsule, TAKE 1 CAPSULE DAILY, Disp: 90 capsule, Rfl: 1  •  sertraline (ZOLOFT) 100 MG tablet, TAKE 1 TABLET DAILY, Disp: 90 tablet, Rfl: 1     Allergies   Allergen Reactions   • Azithromycin Hives       Family History   Problem Relation Age of Onset   • Cancer Father    • Cancer Sister    • Diabetes Brother    • Asthma Other    • Seizures Other         Social History     Social History Narrative   • Not on file       Objective     Vital Signs:   Temp 97.6 °F (36.4 °C) (Tympanic)   Ht 162.6 cm (64.02\")   Wt 58.3 kg (128 lb 9.6 oz)   BMI 22.06 kg/m²       Physical Exam         Constitutional   Appearance  · : well developed, well-nourished, alert and in no acute distress, voice clear and strong    Head  Inspection  · : no " deformities or lesions  Face  Inspection  · : No facial lesions; House-Brackmann I/VI bilaterally  Palpation  · : No TMJ crepitus nor  muscle tenderness bilaterally    Eyes  Vision  Visual Fields  · : Extraocular movements are intact. No spontaneous or gaze-induced nystagmus.  Conjunctivae  · : clear  Sclerae  · : clear  Pupils and Irises  · : pupils equal, round, and reactive to light.     Ears, Nose, Mouth and Throat    Ears    External Ears  · : appearance within normal limits, no lesions present  Otoscopic Examination  · : Tympanic membrane appearance within normal limits bilaterally without perforations, well-aerated middle ears  Hearing  · : intact to conversational voice both ears  Tunning fork testing:     :    Nose    External Nose  · : appearance normal  Intranasal Exam  · : mucosa within normal limits, vestibules normal, no intranasal lesions present, septum midline, sinuses non tender to percussion  Oral Cavity    Oral Mucosa  · : oral mucosa normal without pallor or cyanosis  Lips  · : lip appearance normal  Teeth  · : normal dentition for age  Gums  · : gums pink, non-swollen, no bleeding present  Tongue  · : tongue appearance normal; normal mobility  Palate  · : hard palate normal, soft palate appearance normal with symmetric mobility    Throat    Oropharynx  · : no inflammation or lesions present, tonsils within normal limits  Hypopharynx  · : appearance within normal limits, superior epiglottis within normal limits  Larynx  · : appearance within normal limits, vocal cords within normal limits, no lesions present    Neck  Inspection/Palpation  : normal appearance, no masses or tenderness, trachea midline; thyroid slightly enlarged, soft, no nodules or lymphadenopathy  Respiratory  Respiratory Effort  · : breathing unlabored  Inspection of Chest  · : normal appearance, no retractions    Cardiovascular  Heart  · : regular rate and rhythm    Lymphatic  Neck  · : no lymphadenopathy  present  Supraclavicular Nodes  · : no lymphadenopathy present  Preauricular Nodes  · : no lymphadenopathy present    Skin and Subcutaneous Tissue  General Inspection  · : Regarding face and neck - there are no rashes present, no lesions present, and no areas of discoloration    Neurologic  Cranial Nerves  · : cranial nerves II-XII are grossly intact bilaterally  Gait and Station  · : normal gait, able to stand without diffculty    Psychiatric  Judgement and Insight  · : judgment and insight intact  Mood and Affect  · : mood normal, affect appropriate          Assessment and Plan    Diagnoses and all orders for this visit:    1. Hypothyroidism due to Hashimoto's thyroiditis (Primary)    2. Tobacco abuse    Examination today revealed slightly enlarged thyroid with no lymphadenopathy or palpable nodules.  I discussed the above findings with her, and will have her primary care physician continue to monitor her blood work if any adjustments are needed in her Synthroid.  Ears appear normal on exam today.  We discussed smoking cessation.  I will plan to see her back on an as-needed basis should there be any further issues.    Follow Up   No follow-ups on file.  Patient was given instructions and counseling regarding her condition or for health maintenance advice. Please see specific information pulled into the AVS if appropriate.

## 2023-03-27 DIAGNOSIS — F41.9 ANXIETY: ICD-10-CM

## 2023-03-27 RX ORDER — SERTRALINE HYDROCHLORIDE 100 MG/1
TABLET, FILM COATED ORAL
Qty: 90 TABLET | Refills: 0 | Status: SHIPPED | OUTPATIENT
Start: 2023-03-27

## 2023-04-10 DIAGNOSIS — M54.41 CHRONIC RIGHT-SIDED LOW BACK PAIN WITH RIGHT-SIDED SCIATICA: ICD-10-CM

## 2023-04-10 DIAGNOSIS — K21.9 GASTROESOPHAGEAL REFLUX DISEASE WITHOUT ESOPHAGITIS: ICD-10-CM

## 2023-04-10 DIAGNOSIS — G89.29 CHRONIC RIGHT-SIDED LOW BACK PAIN WITH RIGHT-SIDED SCIATICA: ICD-10-CM

## 2023-04-11 RX ORDER — OMEPRAZOLE 20 MG/1
CAPSULE, DELAYED RELEASE ORAL
Qty: 90 CAPSULE | Refills: 1 | Status: SHIPPED | OUTPATIENT
Start: 2023-04-11

## 2023-04-11 RX ORDER — MELOXICAM 15 MG/1
TABLET ORAL
Qty: 90 TABLET | Refills: 1 | Status: SHIPPED | OUTPATIENT
Start: 2023-04-11

## 2023-04-20 DIAGNOSIS — M54.41 CHRONIC RIGHT-SIDED LOW BACK PAIN WITH RIGHT-SIDED SCIATICA: ICD-10-CM

## 2023-04-20 DIAGNOSIS — K21.9 GASTROESOPHAGEAL REFLUX DISEASE WITHOUT ESOPHAGITIS: ICD-10-CM

## 2023-04-20 DIAGNOSIS — F41.9 ANXIETY: ICD-10-CM

## 2023-04-20 DIAGNOSIS — G89.29 CHRONIC RIGHT-SIDED LOW BACK PAIN WITH RIGHT-SIDED SCIATICA: ICD-10-CM

## 2023-04-20 RX ORDER — MELOXICAM 15 MG/1
15 TABLET ORAL DAILY
Qty: 90 TABLET | Refills: 1 | OUTPATIENT
Start: 2023-04-20

## 2023-04-20 RX ORDER — HYDROXYZINE PAMOATE 25 MG/1
25 CAPSULE ORAL 3 TIMES DAILY PRN
Qty: 100 CAPSULE | Refills: 2 | Status: SHIPPED | OUTPATIENT
Start: 2023-04-20

## 2023-04-20 RX ORDER — OMEPRAZOLE 20 MG/1
20 CAPSULE, DELAYED RELEASE ORAL DAILY
Qty: 90 CAPSULE | Refills: 1 | OUTPATIENT
Start: 2023-04-20

## 2023-04-20 NOTE — TELEPHONE ENCOUNTER
Rx Refill Note  Requested Prescriptions     Pending Prescriptions Disp Refills   • hydrOXYzine pamoate (VISTARIL) 25 MG capsule 100 capsule 2     Sig: Take 1 capsule by mouth 3 (Three) Times a Day As Needed for Itching or Anxiety.     Refused Prescriptions Disp Refills   • meloxicam (MOBIC) 15 MG tablet 90 tablet 1     Sig: Take 1 tablet by mouth Daily.     Refused By: CHERY CAMARENA     Reason for Refusal: Patient does not need medication refilled at this time   • omeprazole (priLOSEC) 20 MG capsule 90 capsule 1     Sig: Take 1 capsule by mouth Daily.     Refused By: CHERY CAMARENA     Reason for Refusal: Patient does not need medication refilled at this time      Last office visit with prescribing clinician: 1/16/2023     Next office visit with prescribing clinician: 7/17/2023    ON CALL FOR JENNIFER GUEVARA 7/14/22 #100 WITH 2 RF'S    Chery Camarena LPN  04/20/23, 16:13 EDT

## 2023-05-28 DIAGNOSIS — I10 ESSENTIAL HYPERTENSION: ICD-10-CM

## 2023-05-28 DIAGNOSIS — U07.1 COVID-19: ICD-10-CM

## 2023-05-28 DIAGNOSIS — E03.9 ACQUIRED HYPOTHYROIDISM: ICD-10-CM

## 2023-05-30 RX ORDER — ALBUTEROL SULFATE 90 UG/1
AEROSOL, METERED RESPIRATORY (INHALATION)
Qty: 18 G | Refills: 0 | OUTPATIENT
Start: 2023-05-30

## 2023-05-30 NOTE — TELEPHONE ENCOUNTER
Rx Refill Note  Requested Prescriptions     Pending Prescriptions Disp Refills   • albuterol sulfate  (90 Base) MCG/ACT inhaler [Pharmacy Med Name: ALBUTEROL HFA 90 MCG INHALER] 18 g 0     Sig: INHALE ONE PUFF BY MOUTH EVERY 4 HOURS AS NEEDED FOR WHEEZING OR SHORTNESS OF AIR      Last office visit with prescribing clinician: Visit date not found     Next office visit with prescribing clinician: Visit date not found    Last filled 12/12/22 KEVAN Loja LPN  05/30/23, 09:28 EDT

## 2023-06-01 RX ORDER — LEVOTHYROXINE SODIUM 88 UG/1
TABLET ORAL
Qty: 90 TABLET | Refills: 0 | Status: SHIPPED | OUTPATIENT
Start: 2023-06-01

## 2023-06-01 RX ORDER — HYDROCHLOROTHIAZIDE 12.5 MG/1
TABLET ORAL
Qty: 90 TABLET | Refills: 0 | Status: SHIPPED | OUTPATIENT
Start: 2023-06-01

## 2023-06-18 DIAGNOSIS — E78.00 PURE HYPERCHOLESTEROLEMIA: ICD-10-CM

## 2023-06-18 DIAGNOSIS — I10 ESSENTIAL HYPERTENSION: ICD-10-CM

## 2023-06-19 RX ORDER — AMLODIPINE BESYLATE 5 MG/1
TABLET ORAL
Qty: 90 TABLET | Refills: 1 | OUTPATIENT
Start: 2023-06-19

## 2023-06-19 RX ORDER — ATORVASTATIN CALCIUM 20 MG/1
TABLET, FILM COATED ORAL
Qty: 90 TABLET | Refills: 1 | Status: SHIPPED | OUTPATIENT
Start: 2023-06-19

## 2023-07-20 ENCOUNTER — TELEPHONE (OUTPATIENT)
Dept: FAMILY MEDICINE CLINIC | Age: 73
End: 2023-07-20

## 2023-07-20 NOTE — TELEPHONE ENCOUNTER
Caller: [facility] - [facility]ANESHA Dom[facility]nguez - [facility] AT Portal to Registered [facility] - [phone]  - [phone] FX    Relationship: Pharmacy EMPLOYEE SHASHANK Dean call back number: [phone] OPTION 2 REFERENCE NUMBER [id number]    Requested Prescriptions:   Requested Prescriptions     Pending Prescriptions Disp Refills    amLODIPine (NORVASC) 5 MG tablet          Pharmacy where request should be sent: [facility] TANESHA DOMÍNGUEZ - [address] [facility] AT PORTAL TO [facility] - [phone]  - [phone] FX     Last office visit with prescribing clinician: 7/17/2023   Last telemedicine visit with prescribing clinician: Visit date not found   Next office visit with prescribing clinician: 8/7/2023     Additional details provided by patient: PHARMACY WAS CALLING TO PROCESS AN AUTO REFILL FOR THIS MEDICATION. PLEASE SEND NEW PRESCRIPTION WITH REFILLS TO PHARMACY [facility] AS THIS IS MAIL ORDER. ALSO, THIS MEDICATION THEY GOT A MESSAGE THAT IT HAD CHANGED. PLEASE CALL PHARMACY WITH QUESTIONS.    Does the patient have less than a 3 day supply:  [] Yes  [x] No    Would you like a call back once the refill request has been completed: [] Yes [] No    If the office needs to give you a call back, can they leave a voicemail: [] Yes [] No    Marilia Davila Rep   07/20/23 15:34 EDT

## 2023-07-21 ENCOUNTER — LAB (OUTPATIENT)
Dept: LAB | Facility: HOSPITAL | Age: 73
End: 2023-07-21
Payer: MEDICARE

## 2023-07-21 DIAGNOSIS — E78.00 PURE HYPERCHOLESTEROLEMIA: ICD-10-CM

## 2023-07-21 DIAGNOSIS — E03.9 ACQUIRED HYPOTHYROIDISM: ICD-10-CM

## 2023-07-21 DIAGNOSIS — D64.9 ANEMIA, UNSPECIFIED TYPE: ICD-10-CM

## 2023-07-21 DIAGNOSIS — E11.9 TYPE 2 DIABETES MELLITUS WITHOUT COMPLICATION, WITHOUT LONG-TERM CURRENT USE OF INSULIN: ICD-10-CM

## 2023-07-21 LAB
ALBUMIN SERPL-MCNC: 4 G/DL (ref 3.5–5.2)
ALBUMIN UR-MCNC: 2.3 MG/DL
ALBUMIN/GLOB SERPL: 1.5 G/DL
ALP SERPL-CCNC: 58 U/L (ref 39–117)
ALT SERPL W P-5'-P-CCNC: <5 U/L (ref 1–33)
ANION GAP SERPL CALCULATED.3IONS-SCNC: 10.7 MMOL/L (ref 5–15)
AST SERPL-CCNC: 15 U/L (ref 1–32)
BILIRUB SERPL-MCNC: 0.4 MG/DL (ref 0–1.2)
BUN SERPL-MCNC: 19 MG/DL (ref 8–23)
BUN/CREAT SERPL: 16.4 (ref 7–25)
CALCIUM SPEC-SCNC: 9.3 MG/DL (ref 8.6–10.5)
CHLORIDE SERPL-SCNC: 104 MMOL/L (ref 98–107)
CHOLEST SERPL-MCNC: 148 MG/DL (ref 0–200)
CO2 SERPL-SCNC: 28.3 MMOL/L (ref 22–29)
CREAT SERPL-MCNC: 1.16 MG/DL (ref 0.57–1)
CREAT UR-MCNC: 169.7 MG/DL
DEPRECATED RDW RBC AUTO: 42.9 FL (ref 37–54)
EGFRCR SERPLBLD CKD-EPI 2021: 49.3 ML/MIN/1.73
ERYTHROCYTE [DISTWIDTH] IN BLOOD BY AUTOMATED COUNT: 13 % (ref 12.3–15.4)
GLOBULIN UR ELPH-MCNC: 2.7 GM/DL
GLUCOSE SERPL-MCNC: 122 MG/DL (ref 65–99)
HBA1C MFR BLD: 6.5 % (ref 4.8–5.6)
HCT VFR BLD AUTO: 30.5 % (ref 34–46.6)
HDLC SERPL-MCNC: 68 MG/DL (ref 40–60)
HGB BLD-MCNC: 10.1 G/DL (ref 12–15.9)
IRON 24H UR-MRATE: 44 MCG/DL (ref 37–145)
IRON SATN MFR SERPL: 11 % (ref 20–50)
LDLC SERPL CALC-MCNC: 67 MG/DL (ref 0–100)
LDLC/HDLC SERPL: 0.99 {RATIO}
MCH RBC QN AUTO: 29.7 PG (ref 26.6–33)
MCHC RBC AUTO-ENTMCNC: 33.1 G/DL (ref 31.5–35.7)
MCV RBC AUTO: 89.7 FL (ref 79–97)
MICROALBUMIN/CREAT UR: 13.6 MG/G
PLATELET # BLD AUTO: 164 10*3/MM3 (ref 140–450)
PMV BLD AUTO: 9.6 FL (ref 6–12)
POTASSIUM SERPL-SCNC: 3.4 MMOL/L (ref 3.5–5.2)
PROT SERPL-MCNC: 6.7 G/DL (ref 6–8.5)
RBC # BLD AUTO: 3.4 10*6/MM3 (ref 3.77–5.28)
SODIUM SERPL-SCNC: 143 MMOL/L (ref 136–145)
T4 FREE SERPL-MCNC: 1.51 NG/DL (ref 0.93–1.7)
TIBC SERPL-MCNC: 411 MCG/DL (ref 298–536)
TRANSFERRIN SERPL-MCNC: 276 MG/DL (ref 200–360)
TRIGL SERPL-MCNC: 64 MG/DL (ref 0–150)
TSH SERPL DL<=0.05 MIU/L-ACNC: 2.52 UIU/ML (ref 0.27–4.2)
VIT B12 BLD-MCNC: >2000 PG/ML (ref 211–946)
VLDLC SERPL-MCNC: 13 MG/DL (ref 5–40)
WBC NRBC COR # BLD: 6.06 10*3/MM3 (ref 3.4–10.8)

## 2023-07-21 PROCEDURE — 82607 VITAMIN B-12: CPT

## 2023-07-21 PROCEDURE — 80061 LIPID PANEL: CPT

## 2023-07-21 PROCEDURE — 84439 ASSAY OF FREE THYROXINE: CPT

## 2023-07-21 PROCEDURE — 80053 COMPREHEN METABOLIC PANEL: CPT

## 2023-07-21 PROCEDURE — 85027 COMPLETE CBC AUTOMATED: CPT

## 2023-07-21 PROCEDURE — 84443 ASSAY THYROID STIM HORMONE: CPT

## 2023-07-21 PROCEDURE — 84466 ASSAY OF TRANSFERRIN: CPT

## 2023-07-21 PROCEDURE — 36415 COLL VENOUS BLD VENIPUNCTURE: CPT

## 2023-07-21 PROCEDURE — 82043 UR ALBUMIN QUANTITATIVE: CPT

## 2023-07-21 PROCEDURE — 83540 ASSAY OF IRON: CPT

## 2023-07-21 PROCEDURE — 82570 ASSAY OF URINE CREATININE: CPT

## 2023-07-21 PROCEDURE — 83036 HEMOGLOBIN GLYCOSYLATED A1C: CPT

## 2023-07-21 RX ORDER — AMLODIPINE BESYLATE 5 MG/1
5 TABLET ORAL DAILY
Qty: 90 TABLET | Refills: 1 | Status: SHIPPED | OUTPATIENT
Start: 2023-07-21

## 2023-07-28 ENCOUNTER — TELEPHONE (OUTPATIENT)
Dept: FAMILY MEDICINE CLINIC | Age: 73
End: 2023-07-28
Payer: MEDICARE

## 2023-07-28 NOTE — TELEPHONE ENCOUNTER
"----- Message from Gianna Negron sent at 7/28/2023  8:07 AM EDT -----  Regarding: FW: CT scan of my back  Contact: [phone]      ----- Message -----  From: Odalis Espino \"Amaris\"  Sent: 7/27/2023   5:14 PM EDT  To: [facility]  Subject: CT scan of my back                               I thought that was supposed to be a CT scan of my back, but I haven’t heard from anyone  "

## 2023-07-28 NOTE — TELEPHONE ENCOUNTER
I called pt and let her know DR Skaggs ordered a x-ray for her back and she can come and complete without appt Monday -Friday

## 2023-08-01 ENCOUNTER — HOSPITAL ENCOUNTER (OUTPATIENT)
Dept: GENERAL RADIOLOGY | Facility: HOSPITAL | Age: 73
Discharge: HOME | End: 2023-08-01
Payer: MEDICARE

## 2023-08-01 ENCOUNTER — HOSPITAL ENCOUNTER (OUTPATIENT)
Dept: CT IMAGING | Facility: HOSPITAL | Age: 73
Discharge: HOME | End: 2023-08-01
Payer: MEDICARE

## 2023-08-01 ENCOUNTER — HOSPITAL ENCOUNTER (OUTPATIENT)
Dept: BONE DENSITY | Facility: HOSPITAL | Age: 73
Discharge: HOME | End: 2023-08-01
Payer: MEDICARE

## 2023-08-01 DIAGNOSIS — Z78.0 POSTMENOPAUSAL STATE: ICD-10-CM

## 2023-08-01 DIAGNOSIS — Z87.891 PERSONAL HISTORY OF NICOTINE DEPENDENCE: ICD-10-CM

## 2023-08-01 DIAGNOSIS — M54.41 ACUTE RIGHT-SIDED LOW BACK PAIN WITH RIGHT-SIDED SCIATICA: ICD-10-CM

## 2023-08-01 PROCEDURE — 71271 CT THORAX LUNG CANCER SCR C-: CPT

## 2023-08-01 PROCEDURE — 72100 X-RAY EXAM L-S SPINE 2/3 VWS: CPT

## 2023-08-01 PROCEDURE — 77080 DXA BONE DENSITY AXIAL: CPT

## 2023-08-02 ENCOUNTER — TELEMEDICINE (OUTPATIENT)
Dept: FAMILY MEDICINE CLINIC | Age: 73
End: 2023-08-02
Payer: MEDICARE

## 2023-08-02 VITALS — WEIGHT: 126 LBS | BODY MASS INDEX: 20.99 KG/M2 | HEIGHT: 65 IN

## 2023-08-02 DIAGNOSIS — E03.9 ACQUIRED HYPOTHYROIDISM: ICD-10-CM

## 2023-08-02 DIAGNOSIS — Z72.0 TOBACCO ABUSE: ICD-10-CM

## 2023-08-02 DIAGNOSIS — N18.32 STAGE 3B CHRONIC KIDNEY DISEASE (CKD): ICD-10-CM

## 2023-08-02 DIAGNOSIS — M54.41 ACUTE RIGHT-SIDED LOW BACK PAIN WITH RIGHT-SIDED SCIATICA: ICD-10-CM

## 2023-08-02 DIAGNOSIS — J43.9 PULMONARY EMPHYSEMA, UNSPECIFIED EMPHYSEMA TYPE: ICD-10-CM

## 2023-08-02 DIAGNOSIS — I10 ESSENTIAL HYPERTENSION: ICD-10-CM

## 2023-08-02 DIAGNOSIS — D50.8 IRON DEFICIENCY ANEMIA SECONDARY TO INADEQUATE DIETARY IRON INTAKE: ICD-10-CM

## 2023-08-02 DIAGNOSIS — R91.1 LEFT UPPER LOBE PULMONARY NODULE: Primary | ICD-10-CM

## 2023-08-02 DIAGNOSIS — M41.85 DEXTROSCOLIOSIS OF THORACOLUMBAR SPINE: ICD-10-CM

## 2023-08-02 PROCEDURE — 3044F HG A1C LEVEL LT 7.0%: CPT | Performed by: FAMILY MEDICINE

## 2023-08-02 PROCEDURE — 99214 OFFICE O/P EST MOD 30 MIN: CPT | Performed by: FAMILY MEDICINE

## 2023-08-02 RX ORDER — LANCETS 33 GAUGE
EACH MISCELLANEOUS
COMMUNITY
Start: 2023-07-19

## 2023-08-04 DIAGNOSIS — K21.9 GASTROESOPHAGEAL REFLUX DISEASE WITHOUT ESOPHAGITIS: ICD-10-CM

## 2023-08-04 RX ORDER — OMEPRAZOLE 20 MG/1
CAPSULE, DELAYED RELEASE ORAL
Qty: 90 CAPSULE | Refills: 1 | Status: SHIPPED | OUTPATIENT
Start: 2023-08-04

## 2023-08-08 ENCOUNTER — OFFICE VISIT (OUTPATIENT)
Dept: FAMILY MEDICINE CLINIC | Age: 73
End: 2023-08-08
Payer: MEDICARE

## 2023-08-08 VITALS
BODY MASS INDEX: 20.79 KG/M2 | WEIGHT: 124.8 LBS | HEIGHT: 65 IN | DIASTOLIC BLOOD PRESSURE: 71 MMHG | HEART RATE: 68 BPM | OXYGEN SATURATION: 95 % | SYSTOLIC BLOOD PRESSURE: 133 MMHG | TEMPERATURE: 98 F

## 2023-08-08 DIAGNOSIS — E78.00 PURE HYPERCHOLESTEROLEMIA: ICD-10-CM

## 2023-08-08 DIAGNOSIS — M41.85 DEXTROSCOLIOSIS OF THORACOLUMBAR SPINE: ICD-10-CM

## 2023-08-08 DIAGNOSIS — Z12.31 ENCOUNTER FOR SCREENING MAMMOGRAM FOR BREAST CANCER: ICD-10-CM

## 2023-08-08 DIAGNOSIS — E03.9 ACQUIRED HYPOTHYROIDISM: ICD-10-CM

## 2023-08-08 DIAGNOSIS — Z72.0 TOBACCO ABUSE: ICD-10-CM

## 2023-08-08 DIAGNOSIS — I10 ESSENTIAL HYPERTENSION: ICD-10-CM

## 2023-08-08 DIAGNOSIS — Z23 ENCOUNTER FOR IMMUNIZATION: ICD-10-CM

## 2023-08-08 DIAGNOSIS — Z12.11 COLON CANCER SCREENING: ICD-10-CM

## 2023-08-08 DIAGNOSIS — J43.9 PULMONARY EMPHYSEMA, UNSPECIFIED EMPHYSEMA TYPE: ICD-10-CM

## 2023-08-08 DIAGNOSIS — E11.41 TYPE 2 DIABETES MELLITUS WITH DIABETIC MONONEUROPATHY, WITHOUT LONG-TERM CURRENT USE OF INSULIN: ICD-10-CM

## 2023-08-08 DIAGNOSIS — Z00.00 ENCOUNTER FOR ANNUAL WELLNESS EXAM IN MEDICARE PATIENT: Primary | ICD-10-CM

## 2023-08-08 DIAGNOSIS — N18.32 STAGE 3B CHRONIC KIDNEY DISEASE (CKD): ICD-10-CM

## 2023-08-08 DIAGNOSIS — D50.8 IRON DEFICIENCY ANEMIA SECONDARY TO INADEQUATE DIETARY IRON INTAKE: ICD-10-CM

## 2023-08-08 DIAGNOSIS — D64.9 ANEMIA, UNSPECIFIED TYPE: ICD-10-CM

## 2023-08-08 DIAGNOSIS — Z78.0 POSTMENOPAUSAL STATE: ICD-10-CM

## 2023-08-08 PROCEDURE — 1159F MED LIST DOCD IN RCRD: CPT | Performed by: FAMILY MEDICINE

## 2023-08-08 PROCEDURE — 1170F FXNL STATUS ASSESSED: CPT | Performed by: FAMILY MEDICINE

## 2023-08-08 PROCEDURE — 0124A PR ADM SARSCOV2 30MCG/0.3ML BST: CPT | Performed by: FAMILY MEDICINE

## 2023-08-08 PROCEDURE — 1160F RVW MEDS BY RX/DR IN RCRD: CPT | Performed by: FAMILY MEDICINE

## 2023-08-08 PROCEDURE — 3075F SYST BP GE 130 - 139MM HG: CPT | Performed by: FAMILY MEDICINE

## 2023-08-08 PROCEDURE — 3044F HG A1C LEVEL LT 7.0%: CPT | Performed by: FAMILY MEDICINE

## 2023-08-08 PROCEDURE — 91312 COVID-19 (PFIZER) BIVALENT 12+YRS: CPT | Performed by: FAMILY MEDICINE

## 2023-08-08 PROCEDURE — 3078F DIAST BP <80 MM HG: CPT | Performed by: FAMILY MEDICINE

## 2023-08-08 PROCEDURE — 99214 OFFICE O/P EST MOD 30 MIN: CPT | Performed by: FAMILY MEDICINE

## 2023-08-08 PROCEDURE — G0439 PPPS, SUBSEQ VISIT: HCPCS | Performed by: FAMILY MEDICINE

## 2023-08-08 NOTE — PROGRESS NOTES
The ABCs of the Annual Wellness Visit  Subsequent Medicare Wellness Visit    Subjective    Odalis Espino is a 73 y.o. female who presents for a Subsequent Medicare Wellness Visit.    The following portions of the patient's history were reviewed and   updated as appropriate: allergies, current medications, past family history, past medical history, past social history, past surgical history, and problem list.    Compared to one year ago, the patient feels her physical   health is the same.    Compared to one year ago, the patient feels her mental   health is worse, more anxiety over her health    Recent Hospitalizations:  She was not admitted to the hospital during the last year.       Advance Care Planning  Advance Directive is not on file.  ACP discussion was held with the patient during this visit. Patient has an advance directive (not in EMR), copy requested.    Does the patient have evidence of cognitive impairment? No    Aspirin is not on active medication list.  Aspirin use is not indicated based on review of current medical condition/s. Risk of harm outweighs potential benefits.  .    Patient Active Problem List   Diagnosis    Encounter for annual wellness exam in Medicare patient    Degeneration of lumbar intervertebral disc    Acquired hypothyroidism    Anxiety    Essential hypertension    Other hyperlipidemia    Type 2 diabetes mellitus without complication, without long-term current use of insulin    Gastroesophageal reflux disease without esophagitis    Stage 3b chronic kidney disease (CKD)    Hypothyroidism due to Hashimoto's thyroiditis    Tobacco abuse       Current Medical Providers:  Patient Care Team:  Karina Langford MD as PCP - General (Family Medicine)    Opioid medication/s are on active medication list.  and I have evaluated her active treatment plan and pain score trends (see table).  There were no vitals filed for this visit.  I have reviewed the chart for potential of high risk  "medication and harmful drug interactions in the elderly.               Objective    Vitals:    23 1529   BP: 133/71   BP Location: Right arm   Patient Position: Sitting   Pulse: 68   Temp: 98 øF (36.7 øC)   TempSrc: Oral   SpO2: 95%   Weight: 56.6 kg (124 lb 12.8 oz)   Height: 165.1 cm (65\")     Estimated body mass index is 20.77 kg/mý as calculated from the following:    Height as of this encounter: 165.1 cm (65\").    Weight as of this encounter: 56.6 kg (124 lb 12.8 oz).    BMI is within normal parameters. No other follow-up for BMI required.          Lab Results   Component Value Date    TRIG 64 2023    HDL 68 (H) 2023    LDL 67 2023    VLDL 13 2023    HGBA1C 6.50 (H) 2023        HEALTH RISK ASSESSMENT    Smoking Status:  Social History     Tobacco Use   Smoking Status Every Day    Packs/day: 1.00    Years: 40.00    Pack years: 40.00    Types: Cigarettes    Start date:    Smokeless Tobacco Never     Alcohol Consumption:  Social History     Substance and Sexual Activity   Alcohol Use Yes    Alcohol/week: 1.0 standard drink    Types: 1 Glasses of wine per week    Comment: occasional     Fall Risk Screen:    DARELL Fall Risk Assessment was completed, and patient is at MODERATE risk for falls. Assessment completed on:2023    Depression Screenin/8/2023     3:32 PM   PHQ-2/PHQ-9 Depression Screening   Little Interest or Pleasure in Doing Things 0-->not at all   Feeling Down, Depressed or Hopeless 0-->not at all   PHQ-9: Brief Depression Severity Measure Score 0       Health Habits and Functional and Cognitive Screenin/8/2023     3:32 PM   Functional & Cognitive Status   Do you have difficulty preparing food and eating? No   Do you have difficulty bathing yourself, getting dressed or grooming yourself? No   Do you have difficulty using the toilet? No   Do you have difficulty moving around from place to place? No   Do you have trouble with steps or getting out " of a bed or a chair? No   Current Diet Unhealthy Diet   Dental Exam Up to date   Eye Exam Up to date   Exercise (times per week) 0 times per week   Current Exercises Include Yard Work;No Regular Exercise   Do you need help using the phone?  No   Are you deaf or do you have serious difficulty hearing?  No   Do you need help to go to places out of walking distance? No   Do you need help shopping? No   Do you need help preparing meals?  No   Do you need help with housework?  No   Do you need help with laundry? No   Do you need help taking your medications? No   Do you need help managing money? No   Do you ever drive or ride in a car without wearing a seat belt? No   Have you felt unusual stress, anger or loneliness in the last month? No   Who do you live with? Alone   If you need help, do you have trouble finding someone available to you? No   Have you been bothered in the last four weeks by sexual problems? No   Do you have difficulty concentrating, remembering or making decisions? No       Age-appropriate Screening Schedule:  Refer to the list below for future screening recommendations based on patient's age, sex and/or medical conditions. Orders for these recommended tests are listed in the plan section. The patient has been provided with a written plan.    Health Maintenance   Topic Date Due    ZOSTER VACCINE (2 of 2) 09/11/2023    INFLUENZA VACCINE  10/01/2023    COVID-19 Vaccine (6 - Moderna series) 12/08/2023    HEMOGLOBIN A1C  01/21/2024    COLORECTAL CANCER SCREENING  07/06/2024    LIPID PANEL  07/21/2024    URINE MICROALBUMIN  07/21/2024    LUNG CANCER SCREENING  08/01/2024    DIABETIC EYE EXAM  08/04/2024    ANNUAL WELLNESS VISIT  08/08/2024    DIABETIC FOOT EXAM  08/08/2024    DXA SCAN  08/01/2025    TDAP/TD VACCINES (2 - Td or Tdap) 07/17/2033    HEPATITIS C SCREENING  Completed    Pneumococcal Vaccine 65+  Completed              Outpatient Medications Prior to Visit   Medication Sig Dispense Refill     albuterol sulfate  (90 Base) MCG/ACT inhaler Inhale 1 puff Every 4 (Four) Hours As Needed for Wheezing or Shortness of Air. Refill needed. 18 g 2    amLODIPine (NORVASC) 5 MG tablet Take 1 tablet by mouth Daily. 90 tablet 1    atorvastatin (LIPITOR) 20 MG tablet TAKE 1 TABLET EVERY NIGHT 90 tablet 1    Blood Glucose Monitoring Suppl device 1 each Daily. 1 each 0    gabapentin (NEURONTIN) 600 MG tablet Take 1 tablet by mouth 3 (Three) Times a Day. 90 tablet 0    glucose blood test strip Use as instructed to check blood sugar daily 100 each 3    hydroCHLOROthiazide (HYDRODIURIL) 12.5 MG tablet Take 1 tablet by mouth Daily. 90 tablet 1    HYDROcodone-acetaminophen (NORCO)  MG per tablet Take 3 tablets by mouth Every 8 (Eight) Hours As Needed.      hydrOXYzine pamoate (VISTARIL) 25 MG capsule Take 1 capsule by mouth 3 (Three) Times a Day As Needed for Itching or Anxiety. 100 capsule 2    Lancet Device misc 1 each Daily. Use as directed; check blood sugar once daily 100 each 3    Lancets (OneTouch Delica Plus [id number]) misc       levothyroxine (SYNTHROID, LEVOTHROID) 88 MCG tablet Take 1 tablet by mouth Daily. 90 tablet 1    meloxicam (MOBIC) 15 MG tablet TAKE 1 TABLET DAILY 90 tablet 1    metFORMIN (GLUCOPHAGE) 500 MG tablet TAKE 2 TABLETS TWICE A  tablet 1    omeprazole (priLOSEC) 20 MG capsule TAKE 1 CAPSULE DAILY 90 capsule 1    sertraline (ZOLOFT) 100 MG tablet TAKE 1 TABLET DAILY 90 tablet 0    Tetanus-Diphth-Acell Pertussis (Boostrix) 5-2.5-18.5 LF-MCG/0.5 injection Inject  into the appropriate muscle as directed by prescriber. 0.5 mL 0    Zoster Vac Recomb Adjuvanted (Shingrix) 50 MCG/0.5ML reconstituted suspension Inject  into the appropriate muscle as directed by prescriber. 1 each 1     No facility-administered medications prior to visit.       CMS Preventative Services Quick Reference  Risk Factors Identified During Encounter  [facility]  she [facility]  Immunizations Discussed/Encouraged: COVID19  Inactivity/Sedentary: Patient was advised to exercise at least 150 minutes a week per CDC recommendations.  Tobacco Use/Dependance Risk (use dotphrase .tobaccocessation for documentation)  Dental Screening Recommended  Vision Screening Recommended  The above risks/problems have been discussed with the patient.  Pertinent information has been shared with the patient in the After Visit Summary.  An After Visit Summary and PPPS were made available to the patient.    Follow Up:   Next Medicare Wellness visit to be scheduled in 1 year.       Additional E&M Note during same encounter follows:  Patient has multiple medical problems which are significant and separately identifiable that require additional work above and beyond the Medicare Wellness Visit.         Odalis Espino presents to [facility].    Chief Complaint: questions on upcoming PET scan        Subjective   History of Present Illness:  HPI  Abnormal CT chest requiring a PET scan.  Pt advised she can eat a small meal 4 hours prior to test due to her being diabetic. Her BS needs to be <200  CT CHEST IMPRESSION:  1. 9 x 7 millimeter noncalcified left upper lobe nodule .  Three month low-dose CT chest or PET-CT   follow-up is recommended.    2. Emphysema.    She has chronic LBP with radiculopathy to R LE.  She follows with a pain management  ([facility]). Her current regimen includes gabapentin 600 mg 3 times daily, Norco  mg 4 times daily and Mobic 15 mg daily.  She denies weakness or numbness/tingling except for mild tingling in her right great toe.  No saddle anesthesia or bowel/bladder incontinence.    She presents with hypothyroidism.  She she is currently taking Synthroid, 125 mcg daily.  The result was reported as normal (2.5).  She denies any related symptoms.  She reports no symptoms suggestive of adverse medication effect.      She suffers from  chronic anxiety that is currently stable on Zoloft 100 mg twice daily and Klonopin 1 mg daily as needed.  She says her moods are stable without depression is stable. She denies SI or HI.        She presents with history of essential (primary) hypertension, her current cardiac medication regimen includes a diuretic ( HCTZ 25 mg daily ) and a calcium channel blocker ( Amlodipine 5 mg daily ).  Compliance with treatment has been good.  She is tolerating the medication well without side effects.  She has not kept a blood pressure diary, but states that pressures have been okay.  Denies headache, blurry vision, chest pain, palpiations, shortness of breath or edema     She has chronic GERD, and her symptoms are stable and well-controlled on omeprazole 20 mg daily.  She tries to avoid spicy and acidic foods in her diet.  No dysphagia,  abdominal pain, nausea, vomiting, melena or hematochezia.      She presents with hyperlipidemia, current treatment includes Lipitor.  Compliance with treatment has been good; she takes her medication as directed and follows up as directed.  She denies experiencing any hypercholesterolemia related symptoms.  Concurrent health problems include hypertension and hypothyroidism.          She also has ype 2 diabetes mellitus without complications, specifically, this is type 2, non-insulin requiring diabetes without complications.  Compliance with treatment has been good; she takes her medication as directed and follows up as directed.  She denies experiencing any diabetes related symptoms.  Current meds include an oral hypoglycemic ( Glucophage ( 1000mg bid ) ).  She reports home blood glucose readings have been fairly good, with average fasting glucoses running in the 120-150 mg/dL range.  Last hgba1c was 6.5%    Iron deficiency anemia and B12 deficiency-she is on iron and B12, anemia labs are stable at 30.5      Result Review   The following data was reviewed by Karina Langford MD on  08/08/2023.  Lab Results   Component Value Date    WBC 6.06 07/21/2023    HGB 10.1 (L) 07/21/2023    HCT 30.5 (L) 07/21/2023    MCV 89.7 07/21/2023     07/21/2023     Lab Results   Component Value Date    GLUCOSE 122 (H) 07/21/2023    BUN 19 07/21/2023    CREATININE 1.16 (H) 07/21/2023    EGFR 49.3 (L) 07/21/2023    BCR 16.4 07/21/2023    K 3.4 (L) 07/21/2023    CO2 28.3 07/21/2023    CALCIUM 9.3 07/21/2023    ALBUMIN 4.0 07/21/2023    BILITOT 0.4 07/21/2023    AST 15 07/21/2023    ALT <5 07/21/2023     Lab Results   Component Value Date    CHOL 148 07/21/2023    CHLPL 164 12/17/2020    TRIG 64 07/21/2023    HDL 68 (H) 07/21/2023    LDL 67 07/21/2023     Lab Results   Component Value Date    TSH 2.520 07/21/2023     Lab Results   Component Value Date    HGBA1C 6.50 (H) 07/21/2023     No results found for: PSA  Lab Results   Component Value Date    IRON 44 07/21/2023      No results found for: [id number]          Assessment and Plan:   Diagnoses and all orders for this visit:    1. Medicare wellness (Primary)    2. Mammogram  Comments:  breast exam WNL, mammogram ordered  Orders:  -     Mammo Screening Digital Tomosynthesis Bilateral With CAD; Future    3. Screening colonoscopy  Comments:  UTD    4. immunization  Comments:  covid immunization given today  Orders:  -     COVID-19 (Pfizer) Bivalent 12+yrs    5. DEXA  Comments:  dexa is UTD    6. Pulmonary emphysema, unspecified emphysema type  Comments:  she defers tob cessation, defers pulmonology referral    7. Dextroscoliosis of thoracolumbar spine  Comments:  [facility] PT for chronic LBP    8. Acquired hypothyroidism  Comments:  stable on levothyroxine, tolerates meds, meds reviewed today, no changes needed in current treatment plan    9. Iron deficiency anemia secondary to inadequate dietary iron intake  Comments:  cont iron supplementation, she tolerates meds well    10. Type 2 diabetes mellitus with diabetic mononeuropathy, without long-term current use of  insulin  Comments:  very well controlled, stable on metformin, eye exam is UTD, foot exam noted above    11. Stage 3b chronic kidney disease (CKD)  Comments:  avoid nsaids, push fluids 64 ounces a day    12. Pure hypercholesterolemia  Comments:  stable on atorvastatin, tolerates meds, meds reviewed today, no changes needed in current treatment plan    13. Tobacco abuse  Comments:  [facility] 1800 quit now    14. Anemia, unspecified type  Comments:  cont iron and B12 supplementation, stable but low hgb    15. Essential hypertension  Comments:  stable on current meds, tolerates meds, meds reviewed today, no changes needed in current treatment plan                    Medications:      Current Outpatient Medications:     albuterol sulfate  (90 Base) MCG/ACT inhaler, Inhale 1 puff Every 4 (Four) Hours As Needed for Wheezing or Shortness of Air. Refill needed., Disp: 18 g, Rfl: 2    amLODIPine (NORVASC) 5 MG tablet, Take 1 tablet by mouth Daily., Disp: 90 tablet, Rfl: 1    atorvastatin (LIPITOR) 20 MG tablet, TAKE 1 TABLET EVERY NIGHT, Disp: 90 tablet, Rfl: 1    Blood Glucose Monitoring Suppl device, 1 each Daily., Disp: 1 each, Rfl: 0    gabapentin (NEURONTIN) 600 MG tablet, Take 1 tablet by mouth 3 (Three) Times a Day., Disp: 90 tablet, Rfl: 0    glucose blood test strip, Use as instructed to check blood sugar daily, Disp: 100 each, Rfl: 3    hydroCHLOROthiazide (HYDRODIURIL) 12.5 MG tablet, Take 1 tablet by mouth Daily., Disp: 90 tablet, Rfl: 1    HYDROcodone-acetaminophen (NORCO)  MG per tablet, Take 3 tablets by mouth Every 8 (Eight) Hours As Needed., Disp: , Rfl:     hydrOXYzine pamoate (VISTARIL) 25 MG capsule, Take 1 capsule by mouth 3 (Three) Times a Day As Needed for Itching or Anxiety., Disp: 100 capsule, Rfl: 2    Lancet Device misc, 1 each Daily. Use as directed; check blood sugar once daily, Disp: 100 each, Rfl: 3    Lancets (OneTouch Delica Plus [id number]) misc, , Disp: , Rfl:     levothyroxine  "(SYNTHROID, LEVOTHROID) 88 MCG tablet, Take 1 tablet by mouth Daily., Disp: 90 tablet, Rfl: 1    meloxicam (MOBIC) 15 MG tablet, TAKE 1 TABLET DAILY, Disp: 90 tablet, Rfl: 1    metFORMIN (GLUCOPHAGE) 500 MG tablet, TAKE 2 TABLETS TWICE A DAY, Disp: 360 tablet, Rfl: 1    omeprazole (priLOSEC) 20 MG capsule, TAKE 1 CAPSULE DAILY, Disp: 90 capsule, Rfl: 1    sertraline (ZOLOFT) 100 MG tablet, TAKE 1 TABLET DAILY, Disp: 90 tablet, Rfl: 0    Tetanus-Diphth-Acell Pertussis (Boostrix) 5-2.5-18.5 LF-MCG/0.5 injection, Inject  into the appropriate muscle as directed by prescriber., Disp: 0.5 mL, Rfl: 0    Zoster Vac Recomb Adjuvanted (Shingrix) 50 MCG/0.5ML reconstituted suspension, Inject  into the appropriate muscle as directed by prescriber., Disp: 1 each, Rfl: 1       Objective   Vital Signs:   /71 (BP Location: Right arm, Patient Position: Sitting)   Pulse 68   Temp 98 øF (36.7 øC) (Oral)   Ht 165.1 cm (65\")   Wt 56.6 kg (124 lb 12.8 oz)   SpO2 95%   BMI 20.77 kg/mý       Physical Exam:  Physical Exam  Constitutional:       General: She is not in acute distress.     Appearance: She is normal weight. She is not ill-appearing.   HENT:      Head: Normocephalic.      Right Ear: Tympanic membrane normal. There is no impacted cerumen.      Left Ear: Tympanic membrane normal. There is no impacted cerumen.      Mouth/Throat:      Mouth: Mucous membranes are moist.      Pharynx: Oropharynx is clear.   Eyes:      Extraocular Movements: Extraocular movements intact.      Pupils: Pupils are equal, round, and reactive to light.   Neck:      Vascular: No carotid bruit.   Cardiovascular:      Rate and Rhythm: Normal rate and regular rhythm.      Pulses: Normal pulses.           Dorsalis pedis pulses are 2+ on the right side and 2+ on the left side.      Heart sounds: No murmur heard.  Pulmonary:      Effort: Pulmonary effort is normal.      Breath sounds: No wheezing, rhonchi or rales.   Chest:   Breasts:     Right: " Normal.      Left: Normal.   Abdominal:      General: Bowel sounds are normal.      Palpations: Abdomen is soft.      Tenderness: There is no abdominal tenderness.   Musculoskeletal:         General: No swelling. Normal range of motion.      Cervical back: No rigidity or tenderness.   Feet:      Right foot:      Protective Sensation: 7 sites tested.  6 sites sensed.      Skin integrity: Skin integrity normal. Callus present. No ulcer or blister.      Toenail Condition: Right toenails are normal.      Left foot:      Protective Sensation: 7 sites tested.  7 sites sensed.      Skin integrity: Skin integrity normal. No ulcer or blister.      Toenail Condition: Left toenails are normal.      Comments: Diabetic Foot Exam Performed and Monofilament Test Performed     Lymphadenopathy:      Cervical: No cervical adenopathy.   Skin:     General: Skin is warm and dry.   Neurological:      Mental Status: She is alert.      Gait: Gait normal.   Psychiatric:         Mood and Affect: Mood normal.         Behavior: Behavior normal.         Judgment: Judgment normal.                   Review of Systems:  Review of Systems   Constitutional:  Negative for chills, fatigue and fever.   HENT:  Negative for ear pain, sinus pressure and sore throat.    Eyes:  Negative for blurred vision and double vision.   Respiratory:  Positive for cough, shortness of breath and wheezing.    Cardiovascular:  Negative for chest pain and palpitations.   Gastrointestinal:  Negative for abdominal pain, blood in stool, constipation, diarrhea, nausea and vomiting.   Musculoskeletal:  Positive for arthralgias and gait problem.   Skin:  Negative for rash.   Neurological:  Negative for dizziness and headache.   Psychiatric/Behavioral:  Negative for sleep disturbance, suicidal ideas and depressed mood. The patient is nervous/anxious.           Follow Up   Return in about 3 months (around 11/8/2023) for Recheck.           Medical History:  Past Medical History:     Anxiety    Arthritis    Diabetes mellitus    Hepatitis    HL (hearing loss)    Thyroiditis    Visual impairment     Past Surgical History:    EYE SURGERY    HYSTERECTOMY    TONSILLECTOMY      Family History   Problem Relation Age of Onset    Cancer Father     Cancer Sister     Diabetes Brother     Asthma Other     Seizures Other     Anxiety disorder Mother     Arthritis Daughter      Social History     Tobacco Use    Smoking status: Every Day     Packs/day: 1.00     Years: 40.00     Pack years: 40.00     Types: Cigarettes     Start date: 1964    Smokeless tobacco: Never   Substance Use Topics    Alcohol use: Yes     Alcohol/week: 1.0 standard drink     Types: 1 Glasses of wine per week     Comment: occasional       There are no preventive care reminders to display for this patient.     Immunization History   Administered Date(s) Administered    COVID-19 (MODERNA) 1st,2nd,3rd Dose Monovalent 03/17/2021, 04/15/2021, 11/03/2021    COVID-19 (PFIZER) BIVALENT 12+YRS 08/08/2023    Covid-19 (Pfizer) Gray Cap Monovalent 07/14/2022    Flu Vaccine Quad PF >36MO 10/09/2018    Fluzone >6mos 10/09/2018    Fluzone High Dose =>65 Years ([employer] ONLY) 11/13/2017    Fluzone High-Dose 65+yrs 11/11/2021    Pneumococcal Conjugate 13-Valent (PCV13) 10/18/2018    Pneumococcal Polysaccharide (PPSV23) 01/01/2016    Shingrix 07/17/2023    Tdap 07/17/2023       Allergies   Allergen Reactions    Azithromycin Hives

## 2023-08-28 ENCOUNTER — HOSPITAL ENCOUNTER (OUTPATIENT)
Dept: PET IMAGING | Facility: HOSPITAL | Age: 73
Discharge: HOME | End: 2023-08-28
Payer: MEDICARE

## 2023-08-28 DIAGNOSIS — R91.1 LEFT UPPER LOBE PULMONARY NODULE: ICD-10-CM

## 2023-08-28 PROCEDURE — 78815 PET IMAGE W/CT SKULL-THIGH: CPT

## 2023-08-28 PROCEDURE — 0 FLUDEOXYGLUCOSE F18 SOLUTION: Performed by: FAMILY MEDICINE

## 2023-08-28 PROCEDURE — A9552 F18 FDG: HCPCS | Performed by: FAMILY MEDICINE

## 2023-08-28 RX ADMIN — FLUDEOXYGLUCOSE F18 1 DOSE: 300 INJECTION INTRAVENOUS at 14:14

## 2023-08-31 ENCOUNTER — OFFICE VISIT (OUTPATIENT)
Dept: FAMILY MEDICINE CLINIC | Age: 73
End: 2023-08-31
Payer: MEDICARE

## 2023-08-31 VITALS
SYSTOLIC BLOOD PRESSURE: 128 MMHG | DIASTOLIC BLOOD PRESSURE: 86 MMHG | WEIGHT: 125 LBS | OXYGEN SATURATION: 94 % | HEART RATE: 71 BPM | BODY MASS INDEX: 20.83 KG/M2 | TEMPERATURE: 98.6 F | HEIGHT: 65 IN

## 2023-08-31 DIAGNOSIS — E03.9 ACQUIRED HYPOTHYROIDISM: ICD-10-CM

## 2023-08-31 DIAGNOSIS — I10 ESSENTIAL HYPERTENSION: ICD-10-CM

## 2023-08-31 DIAGNOSIS — R94.2 ABNORMAL PET OF LEFT LUNG: ICD-10-CM

## 2023-08-31 DIAGNOSIS — R91.1 LUNG NODULE: ICD-10-CM

## 2023-08-31 DIAGNOSIS — E06.3 HASHIMOTO'S THYROIDITIS: Primary | ICD-10-CM

## 2023-08-31 PROCEDURE — 3044F HG A1C LEVEL LT 7.0%: CPT | Performed by: FAMILY MEDICINE

## 2023-08-31 PROCEDURE — 3079F DIAST BP 80-89 MM HG: CPT | Performed by: FAMILY MEDICINE

## 2023-08-31 PROCEDURE — 3074F SYST BP LT 130 MM HG: CPT | Performed by: FAMILY MEDICINE

## 2023-08-31 PROCEDURE — 99214 OFFICE O/P EST MOD 30 MIN: CPT | Performed by: FAMILY MEDICINE

## 2023-08-31 NOTE — PROGRESS NOTES
"Odalis Espino presents to [facility].    Chief Complaint: abnormal pet scan    Subjective     History of Present Illness:  JACKIE Glez is in today to go over her recent PET scan results noted below:   XR Spine Lumbar 2 or 3 View    Result Date: 8/1/2023    1. Advanced degenerative changes throughout the lumbar spine.    FREDO HOLLIS MD       Electronically Signed and Approved By: FREDO HOLLIS MD on 8/01/2023 at 15:19             CT Chest Low Dose Cancer Screening WO    Result Date: 8/1/2023    1. 9 x 7 millimeter noncalcified left upper lobe nodule .  Three month low-dose CT chest or PET-CT follow-up is recommended.  2. Emphysema.  LUNG RADS:                           Category 4A.  Suspicious.      CHULA LÓPEZ MD       Electronically Signed and Approved By: CHULA LÓPEZ MD on 8/01/2023 at 15:40             NM PET/CT Skull Base to Mid Thigh    Result Date: 8/29/2023    1. 0.8 centimeter irregularly shaped left upper lobe lung nodule demonstrates mild metabolic activity slightly greater than lung background with a maximum SUV of 1.2.  Low-grade neoplasm in the differential.  Consider biopsy versus short-term follow-up in 3 months. 2. Small subpleural nodule in the right lower lobe too small to characterize more likely benign. 3. Focal activity in the medial right pterygoid muscle may be physiologic. 4. Diffuse thyroid activity may reflect thyroiditis.  Recommend thyroid ultrasound and correlation with thyroid function tests. 5. There is focal activity in the proximal left femur with maximum SUV 3.2.  A lesion on CT is not seen.  Consider MRI to exclude a lesion.       RICHIE CARTER MD       Electronically Signed and Approved By: RICHIE CARTER MD on 8/29/2023 at 8:43              She has hashimotos thyroiditis, saw Dr Fernández and he advised \"Examination today revealed slightly enlarged thyroid with no lymphadenopathy or palpable nodules. I discussed the above findings with " "her, and will have her primary care physician continue to monitor her blood work if any adjustments are needed in her Synthroid. Ears appear normal on exam today. We discussed smoking cessation. I will plan to see her back on an as-needed basis should there be any further issues. \"  So I will recheck her labs today  She she is currently taking Synthroid, 125 mcg daily, last TSH removed and was normal    She has chronic LBP with radiculopathy to R LE.  She is going to pain management  ([facility]) and pending MRI lumbar back. Her current regimen includes gabapentin 600 mg 3 times daily, Norco  mg 4 times daily and Mobic 15 mg daily.  She denies weakness or numbness/tingling except for mild tingling in her right great toe.  She denies saddle anesthesia or bowel/bladder incontinence.         Her chronic anxiety is stable on Zoloft 100 mg twice daily and Klonopin 1 mg daily as needed.  She says her moods are stable without depression is stable. She denies SI or HI.        Her essential (primary) hypertension is controlled on her current cardiac medication regimen includes a diuretic ( HCTZ 25 mg daily ) and a calcium channel blocker ( Amlodipine 5 mg daily ).  Compliance with treatment has been good.  She is tolerating the medication well without side effects.  She has not kept a blood pressure diary, but states that pressures have been okay.  Denies headache, blurry vision, chest pain, palpiations, shortness of breath or edema           Result Review   The following data was reviewed by Karina Langford MD on 08/31/2023.  Lab Results   Component Value Date    WBC 6.06 07/21/2023    HGB 10.1 (L) 07/21/2023    HCT 30.5 (L) 07/21/2023    MCV 89.7 07/21/2023     07/21/2023     Lab Results   Component Value Date    GLUCOSE 122 (H) 07/21/2023    BUN 19 07/21/2023    CREATININE 1.16 (H) 07/21/2023    EGFR 49.3 (L) 07/21/2023    BCR 16.4 07/21/2023    K 3.4 (L) 07/21/2023    CO2 28.3 " 07/21/2023    CALCIUM 9.3 07/21/2023    ALBUMIN 4.0 07/21/2023    BILITOT 0.4 07/21/2023    AST 15 07/21/2023    ALT <5 07/21/2023     Lab Results   Component Value Date    CHOL 148 07/21/2023    CHLPL 164 12/17/2020    TRIG 64 07/21/2023    HDL 68 (H) 07/21/2023    LDL 67 07/21/2023     Lab Results   Component Value Date    TSH 2.520 07/21/2023     Lab Results   Component Value Date    HGBA1C 6.50 (H) 07/21/2023     No results found for: PSA  Lab Results   Component Value Date    IRON 44 07/21/2023      No results found for: [id number]            Assessment and Plan:   Diagnoses and all orders for this visit:    1. Hashimoto's thyroiditis (Primary)  Comments:  She is seen ENT, we will continue to monitor her thyroid hormones every 6 months and continue levothyroxine 88 mcg daily.  No hypothyroid symptoms  Orders:  -     Thyroid Peroxidase Antibody; Future  -     Thyroid Panel With TSH; Future  -     Thyroid Stimulating Immunoglobulin; Future    2. Acquired hypothyroidism  -     Thyroid Peroxidase Antibody; Future  -     Thyroid Panel With TSH; Future  -     Thyroid Stimulating Immunoglobulin; Future    3. Essential hypertension  Comments:  Blood pressure was elevated but repeat blood pressure WNL, continue current meds, no changes in current meds/Tx plan    4. Lung nodule  Comments:  Referral to the lung nodule clinic for further evaluation  Orders:  -     Ambulatory Referral to Lung Nodule Clinic    5. Abnormal PET of left lung  Comments:  Abnormal signal in the right femur.  We will set her up for an MRI for further evaluation.  She does have right lower extremity femur pain  Orders:  -     Ambulatory Referral to Lung Nodule Clinic  -     MRI Femur Left With & Without Contrast; Future              Objective     Medications:  Current Outpatient Medications   Medication Instructions    albuterol sulfate  (90 Base) MCG/ACT inhaler 1 puff, Inhalation, Every 4 Hours PRN, Refill needed.     amLODIPine (NORVASC) 5  "mg, Oral, Daily    atorvastatin (LIPITOR) 20 MG tablet TAKE 1 TABLET EVERY NIGHT    Blood Glucose Monitoring Suppl device 1 each, Does not apply, Daily    gabapentin (NEURONTIN) 600 mg, Oral, 3 Times Daily    glucose blood test strip Use as instructed to check blood sugar daily    hydroCHLOROthiazide (HYDRODIURIL) 12.5 mg, Oral, Daily    HYDROcodone-acetaminophen (NORCO)  MG per tablet 3 tablets, Oral, Every 8 Hours PRN    hydrOXYzine pamoate (VISTARIL) 25 mg, Oral, 3 Times Daily PRN    Lancet Device misc 1 each, Does not apply, Daily, Use as directed; check blood sugar once daily    Lancets (OneTouch Delica Plus Garstb48M) misc     levothyroxine (SYNTHROID, LEVOTHROID) 88 mcg, Oral, Daily    meloxicam (MOBIC) 15 MG tablet TAKE 1 TABLET DAILY    metFORMIN (GLUCOPHAGE) 500 MG tablet TAKE 2 TABLETS TWICE A DAY    omeprazole (priLOSEC) 20 MG capsule TAKE 1 CAPSULE DAILY    sertraline (ZOLOFT) 100 MG tablet TAKE 1 TABLET DAILY    Tetanus-Diphth-Acell Pertussis (Boostrix) 5-2.5-18.5 LF-MCG/0.5 injection Intramuscular    Zoster Vac Recomb Adjuvanted (Shingrix) 50 MCG/0.5ML reconstituted suspension Intramuscular        Vital Signs:   /86   Pulse 71   Temp 98.6 øF (37 øC) (Oral)   Ht 165.1 cm (65\")   Wt 56.7 kg (125 lb)   SpO2 94% Comment: room air  BMI 20.80 kg/mý     BMI is within normal parameters. No other follow-up for BMI required.       Physical Exam:  Physical Exam  Constitutional:       General: She is not in acute distress.     Appearance: She is normal weight. She is not ill-appearing.   HENT:      Head: Normocephalic.      Right Ear: There is no impacted cerumen.      Left Ear: There is no impacted cerumen.      Mouth/Throat:      Mouth: Mucous membranes are moist.      Pharynx: Oropharynx is clear.   Eyes:      Extraocular Movements: Extraocular movements intact.      Conjunctiva/sclera: Conjunctivae normal.      Pupils: Pupils are equal, round, and reactive to light.   Neck:      Vascular: No " carotid bruit.   Cardiovascular:      Rate and Rhythm: Normal rate and regular rhythm.      Pulses: Normal pulses.      Heart sounds: No murmur heard.  Pulmonary:      Effort: Pulmonary effort is normal.      Breath sounds: No wheezing, rhonchi or rales.   Abdominal:      General: Bowel sounds are normal.      Palpations: Abdomen is soft.      Tenderness: There is no abdominal tenderness.   Musculoskeletal:         General: No swelling. Normal range of motion.      Cervical back: No rigidity or tenderness.      Comments: 4 out of 5 musculoskeletal strength in the right lower extremity bilaterally with normal sensory exam in the lower extremity.  2/4 patellar DTR bilaterally.  Negative straight leg raise bilaterally.  Nontender to palpation over the lumbar spine.  No muscle spasm noted in the paralumbar muscles bilaterally     Lymphadenopathy:      Cervical: No cervical adenopathy.   Skin:     General: Skin is warm and dry.   Neurological:      Mental Status: She is alert.      Gait: Gait normal.   Psychiatric:         Mood and Affect: Mood normal.         Behavior: Behavior normal.         Judgment: Judgment normal.         Review of Systems:  Review of Systems   Constitutional:  Negative for chills, fatigue and fever.   HENT:  Negative for ear pain, sinus pressure and sore throat.    Eyes:  Negative for blurred vision and double vision.   Respiratory:  Negative for cough, shortness of breath and wheezing.    Cardiovascular:  Negative for chest pain, palpitations and leg swelling.   Gastrointestinal:  Negative for abdominal pain, blood in stool, constipation, diarrhea, nausea and vomiting.   Skin:  Negative for rash.   Neurological:  Negative for dizziness and headache.   Psychiatric/Behavioral:  Negative for sleep disturbance, suicidal ideas and depressed mood. The patient is not nervous/anxious.             Follow Up   Return in about 3 months (around 11/30/2023) for Recheck.    Part of this note may be an  electronic transcription/translation of spoken language to printed   text using the Dragon Dictation System.            Medical History:  There are no discontinued medications.   Past Medical History:    Anxiety    Arthritis    Diabetes mellitus    Hepatitis    HL (hearing loss)    Thyroiditis    Visual impairment     Past Surgical History:    EYE SURGERY    HYSTERECTOMY    TONSILLECTOMY      Family History   Problem Relation Age of Onset    Cancer Father     Cancer Sister     Diabetes Brother     Asthma Other     Seizures Other     Anxiety disorder Mother     Arthritis Daughter      Social History     Tobacco Use    Smoking status: Every Day     Packs/day: 1.00     Years: 40.00     Pack years: 40.00     Types: Cigarettes     Start date: 1966    Smokeless tobacco: Never   Substance Use Topics    Alcohol use: Yes     Alcohol/week: 1.0 standard drink     Types: 1 Glasses of wine per week     Comment: occasional       There are no preventive care reminders to display for this patient.     Immunization History   Administered Date(s) Administered    COVID-19 (MODERNA) 1st,2nd,3rd Dose Monovalent 03/17/2021, 04/15/2021, 11/03/2021    COVID-19 (PFIZER) BIVALENT 12+YRS 08/08/2023    Covid-19 (Pfizer) Gray Cap Monovalent 07/14/2022    Flu Vaccine Quad PF >36MO 10/09/2018    Fluzone >6mos 10/09/2018    Fluzone High Dose =>65 Years ([employer] ONLY) 11/13/2017    Fluzone High-Dose 65+yrs 11/11/2021    Pneumococcal Conjugate 13-Valent (PCV13) 10/18/2018    Pneumococcal Polysaccharide (PPSV23) 01/01/2016    Shingrix 07/17/2023    Tdap 07/17/2023       Allergies   Allergen Reactions    Azithromycin Hives

## 2023-09-01 ENCOUNTER — TRANSCRIBE ORDERS (OUTPATIENT)
Dept: ADMINISTRATIVE | Facility: HOSPITAL | Age: 73
End: 2023-09-01
Payer: MEDICARE

## 2023-09-01 DIAGNOSIS — M54.16 LUMBAR RADICULOPATHY: Primary | ICD-10-CM

## 2023-09-13 ENCOUNTER — TELEPHONE (OUTPATIENT)
Dept: FAMILY MEDICINE CLINIC | Age: 73
End: 2023-09-13
Payer: MEDICARE

## 2023-09-13 ENCOUNTER — OFFICE VISIT (OUTPATIENT)
Dept: PULMONOLOGY | Facility: CLINIC | Age: 73
End: 2023-09-13
Payer: MEDICARE

## 2023-09-13 VITALS
DIASTOLIC BLOOD PRESSURE: 70 MMHG | TEMPERATURE: 98 F | WEIGHT: 126.4 LBS | BODY MASS INDEX: 21.58 KG/M2 | SYSTOLIC BLOOD PRESSURE: 134 MMHG | HEART RATE: 75 BPM | HEIGHT: 64 IN | OXYGEN SATURATION: 95 % | RESPIRATION RATE: 18 BRPM

## 2023-09-13 DIAGNOSIS — M54.41 CHRONIC RIGHT-SIDED LOW BACK PAIN WITH RIGHT-SIDED SCIATICA: ICD-10-CM

## 2023-09-13 DIAGNOSIS — R91.1 LUNG NODULE: Primary | ICD-10-CM

## 2023-09-13 DIAGNOSIS — M51.36 DEGENERATION OF LUMBAR INTERVERTEBRAL DISC: ICD-10-CM

## 2023-09-13 DIAGNOSIS — J43.2 CENTRILOBULAR EMPHYSEMA: ICD-10-CM

## 2023-09-13 DIAGNOSIS — F17.210 SMOKING GREATER THAN 40 PACK YEARS: ICD-10-CM

## 2023-09-13 DIAGNOSIS — G89.29 CHRONIC RIGHT-SIDED LOW BACK PAIN WITH RIGHT-SIDED SCIATICA: ICD-10-CM

## 2023-09-13 RX ORDER — VARENICLINE TARTRATE 1 MG/1
1 TABLET, FILM COATED ORAL 2 TIMES DAILY
Qty: 60 TABLET | Refills: 4 | Status: SHIPPED | OUTPATIENT
Start: 2023-09-13

## 2023-09-13 RX ORDER — LANOLIN ALCOHOL/MO/W.PET/CERES
1000 CREAM (GRAM) TOPICAL DAILY
COMMUNITY

## 2023-09-13 NOTE — H&P (VIEW-ONLY)
CHIEF COMPLAINT    Primary Care Provider  Karina Langford MD     Referring Provider  Karina Langford MD    Patient Complaint  9MM LAURA, Emphysema, Cough, and Establish Care (New pt here for 9MM LAURA/ Pet can results)        Subjective          Odalis Espino presents to [facility] PULMONARY & CRITICAL CARE MEDICINE  History of Present Illness  Odalis Espino is a 73 y.o. female who has history of chronic smoking, 1 pack a day for more than 50 years, started smoking when she was 14.  She was referred to me by Karina Langford MD. she recently had low-dose lung cancer screening CT scan of the chest.  It showed left upper lobe 8 mm lung nodule with irregular borders.  PET scan was done which did not show significant hypermetabolic activity.  She has some cough, mild shortness of breath with activities.  She uses albuterol as needed 1-2 times a day.  She has no significant changes in weight or appetite.  No coughing up blood.  No new onset headache or seizures.  Her father and her sister had diagnosis of lung cancer and both of them were heavy smokers.  I reviewed the CT scan and PET scan finding with her and her daughter today.    Tobacco Use: High Risk    Smoking Tobacco Use: Every Day    Smokeless Tobacco Use: Never    Passive Exposure: Not on file   .    Review of Systems     General:  No Fatigue, No Fever No weight loss or loss of appetite  HEENT: No dysphagia, No Visual Changes, no rhinorrhea  Respiratory:  + cough,+Dyspnea, intermittent.  Phlegm, No Pleuritic Pain, no wheezing, no hemoptysis.  Cardiovascular: Denies chest pain, denies palpitations,+ROQUE, No Chest Pressure  Gastrointestinal:  No Abdominal Pain, No Nausea, No Vomiting, No Diarrhea  Genitourinary:  No Dysuria, No Frequency, No Hesitancy  Musculoskeletal: No muscle pain or swelling  Endocrine:  No Heat Intolerance, No Cold Intolerance  Hematologic:  No Bleeding, No Bruising  Psychiatric:  No Anxiety, No  Depression  Neurologic:  No Confusion, no Dysarthria, No Headaches  Skin:  No Rash, No Open Wounds    Family History   Problem Relation Age of Onset    Cancer Father     Cancer Sister     Diabetes Brother     Asthma Other     Seizures Other     Anxiety disorder Mother     Arthritis Daughter         Social History     Socioeconomic History    Marital status:    Tobacco Use    Smoking status: Every Day     Packs/day: 1.00     Years: 40.00     Pack years: 40.00     Types: Cigarettes     Start date: 1964    Smokeless tobacco: Never   Vaping Use    Vaping Use: Never used   Substance and Sexual Activity    Alcohol use: Yes     Alcohol/week: 1.0 standard drink     Types: 1 Glasses of wine per week     Comment: occasional    Drug use: Never    Sexual activity: Not Currently     Birth control/protection: None        Past Medical History:   Diagnosis Date    Anxiety     Arthritis     Diabetes mellitus     Hepatitis     HL (hearing loss)     Thyroiditis     Visual impairment         Immunization History   Administered Date(s) Administered    COVID-19 (MODERNA) 1st,2nd,3rd Dose Monovalent 03/17/2021, 04/15/2021, 11/03/2021    COVID-19 (PFIZER) BIVALENT 12+YRS 08/08/2023    Covid-19 (Pfizer) Gray Cap Monovalent 07/14/2022    Flu Vaccine Quad PF >36MO 10/09/2018    Fluzone (or Fluarix & Flulaval for VFC) >6mos 10/09/2018    Fluzone High Dose =>65 Years ([employer] ONLY) 11/13/2017, 07/17/2023    Fluzone High-Dose 65+yrs 11/11/2021    Pneumococcal Conjugate 13-Valent (PCV13) 10/18/2018    Pneumococcal Polysaccharide (PPSV23) 01/01/2016    Shingrix 07/17/2023    Tdap 07/17/2023         Allergies   Allergen Reactions    Azithromycin Hives          Current Outpatient Medications:     albuterol sulfate  (90 Base) MCG/ACT inhaler, Inhale 1 puff Every 4 (Four) Hours As Needed for Wheezing or Shortness of Air. Refill needed., Disp: 18 g, Rfl: 2    amLODIPine (NORVASC) 5 MG tablet, Take 1 tablet by mouth Daily., Disp: 90  tablet, Rfl: 1    atorvastatin (LIPITOR) 20 MG tablet, TAKE 1 TABLET EVERY NIGHT, Disp: 90 tablet, Rfl: 1    Blood Glucose Monitoring Suppl device, 1 each Daily., Disp: 1 each, Rfl: 0    gabapentin (NEURONTIN) 600 MG tablet, Take 1 tablet by mouth 3 (Three) Times a Day., Disp: 90 tablet, Rfl: 0    glucose blood test strip, Use as instructed to check blood sugar daily, Disp: 100 each, Rfl: 3    hydroCHLOROthiazide (HYDRODIURIL) 12.5 MG tablet, Take 1 tablet by mouth Daily., Disp: 90 tablet, Rfl: 1    HYDROcodone-acetaminophen (NORCO)  MG per tablet, Take 3 tablets by mouth Every 8 (Eight) Hours As Needed., Disp: , Rfl:     hydrOXYzine pamoate (VISTARIL) 25 MG capsule, Take 1 capsule by mouth 3 (Three) Times a Day As Needed for Itching or Anxiety., Disp: 100 capsule, Rfl: 2    Lancet Device misc, 1 each Daily. Use as directed; check blood sugar once daily, Disp: 100 each, Rfl: 3    Lancets (OneTouch Delica Plus Plycye48D) misc, , Disp: , Rfl:     levothyroxine (SYNTHROID, LEVOTHROID) 88 MCG tablet, Take 1 tablet by mouth Daily., Disp: 90 tablet, Rfl: 1    meloxicam (MOBIC) 15 MG tablet, TAKE 1 TABLET DAILY, Disp: 90 tablet, Rfl: 1    metFORMIN (GLUCOPHAGE) 500 MG tablet, TAKE 2 TABLETS TWICE A DAY, Disp: 360 tablet, Rfl: 1    omeprazole (priLOSEC) 20 MG capsule, TAKE 1 CAPSULE DAILY, Disp: 90 capsule, Rfl: 1    sertraline (ZOLOFT) 100 MG tablet, TAKE 1 TABLET DAILY, Disp: 90 tablet, Rfl: 0    Tetanus-Diphth-Acell Pertussis (Boostrix) 5-2.5-18.5 LF-MCG/0.5 injection, Inject  into the appropriate muscle as directed by prescriber., Disp: 0.5 mL, Rfl: 0    vitamin B-12 (CYANOCOBALAMIN) 1000 MCG tablet, Take 1 tablet by mouth Daily., Disp: , Rfl:     Zoster Vac Recomb Adjuvanted (Shingrix) 50 MCG/0.5ML reconstituted suspension, Inject  into the appropriate muscle as directed by prescriber., Disp: 1 each, Rfl: 1    varenicline (CHANTIX) 1 MG tablet, Take 1 tablet by mouth 2 (Two) Times a Day., Disp: 60 tablet, Rfl: 4  "    Objective   Vital Signs:   /70 (BP Location: Left arm, Patient Position: Sitting, Cuff Size: Adult)   Pulse 75   Temp 98 °F (36.7 °C) (Tympanic)   Resp 18   Ht 162.6 cm (64\")   Wt 57.3 kg (126 lb 6.4 oz)   SpO2 95% Comment: room air  BMI 21.70 kg/m²   Mallampatti classification : 1  Physical Exam      Vital Signs Reviewed  Pleasant female, no acute distress, normal conversational  HEENT:  PERRL, EOMI.  OP, nares clear, no sinus tenderness  Neck:  Supple, no JVD, no thyromegaly  Lymph: no axillary, cervical, supraclavicular lymphadenopathy noted bilaterally  Chest:  good aeration, bilateral diminished breath sounds, no wheezing, crackles or rhonchi, resonant to percussion b/l  CV: RRR, no MGR, pulses 2+, equal.  Abd:  Soft, NT, ND, + BS, no HSM  EXT:  no clubbing, no cyanosis, No BLE edema  Neuro:  A&Ox3, CN grossly intact, no focal deficits.  Skin: No rashes or lesions noted     Result Review :   The following data was reviewed by: Chiki Morse MD on 09/13/2023:  Common labs          1/18/2023    10:37 7/21/2023    12:49 7/21/2023    13:55   Common Labs   Glucose 125  122     BUN 24  19     Creatinine 1.07  1.16     Sodium 139  143     Potassium 3.6  3.4     Chloride 103  104     Calcium 9.7  9.3     Albumin 4.4  4.0     Total Bilirubin <0.2  0.4     Alkaline Phosphatase 55  58     AST (SGOT) 14  15     ALT (SGPT) 9  <5     WBC 6.51  6.06     Hemoglobin 10.1  10.1     Hematocrit 31.4  30.5     Platelets 132  164     Total Cholesterol 157  148     Triglycerides 64  64     HDL Cholesterol 83  68     LDL Cholesterol  61  67     Hemoglobin A1C 6.30  6.50     Microalbumin, Urine 1.4   2.3      CMP          1/18/2023    10:37 7/21/2023    12:49   CMP   Glucose 125  122    BUN 24  19    Creatinine 1.07  1.16    EGFR 54.6  49.3    Sodium 139  143    Potassium 3.6  3.4    Chloride 103  104    Calcium 9.7  9.3    Total Protein 7.3  6.7    Albumin 4.4  4.0    Globulin 2.9  2.7    Total Bilirubin <0.2  0.4  "   Alkaline Phosphatase 55  58    AST (SGOT) 14  15    ALT (SGPT) 9  <5    Albumin/Globulin Ratio 1.5  1.5    BUN/Creatinine Ratio 22.4  16.4    Anion Gap 6.2  10.7      CBC          1/18/2023    10:37 7/21/2023    12:49   CBC   WBC 6.51  6.06    RBC 3.52  3.40    Hemoglobin 10.1  10.1    Hematocrit 31.4  30.5    MCV 89.2  89.7    MCH 28.7  29.7    MCHC 32.2  33.1    RDW 13.9  13.0    Platelets 132  164      CBC w/diff          1/18/2023    10:37 7/21/2023    12:49   CBC w/Diff   WBC 6.51  6.06    RBC 3.52  3.40    Hemoglobin 10.1  10.1    Hematocrit 31.4  30.5    MCV 89.2  89.7    MCH 28.7  29.7    MCHC 32.2  33.1    RDW 13.9  13.0    Platelets 132  164      Data reviewed : Radiologic studies recent CT scan of the chest and PET scan were reviewed with her today.      Narrative & Impression  PROCEDURE:  CT CHEST LOW DOSE CANCER SCREENING WO     COMPARISON: None     REASON FOR SCREENING:     Patient is 73 years of age, asymptomatic, and has a smoking history of more   than 30 pack years.  SMOKING STATUS:      Current smoker.                   SCREENING VISIT:       Baseline.                   TECHNIQUE:    Axial unenhanced LDCT images from the apices through mid-kidney were obtained.   Evaluation of solid organs and vascular structures is suboptimal due to the lack of IV contrast.   Imaging was performed on a [employer] 128 CT scanner.     RECONSTRUCTED IMAGE WIDTH: 2 mm.  RADIATION:      CT Dose Index Vol (CTDIvol):      2.685  mGy               Dose Length Product (DLP):        96.8  mGy-cm        FINDINGS:          9 x 7 millimeter noncalcified nodule within the left upper lobe (2 series 204, image 66).     Mild to moderate emphysema is present.  Benign calcified nodules are present in the right middle   lobe.  Mild fibrotic changes are seen within the lower lobe distribution without hunter honeycombing   fibrosis.     Mild generalized thyroid enlargement is noted.  There is mild thoracic aortic calcific    atherosclerosis.  The thoracic aortic caliber is within normal limits.  Heart size is normal.    Coronary artery calcifications are present.  There is no pericardial effusion or pleural effusion.    There are no pathologically enlarged lymph nodes.     The included portions of the upper abdominal organs have a normal noncontrast appearance.     There is thoracolumbar junction dextroscoliotic curvature advanced degenerative disc and endplate   changes are present at T10-11 and T11-12.  No acute or suspicious osseous abnormalities.           IMPRESSION:                 1. 9 x 7 millimeter noncalcified left upper lobe nodule .  Three month low-dose CT chest or PET-CT   follow-up is recommended.    2. Emphysema.     LUNG RADS:                           Category 4A.  Suspicious.              CHULA LÓPEZ MD         Electronically Signed and Approved By: CHULA LÓPEZ MD on 8/01/2023 at 15:40                       Narrative & Impression   PROCEDURE:  NM PET SKULL BASE TO MID THIGH     COMPARISON: [facility], CT, CT CHEST LOW DOSE CANCER SCREENING WO, 8/01/2023, 10:56.     INDICATIONS:  Lung nodule, > 8mm     TECHNIQUE:    After obtaining the patient's consent, F-18 FDG was administered intravenously.  PET/CT   imaging was performed from skull to thigh with multi-planar imaging without oral or intravenous   contrast material, using a dedicated integrated PET/CT scanner.       RADIONUCLIDE:     9.72 MCI   F18 FDG- I.V.  LABS:                          Blood Glucose 137 mg/dl  UPTAKE TIME:        45 mins  MEDIASTINAL BACKGROUND UPTAKE:  The 2.6     FINDINGS:             There is activity in the right medial pterygoid muscle which may be physiologic.  Maximum SUV is   about 6.2.  There is intense thyroid activity maximum SUV 7.2 diffusely that may indicate   thyroiditis.     There is focal activity adjacent to left shoulder probably inflammatory.     No hypermetabolic mediastinal or hilar adenopathy is seen.   There are coronary calcifications and   atherosclerotic changes in the thoracic aorta.  There is no pleural effusion.     There is moderate emphysematous changes.  On image 97 there is redemonstration of irregular 0.8   centimeter nodule in the left upper lobe abutting the fissure similar in size to previous CT scan.    The maximum SUV about 1.2, slightly greater than lung background.  Lung background demonstrates a   maximum SUV of 0.8.       On image 110 there is a small 0.4 centimeter subpleural nodule in the right lower lobe.  This is   too small to characterize.     There is an unusual axis of the left kidney.  There are few renal vascular calcifications.  There   is no obstructive uropathy is seen.  The moderate stool throughout the colon.  No hypermetabolic   adenopathy.  Moderate atherosclerosis.  There is diffuse bowel activity likely physiologic.     There is dextrocurvature in the upper lumbar spine with degenerative changes.  There is activity in   the proximal left femur centrally with maximum SUV 3.2.        IMPRESSION:                 1. 0.8 centimeter irregularly shaped left upper lobe lung nodule demonstrates mild metabolic   activity slightly greater than lung background with a maximum SUV of 1.2.  Low-grade neoplasm in   the differential.  Consider biopsy versus short-term follow-up in 3 months.  2. Small subpleural nodule in the right lower lobe too small to characterize more likely benign.  3. Focal activity in the medial right pterygoid muscle may be physiologic.  4. Diffuse thyroid activity may reflect thyroiditis.  Recommend thyroid ultrasound and correlation   with thyroid function tests.  5. There is focal activity in the proximal left femur with maximum SUV 3.2.  A lesion on CT is not   seen.  Consider MRI to exclude a lesion.           Assessment and Plan    Diagnoses and all orders for this visit:    1. Lung nodule (Primary)  -     CT Chest Without Contrast; Future  -     Case Request;  Standing  -     Follow Anesthesia Guidlines / Standing Orders; Standing  -     Obtain Informed Consent; Standing  -     Case Request  -     varenicline (CHANTIX) 1 MG tablet; Take 1 tablet by mouth 2 (Two) Times a Day.  Dispense: 60 tablet; Refill: 4  -     Complete PFT - Pre & Post Bronchodilator; Future  -     6 Minute Walk Test; Future    2. Smoking greater than 40 pack years  -     CT Chest Without Contrast; Future  -     Case Request; Standing  -     Follow Anesthesia Guidlines / Standing Orders; Standing  -     Obtain Informed Consent; Standing  -     Case Request  -     varenicline (CHANTIX) 1 MG tablet; Take 1 tablet by mouth 2 (Two) Times a Day.  Dispense: 60 tablet; Refill: 4  -     Complete PFT - Pre & Post Bronchodilator; Future  -     6 Minute Walk Test; Future    3. Centrilobular emphysema  -     CT Chest Without Contrast; Future  -     Case Request; Standing  -     Follow Anesthesia Guidlines / Standing Orders; Standing  -     Obtain Informed Consent; Standing  -     Case Request  -     varenicline (CHANTIX) 1 MG tablet; Take 1 tablet by mouth 2 (Two) Times a Day.  Dispense: 60 tablet; Refill: 4  -     Complete PFT - Pre & Post Bronchodilator; Future  -     6 Minute Walk Test; Future      Lung nodule, left upper lobe: 8 mm lung nodule with some spiculated margins.  PET scan was not hypermetabolic.  However, significant family history of lung cancer including her father and her sister.  Discussed robotic bronchoscopy with radial probe ultrasound, endobronchial ultrasound-guided lymph node biopsy, bronchoalveolar lavage, bronchial washing, possible transbronchial biopsy, endobronchial brushing, airway inspection.  Risks and benefits of the procedure were discussed in detail.  Alternatives and options were reviewed.  Risks including pneumothorax, pneumonia, bleeding, respiratory depression and that it could be fatal were all reviewed.  Patient understands and is agreeable for the procedure.  We will  proceed with bronchoscopy next week.  Check navigational protocol CT scan of the chest prior to the bronchoscopy.    Continue with albuterol as needed.  Check pulmonary function test and 6-minute walk test.    Needs to quit smoking.  Smoking cessation counseling was done for more than 5 minutes.  She is willing to try Chantix.  Have sent a prescription for Chantix.    We will address vaccination next office visit.    Follow Up   Return in about 3 weeks (around 10/4/2023).  Patient was given instructions and counseling regarding her condition or for health maintenance advice. Please see specific information pulled into the AVS if appropriate.        Electronically signed by Chiki Morse MD, 9/13/2023, 14:48 EDT.

## 2023-09-13 NOTE — PROGRESS NOTES
CHIEF COMPLAINT    Primary Care Provider  Karina Langford MD     Referring Provider  Karina Langford MD    Patient Complaint  9MM LAURA, Emphysema, Cough, and Establish Care (New pt here for 9MM LAURA/ Pet can results)        Subjective          Odalis Espino presents to [facility] PULMONARY & CRITICAL CARE MEDICINE  History of Present Illness  Odalis Espino is a 73 y.o. female who has history of chronic smoking, 1 pack a day for more than 50 years, started smoking when she was 14.  She was referred to me by Karina Langford MD. she recently had low-dose lung cancer screening CT scan of the chest.  It showed left upper lobe 8 mm lung nodule with irregular borders.  PET scan was done which did not show significant hypermetabolic activity.  She has some cough, mild shortness of breath with activities.  She uses albuterol as needed 1-2 times a day.  She has no significant changes in weight or appetite.  No coughing up blood.  No new onset headache or seizures.  Her father and her sister had diagnosis of lung cancer and both of them were heavy smokers.  I reviewed the CT scan and PET scan finding with her and her daughter today.    Tobacco Use: High Risk    Smoking Tobacco Use: Every Day    Smokeless Tobacco Use: Never    Passive Exposure: Not on file   .    Review of Systems     General:  No Fatigue, No Fever No weight loss or loss of appetite  HEENT: No dysphagia, No Visual Changes, no rhinorrhea  Respiratory:  + cough,+Dyspnea, intermittent.  Phlegm, No Pleuritic Pain, no wheezing, no hemoptysis.  Cardiovascular: Denies chest pain, denies palpitations,+ROQUE, No Chest Pressure  Gastrointestinal:  No Abdominal Pain, No Nausea, No Vomiting, No Diarrhea  Genitourinary:  No Dysuria, No Frequency, No Hesitancy  Musculoskeletal: No muscle pain or swelling  Endocrine:  No Heat Intolerance, No Cold Intolerance  Hematologic:  No Bleeding, No Bruising  Psychiatric:  No Anxiety, No  Depression  Neurologic:  No Confusion, no Dysarthria, No Headaches  Skin:  No Rash, No Open Wounds    Family History   Problem Relation Age of Onset    Cancer Father     Cancer Sister     Diabetes Brother     Asthma Other     Seizures Other     Anxiety disorder Mother     Arthritis Daughter         Social History     Socioeconomic History    Marital status:    Tobacco Use    Smoking status: Every Day     Packs/day: 1.00     Years: 40.00     Pack years: 40.00     Types: Cigarettes     Start date: 1964    Smokeless tobacco: Never   Vaping Use    Vaping Use: Never used   Substance and Sexual Activity    Alcohol use: Yes     Alcohol/week: 1.0 standard drink     Types: 1 Glasses of wine per week     Comment: occasional    Drug use: Never    Sexual activity: Not Currently     Birth control/protection: None        Past Medical History:   Diagnosis Date    Anxiety     Arthritis     Diabetes mellitus     Hepatitis     HL (hearing loss)     Thyroiditis     Visual impairment         Immunization History   Administered Date(s) Administered    COVID-19 (MODERNA) 1st,2nd,3rd Dose Monovalent 03/17/2021, 04/15/2021, 11/03/2021    COVID-19 (PFIZER) BIVALENT 12+YRS 08/08/2023    Covid-19 (Pfizer) Gray Cap Monovalent 07/14/2022    Flu Vaccine Quad PF >36MO 10/09/2018    Fluzone (or Fluarix & Flulaval for VFC) >6mos 10/09/2018    Fluzone High Dose =>65 Years ([employer] ONLY) 11/13/2017, 07/17/2023    Fluzone High-Dose 65+yrs 11/11/2021    Pneumococcal Conjugate 13-Valent (PCV13) 10/18/2018    Pneumococcal Polysaccharide (PPSV23) 01/01/2016    Shingrix 07/17/2023    Tdap 07/17/2023         Allergies   Allergen Reactions    Azithromycin Hives          Current Outpatient Medications:     albuterol sulfate  (90 Base) MCG/ACT inhaler, Inhale 1 puff Every 4 (Four) Hours As Needed for Wheezing or Shortness of Air. Refill needed., Disp: 18 g, Rfl: 2    amLODIPine (NORVASC) 5 MG tablet, Take 1 tablet by mouth Daily., Disp: 90  tablet, Rfl: 1    atorvastatin (LIPITOR) 20 MG tablet, TAKE 1 TABLET EVERY NIGHT, Disp: 90 tablet, Rfl: 1    Blood Glucose Monitoring Suppl device, 1 each Daily., Disp: 1 each, Rfl: 0    gabapentin (NEURONTIN) 600 MG tablet, Take 1 tablet by mouth 3 (Three) Times a Day., Disp: 90 tablet, Rfl: 0    glucose blood test strip, Use as instructed to check blood sugar daily, Disp: 100 each, Rfl: 3    hydroCHLOROthiazide (HYDRODIURIL) 12.5 MG tablet, Take 1 tablet by mouth Daily., Disp: 90 tablet, Rfl: 1    HYDROcodone-acetaminophen (NORCO)  MG per tablet, Take 3 tablets by mouth Every 8 (Eight) Hours As Needed., Disp: , Rfl:     hydrOXYzine pamoate (VISTARIL) 25 MG capsule, Take 1 capsule by mouth 3 (Three) Times a Day As Needed for Itching or Anxiety., Disp: 100 capsule, Rfl: 2    Lancet Device misc, 1 each Daily. Use as directed; check blood sugar once daily, Disp: 100 each, Rfl: 3    Lancets (OneTouch Delica Plus [id number]) misc, , Disp: , Rfl:     levothyroxine (SYNTHROID, LEVOTHROID) 88 MCG tablet, Take 1 tablet by mouth Daily., Disp: 90 tablet, Rfl: 1    meloxicam (MOBIC) 15 MG tablet, TAKE 1 TABLET DAILY, Disp: 90 tablet, Rfl: 1    metFORMIN (GLUCOPHAGE) 500 MG tablet, TAKE 2 TABLETS TWICE A DAY, Disp: 360 tablet, Rfl: 1    omeprazole (priLOSEC) 20 MG capsule, TAKE 1 CAPSULE DAILY, Disp: 90 capsule, Rfl: 1    sertraline (ZOLOFT) 100 MG tablet, TAKE 1 TABLET DAILY, Disp: 90 tablet, Rfl: 0    Tetanus-Diphth-Acell Pertussis (Boostrix) 5-2.5-18.5 LF-MCG/0.5 injection, Inject  into the appropriate muscle as directed by prescriber., Disp: 0.5 mL, Rfl: 0    vitamin B-12 (CYANOCOBALAMIN) 1000 MCG tablet, Take 1 tablet by mouth Daily., Disp: , Rfl:     Zoster Vac Recomb Adjuvanted (Shingrix) 50 MCG/0.5ML reconstituted suspension, Inject  into the appropriate muscle as directed by prescriber., Disp: 1 each, Rfl: 1    varenicline (CHANTIX) 1 MG tablet, Take 1 tablet by mouth 2 (Two) Times a Day., Disp: 60 tablet, Rfl: 4  "    Objective   Vital Signs:   /70 (BP Location: Left arm, Patient Position: Sitting, Cuff Size: Adult)   Pulse 75   Temp 98 °F (36.7 °C) (Tympanic)   Resp 18   Ht 162.6 cm (64\")   Wt 57.3 kg (126 lb 6.4 oz)   SpO2 95% Comment: room air  BMI 21.70 kg/m²   Mallampatti classification : 1  Physical Exam      Vital Signs Reviewed  Pleasant female, no acute distress, normal conversational  HEENT:  PERRL, EOMI.  OP, nares clear, no sinus tenderness  Neck:  Supple, no JVD, no thyromegaly  Lymph: no axillary, cervical, supraclavicular lymphadenopathy noted bilaterally  Chest:  good aeration, bilateral diminished breath sounds, no wheezing, crackles or rhonchi, resonant to percussion b/l  CV: RRR, no MGR, pulses 2+, equal.  Abd:  Soft, NT, ND, + BS, no HSM  EXT:  no clubbing, no cyanosis, No BLE edema  Neuro:  A&Ox3, CN grossly intact, no focal deficits.  Skin: No rashes or lesions noted     Result Review :   The following data was reviewed by: Chiki Morse MD on 09/13/2023:  Common labs          1/18/2023    10:37 7/21/2023    12:49 7/21/2023    13:55   Common Labs   Glucose 125  122     BUN 24  19     Creatinine 1.07  1.16     Sodium 139  143     Potassium 3.6  3.4     Chloride 103  104     Calcium 9.7  9.3     Albumin 4.4  4.0     Total Bilirubin <0.2  0.4     Alkaline Phosphatase 55  58     AST (SGOT) 14  15     ALT (SGPT) 9  <5     WBC 6.51  6.06     Hemoglobin 10.1  10.1     Hematocrit 31.4  30.5     Platelets 132  164     Total Cholesterol 157  148     Triglycerides 64  64     HDL Cholesterol 83  68     LDL Cholesterol  61  67     Hemoglobin A1C 6.30  6.50     Microalbumin, Urine 1.4   2.3      CMP          1/18/2023    10:37 7/21/2023    12:49   CMP   Glucose 125  122    BUN 24  19    Creatinine 1.07  1.16    EGFR 54.6  49.3    Sodium 139  143    Potassium 3.6  3.4    Chloride 103  104    Calcium 9.7  9.3    Total Protein 7.3  6.7    Albumin 4.4  4.0    Globulin 2.9  2.7    Total Bilirubin <0.2  0.4  "   Alkaline Phosphatase 55  58    AST (SGOT) 14  15    ALT (SGPT) 9  <5    Albumin/Globulin Ratio 1.5  1.5    BUN/Creatinine Ratio 22.4  16.4    Anion Gap 6.2  10.7      CBC          1/18/2023    10:37 7/21/2023    12:49   CBC   WBC 6.51  6.06    RBC 3.52  3.40    Hemoglobin 10.1  10.1    Hematocrit 31.4  30.5    MCV 89.2  89.7    MCH 28.7  29.7    MCHC 32.2  33.1    RDW 13.9  13.0    Platelets 132  164      CBC w/diff          1/18/2023    10:37 7/21/2023    12:49   CBC w/Diff   WBC 6.51  6.06    RBC 3.52  3.40    Hemoglobin 10.1  10.1    Hematocrit 31.4  30.5    MCV 89.2  89.7    MCH 28.7  29.7    MCHC 32.2  33.1    RDW 13.9  13.0    Platelets 132  164      Data reviewed : Radiologic studies recent CT scan of the chest and PET scan were reviewed with her today.      Narrative & Impression  PROCEDURE:  CT CHEST LOW DOSE CANCER SCREENING WO     COMPARISON: None     REASON FOR SCREENING:     Patient is 73 years of age, asymptomatic, and has a smoking history of more   than 30 pack years.  SMOKING STATUS:      Current smoker.                   SCREENING VISIT:       Baseline.                   TECHNIQUE:    Axial unenhanced LDCT images from the apices through mid-kidney were obtained.   Evaluation of solid organs and vascular structures is suboptimal due to the lack of IV contrast.   Imaging was performed on a [employer]uity 128 CT scanner.     RECONSTRUCTED IMAGE WIDTH: 2 mm.  RADIATION:      CT Dose Index Vol (CTDIvol):      2.685  mGy               Dose Length Product (DLP):        96.8  mGy-cm        FINDINGS:          9 x 7 millimeter noncalcified nodule within the left upper lobe (2 series 204, image 66).     Mild to moderate emphysema is present.  Benign calcified nodules are present in the right middle   lobe.  Mild fibrotic changes are seen within the lower lobe distribution without hunter honeycombing   fibrosis.     Mild generalized thyroid enlargement is noted.  There is mild thoracic aortic calcific    atherosclerosis.  The thoracic aortic caliber is within normal limits.  Heart size is normal.    Coronary artery calcifications are present.  There is no pericardial effusion or pleural effusion.    There are no pathologically enlarged lymph nodes.     The included portions of the upper abdominal organs have a normal noncontrast appearance.     There is thoracolumbar junction dextroscoliotic curvature advanced degenerative disc and endplate   changes are present at T10-11 and T11-12.  No acute or suspicious osseous abnormalities.           IMPRESSION:                 1. 9 x 7 millimeter noncalcified left upper lobe nodule .  Three month low-dose CT chest or PET-CT   follow-up is recommended.    2. Emphysema.     LUNG RADS:                           Category 4A.  Suspicious.              CHULA LÓPEZ MD         Electronically Signed and Approved By: CHULA LÓPEZ MD on 8/01/2023 at 15:40                       Narrative & Impression   PROCEDURE:  NM PET SKULL BASE TO MID THIGH     COMPARISON: [facility], CT, CT CHEST LOW DOSE CANCER SCREENING WO, 8/01/2023, 10:56.     INDICATIONS:  Lung nodule, > 8mm     TECHNIQUE:    After obtaining the patient's consent, F-18 FDG was administered intravenously.  PET/CT   imaging was performed from skull to thigh with multi-planar imaging without oral or intravenous   contrast material, using a dedicated integrated PET/CT scanner.       RADIONUCLIDE:     9.72 MCI   F18 FDG- I.V.  LABS:                          Blood Glucose 137 mg/dl  UPTAKE TIME:        45 mins  MEDIASTINAL BACKGROUND UPTAKE:  The 2.6     FINDINGS:             There is activity in the right medial pterygoid muscle which may be physiologic.  Maximum SUV is   about 6.2.  There is intense thyroid activity maximum SUV 7.2 diffusely that may indicate   thyroiditis.     There is focal activity adjacent to left shoulder probably inflammatory.     No hypermetabolic mediastinal or hilar adenopathy is seen.   There are coronary calcifications and   atherosclerotic changes in the thoracic aorta.  There is no pleural effusion.     There is moderate emphysematous changes.  On image 97 there is redemonstration of irregular 0.8   centimeter nodule in the left upper lobe abutting the fissure similar in size to previous CT scan.    The maximum SUV about 1.2, slightly greater than lung background.  Lung background demonstrates a   maximum SUV of 0.8.       On image 110 there is a small 0.4 centimeter subpleural nodule in the right lower lobe.  This is   too small to characterize.     There is an unusual axis of the left kidney.  There are few renal vascular calcifications.  There   is no obstructive uropathy is seen.  The moderate stool throughout the colon.  No hypermetabolic   adenopathy.  Moderate atherosclerosis.  There is diffuse bowel activity likely physiologic.     There is dextrocurvature in the upper lumbar spine with degenerative changes.  There is activity in   the proximal left femur centrally with maximum SUV 3.2.        IMPRESSION:                 1. 0.8 centimeter irregularly shaped left upper lobe lung nodule demonstrates mild metabolic   activity slightly greater than lung background with a maximum SUV of 1.2.  Low-grade neoplasm in   the differential.  Consider biopsy versus short-term follow-up in 3 months.  2. Small subpleural nodule in the right lower lobe too small to characterize more likely benign.  3. Focal activity in the medial right pterygoid muscle may be physiologic.  4. Diffuse thyroid activity may reflect thyroiditis.  Recommend thyroid ultrasound and correlation   with thyroid function tests.  5. There is focal activity in the proximal left femur with maximum SUV 3.2.  A lesion on CT is not   seen.  Consider MRI to exclude a lesion.           Assessment and Plan    Diagnoses and all orders for this visit:    1. Lung nodule (Primary)  -     CT Chest Without Contrast; Future  -     Case Request;  Standing  -     Follow Anesthesia Guidlines / Standing Orders; Standing  -     Obtain Informed Consent; Standing  -     Case Request  -     varenicline (CHANTIX) 1 MG tablet; Take 1 tablet by mouth 2 (Two) Times a Day.  Dispense: 60 tablet; Refill: 4  -     Complete PFT - Pre & Post Bronchodilator; Future  -     6 Minute Walk Test; Future    2. Smoking greater than 40 pack years  -     CT Chest Without Contrast; Future  -     Case Request; Standing  -     Follow Anesthesia Guidlines / Standing Orders; Standing  -     Obtain Informed Consent; Standing  -     Case Request  -     varenicline (CHANTIX) 1 MG tablet; Take 1 tablet by mouth 2 (Two) Times a Day.  Dispense: 60 tablet; Refill: 4  -     Complete PFT - Pre & Post Bronchodilator; Future  -     6 Minute Walk Test; Future    3. Centrilobular emphysema  -     CT Chest Without Contrast; Future  -     Case Request; Standing  -     Follow Anesthesia Guidlines / Standing Orders; Standing  -     Obtain Informed Consent; Standing  -     Case Request  -     varenicline (CHANTIX) 1 MG tablet; Take 1 tablet by mouth 2 (Two) Times a Day.  Dispense: 60 tablet; Refill: 4  -     Complete PFT - Pre & Post Bronchodilator; Future  -     6 Minute Walk Test; Future      Lung nodule, left upper lobe: 8 mm lung nodule with some spiculated margins.  PET scan was not hypermetabolic.  However, significant family history of lung cancer including her father and her sister.  Discussed robotic bronchoscopy with radial probe ultrasound, endobronchial ultrasound-guided lymph node biopsy, bronchoalveolar lavage, bronchial washing, possible transbronchial biopsy, endobronchial brushing, airway inspection.  Risks and benefits of the procedure were discussed in detail.  Alternatives and options were reviewed.  Risks including pneumothorax, pneumonia, bleeding, respiratory depression and that it could be fatal were all reviewed.  Patient understands and is agreeable for the procedure.  We will  proceed with bronchoscopy next week.  Check navigational protocol CT scan of the chest prior to the bronchoscopy.    Continue with albuterol as needed.  Check pulmonary function test and 6-minute walk test.    Needs to quit smoking.  Smoking cessation counseling was done for more than 5 minutes.  She is willing to try Chantix.  Have sent a prescription for Chantix.    We will address vaccination next office visit.    Follow Up   Return in about 3 weeks (around 10/4/2023).  Patient was given instructions and counseling regarding her condition or for health maintenance advice. Please see specific information pulled into the AVS if appropriate.        Electronically signed by Chiki Morse MD, 9/13/2023, 14:48 EDT.

## 2023-09-14 RX ORDER — GABAPENTIN 600 MG/1
TABLET ORAL
Qty: 270 TABLET | Refills: 1 | OUTPATIENT
Start: 2023-09-14

## 2023-09-15 ENCOUNTER — LAB (OUTPATIENT)
Dept: LAB | Facility: HOSPITAL | Age: 73
End: 2023-09-15
Payer: MEDICARE

## 2023-09-15 DIAGNOSIS — E03.9 ACQUIRED HYPOTHYROIDISM: ICD-10-CM

## 2023-09-15 DIAGNOSIS — E06.3 HASHIMOTO'S THYROIDITIS: ICD-10-CM

## 2023-09-15 LAB
T-UPTAKE NFR SERPL: 1.1 TBI (ref 0.8–1.3)
T4 SERPL-MCNC: 7.61 MCG/DL (ref 4.5–11.7)
TSH SERPL DL<=0.05 MIU/L-ACNC: 2.82 UIU/ML (ref 0.27–4.2)

## 2023-09-15 PROCEDURE — 84479 ASSAY OF THYROID (T3 OR T4): CPT

## 2023-09-15 PROCEDURE — 84445 ASSAY OF TSI GLOBULIN: CPT

## 2023-09-15 PROCEDURE — 84436 ASSAY OF TOTAL THYROXINE: CPT

## 2023-09-15 PROCEDURE — 84443 ASSAY THYROID STIM HORMONE: CPT

## 2023-09-15 PROCEDURE — 86376 MICROSOMAL ANTIBODY EACH: CPT

## 2023-09-15 PROCEDURE — 36415 COLL VENOUS BLD VENIPUNCTURE: CPT

## 2023-09-17 LAB — THYROPEROXIDASE AB SERPL-ACNC: 61 IU/ML (ref 0–34)

## 2023-09-19 LAB — TSI SER-ACNC: <0.1 IU/L (ref 0–0.55)

## 2023-09-24 DIAGNOSIS — F41.9 ANXIETY: ICD-10-CM

## 2023-09-25 ENCOUNTER — HOSPITAL ENCOUNTER (OUTPATIENT)
Facility: HOSPITAL | Age: 73
Setting detail: HOSPITAL OUTPATIENT SURGERY
Discharge: HOME | End: 2023-09-25
Attending: INTERNAL MEDICINE | Admitting: INTERNAL MEDICINE
Payer: MEDICARE

## 2023-09-25 ENCOUNTER — ANESTHESIA EVENT (OUTPATIENT)
Dept: PERIOP | Facility: HOSPITAL | Age: 73
End: 2023-09-25

## 2023-09-25 ENCOUNTER — ANESTHESIA (OUTPATIENT)
Dept: PERIOP | Facility: HOSPITAL | Age: 73
End: 2023-09-25

## 2023-09-25 ENCOUNTER — APPOINTMENT (OUTPATIENT)
Dept: GENERAL RADIOLOGY | Facility: HOSPITAL | Age: 73
End: 2023-09-25
Payer: MEDICARE

## 2023-09-25 ENCOUNTER — HOSPITAL ENCOUNTER (OUTPATIENT)
Dept: CT IMAGING | Facility: HOSPITAL | Age: 73
Discharge: HOME | End: 2023-09-25
Payer: MEDICARE

## 2023-09-25 VITALS
HEART RATE: 79 BPM | OXYGEN SATURATION: 95 % | HEIGHT: 64 IN | TEMPERATURE: 97.5 F | BODY MASS INDEX: 21.72 KG/M2 | RESPIRATION RATE: 16 BRPM | SYSTOLIC BLOOD PRESSURE: 120 MMHG | DIASTOLIC BLOOD PRESSURE: 52 MMHG | WEIGHT: 127.21 LBS

## 2023-09-25 DIAGNOSIS — J43.2 CENTRILOBULAR EMPHYSEMA: ICD-10-CM

## 2023-09-25 DIAGNOSIS — F17.210 SMOKING GREATER THAN 40 PACK YEARS: ICD-10-CM

## 2023-09-25 DIAGNOSIS — R91.1 LUNG NODULE: ICD-10-CM

## 2023-09-25 LAB
ACB CMPLX DNA BAL NAA+NON-PRB-NCNCRNG: NOT DETECTED
BLACTX-M ISLT/SPM QL: NORMAL
BLAIMP ISLT/SPM QL: NORMAL
BLAKPC ISLT/SPM QL: NORMAL
BLAOXA-48-LIKE ISLT/SPM QL: NORMAL
BLAVIM ISLT/SPM QL: NORMAL
C PNEUM DNA NPH QL NAA+NON-PROBE: NOT DETECTED
CILIATED BAL QL: 1 %
E CLOAC COMP DNA BAL NAA+NON-PRB-NCNCRNG: NOT DETECTED
E COLI DNA BAL NAA+NON-PRB-NCNCRNG: NOT DETECTED
EOSINOPHIL NFR FLD MANUAL: 2 %
FLUAV SUBTYP SPEC NAA+PROBE: NOT DETECTED
FLUBV RNA ISLT QL NAA+PROBE: NOT DETECTED
GLUCOSE BLDC GLUCOMTR-MCNC: 125 MG/DL (ref 70–99)
GLUCOSE BLDC GLUCOMTR-MCNC: 143 MG/DL (ref 70–99)
GP B STREP DNA BAL NAA+NON-PRB-NCNCRNG: NOT DETECTED
GRAM STN SPEC: NORMAL
HADV DNA SPEC NAA+PROBE: NOT DETECTED
HAEM INFLU DNA BAL NAA+NON-PRB-NCNCRNG: NOT DETECTED
HCOV RNA LOWER RESP QL NAA+NON-PROBE: NOT DETECTED
HMPV RNA NPH QL NAA+NON-PROBE: NOT DETECTED
HPIV RNA LOWER RESP QL NAA+NON-PROBE: NOT DETECTED
K AEROGENES DNA BAL NAA+NON-PRB-NCNCRNG: NOT DETECTED
K OXYTOCA DNA BAL NAA+NON-PRB-NCNCRNG: NOT DETECTED
K PNEU GRP DNA BAL NAA+NON-PRB-NCNCRNG: NOT DETECTED
L PNEUMO DNA LOWER RESP QL NAA+NON-PROBE: NOT DETECTED
LYMPHOCYTES NFR FLD MANUAL: 21 %
M CATARRHALIS DNA BAL NAA+NON-PRB-NCNCRNG: NOT DETECTED
M PNEUMO IGG SER IA-ACNC: NOT DETECTED
MECA+MECC ISLT/SPM QL: NORMAL
NDM GENE: NORMAL
NEUTROPHILS NFR FLD MANUAL: 76 %
P AERUGINOSA DNA BAL NAA+NON-PRB-NCNCRNG: NOT DETECTED
PROTEUS SP DNA BAL NAA+NON-PRB-NCNCRNG: NOT DETECTED
RHINOVIRUS RNA SPEC NAA+PROBE: NOT DETECTED
RSV RNA NPH QL NAA+NON-PROBE: NOT DETECTED
S AUREUS DNA BAL NAA+NON-PRB-NCNCRNG: NOT DETECTED
S MARCESCENS DNA BAL NAA+NON-PRB-NCNCRNG: NOT DETECTED
S PNEUM DNA BAL NAA+NON-PRB-NCNCRNG: NOT DETECTED
S PYO DNA BAL NAA+NON-PRB-NCNCRNG: NOT DETECTED
VISUAL PRESENCE OF BLOOD: NORMAL

## 2023-09-25 PROCEDURE — 25010000002 DEXAMETHASONE PER 1 MG: Performed by: NURSE ANESTHETIST, CERTIFIED REGISTERED

## 2023-09-25 PROCEDURE — 88305 TISSUE EXAM BY PATHOLOGIST: CPT | Performed by: INTERNAL MEDICINE

## 2023-09-25 PROCEDURE — 25010000002 ONDANSETRON PER 1 MG: Performed by: NURSE ANESTHETIST, CERTIFIED REGISTERED

## 2023-09-25 PROCEDURE — 87071 CULTURE AEROBIC QUANT OTHER: CPT | Performed by: INTERNAL MEDICINE

## 2023-09-25 PROCEDURE — 88312 SPECIAL STAINS GROUP 1: CPT | Performed by: INTERNAL MEDICINE

## 2023-09-25 PROCEDURE — 88108 CYTOPATH CONCENTRATE TECH: CPT | Performed by: INTERNAL MEDICINE

## 2023-09-25 PROCEDURE — 76000 FLUOROSCOPY <1 HR PHYS/QHP: CPT

## 2023-09-25 PROCEDURE — 87205 SMEAR GRAM STAIN: CPT | Performed by: INTERNAL MEDICINE

## 2023-09-25 PROCEDURE — 87070 CULTURE OTHR SPECIMN AEROBIC: CPT | Performed by: INTERNAL MEDICINE

## 2023-09-25 PROCEDURE — 25010000002 METOCLOPRAMIDE PER 10 MG: Performed by: ANESTHESIOLOGY

## 2023-09-25 PROCEDURE — 87102 FUNGUS ISOLATION CULTURE: CPT | Performed by: INTERNAL MEDICINE

## 2023-09-25 PROCEDURE — 25010000002 MIDAZOLAM PER 1MG: Performed by: ANESTHESIOLOGY

## 2023-09-25 PROCEDURE — 82948 REAGENT STRIP/BLOOD GLUCOSE: CPT

## 2023-09-25 PROCEDURE — 89051 BODY FLUID CELL COUNT: CPT | Performed by: INTERNAL MEDICINE

## 2023-09-25 PROCEDURE — 88173 CYTOPATH EVAL FNA REPORT: CPT | Performed by: INTERNAL MEDICINE

## 2023-09-25 PROCEDURE — 88104 CYTOPATH FL NONGYN SMEARS: CPT | Performed by: INTERNAL MEDICINE

## 2023-09-25 PROCEDURE — 87206 SMEAR FLUORESCENT/ACID STAI: CPT | Performed by: INTERNAL MEDICINE

## 2023-09-25 PROCEDURE — 71250 CT THORAX DX C-: CPT

## 2023-09-25 PROCEDURE — 25010000002 PROPOFOL 10 MG/ML EMULSION: Performed by: NURSE ANESTHETIST, CERTIFIED REGISTERED

## 2023-09-25 PROCEDURE — 25010000002 FENTANYL CITRATE (PF) 50 MCG/ML SOLUTION: Performed by: NURSE ANESTHETIST, CERTIFIED REGISTERED

## 2023-09-25 PROCEDURE — 25010000002 SUGAMMADEX 200 MG/2ML SOLUTION: Performed by: NURSE ANESTHETIST, CERTIFIED REGISTERED

## 2023-09-25 PROCEDURE — C1726 CATH, BAL DIL, NON-VASCULAR: HCPCS | Performed by: INTERNAL MEDICINE

## 2023-09-25 PROCEDURE — 87116 MYCOBACTERIA CULTURE: CPT | Performed by: INTERNAL MEDICINE

## 2023-09-25 PROCEDURE — 87633 RESP VIRUS 12-25 TARGETS: CPT | Performed by: INTERNAL MEDICINE

## 2023-09-25 RX ORDER — MEPERIDINE HYDROCHLORIDE 25 MG/ML
12.5 INJECTION INTRAMUSCULAR; INTRAVENOUS; SUBCUTANEOUS
Status: DISCONTINUED | OUTPATIENT
Start: 2023-09-25 | End: 2023-09-25 | Stop reason: HOSPADM

## 2023-09-25 RX ORDER — GLYCOPYRROLATE 0.2 MG/ML
INJECTION INTRAMUSCULAR; INTRAVENOUS AS NEEDED
Status: DISCONTINUED | OUTPATIENT
Start: 2023-09-25 | End: 2023-09-25 | Stop reason: SURG

## 2023-09-25 RX ORDER — FENTANYL CITRATE 50 UG/ML
INJECTION, SOLUTION INTRAMUSCULAR; INTRAVENOUS AS NEEDED
Status: DISCONTINUED | OUTPATIENT
Start: 2023-09-25 | End: 2023-09-25 | Stop reason: SURG

## 2023-09-25 RX ORDER — MIDAZOLAM HYDROCHLORIDE 2 MG/2ML
2 INJECTION, SOLUTION INTRAMUSCULAR; INTRAVENOUS ONCE
Status: COMPLETED | OUTPATIENT
Start: 2023-09-25 | End: 2023-09-25

## 2023-09-25 RX ORDER — PROMETHAZINE HYDROCHLORIDE 25 MG/1
25 SUPPOSITORY RECTAL ONCE AS NEEDED
Status: DISCONTINUED | OUTPATIENT
Start: 2023-09-25 | End: 2023-09-25 | Stop reason: HOSPADM

## 2023-09-25 RX ORDER — PROPOFOL 10 MG/ML
VIAL (ML) INTRAVENOUS AS NEEDED
Status: DISCONTINUED | OUTPATIENT
Start: 2023-09-25 | End: 2023-09-25 | Stop reason: SURG

## 2023-09-25 RX ORDER — SERTRALINE HYDROCHLORIDE 100 MG/1
TABLET, FILM COATED ORAL
Qty: 90 TABLET | Refills: 0 | Status: SHIPPED | OUTPATIENT
Start: 2023-09-25

## 2023-09-25 RX ORDER — OXYCODONE HYDROCHLORIDE 5 MG/1
5 TABLET ORAL
Status: DISCONTINUED | OUTPATIENT
Start: 2023-09-25 | End: 2023-09-25 | Stop reason: HOSPADM

## 2023-09-25 RX ORDER — LIDOCAINE HYDROCHLORIDE 40 MG/ML
INJECTION, SOLUTION RETROBULBAR; TOPICAL AS NEEDED
Status: DISCONTINUED | OUTPATIENT
Start: 2023-09-25 | End: 2023-09-25 | Stop reason: SURG

## 2023-09-25 RX ORDER — EPHEDRINE SULFATE 50 MG/ML
INJECTION, SOLUTION INTRAVENOUS AS NEEDED
Status: DISCONTINUED | OUTPATIENT
Start: 2023-09-25 | End: 2023-09-25 | Stop reason: SURG

## 2023-09-25 RX ORDER — ROCURONIUM BROMIDE 10 MG/ML
INJECTION, SOLUTION INTRAVENOUS AS NEEDED
Status: DISCONTINUED | OUTPATIENT
Start: 2023-09-25 | End: 2023-09-25 | Stop reason: SURG

## 2023-09-25 RX ORDER — ONDANSETRON 2 MG/ML
4 INJECTION INTRAMUSCULAR; INTRAVENOUS ONCE AS NEEDED
Status: DISCONTINUED | OUTPATIENT
Start: 2023-09-25 | End: 2023-09-25 | Stop reason: HOSPADM

## 2023-09-25 RX ORDER — DEXAMETHASONE SODIUM PHOSPHATE 4 MG/ML
INJECTION, SOLUTION INTRA-ARTICULAR; INTRALESIONAL; INTRAMUSCULAR; INTRAVENOUS; SOFT TISSUE AS NEEDED
Status: DISCONTINUED | OUTPATIENT
Start: 2023-09-25 | End: 2023-09-25 | Stop reason: SURG

## 2023-09-25 RX ORDER — PROMETHAZINE HYDROCHLORIDE 12.5 MG/1
25 TABLET ORAL ONCE AS NEEDED
Status: DISCONTINUED | OUTPATIENT
Start: 2023-09-25 | End: 2023-09-25 | Stop reason: HOSPADM

## 2023-09-25 RX ORDER — METOCLOPRAMIDE HYDROCHLORIDE 5 MG/ML
10 INJECTION INTRAMUSCULAR; INTRAVENOUS ONCE AS NEEDED
Status: COMPLETED | OUTPATIENT
Start: 2023-09-25 | End: 2023-09-25

## 2023-09-25 RX ORDER — SODIUM CHLORIDE, SODIUM LACTATE, POTASSIUM CHLORIDE, CALCIUM CHLORIDE 600; 310; 30; 20 MG/100ML; MG/100ML; MG/100ML; MG/100ML
9 INJECTION, SOLUTION INTRAVENOUS CONTINUOUS PRN
Status: DISCONTINUED | OUTPATIENT
Start: 2023-09-25 | End: 2023-09-25 | Stop reason: HOSPADM

## 2023-09-25 RX ORDER — ONDANSETRON 2 MG/ML
INJECTION INTRAMUSCULAR; INTRAVENOUS AS NEEDED
Status: DISCONTINUED | OUTPATIENT
Start: 2023-09-25 | End: 2023-09-25 | Stop reason: SURG

## 2023-09-25 RX ORDER — ACETAMINOPHEN 500 MG
1000 TABLET ORAL ONCE
Status: COMPLETED | OUTPATIENT
Start: 2023-09-25 | End: 2023-09-25

## 2023-09-25 RX ORDER — PHENYLEPHRINE HCL IN 0.9% NACL 1 MG/10 ML
SYRINGE (ML) INTRAVENOUS AS NEEDED
Status: DISCONTINUED | OUTPATIENT
Start: 2023-09-25 | End: 2023-09-25 | Stop reason: SURG

## 2023-09-25 RX ORDER — ALBUTEROL SULFATE 2.5 MG/3ML
2.5 SOLUTION RESPIRATORY (INHALATION) EVERY 6 HOURS PRN
Status: DISCONTINUED | OUTPATIENT
Start: 2023-09-25 | End: 2023-09-25 | Stop reason: HOSPADM

## 2023-09-25 RX ADMIN — FENTANYL CITRATE 100 MCG: 50 INJECTION, SOLUTION INTRAMUSCULAR; INTRAVENOUS at 12:35

## 2023-09-25 RX ADMIN — ONDANSETRON 4 MG: 2 INJECTION INTRAMUSCULAR; INTRAVENOUS at 13:55

## 2023-09-25 RX ADMIN — LIDOCAINE HYDROCHLORIDE 2.5 ML: 40 INJECTION, SOLUTION RETROBULBAR; TOPICAL at 12:37

## 2023-09-25 RX ADMIN — PROPOFOL 120 MG: 10 INJECTION, EMULSION INTRAVENOUS at 12:35

## 2023-09-25 RX ADMIN — SODIUM CHLORIDE, POTASSIUM CHLORIDE, SODIUM LACTATE AND CALCIUM CHLORIDE 9 ML/HR: 600; 310; 30; 20 INJECTION, SOLUTION INTRAVENOUS at 11:43

## 2023-09-25 RX ADMIN — SUGAMMADEX 200 MG: 100 INJECTION, SOLUTION INTRAVENOUS at 13:56

## 2023-09-25 RX ADMIN — ROCURONIUM BROMIDE 70 MG: 50 INJECTION INTRAVENOUS at 12:36

## 2023-09-25 RX ADMIN — ALBUTEROL SULFATE 2.5 MG: 2.5 SOLUTION RESPIRATORY (INHALATION) at 14:56

## 2023-09-25 RX ADMIN — EPHEDRINE SULFATE 10 MG: 50 INJECTION INTRAVENOUS at 13:52

## 2023-09-25 RX ADMIN — GLYCOPYRROLATE 0.2 MG: 0.2 INJECTION INTRAMUSCULAR; INTRAVENOUS at 12:46

## 2023-09-25 RX ADMIN — METOCLOPRAMIDE HYDROCHLORIDE 10 MG: 5 INJECTION INTRAMUSCULAR; INTRAVENOUS at 12:11

## 2023-09-25 RX ADMIN — Medication 200 MCG: at 13:38

## 2023-09-25 RX ADMIN — MIDAZOLAM HYDROCHLORIDE 2 MG: 1 INJECTION, SOLUTION INTRAMUSCULAR; INTRAVENOUS at 12:11

## 2023-09-25 RX ADMIN — SODIUM CHLORIDE, POTASSIUM CHLORIDE, SODIUM LACTATE AND CALCIUM CHLORIDE: 600; 310; 30; 20 INJECTION, SOLUTION INTRAVENOUS at 12:58

## 2023-09-25 RX ADMIN — DEXAMETHASONE SODIUM PHOSPHATE 8 MG: 4 INJECTION, SOLUTION INTRAMUSCULAR; INTRAVENOUS at 12:46

## 2023-09-25 RX ADMIN — ACETAMINOPHEN 1000 MG: 500 TABLET ORAL at 11:43

## 2023-09-25 RX ADMIN — EPHEDRINE SULFATE 15 MG: 50 INJECTION INTRAVENOUS at 13:02

## 2023-09-25 NOTE — ANESTHESIA PREPROCEDURE EVALUATION
Anesthesia Evaluation     Patient summary reviewed and Nursing notes reviewed   no history of anesthetic complications:   NPO Solid Status: > 8 hours  NPO Liquid Status: > 2 hours           Airway   Mallampati: I  TM distance: >3 FB  Neck ROM: full  No difficulty expected  Dental      Pulmonary - normal exam    breath sounds clear to auscultation  (+) COPD,  Cardiovascular - normal exam  Exercise tolerance: good (4-7 METS)    Rhythm: regular  Rate: normal    (+) hypertension, hyperlipidemia      Neuro/Psych  (+) psychiatric history  GI/Hepatic/Renal/Endo    (+) GERD, hepatitis, liver disease, renal disease, diabetes mellitus type 2, thyroid problem     Musculoskeletal     Abdominal    Substance History - negative use     OB/GYN negative ob/gyn ROS         Other   arthritis,     ROS/Med Hx Other: PAT Nursing Notes unavailable.                   Anesthesia Plan    ASA 3     general     (Patient understands anesthesia not responsible for dental damage.)  intravenous induction     Anesthetic plan, risks, benefits, and alternatives have been provided, discussed and informed consent has been obtained with: patient.    Use of blood products discussed with patient .    Plan discussed with CRNA.      CODE STATUS:

## 2023-09-25 NOTE — OP NOTE
Bronchoscopy Procedure Note    Procedure:    1.  Robotic bronchoscopy with Ion procedure  2. Robotic bronchoscopy with radial EBUS guided lung nodule aspiration  3. Robotic bronchoscopy with radial EBUS guided transbronchial biopsy left upper lobe lung nodule  4. Bronchoalveolar lavage left upper lobe with fluroscopy  5.  Endobronchial ultrasound-guided lymph node biopsy  5. Bronchial washings tracheobronchial tree  6. Airway inspection    Pre-Operative Diagnosis: Left upper lobe lung nodule    Post-Operative Diagnosis: Same, mildly enlarged mediastinal lymph nodes    Indication: Left upper lobe lung nodule    Anesthesia: General anesthesia, sedated and paralyzed with anesthesia    Procedure Details: Patient was consented for the procedure with all risks and benefits of the procedure explained in detail. Patient was given the opportunity to ask questions and all concerns were answered.  Patient was intubated with size 8.5 ET tube and placed on invasive mechanical ventilator.  Patient was sedated and paralyzed on invasive mechanical ventilator with anesthesia service.    Then regular bronchoscopy was used to suction the secretions through the ET tube.  ET tube was ending in mid trachea.  Robotic bronchoscopy with Ion was started with planning and registration of the patient, navigational mapping then procedure done with radial probe ultrasound and fluoroscopy.  The left upper lobe lung nodule was mapped and navigated with the robotic bronchoscopy.  Fine-needle aspiration biopsy and forceps biopsy were done of the left upper lobe lung nodule area with the help of navigational bronchoscopy, radial probe ultrasound and fluoroscopy.  Endobronchial brushing was done of left upper lobe lung nodule with fluoroscopy guidance and radial ultrasound.  Bronchoalveolar lavage was collected from left upper lobe area with injection of 20 cc saline.  Then scope was switched to endobronchial ultrasound bronchoscope (EBUS), which was  inserted into the main airway via the ET tube. An anatomical and mediastinal survey was done of the main airways and the subsegmental bronchus with EBUS scope.  Enlarged left hilar, borderline enlarged subcarinal and right hilar lymph nodes were seen. Transbronchial aspiration biopsy of lymph nodes were done starting at right hilar (10 R station) with 3 passes, then subcarinal (7 station) with 3 passes, left hilar (10 L station) with 4 passes. Then scope was changed to regular bronchoscope, airway inspection was done.  No active bleeding was seen.  Bronchial washing was obtained from entire tracheobronchial tree.  Post procedure no active bleeding was seen.    Estimated Blood Loss: 2 mL      Specimens:  -Fine-needle aspiration biopsy left upper lobe lung nodule  -Transbronchial forceps biopsy left upper lobe lung nodule  -Bronchial brushing left upper lobe lung nodule area  - Cytology specimen from left hilar, subcarinal and right hilar lymph node stations  -20 cc bronchoalveolar lavage left upper lobe  - Bronchial washing tracheobronchial tree      Complications: No complications during or after the procedure.    Disposition: To home, if stable in recovery. Follow up in clinic in a week.    Patient tolerated the procedure well.      Electronically signed by Chiki Morse MD, 09/25/23, 2:08 PM EDT.

## 2023-09-25 NOTE — ADDENDUM NOTE
Addendum  created 09/25/23 1855 by Steve Paniagua MD    Intraprocedure Event edited, Intraprocedure Staff edited

## 2023-09-25 NOTE — ANESTHESIA POSTPROCEDURE EVALUATION
Patient: Odalis Espino    Procedure Summary       Date: 09/25/23 Room / Location: [facility] OR 04 / [facility] MAIN OR    Anesthesia Start: 1231 Anesthesia Stop: 1417    Procedure: BRONCHOSCOPY WITH ION ROBOT, REBUS, EBUS, NEEDLE ASPIRATE, BIOPSIES, WASHINGS, BRUSHINGS, BAL (Bronchus) Diagnosis:       Lung nodule      Smoking greater than 40 pack years      Centrilobular emphysema      (Lung nodule [R91.1])      (Smoking greater than 40 pack years [F17.210])      (Centrilobular emphysema [J43.2])    Surgeons: Chiki Morse MD Provider: Steve Paniagua MD    Anesthesia Type: general ASA Status: 3            Anesthesia Type: general    Vitals  Vitals Value Taken Time   /70 09/25/23 1515   Temp 36.6 °C (97.8 °F) 09/25/23 1510   Pulse 82 09/25/23 1515   Resp 16 09/25/23 1515   SpO2 95 % 09/25/23 1515           Post Anesthesia Care and Evaluation    Patient location during evaluation: bedside  Patient participation: complete - patient participated  Level of consciousness: awake  Pain management: adequate    Airway patency: patent  PONV Status: none  Cardiovascular status: acceptable and stable  Respiratory status: acceptable  Hydration status: acceptable    Comments: An Anesthesiologist personally participated in the most demanding procedures (including induction and emergence if applicable) in the anesthesia plan, monitored the course of anesthesia administration at frequent intervals and remained physically present and available for immediate diagnosis and treatment of emergencies.

## 2023-09-25 NOTE — DISCHARGE INSTRUCTIONS
DISCHARGE INSTRUCTIONS  BRONCHOSCOPY/  ENDOBRONCHIAL ULTRASOUND      For your procedure you had:  General Anesthesia (you may have a sore throat for the first 24 hours)  You may experience dizziness, drowsiness, or lightheadedness for several hours following surgery/procedure.  Do not stay alone today or tonight.  Limit your activity for 24 hours.  You should not drive or operate machinery or drink alcohol for 24 hours or while you are taking pain medication.  You should not sign legally binding documents.  Resume your diet slowly.  Follow any special dietary instructions you may have been given by your doctor.  SPECIAL INSTRUCTIONS:    Use Incentive spirometer 10 times every hour while awake today.     Tylenol 1000 mg given at 11:43 am. Do not exceed 4000 mg in a 24 hour period.      Following your bronchoscopy, you may experience a mild sore throat or cough up small amounts of blood.  Extremes of either should be reported to your doctor.  If you have shortness of breath, chest pain, or fever over   degrees or any other signs of infection, you should notify your doctor.  Smokers are encouraged not to smoke for 6 to 8 hours after the procedure to decrease the risk of coughing and bleeding.  Nausea and vomiting can occur, but is not a common side effect of the procedure you have had today. If you experience any nausea or vomiting, take clear liquids only for the next meal.   Medications per physician instructions as indicated on Discharge Medication Information Sheet.  Missing your appointment/follow-up could lead to serious complications.  If you have an emergency and cannot reach your doctor, go to an Emergency Room nearest your home.

## 2023-09-26 LAB — NIGHT BLUE STAIN TISS: NORMAL

## 2023-09-27 LAB
BACTERIA SPEC AEROBE CULT: NO GROWTH
BACTERIA SPEC RESP CULT: NORMAL
CYTO UR: NORMAL
CYTO UR: NORMAL
GRAM STN SPEC: NORMAL
LAB AP CASE REPORT: NORMAL
LAB AP CASE REPORT: NORMAL
LAB AP CLINICAL INFORMATION: NORMAL
LAB AP CLINICAL INFORMATION: NORMAL
LAB AP SPECIAL STAINS: NORMAL
PATH REPORT.FINAL DX SPEC: NORMAL
PATH REPORT.FINAL DX SPEC: NORMAL
PATH REPORT.GROSS SPEC: NORMAL
PATH REPORT.GROSS SPEC: NORMAL

## 2023-10-03 ENCOUNTER — HOSPITAL ENCOUNTER (OUTPATIENT)
Dept: MRI IMAGING | Facility: HOSPITAL | Age: 73
Discharge: HOME | End: 2023-10-03
Admitting: PHYSICIAN ASSISTANT
Payer: MEDICARE

## 2023-10-03 DIAGNOSIS — M54.16 LUMBAR RADICULOPATHY: ICD-10-CM

## 2023-10-03 PROCEDURE — 72148 MRI LUMBAR SPINE W/O DYE: CPT

## 2023-10-04 ENCOUNTER — TELEPHONE (OUTPATIENT)
Dept: PULMONOLOGY | Facility: CLINIC | Age: 73
End: 2023-10-04

## 2023-10-04 NOTE — TELEPHONE ENCOUNTER
"    Caller: Odalis Espion \"[employer]\"    Relationship: Self    Best call back number: [phone]    Caller requesting test results: YES     What test was performed: BIOPSY     When was the test performed: 09/25/23    Where was the test performed: HOS[facility]ITAL    Additional notes: PT WOULD LIKE SOMEONE TO CALL HER WITH THE RESULTS OF HER BIOPSY.        "

## 2023-10-06 NOTE — PROGRESS NOTES
Primary Care Provider  Karina Langford MD   Referring Provider  No ref. provider found    Patient Complaint  Follow-up      SUBJECTIVE    History of Presenting Illness  Odalis Espino is a pleasant 73 y.o. female patient of Dr. Tena who presents to [facility] for follow-up after ion robotic bronchoscopy on 9/25/2023 due to lung nodule.  Patient is here for results.  Patient here with granddaughter.Tissue pathology and cytology have returned negative for malignant cells.  Culture results with no growth to date.  Patient is currently working on smoking cessation and is down to half a pack a day.  Patient is scheduled for PFT and 6-minute walk tomorrow 10/11/2023 however patient states she is going to reschedule this at this time she has a conflict.  Patient currently is using albuterol inhaler as needed for shortness of air or wheeze.  Patient states she may use it 2 or 3 times a week.  At present time she is not having any dyspnea, coughing, wheezing, headaches, chest pain, weight loss or hemoptysis. Denies fevers, chills and night sweats. Odalis Espino is able to perform ADLs without difficulties and denies any swollen glands/lymph nodes in the head or neck.    I have personally reviewed the review of systems, past family, social, medical and surgical histories; and agree with their findings.    Review of Systems  Constitutional symptoms:  Denied complaints   Ear, nose, throat: Denied complaints  Cardiovascular:  Denied complaints  Respiratory: Denied complaints  Gastrointestinal: Denied complaints  Musculoskeletal: Denied complaints    Family History   Problem Relation Age of Onset    Cancer Father     Cancer Sister     Diabetes Brother     Asthma Other     Seizures Other     Anxiety disorder Mother     Arthritis Daughter         Social History     Socioeconomic History    Marital status:    Tobacco Use    Smoking status: Every Day     Packs/day:  1.00     Years: 40.00     Additional pack years: 0.00     Total pack years: 40.00     Types: Cigarettes     Start date: 1966    Smokeless tobacco: Never   Vaping Use    Vaping Use: Never used   Substance and Sexual Activity    Alcohol use: Yes     Alcohol/week: 1.0 standard drink of alcohol     Types: 1 Glasses of wine per week     Comment: occasional    Drug use: Never    Sexual activity: Not Currently     Birth control/protection: None        Past Medical History:   Diagnosis Date    Anxiety     Arthritis     Diabetes mellitus     Hepatitis     HL (hearing loss)     Thyroiditis     Visual impairment         Immunization History   Administered Date(s) Administered    COVID-19 (MODERNA) 1st,2nd,3rd Dose Monovalent 03/17/2021, 04/15/2021, 11/03/2021    COVID-19 (PFIZER) BIVALENT 12+YRS 08/08/2023    Covid-19 (Pfizer) Gray Cap Monovalent 07/14/2022    Flu Vaccine Quad PF >36MO 10/09/2018    Fluzone (or Fluarix & Flulaval for VFC) >6mos 10/09/2018    Fluzone High Dose =>65 Years ([employer] ONLY) 11/13/2017, 07/17/2023    Fluzone High-Dose 65+yrs 11/11/2021    Pneumococcal Conjugate 13-Valent (PCV13) 10/18/2018    Pneumococcal Polysaccharide (PPSV23) 01/01/2016    Shingrix 07/17/2023    Tdap 07/17/2023       Allergies   Allergen Reactions    Azithromycin Hives          Current Outpatient Medications:     albuterol sulfate  (90 Base) MCG/ACT inhaler, Inhale 1 puff Every 4 (Four) Hours As Needed for Wheezing or Shortness of Air. Refill needed., Disp: 18 g, Rfl: 2    amLODIPine (NORVASC) 5 MG tablet, Take 1 tablet by mouth Daily., Disp: 90 tablet, Rfl: 1    atorvastatin (LIPITOR) 20 MG tablet, TAKE 1 TABLET EVERY NIGHT, Disp: 90 tablet, Rfl: 1    Blood Glucose Monitoring Suppl device, 1 each Daily., Disp: 1 each, Rfl: 0    gabapentin (NEURONTIN) 600 MG tablet, Take 1 tablet by mouth 3 (Three) Times a Day., Disp: 90 tablet, Rfl: 0    glucose blood test strip, Use as instructed to check blood sugar daily, Disp: 100  "each, Rfl: 3    hydroCHLOROthiazide (HYDRODIURIL) 12.5 MG tablet, Take 1 tablet by mouth Daily., Disp: 90 tablet, Rfl: 1    HYDROcodone-acetaminophen (NORCO)  MG per tablet, Take 3 tablets by mouth Every 8 (Eight) Hours As Needed., Disp: , Rfl:     hydrOXYzine pamoate (VISTARIL) 25 MG capsule, Take 1 capsule by mouth 3 (Three) Times a Day As Needed for Itching or Anxiety., Disp: 100 capsule, Rfl: 2    Lancet Device misc, 1 each Daily. Use as directed; check blood sugar once daily, Disp: 100 each, Rfl: 3    Lancets (OneTouch Delica Plus Faopyb87S) misc, , Disp: , Rfl:     levothyroxine (SYNTHROID, LEVOTHROID) 88 MCG tablet, Take 1 tablet by mouth Daily., Disp: 90 tablet, Rfl: 1    meloxicam (MOBIC) 15 MG tablet, TAKE 1 TABLET DAILY, Disp: 90 tablet, Rfl: 1    metFORMIN (GLUCOPHAGE) 500 MG tablet, TAKE 2 TABLETS TWICE A DAY, Disp: 360 tablet, Rfl: 1    omeprazole (priLOSEC) 20 MG capsule, TAKE 1 CAPSULE DAILY, Disp: 90 capsule, Rfl: 1    sertraline (ZOLOFT) 100 MG tablet, TAKE 1 TABLET DAILY, Disp: 90 tablet, Rfl: 0    varenicline (CHANTIX) 1 MG tablet, Take 1 tablet by mouth 2 (Two) Times a Day., Disp: 60 tablet, Rfl: 4    vitamin B-12 (CYANOCOBALAMIN) 1000 MCG tablet, Take 1 tablet by mouth Daily., Disp: , Rfl:            Vital Signs   /94 (BP Location: Right arm, Patient Position: Sitting, Cuff Size: Adult)   Pulse 58   Temp 97.1 øF (36.2 øC) (Temporal)   Resp 18   Ht 162.6 cm (64\")   Wt 58.4 kg (128 lb 12.8 oz)   SpO2 98% Comment: room air  BMI 22.11 kg/mý       OBJECTIVE    Physical Exam  Vital Signs Reviewed   WDWN, Alert, NAD.    HEENT:  PERRL, EOMI.  OP, nares clear  Neck:  Supple, no JVD, no thyromegaly  Chest:  good aeration, clear to auscultation bilaterally, tympanic to percussion bilaterally, no work of breathing noted  CV: RRR, no MGR, pulses 2+, equal.  Abd:  Soft, NT, ND, + BS, no HSM  EXT:  no clubbing, no cyanosis, no edema  Neuro:  A&Ox3, CN grossly intact, no focal " deficits.  Skin: No rashes or lesions noted    Results Review  I have personally reviewed the prior office notes, hospital records, labs, and diagnostics.  Non-gynecologic Cytology: [id number]  Order: [id number]  Status: Final result       Visible to patient: Yes (seen)       Next appt: 10/10/2023 at 10:30 AM in Pulmonology (Estela Ramirez, [facility])       Dx: Lung nodule; Centrilobular emphysema;...    Specimen Information: A: Lung, Left Upper Lobe; Aspirate    C: Lung, Left Upper Lobe; Aspirate    D: Lung, Left Upper Lobe; Aspirate    E: Mediastinum; Aspirate    F: Mediastinum; Aspirate    G: Mediastinum; Aspirate    H: Bronchus; Aspirate   0 Result Notes      Component    Case Report   Medical Cytology Report                           Case: [id number]                                   Authorizing Provider:  Chiki Morse MD         Collected:           09/25/2023 01:20 PM           Ordering Location:     [facility] MAIN Received:            09/26/2023 10:59 AM                                  OR                                                                           Pathologist:           Zohra Montilla MD                                                     Specimens:   1) - Lung, Left Upper Lobe, LAURA NEEDLE ASPIRATE - CELL BLOCK                                        2) - Lung, Left Upper Lobe, LAURA BRUSHINGS                                                            3) - Lung, Left Upper Lobe, LAURA BAL                                                                  4) - Mediastinum, STATION 10R NEEDLE ASPIRATE                                                        5) - Mediastinum, STATION 10L NEEDLE ASPIRATE                                                        6) - Mediastinum, STATION 7 NEEDLE ASPIRATE                                                          7) - Bronchus, WASHINGS                                                                    Final Diagnosis   1.  Lung, left upper lobe,  FNA:               -Negative for malignant cells               -Abundant macrophages               -No granulomas        2. Lung, left upper lobe, bronchial brushing:               - Nondiagnostic (blood elements only)                    3. Lung, left upper lobe, BAL:               - Negative for malignant cells               - No viral inclusions  - GMS stain, negative for fungal forms and Pneumocystis                    4. Lymph node, station 10 R, FNA:               - Non diagnostic (lymphoid tissue not present)               - Sparse benign bronchial epithelial cells                    5. Lymph node, station 10 L, FNA:               - Negative for malignant cells               - Lymphoid tissue present               - Negative for granulomas        6. Lymph node, station 7, FNA:               - Negative for malignant cells               - Lymphoid tissue present               - Negative for granulomas        7. Lung, NOS, bronchial washing:               - Negative for malignant cells  - GMS stain, negative for fungal forms and Pneumocystis                       Comment: See also concurrently acquired biopsy [id number].   Electronically signed by Zohra Montilla MD on 9/27/2023 at 1111   Clinical Information    Left upper lobe lung nodule.     Check for pneumocystis and fungus.   Gross Description    1. Lung, Left Upper Lobe.  Ion-Guided Fine Needle Aspiration, left upper lobe       2 rapid Diff-Quik stained slides reviewed in OR by the cytotechnologist for the determination of cellular adequacy.  In addition to the 2 rapid Diff-Quik stained slides, 2 additional pap stained slides were collected in ETOH and a cytofixative vial containing multiple passes for cell block preparation are submitted for Cytology (a cell block is prepared in addition to the 4 total prepared smears).     2. Lung, Left Upper Lobe.  Bronchial Brushings, left upper lobe       2 prepared smears received in ETOH.     3. Lung, Left  Upper Lobe.  BAL, left upper lobe       15 cc of bloody, slightly mucoid fluid received (1 cytospin prep and GMS-P/F prepared).     4. Mediastinum.  BFNA, Station 10R Needle Aspirate       40 cc total volume in cytofixative received (1 cytospin prep and a cell block prepared).     5. Mediastinum.  BFNA, Station 10L Needle Aspirate       42 cc total volume in cytofixative received (1 cytospin prep and a cell block prepared).     6. Mediastinum.  BFNA, Station 7 Needle Aspirate       42 cc total volume in cytofixative received (1 cytospin prep and a cell block prepared).     7. Bronchus.  Bronchial Washings       52 cc of bloody fluid received (1 cytospin prep and GMS-P/F prepared).      Microscopic Description    Microscopic examination performed.   Special Stains    Comment:  A special stain for GMS was performed to aid in the detection of fungal forms.  Controls are appropriate.      Resulting Agency [facility]              Specimen Collected: 09/25/23 13:20 EDT Last Resulted: 09/27/23 11:11 EDT             Tissue Pathology Exam: [id number]  Order: [id number]  Status: Final result       Visible to patient: Yes (seen)       Next appt: 10/10/2023 at 10:30 AM in Pulmonology ([facility] Bell)       Dx: Lung nodule; Centrilobular emphysema;...    Specimen Information: Lung, Left Upper Lobe; Tissue   0 Result Notes      Component    Case Report   Surgical Pathology Report                         Case: [id number]                                   Authorizing Provider:  Chiki Morse MD         Collected:           09/25/2023 01:32 PM           Ordering Location:     [facility] MAIN Received:            09/26/2023 09:55 AM                                  OR                                                                           Pathologist:           Zohra Montilla MD                                                     Specimen:    Lung, Left Upper Lobe, LAURA BIOPSIES                                  "                       Clinical Information    Lung nodule  Smoking greater than 40 pack years  Centrilobular emphysema   Final Diagnosis   Lung, left upper lobe, biopsy:               -Few small fragments of benign pulmonary parenchyma with focal fibrosis and abundant intra-alveolar macrophages                -Fragments of fibrin and blood               -Negative for viral inclusions, granulomas and malignancy     Comment: See also concurrently acquired cytology case [id number].   Electronically signed by Zohra Montilla MD on 9/27/2023 at 1112   Gross Description    1. Lung, Left Upper Lobe.  Received in formalin and labeled \" left upper lobe\" is a 0.7 cm aggregate of tan soft tissue fragments. The specimen is entirely submitted in one cassette.   YUNIER   Microscopic Description    Microscopic examination performed.   Resulting Agency [facility]              Specimen Collected: 09/25/23 13:32 EDT Last Resulted: 09/27/23 11:12 EDT             Gram Stain - No Culture  Order: [id number]  Status: Final result       Visible to patient: Yes (seen)       Next appt: 10/10/2023 at 10:30 AM in Pulmonology ([facility] Bell)       Dx: Lung nodule; Centrilobular emphysema;...    Specimen Information: Lung, Left Upper Lobe; Brushing   0 Result Notes  Gram Stain No organisms seen              Resulting Agency: [facility]           Specimen Collected: 09/25/23 13:37 EDT Last Resulted: 09/25/23 15:42 EDT         AFB Stain - No Culture - Brushing, Lung, Left Upper Lobe  Order: [id number]  Status: Final result       Visible to patient: Yes (seen)       Next appt: 10/10/2023 at 10:30 AM in Pulmonology ([facility] Bell)       Dx: Lung nodule; Centrilobular emphysema;...    Specimen Information: Lung, Left Upper Lobe; Brushing   0 Result Notes  AFB Stain No acid fast bacilli seen              Resulting Agency: [facility]           Specimen Collected: 09/25/23 13:37 EDT Last Resulted: 09/26/23 10:49 EDT         Fungus Culture - " Lavage, Lung, Left Upper Lobe  Order: [id number]  Status: Preliminary result       Visible to patient: No (not released)       Next appt: 10/10/2023 at 10:30 AM [facility]       Dx: Lung nodule; Centrilobular emphysema;...    Specimen Information: Lung, Left Upper Lobe; Lavage   0 Result Notes  Fungus Culture No fungus isolated at 1 week           Resulting Agency: [facility]           Specimen Collected: 09/25/23 13:39 EDT Last Resulted: 10/02/23 14:30 EDT             AFB Culture - Lavage, Lung, Left Upper Lobe  Order: [id number]  Status: Preliminary result       Visible to patient: No (not released)       Next appt: 10/10/2023 at 10:30 AM in Pulmonology (Estela Mabelor, [facility])       Dx: Lung nodule; Centrilobular emphysema;...    Specimen Information: Lung, Left Upper Lobe; Lavage   0 Result Notes  AFB Culture No AFB isolated at 1 week               AFB Stain No acid fast bacilli seen on direct smear      No acid fast bacilli seen on concentrated smear              Resulting Agency: [facility]           Specimen Collected: 09/25/23 13:39 EDT Last Resulted: 10/02/23 14:30 EDT         BAL Culture, Quantitative - Lavage, Lung, Left Upper Lobe  Order: [id number]  Status: Final result       Visible to patient: Yes (seen)       Next appt: 10/10/2023 at 10:30 AM in Pulmonology (Estela James, APRN)       Dx: Lung nodule; Centrilobular emphysema;...    Specimen Information: Lung, Left Upper Lobe; Lavage   0 Result Notes  BAL Culture   Lab   No growth  [facility]               Gram Stain  Lab   Rare (1+) WBCs seen [facility]      No organisms seen [facility]                    Specimen Collected: 09/25/23 13:39 EDT Last Resulted: 09/27/23 07:11 EDT         Pneumonia Panel - Lavage, Lung, Left Upper Lobe  Order: [id number]  Status: Final result       Visible to patient: Yes (seen)       Next appt: 10/10/2023 at 10:30 AM in Pulmonology (Estela James, APRN)       Dx: Lung nodule; Centrilobular emphysema;...     Specimen Information: Lung, Left Upper Lobe; Lavage   0 Result Notes      Component  Ref Range & Units    Escherichia coli PCR  Not Detected Not Detected   Acinetobacter calcoaceticus-baumannii complex PCR  Not Detected Not Detected   Enterobacter cloacae PCR  Not Detected Not Detected   Klebsiella oxytoca PCR  Not Detected Not Detected   Klebsiella pneumoniae group PCR  Not Detected Not Detected   Klebsiella aerogenes PCR  Not Detected Not Detected   Moraxella catarrhalis PCR  Not Detected Not Detected   Proteus species PCR  Not Detected Not Detected   Pseudomonas aeroginosa PCR  Not Detected Not Detected   Serratia marcescens PCR  Not Detected Not Detected   Staphylococcus aureus PCR  Not Detected Not Detected   Streptococcus pyogenes PCR  Not Detected Not Detected   Haemophilus influenzae PCR  Not Detected Not Detected   Streptococcus agalactiae PCR  Not Detected Not Detected   Streptococcus pneumoniae PCR  Not Detected Not Detected   Chlamydophila pneumoniae PCR  Not Detected Not Detected   Legionella pneumophilia PCR  Not Detected Not Detected   Mycoplasma pneumo by PCR  Not Detected Not Detected   ADENOVIRUS, PCR  Not Detected Not Detected   CTX-M Gene  Not Detected, N/A N/A   IMP Gene  Not Detected, N/A N/A   KPC Gene  Not Detected, N/A N/A   mecA/C and MREJ Gene  Not Detected, N/A N/A   NDM Gene  Not Detected, N/A N/A   OXA-48-like Gene  Not Detected, N/A N/A   VIM Gene  Not Detected, N/A N/A   Coronavirus  Not Detected Not Detected   Human Metapneumovirus  Not Detected Not Detected   Human Rhinovirus/Enterovirus  Not Detected Not Detected   Influenza A PCR  Not Detected Not Detected   Influenza B PCR  Not Detected Not Detected   RSV, PCR  Not Detected Not Detected   Parainfluenza virus PCR  Not Detected Not Detected   Resulting Agency [facility]              Specimen Collected: 09/25/23 13:39 EDT Last Resulted: 09/25/23 16:12 EDT         Bronch Wash/BAL Differential - Lavage, Lung, Left Upper Lobe  Order:  [id number]  Status: Final result       Visible to patient: Yes (seen)       Next appt: 10/10/2023 at 10:30 AM in Pulmonology (Estela Mabelor, [facility])       Dx: Lung nodule; Centrilobular emphysema;...    Specimen Information: Lung, Left Upper Lobe; Lavage   0 Result Notes      Component  Ref Range & Units 11 d ago   Visual Presence of Blood Large/3+   Lymphocytes, Fluid  % 21   Neutrophils, Fluid  % 76   Eosinophils, Fluid  % 2   Bronchial Lining Cells  % 1   Resulting Agency [facility]           Narrative  Performed by: [facility]  Normal Adults (Nonsmokers)    Alveolar Macrophages       >85%  Lymphocytes              10-15%  Neutrophils              <or=3%  Eosinophils              <or=1%  Bronchial Lining Cells   <or=5%      Clinical Interpretations for BAL    Eosinophils >or=25%       Eosinophilic Pneumoniae  Lymphocytes >or=50%       Consider Drug Rxn,Acute HP  Bloody lavage             Diffuse Alveolar Hemorrhage  Other Findings            Specific Dx or Non-diagnostic      Specimen Collected: 09/25/23 13:39 EDT Last Resulted: 09/25/23 15:17 EDT         Fungus Culture - Wash, Bronchus  Order: [id number]  Status: Preliminary result       Visible to patient: No (not released)       Next appt: 10/10/2023 at 10:30 AM [facility]       Dx: Lung nodule; Centrilobular emphysema;...    Specimen Information: Bronchus; Wash   0 Result Notes  Fungus Culture No fungus isolated at 1 week           Resulting Agency: [facility]           Specimen Collected: 09/25/23 13:57 EDT Last Resulted: 10/02/23 14:45 EDT         AFB Culture - Wash, Bronchus  Order: [id number]  Status: Preliminary result       Visible to patient: No (not released)       Next appt: 10/10/2023 at 10:30 AM in Pulmonology (Estela James, APRN)       Dx: Lung nodule; Centrilobular emphysema;...    Specimen Information: Bronchus; Wash   0 Result Notes  AFB Culture No AFB isolated at 1 week               AFB Stain No acid fast bacilli seen on  direct smear      No acid fast bacilli seen on concentrated smear              Resulting Agency: [facility]           Specimen Collected: 09/25/23 13:57 EDT Last Resulted: 10/02/23 14:45 EDT             Respiratory Culture - Wash, Bronchus  Order: [id number]  Status: Final result       Visible to patient: Yes (seen)       Next appt: 10/10/2023 at 10:30 AM in Pulmonology (Estela Ramirez, APRN)       Dx: Lung nodule; Centrilobular emphysema;...    Specimen Information: Bronchus; Wash   0 Result Notes  Respiratory Culture   Lab   Scant growth (1+) Normal respiratory talia. No S. aureus or Pseudomonas aeruginosa detected. Final report.  [facility]               Gram Stain  Lab   Occasional Gram positive cocci in pairs [facility]      Rare (1+) WBCs seen [facility]                    Specimen Collected: 09/25/23 13:57 EDT Last Resulted: 09/27/23 07:11 EDT         CT Chest Without Contrast Diagnostic [RHL285] (Order [id number])  Order  Status: Final result     Appointment Information    Display Notes    CT ION Bronch                  PACS Images     Radiology Images    Study Result    Narrative & Impression   PROCEDURE:  CT CHEST WO CONTRAST DIAGNOSTIC     COMPARISON: [facility], PET, NM PET/CT SKULL BASE TO MID THIGH, 8/28/2023, 14:44.    [facility], CT, CT CHEST LOW DOSE CANCER SCREENING WO, 8/01/2023, 10:56.     INDICATIONS:  lung nodule, pre-op bronch     TECHNIQUE:    CT images were created without the administration of contrast material.       PROTOCOL:     Standard imaging protocol performed                 RADIATION:      DLP: 86 mGy*cm               Automated exposure control was utilized to minimize radiation dose.      FINDINGS:          Lung window images reveal moderate centrilobular emphysema.       8 mm noncalcified nodule in the left upper lobe, best seen on series 3, image 177; series 4, image   71; and series 5, image 61.      Moderate coronary artery calcifications are evident.      The thyroid gland remains diffusely enlarged.     Degenerative changes are seen in the thoracic spine.     IMPRESSION:                 CT scan of the chest without IV contrast demonstrating 8 mm noncalcified nodule in the left upper   lobe, unchanged.            GOSIA HEARD MD         Electronically Signed and Approved By: GOSIA HEARD MD on 9/25/2023 at 12:24     AFB Culture - Lavage, Lung, Left Upper Lobe  Order: [id number]  Status: Preliminary result       Visible to patient: No (not released)       Next appt: 10/11/2023 at 09:30 AM in Respiratory Therapy ([facility] ROOM 2)       Dx: Lung nodule; Centrilobular emphysema;...    Specimen Information: Lung, Left Upper Lobe; Lavage   0 Result Notes  AFB Culture No AFB isolated at 2 weeks               AFB Stain No acid fast bacilli seen on direct smear      No acid fast bacilli seen on concentrated smear              Resulting Agency: [facility]           Specimen Collected: 09/25/23 13:39 EDT Last Resulted: 10/09/23 14:30 EDT         Fungus Culture - Lavage, Lung, Left Upper Lobe  Order: [id number]  Status: Preliminary result       Visible to patient: No (not released)       Next appt: 10/11/2023 at 09:30 AM in Respiratory Therapy ([facility] ROOM 2)       Dx: Lung nodule; Centrilobular emphysema;...    Specimen Information: Lung, Left Upper Lobe; Lavage   0 Result Notes  Fungus Culture No fungus isolated at 2 weeks           Resulting Agency: [facility]           Specimen Collected: 09/25/23 13:39 EDT Last Resulted: 10/09/23 14:30 EDT           ASSESSMENT         Patient Active Problem List   Diagnosis    Encounter for annual wellness exam in Medicare patient    Degeneration of lumbar intervertebral disc    Acquired hypothyroidism    Anxiety    Essential hypertension    Other hyperlipidemia    Type 2 diabetes mellitus without complication, without long-term current use of insulin    Gastroesophageal reflux disease without esophagitis    Stage 3b  chronic kidney disease (CKD)    Hypothyroidism due to Hashimoto's thyroiditis    Tobacco abuse    Smoking greater than 40 pack years    Lung nodule    Centrilobular emphysema       Encounter Diagnoses   Name Primary?    Centrilobular emphysema Yes    Lung nodule     Smoking greater than 40 pack years       PLAN  -We will schedule patient for follow-up CT in 6 months to monitor status of pulmonary nodule  -Patient states she will reschedule her PFT and 6-minute walk  -We will notify patient of AFB and fungal culture final results when available  -Patient to continue working on smoking cessation  -Patient to continue with albuterol inhaler as needed for shortness of air or wheeze  -Patient will follow-up in 6 months or sooner if needed  Diagnoses and all orders for this visit:    1. Centrilobular emphysema (Primary)    2. Lung nodule  -     CT Chest Without Contrast; Future    3. Smoking greater than 40 pack years           Smoking status:current  Vaccination status:up to date  Medications personally reviewed    Follow Up  Return in about 6 months (around 4/10/2024) for after CT scan.    Patient was given instructions and counseling regarding her condition or for health maintenance advice. Please see specific information pulled into the AVS if appropriate.

## 2023-10-10 ENCOUNTER — OFFICE VISIT (OUTPATIENT)
Dept: PULMONOLOGY | Facility: CLINIC | Age: 73
End: 2023-10-10
Payer: MEDICARE

## 2023-10-10 VITALS
WEIGHT: 128.8 LBS | SYSTOLIC BLOOD PRESSURE: 150 MMHG | TEMPERATURE: 97.1 F | BODY MASS INDEX: 21.99 KG/M2 | OXYGEN SATURATION: 98 % | RESPIRATION RATE: 18 BRPM | DIASTOLIC BLOOD PRESSURE: 94 MMHG | HEART RATE: 58 BPM | HEIGHT: 64 IN

## 2023-10-10 DIAGNOSIS — F17.210 SMOKING GREATER THAN 40 PACK YEARS: ICD-10-CM

## 2023-10-10 DIAGNOSIS — J43.2 CENTRILOBULAR EMPHYSEMA: Primary | ICD-10-CM

## 2023-10-10 DIAGNOSIS — R91.1 LUNG NODULE: ICD-10-CM

## 2023-10-10 PROCEDURE — 1160F RVW MEDS BY RX/DR IN RCRD: CPT | Performed by: NURSE PRACTITIONER

## 2023-10-10 PROCEDURE — 3080F DIAST BP >= 90 MM HG: CPT | Performed by: NURSE PRACTITIONER

## 2023-10-10 PROCEDURE — 1159F MED LIST DOCD IN RCRD: CPT | Performed by: NURSE PRACTITIONER

## 2023-10-10 PROCEDURE — 99214 OFFICE O/P EST MOD 30 MIN: CPT | Performed by: NURSE PRACTITIONER

## 2023-10-10 PROCEDURE — 3077F SYST BP >= 140 MM HG: CPT | Performed by: NURSE PRACTITIONER

## 2023-10-11 LAB — FUNGUS WND CULT: ABNORMAL

## 2023-10-13 ENCOUNTER — HOSPITAL ENCOUNTER (OUTPATIENT)
Dept: MRI IMAGING | Facility: HOSPITAL | Age: 73
Discharge: HOME | End: 2023-10-13
Admitting: FAMILY MEDICINE
Payer: MEDICARE

## 2023-10-13 DIAGNOSIS — R94.2 ABNORMAL PET OF LEFT LUNG: ICD-10-CM

## 2023-10-13 LAB
CREAT BLDA-MCNC: 1.4 MG/DL
EGFRCR SERPLBLD CKD-EPI 2021: 39.3 ML/MIN/1.73

## 2023-10-13 PROCEDURE — 73720 MRI LWR EXTREMITY W/O&W/DYE: CPT

## 2023-10-13 PROCEDURE — A9577 INJ MULTIHANCE: HCPCS | Performed by: FAMILY MEDICINE

## 2023-10-13 PROCEDURE — 0 GADOBENATE DIMEGLUMINE 529 MG/ML SOLUTION: Performed by: FAMILY MEDICINE

## 2023-10-13 PROCEDURE — 82565 ASSAY OF CREATININE: CPT

## 2023-10-13 RX ADMIN — GADOBENATE DIMEGLUMINE 10 ML: 529 INJECTION, SOLUTION INTRAVENOUS at 11:19

## 2023-10-18 LAB — FUNGUS WND CULT: ABNORMAL

## 2023-10-19 DIAGNOSIS — M51.36 DEGENERATION OF LUMBAR INTERVERTEBRAL DISC: ICD-10-CM

## 2023-10-19 DIAGNOSIS — M54.41 CHRONIC RIGHT-SIDED LOW BACK PAIN WITH RIGHT-SIDED SCIATICA: ICD-10-CM

## 2023-10-19 DIAGNOSIS — G89.29 CHRONIC RIGHT-SIDED LOW BACK PAIN WITH RIGHT-SIDED SCIATICA: ICD-10-CM

## 2023-10-20 DIAGNOSIS — M54.41 CHRONIC RIGHT-SIDED LOW BACK PAIN WITH RIGHT-SIDED SCIATICA: ICD-10-CM

## 2023-10-20 DIAGNOSIS — G89.29 CHRONIC RIGHT-SIDED LOW BACK PAIN WITH RIGHT-SIDED SCIATICA: ICD-10-CM

## 2023-10-20 RX ORDER — GABAPENTIN 600 MG/1
600 TABLET ORAL 3 TIMES DAILY
Qty: 270 TABLET | Refills: 1 | Status: SHIPPED | OUTPATIENT
Start: 2023-10-20

## 2023-10-20 RX ORDER — MELOXICAM 15 MG/1
15 TABLET ORAL DAILY
Qty: 90 TABLET | Refills: 1 | Status: SHIPPED | OUTPATIENT
Start: 2023-10-20

## 2023-10-23 LAB — FUNGUS WND CULT: NORMAL

## 2023-10-25 LAB — FUNGUS WND CULT: ABNORMAL

## 2023-10-27 ENCOUNTER — OFFICE VISIT (OUTPATIENT)
Dept: NEUROSURGERY | Facility: CLINIC | Age: 73
End: 2023-10-27
Payer: MEDICARE

## 2023-10-27 VITALS
WEIGHT: 127 LBS | HEART RATE: 71 BPM | SYSTOLIC BLOOD PRESSURE: 141 MMHG | DIASTOLIC BLOOD PRESSURE: 72 MMHG | BODY MASS INDEX: 21.68 KG/M2 | HEIGHT: 64 IN

## 2023-10-27 DIAGNOSIS — M51.27 HERNIATED NUCLEUS PULPOSUS, L5-S1: ICD-10-CM

## 2023-10-27 DIAGNOSIS — M54.41 CHRONIC MIDLINE LOW BACK PAIN WITH RIGHT-SIDED SCIATICA: ICD-10-CM

## 2023-10-27 DIAGNOSIS — M48.062 SPINAL STENOSIS, LUMBAR REGION, WITH NEUROGENIC CLAUDICATION: Primary | ICD-10-CM

## 2023-10-27 DIAGNOSIS — M48.061 FORAMINAL STENOSIS OF LUMBAR REGION: ICD-10-CM

## 2023-10-27 DIAGNOSIS — M47.816 FACET ARTHROPATHY, LUMBAR: ICD-10-CM

## 2023-10-27 DIAGNOSIS — G89.29 CHRONIC MIDLINE LOW BACK PAIN WITH RIGHT-SIDED SCIATICA: ICD-10-CM

## 2023-10-27 NOTE — PROGRESS NOTES
"Chief Complaint  Back Pain, Leg Pain (Right leg to greater toe), Hip Pain (Bilateral ), Numbness (Lower back, right hip to knee ), and Tingling (Lower back, right hip to knee )    Subjective          Odalis Espino who is a 73 y.o. year old female who presents to [facility] for Evaluation of the Spine.     The patient complains of pain located in the Lumbar Spine.  Patients states the pain has been present for a long time.  The pain came on gradually.  The pain scaled level is 8.  The pain does radiate. Dermatomes are located on right Lumbar at: L4 and to the big toe..  The pain is constant and waxing/waning and described as sharp, dull, and aching.  The pain is worse at no particular time of day. Patient states  lidocaine cream makes the pain better.  Patient states Prolonged Sitting and standing or walking makes the pain worse.    Associated Symptoms Include: Numbness, Tingling, and Weakness in the right leg. She denies loss of bowel or bladder control.  Conservative Interventions Include: Physical Therapy that was ineffective. Pain medication, Gabapentin and NSAID are somewhat effective.    Was this the result of an injury or accident? : No    History of Previous Spinal Surgery?: No     reports that she has been smoking cigarettes. She started smoking about 59 years ago. She has a 40.00 pack-year smoking history. She has never used smokeless tobacco.    Review of Systems   Musculoskeletal:  Positive for back pain.        Objective   Vital Signs:   /72 (BP Location: Right arm, Patient Position: Sitting, Cuff Size: Adult)   Pulse 71   Ht 162.6 cm (64\")   Wt 57.6 kg (127 lb)   BMI 21.80 kg/m²       Physical Exam  Constitutional:       Appearance: Normal appearance.   Pulmonary:      Effort: Pulmonary effort is normal.   Musculoskeletal:         General: Tenderness (right lumbar area) present.      Comments: SLR on the right is positive   Neurological:      " General: No focal deficit present.      Mental Status: She is alert and oriented to person, place, and time.      Sensory: No sensory deficit.      Motor: No weakness.      Deep Tendon Reflexes: Reflexes normal.   Psychiatric:         Mood and Affect: Mood normal.         Behavior: Behavior normal.        Neurologic Exam     Mental Status   Oriented to person, place, and time.        Result Review     I have personally reviewed the MRI of the lumbar spine without contrast from 10/3/2023 which shows multilevel spondylosis and facet arthritis.  There is severe central canal stenosis at L5-S1 with severe bilateral foraminal stenosis.  There is severe right foraminal narrowing at L4-L5 and L3-L4.  There is severe left foraminal narrowing at L2-L3.  There is moderate to severe central canal stenosis at the L1-L2 level.     Assessment and Plan    Diagnoses and all orders for this visit:    1. Spinal stenosis, lumbar region, with neurogenic claudication (Primary)    2. Herniated nucleus pulposus, L5-S1    3. Foraminal stenosis of lumbar region    4. Facet arthropathy, lumbar    5. Chronic midline low back pain with right-sided sciatica    She has pain in the right leg to the big toe.    The worse change in the spine is severe stenosis at L5-S1 and severe bilateral foraminal stenosis at this level. She does have some severe right foraminal narrowing at L4-L5 as well.    I expect that both of these levels could be contributing to the right leg pain.    She has failed PT in the past and more recently spinal epidurals have been ineffective.    She would like to discuss a surgical approach with Dr. Ford.    She will follow-up in the next several weeks on the earliest available Thursday appointment in [place] to discuss surgery with Dr. Ford.    Follow Up   Return in about 2 weeks (around 11/10/2023) for First available Thursday to discuss surgery.  Patient was given instructions and counseling regarding her  condition or for health maintenance advice. Please see specific information pulled into the AVS if appropriate.

## 2023-11-06 LAB
MYCOBACTERIUM SPEC CULT: NORMAL
MYCOBACTERIUM SPEC CULT: NORMAL
NIGHT BLUE STAIN TISS: NORMAL

## 2023-11-09 ENCOUNTER — OFFICE VISIT (OUTPATIENT)
Dept: NEUROSURGERY | Facility: CLINIC | Age: 73
End: 2023-11-09
Payer: MEDICARE

## 2023-11-09 VITALS
BODY MASS INDEX: 21.51 KG/M2 | HEIGHT: 64 IN | WEIGHT: 126 LBS | SYSTOLIC BLOOD PRESSURE: 138 MMHG | DIASTOLIC BLOOD PRESSURE: 74 MMHG | HEART RATE: 69 BPM

## 2023-11-09 DIAGNOSIS — M54.41 CHRONIC MIDLINE LOW BACK PAIN WITH RIGHT-SIDED SCIATICA: ICD-10-CM

## 2023-11-09 DIAGNOSIS — M48.062 SPINAL STENOSIS, LUMBAR REGION, WITH NEUROGENIC CLAUDICATION: Primary | ICD-10-CM

## 2023-11-09 DIAGNOSIS — G89.29 CHRONIC MIDLINE LOW BACK PAIN WITH RIGHT-SIDED SCIATICA: ICD-10-CM

## 2023-11-09 DIAGNOSIS — M47.816 FACET ARTHROPATHY, LUMBAR: ICD-10-CM

## 2023-11-09 DIAGNOSIS — M51.27 HERNIATED NUCLEUS PULPOSUS, L5-S1: ICD-10-CM

## 2023-11-09 DIAGNOSIS — M48.061 FORAMINAL STENOSIS OF LUMBAR REGION: ICD-10-CM

## 2023-11-09 NOTE — PROGRESS NOTES
Patient being seen for today for Follow-up  .    Subjective    Odalis Espino is a 73 y.o. female that presents with Follow-up  .    HPI  Previously: Last seen on 10/27/2020 for plaints of right leg pain to the big toe.  She had severe central canal stenosis at L5-S1 with severe bilateral foraminal stenosis.  She also had severe right foraminal stenosis at L4-L5.  She failed physical therapy and spinal epidurals.  She wanted to discuss a surgical approach with Dr. Ford.    Today: She continues to have pain in the right leg to the big toe.     reports that she has been smoking cigarettes. She started smoking about 59 years ago. She has a 20.00 pack-year smoking history. She has never used smokeless tobacco.    Review of Systems   Musculoskeletal:  Positive for back pain.       Objective   Vitals:    11/09/23 1406   BP: 138/74   Pulse: 69        Physical Exam  Constitutional:       Appearance: Normal appearance. She is normal weight.   Pulmonary:      Effort: Pulmonary effort is normal.   Musculoskeletal:         General: Tenderness (midline and right lumbar area) present.      Comments: SLR positive on the right   Neurological:      General: No focal deficit present.      Mental Status: She is alert and oriented to person, place, and time.      Sensory: No sensory deficit.      Motor: Weakness (right ankle flexion) present.      Deep Tendon Reflexes: Reflexes normal.   Psychiatric:         Mood and Affect: Mood normal.         Behavior: Behavior normal.          Result Review   I have again reviewed the MRI of lumbar spine without contrast from 10/3/2023 which shows severe right foraminal stenosis at L4-L5 and L5-S1 with severe central canal stenosis at L5-S1.     Assessment and Plan {CC Problem List  Visit Diagnosis  ROS  Review (Popup)  Health Maintenance  Quality  BestPractice  Medications  SmartSets  SnapShot Encounters  Media :23}   Diagnoses and all orders for this visit:    1. Spinal stenosis,  lumbar region, with neurogenic claudication (Primary)  -     Case Request; Standing  -     Follow Anesthesia Guidelines / Protocol; Standing  -     Obtain informed consent; Standing  -     SCD (sequential compression device)- to be placed on patient in Pre-op; Standing  -     Verify / Perform Chlorhexidine Skin Prep; Standing  -     ceFAZolin (ANCEF) 2 g in sodium chloride 0.9 % 100 mL IVPB  -     Case Request    2. Herniated nucleus pulposus, L5-S1    3. Foraminal stenosis of lumbar region    4. Facet arthropathy, lumbar    5. Chronic midline low back pain with right-sided sciatica    She continues to have pain to the right big toe.    She has severe central canal stenosis at L5-S1 along with severe right foraminal stenosis. There is also foraminal stenosis on the right at L4-L5 to a lesser degree.    She would like to discuss a possible surgical approach with Dr. Ford today.  Follow Up {Instructions Charge Capture  Follow-up Communications :23}   Return for Surgery and post-op.     She has notable back pain and right leg pain to the foot. She has right foraminal narrowing at L4-5 and L5-S1 with moderate canal stenosis at L4-5 and severe stenosis at L5-S1. She would like to pursue surgery. We will arrange for right L4-5 and L5-S1 minimally invasive laminectomy.

## 2023-11-10 ENCOUNTER — TELEPHONE (OUTPATIENT)
Dept: FAMILY MEDICINE CLINIC | Age: 73
End: 2023-11-10
Payer: MEDICARE

## 2023-11-14 ENCOUNTER — TELEPHONE (OUTPATIENT)
Dept: NEUROSURGERY | Facility: CLINIC | Age: 73
End: 2023-11-14
Payer: MEDICARE

## 2023-11-15 NOTE — TELEPHONE ENCOUNTER
----- Message from Garry Ford MD sent at 11/9/2023  3:15 PM EST -----  Please schedule for 2 level MIL    
Left voicemail for patient to call the office.  
Patient returned call and surgery is scheduled for 12-15-23.  
no radiation

## 2023-11-22 PROBLEM — M48.062 SPINAL STENOSIS, LUMBAR REGION, WITH NEUROGENIC CLAUDICATION: Status: ACTIVE | Noted: 2023-11-09

## 2023-11-29 ENCOUNTER — HOSPITAL ENCOUNTER (OUTPATIENT)
Dept: MAMMOGRAPHY | Facility: HOSPITAL | Age: 73
Discharge: HOME | End: 2023-11-29
Admitting: FAMILY MEDICINE
Payer: MEDICARE

## 2023-11-29 DIAGNOSIS — Z12.31 ENCOUNTER FOR SCREENING MAMMOGRAM FOR BREAST CANCER: ICD-10-CM

## 2023-11-29 PROCEDURE — 77067 SCR MAMMO BI INCL CAD: CPT

## 2023-11-29 PROCEDURE — 77063 BREAST TOMOSYNTHESIS BI: CPT

## 2023-11-30 ENCOUNTER — TELEPHONE (OUTPATIENT)
Dept: NEUROSURGERY | Facility: CLINIC | Age: 73
End: 2023-11-30
Payer: MEDICARE

## 2023-11-30 NOTE — TELEPHONE ENCOUNTER
Just need you to add that risks and benefits of surgery were discussed to last office note before submitting for approval to insurance.

## 2023-12-14 NOTE — PRE-PROCEDURE INSTRUCTIONS
IMPORTANT INSTRUCTIONS - PRE-ADMISSION TESTING  DO NOT EAT OR CHEW anything after midnight the night before your procedure.    You may have SIPS NO RED CLEAR liquids up to _2_____ hours prior to ARRIVAL time.   Take the following medications the morning of your procedure with JUST A SIP OF WATER:  ALBUTEROL INHALER IF NEEDED AND BRING WITH YOU, AMLODIPINE, GABAPENTIN, HYDROCODONE IF NEEDED, HYDROXYZINE IF NEEDED,LEVOTHYROXINE, OMEPRAZOLE, SERTRALINE_______________________________________________________________________________________________________________________________________________________________________________________    DO NOT BRING your medications to the hospital with you, UNLESS something has changed since your PRE-Admission Testing appointment.  Hold all vitamins, supplements, and NSAIDS (Non- steroidal anti-inflammatory meds) for one week prior to surgery (you MAY take Tylenol or Acetaminophen).  If you are diabetic, check your blood sugar the morning of your procedure. If it is less than 70 or if you are feeling symptomatic, call the following number for further instructions: [phone]_.  Use your inhalers/nebulizers as usual, the morning of your procedure. BRING YOUR INHALERS with you.   Bring your CPAP or BIPAP to hospital, ONLY IF YOU WILL BE SPENDING THE NIGHT.   Make sure you have a ride home and have someone who will stay with you the day of your procedure after you go home.  If you have any questions, please call your Pre-Admission Testing Nurse, ___DAWMAHENDRA_____________ at [phone]_________.   Per anesthesia request, do not smoke for 24 hours before your procedure or as instructed by your surgeon.    BATHING INSTRUCTIONS GIVEN. NO JEWELRY DAY OF PROCEDURE. NO NAIL POLISH UPPER OR LOWER EXTREMITIES.  ENTRANCE A, ELEVATOR A, 3RD FLOOR DAY OF PROCEDURE  NO SMOKING 24 HOURS PRIOR TO PROCEDURE  NO METFORMIN AFTER 6 PM ON 12/14/23

## 2023-12-15 ENCOUNTER — ANESTHESIA (OUTPATIENT)
Dept: PERIOP | Facility: HOSPITAL | Age: 73
End: 2023-12-15
Payer: MEDICARE

## 2023-12-15 ENCOUNTER — ANESTHESIA EVENT (OUTPATIENT)
Dept: PERIOP | Facility: HOSPITAL | Age: 73
End: 2023-12-15
Payer: MEDICARE

## 2023-12-15 ENCOUNTER — APPOINTMENT (OUTPATIENT)
Dept: GENERAL RADIOLOGY | Facility: HOSPITAL | Age: 73
End: 2023-12-15
Payer: MEDICARE

## 2023-12-15 ENCOUNTER — HOSPITAL ENCOUNTER (OUTPATIENT)
Facility: HOSPITAL | Age: 73
Setting detail: HOSPITAL OUTPATIENT SURGERY
Discharge: HOME | End: 2023-12-15
Attending: NEUROLOGICAL SURGERY | Admitting: NEUROLOGICAL SURGERY
Payer: MEDICARE

## 2023-12-15 VITALS
SYSTOLIC BLOOD PRESSURE: 117 MMHG | RESPIRATION RATE: 20 BRPM | HEIGHT: 65 IN | HEART RATE: 64 BPM | WEIGHT: 127.65 LBS | TEMPERATURE: 97.9 F | OXYGEN SATURATION: 95 % | DIASTOLIC BLOOD PRESSURE: 60 MMHG | BODY MASS INDEX: 21.27 KG/M2

## 2023-12-15 DIAGNOSIS — M48.062 SPINAL STENOSIS, LUMBAR REGION, WITH NEUROGENIC CLAUDICATION: ICD-10-CM

## 2023-12-15 LAB
ANION GAP SERPL CALCULATED.3IONS-SCNC: 10.1 MMOL/L (ref 5–15)
BUN SERPL-MCNC: 19 MG/DL (ref 8–23)
BUN/CREAT SERPL: 15.6 (ref 7–25)
CALCIUM SPEC-SCNC: 9.4 MG/DL (ref 8.6–10.5)
CHLORIDE SERPL-SCNC: 98 MMOL/L (ref 98–107)
CO2 SERPL-SCNC: 29.9 MMOL/L (ref 22–29)
CREAT SERPL-MCNC: 1.22 MG/DL (ref 0.57–1)
EGFRCR SERPLBLD CKD-EPI 2021: 46.4 ML/MIN/1.73
GLUCOSE SERPL-MCNC: 126 MG/DL (ref 65–99)
POTASSIUM SERPL-SCNC: 3.2 MMOL/L (ref 3.5–5.2)
SODIUM SERPL-SCNC: 138 MMOL/L (ref 136–145)

## 2023-12-15 PROCEDURE — 25010000002 CEFAZOLIN IN DEXTROSE 2000 MG/ 100 ML SOLUTION: Performed by: NEUROLOGICAL SURGERY

## 2023-12-15 PROCEDURE — 76000 FLUOROSCOPY <1 HR PHYS/QHP: CPT

## 2023-12-15 PROCEDURE — 25010000002 ONDANSETRON PER 1 MG

## 2023-12-15 PROCEDURE — 25010000002 PROPOFOL 10 MG/ML EMULSION

## 2023-12-15 PROCEDURE — S0260 H&P FOR SURGERY: HCPCS | Performed by: NEUROLOGICAL SURGERY

## 2023-12-15 PROCEDURE — 25010000002 MIDAZOLAM PER 1MG: Performed by: ANESTHESIOLOGY

## 2023-12-15 PROCEDURE — 63048 LAM FACETEC &FORAMOT EA ADDL: CPT | Performed by: NEUROLOGICAL SURGERY

## 2023-12-15 PROCEDURE — 25810000003 LACTATED RINGERS PER 1000 ML: Performed by: ANESTHESIOLOGY

## 2023-12-15 PROCEDURE — 25010000002 FENTANYL CITRATE (PF) 50 MCG/ML SOLUTION

## 2023-12-15 PROCEDURE — 25010000002 DEXAMETHASONE PER 1 MG

## 2023-12-15 PROCEDURE — 80048 BASIC METABOLIC PNL TOTAL CA: CPT | Performed by: ANESTHESIOLOGY

## 2023-12-15 PROCEDURE — 93005 ELECTROCARDIOGRAM TRACING: CPT | Performed by: ANESTHESIOLOGY

## 2023-12-15 PROCEDURE — 63047 LAM FACETEC & FORAMOT LUMBAR: CPT | Performed by: NEUROLOGICAL SURGERY

## 2023-12-15 PROCEDURE — 25010000002 SUGAMMADEX 200 MG/2ML SOLUTION

## 2023-12-15 RX ORDER — PROMETHAZINE HYDROCHLORIDE 25 MG/1
25 SUPPOSITORY RECTAL ONCE AS NEEDED
Status: DISCONTINUED | OUTPATIENT
Start: 2023-12-15 | End: 2023-12-15 | Stop reason: HOSPADM

## 2023-12-15 RX ORDER — PROPOFOL 10 MG/ML
VIAL (ML) INTRAVENOUS AS NEEDED
Status: DISCONTINUED | OUTPATIENT
Start: 2023-12-15 | End: 2023-12-15 | Stop reason: SURG

## 2023-12-15 RX ORDER — ONDANSETRON 2 MG/ML
INJECTION INTRAMUSCULAR; INTRAVENOUS AS NEEDED
Status: DISCONTINUED | OUTPATIENT
Start: 2023-12-15 | End: 2023-12-15 | Stop reason: SURG

## 2023-12-15 RX ORDER — ROCURONIUM BROMIDE 10 MG/ML
INJECTION, SOLUTION INTRAVENOUS AS NEEDED
Status: DISCONTINUED | OUTPATIENT
Start: 2023-12-15 | End: 2023-12-15 | Stop reason: SURG

## 2023-12-15 RX ORDER — HYDROCODONE BITARTRATE AND ACETAMINOPHEN 5; 325 MG/1; MG/1
1 TABLET ORAL EVERY 4 HOURS PRN
Qty: 30 TABLET | Refills: 0 | Status: SHIPPED | OUTPATIENT
Start: 2023-12-15

## 2023-12-15 RX ORDER — ACETAMINOPHEN 500 MG
1000 TABLET ORAL ONCE
Status: COMPLETED | OUTPATIENT
Start: 2023-12-15 | End: 2023-12-15

## 2023-12-15 RX ORDER — CEFAZOLIN SODIUM 2 G/100ML
2 INJECTION, SOLUTION INTRAVENOUS ONCE
Status: COMPLETED | OUTPATIENT
Start: 2023-12-15 | End: 2023-12-15

## 2023-12-15 RX ORDER — LIDOCAINE HYDROCHLORIDE 20 MG/ML
INJECTION, SOLUTION EPIDURAL; INFILTRATION; INTRACAUDAL; PERINEURAL AS NEEDED
Status: DISCONTINUED | OUTPATIENT
Start: 2023-12-15 | End: 2023-12-15 | Stop reason: SURG

## 2023-12-15 RX ORDER — LIDOCAINE HYDROCHLORIDE AND EPINEPHRINE 10; 10 MG/ML; UG/ML
INJECTION, SOLUTION INFILTRATION; PERINEURAL AS NEEDED
Status: DISCONTINUED | OUTPATIENT
Start: 2023-12-15 | End: 2023-12-15 | Stop reason: HOSPADM

## 2023-12-15 RX ORDER — DEXMEDETOMIDINE HYDROCHLORIDE 100 UG/ML
INJECTION, SOLUTION INTRAVENOUS AS NEEDED
Status: DISCONTINUED | OUTPATIENT
Start: 2023-12-15 | End: 2023-12-15 | Stop reason: SURG

## 2023-12-15 RX ORDER — FENTANYL CITRATE 50 UG/ML
INJECTION, SOLUTION INTRAMUSCULAR; INTRAVENOUS AS NEEDED
Status: DISCONTINUED | OUTPATIENT
Start: 2023-12-15 | End: 2023-12-15 | Stop reason: SURG

## 2023-12-15 RX ORDER — PROMETHAZINE HYDROCHLORIDE 12.5 MG/1
25 TABLET ORAL ONCE AS NEEDED
Status: DISCONTINUED | OUTPATIENT
Start: 2023-12-15 | End: 2023-12-15 | Stop reason: HOSPADM

## 2023-12-15 RX ORDER — MIDAZOLAM HYDROCHLORIDE 2 MG/2ML
1 INJECTION, SOLUTION INTRAMUSCULAR; INTRAVENOUS ONCE
Status: COMPLETED | OUTPATIENT
Start: 2023-12-15 | End: 2023-12-15

## 2023-12-15 RX ORDER — EPHEDRINE SULFATE 50 MG/ML
INJECTION, SOLUTION INTRAVENOUS AS NEEDED
Status: DISCONTINUED | OUTPATIENT
Start: 2023-12-15 | End: 2023-12-15 | Stop reason: SURG

## 2023-12-15 RX ORDER — ONDANSETRON 2 MG/ML
4 INJECTION INTRAMUSCULAR; INTRAVENOUS ONCE AS NEEDED
Status: DISCONTINUED | OUTPATIENT
Start: 2023-12-15 | End: 2023-12-15 | Stop reason: HOSPADM

## 2023-12-15 RX ORDER — SODIUM CHLORIDE, SODIUM LACTATE, POTASSIUM CHLORIDE, CALCIUM CHLORIDE 600; 310; 30; 20 MG/100ML; MG/100ML; MG/100ML; MG/100ML
9 INJECTION, SOLUTION INTRAVENOUS CONTINUOUS PRN
Status: DISCONTINUED | OUTPATIENT
Start: 2023-12-15 | End: 2023-12-15 | Stop reason: HOSPADM

## 2023-12-15 RX ORDER — DEXAMETHASONE SODIUM PHOSPHATE 4 MG/ML
INJECTION, SOLUTION INTRA-ARTICULAR; INTRALESIONAL; INTRAMUSCULAR; INTRAVENOUS; SOFT TISSUE AS NEEDED
Status: DISCONTINUED | OUTPATIENT
Start: 2023-12-15 | End: 2023-12-15 | Stop reason: SURG

## 2023-12-15 RX ORDER — MAGNESIUM HYDROXIDE 1200 MG/15ML
LIQUID ORAL AS NEEDED
Status: DISCONTINUED | OUTPATIENT
Start: 2023-12-15 | End: 2023-12-15 | Stop reason: HOSPADM

## 2023-12-15 RX ORDER — OXYCODONE HYDROCHLORIDE 5 MG/1
5 TABLET ORAL
Status: DISCONTINUED | OUTPATIENT
Start: 2023-12-15 | End: 2023-12-15 | Stop reason: HOSPADM

## 2023-12-15 RX ADMIN — DEXMEDETOMIDINE 10 MCG: 100 INJECTION, SOLUTION, CONCENTRATE INTRAVENOUS at 11:33

## 2023-12-15 RX ADMIN — MIDAZOLAM HYDROCHLORIDE 1 MG: 1 INJECTION, SOLUTION INTRAMUSCULAR; INTRAVENOUS at 11:04

## 2023-12-15 RX ADMIN — CEFAZOLIN SODIUM 2 G: 2 INJECTION, SOLUTION INTRAVENOUS at 11:18

## 2023-12-15 RX ADMIN — SUGAMMADEX 200 MG: 100 INJECTION, SOLUTION INTRAVENOUS at 12:52

## 2023-12-15 RX ADMIN — DEXAMETHASONE SODIUM PHOSPHATE 4 MG: 4 INJECTION, SOLUTION INTRAMUSCULAR; INTRAVENOUS at 11:47

## 2023-12-15 RX ADMIN — LIDOCAINE HYDROCHLORIDE 50 MG: 20 INJECTION, SOLUTION EPIDURAL; INFILTRATION; INTRACAUDAL; PERINEURAL at 11:13

## 2023-12-15 RX ADMIN — FENTANYL CITRATE 25 MCG: 50 INJECTION, SOLUTION INTRAMUSCULAR; INTRAVENOUS at 11:40

## 2023-12-15 RX ADMIN — PROPOFOL 120 MG: 10 INJECTION, EMULSION INTRAVENOUS at 11:13

## 2023-12-15 RX ADMIN — ROCURONIUM BROMIDE 50 MG: 50 INJECTION INTRAVENOUS at 11:13

## 2023-12-15 RX ADMIN — FENTANYL CITRATE 25 MCG: 50 INJECTION, SOLUTION INTRAMUSCULAR; INTRAVENOUS at 11:34

## 2023-12-15 RX ADMIN — ONDANSETRON 4 MG: 2 INJECTION INTRAMUSCULAR; INTRAVENOUS at 11:47

## 2023-12-15 RX ADMIN — SODIUM CHLORIDE, POTASSIUM CHLORIDE, SODIUM LACTATE AND CALCIUM CHLORIDE 9 ML/HR: 600; 310; 30; 20 INJECTION, SOLUTION INTRAVENOUS at 09:47

## 2023-12-15 RX ADMIN — DEXMEDETOMIDINE 10 MCG: 100 INJECTION, SOLUTION, CONCENTRATE INTRAVENOUS at 11:40

## 2023-12-15 RX ADMIN — FENTANYL CITRATE 50 MCG: 50 INJECTION, SOLUTION INTRAMUSCULAR; INTRAVENOUS at 11:13

## 2023-12-15 RX ADMIN — EPHEDRINE SULFATE 15 MG: 50 INJECTION INTRAVENOUS at 11:56

## 2023-12-15 RX ADMIN — ACETAMINOPHEN 1000 MG: 500 TABLET ORAL at 09:47

## 2023-12-15 NOTE — H&P
[facility]   HISTORY AND PHYSICAL    Patient Name: Odalis Espino  : 1950  MRN: [id number]  Primary Care Physician:  Karina Langford MD  Date of admission: 12/15/2023    Subjective   Subjective     Chief Complaint: Right leg pain    History of Present Illness  73-year-old female with pain into the right greater than left leg.  She has lumbar 5-sacral 1 greater than lumbar 4-lumbar 5 stenosis with foraminal narrowing.  She has failed conservative interventions and is opting for decompression in an attempt to reduce the right leg pain.      Review of Systems   Musculoskeletal:  Positive for back pain (Right leg pain).        Personal History     Past Medical History:   Diagnosis Date    Anxiety     Arthritis     Centrilobular emphysema     Diabetes mellitus     AVERAGE 'S    GERD (gastroesophageal reflux disease)     Hepatitis     REPORTS OVER 30 YEARS AGO HAD TREATMENT AND DENIES ANY CURRENT ISSUES    HL (hearing loss)     Hyperlipidemia     Hypertension     Hypothyroidism     Lung nodule     (-)  ION BRONCH DONE 2023    Pain management     [facility]    Spinal stenosis     Thyroiditis     DENIES ANY CURRENT ISSUES    Visual impairment        Past Surgical History:   Procedure Laterality Date    BRONCHOSCOPY WITH ION ROBOTIC ASSIST N/A 2023    Procedure: BRONCHOSCOPY WITH ION ROBOT, REBUS, EBUS, NEEDLE ASPIRATE, BIOPSIES, WASHINGS, BRUSHINGS, BAL;  Surgeon: Chiki Morse MD;  Location: [facility] MAIN OR;  Service: Robotics - Pulmonary;  Laterality: N/A;    EYE SURGERY      HYSTERECTOMY      TONSILLECTOMY         Family History: family history includes Anxiety disorder in her mother; Arthritis in her daughter; Asthma in an other family member; Cancer in her father and sister; Diabetes in her brother; Seizures in an other family member. Otherwise pertinent FHx was reviewed and not pertinent to current issue.    Social History:  reports that she has been smoking cigarettes. She started  smoking about 59 years ago. She has a 10.00 pack-year smoking history. She has never used smokeless tobacco. She reports current alcohol use of about 1.0 standard drink of alcohol per week. She reports that she does not use drugs.    Home Medications:  Blood Glucose Monitoring Suppl, HYDROcodone-acetaminophen, Lancet Device, OneTouch Delica Plus [id number], albuterol sulfate HFA, amLODIPine, atorvastatin, gabapentin, glucose blood, hydrOXYzine pamoate, hydroCHLOROthiazide, levothyroxine, meloxicam, metFORMIN, omeprazole, sertraline, and vitamin B-12    Allergies:  Allergies   Allergen Reactions    Azithromycin Hives       Objective    Objective     Vitals:   Temp:  [97.1 °F (36.2 °C)] 97.1 °F (36.2 °C)  Heart Rate:  [63] 63  Resp:  [18] 18  BP: (144)/(65) 144/65    Physical Exam  Constitutional:       Appearance: She is normal weight.   Cardiovascular:      Comments: No edema  Pulmonary:      Effort: Pulmonary effort is normal.   Skin:     General: Skin is warm and dry.   Neurological:      Mental Status: She is alert.   Psychiatric:         Mood and Affect: Mood normal.       Assessment & Plan   Assessment / Plan     Brief Patient Summary:  Odalis Espino is a 73 y.o. female who has spinal and foraminal stenosis with right greater than left leg pain.    Active Hospital Problems:  Active Hospital Problems    Diagnosis     **Spinal stenosis, lumbar region, with neurogenic claudication      Plan:   OR today for minimally invasive lumbar laminectomy, right approach, lumbar 4-lumbar 5 and lumbar 5-sacral 1.  Risk and benefits discussed with patient.  Desires to proceed.    DVT prophylaxis:  Mechanical DVT prophylaxis orders are present.    CODE STATUS:       Admission Status:  I believe this patient meets outpatient status.    Garry Ford MD

## 2023-12-15 NOTE — DISCHARGE INSTRUCTIONS
DISCHARGE INSTRUCTIONS  DISCECTOMY/ LAMINECTOMY  [] MINIMALLY INVASIVE      For your surgery you had:  General anesthesia (you may have a sore throat for the first 24 hours)      You may experience dizziness, drowsiness, or light-headedness for several hours following surgery  Do not stay alone today or tonight.  Limit your activity for 24 hours.  You should not drive, operate machinery, drink alcohol, or sign legally binding documents for 24 hours or while you are taking pain medication.  Activity  For the first two weeks following surgery, remain close to home and do not do any lifting, bending or strenuous activity.  Walking is the best exercise and you can increase the amount you walk as you feel like it.  Riding in a car for short distances (15-20 minutes) is okay but do not drive until you are off all narcotic medications.  If you have a sedentary job, you may resume work as soon as you feel like it (this is rarely less than one week).  Pain Control  Take your pain medicines as needed and as prescribed.  As you are feeling better, you can decrease the prescribed pain medication and take over-the-counter medications such as Tylenol or Ibuprofen.  The prescribed pain medicines tend to be constipating so it is important to eat a well-balanced diet and take a stool softener if recommended by the doctor.  Activity such as walking also helps keep your bowels regular.  Last dose of pain medication was given at:    0947am 1000mg of Tylenol given. Do not exceed 4000mg in 24hours. Incision Care  Your sutures are under the skin and will dissolve in time.  You may shower as soon as you like.    [] You have a clear dressing, which you should remove in 3-4 days.  Beneath this dressing are steri-strips that will peel off over time.  If these steri-strips have not peeled off in 10-14 days, you may remove them.  [] Your incision is closed with skin glue, it remains in place for 5-10 days and naturally sheds itself. By this  time your wound should be healed.    Gently washing your incision with mild soap and rinsing with water during your shower is all you need to do to care for the incision.  Do not scrub the incision or sit in the bathtub.  Check your incision site each day to see how it looks.  Some redness and bruising is normal for the first few days.  NOTIFY YOUR DOCTOR IF YOU EXPERIENCE ANY OF THE FOLLOWING:   You have a fever over 100.8o Fahrenheit orally  Shaking chills  Your incision is red, hot to touch, or has excessive drainage  Your incision starts to separate  Increase in bleeding or bleeding that is excessive  Nausea, vomiting and/or pain not controlled by prescribed medications  Unable to urinate in 6 hours after surgery  You may contact 's clinic at [phone] with any questions or concerns.  If unable to reach your doctor, please go to the closest emergency room.

## 2023-12-15 NOTE — ANESTHESIA PREPROCEDURE EVALUATION
Anesthesia Evaluation     Patient summary reviewed and Nursing notes reviewed   no history of anesthetic complications:   NPO Solid Status: > 8 hours  NPO Liquid Status: > 2 hours           Airway   Mallampati: I  TM distance: >3 FB  Neck ROM: full  No difficulty expected  Dental    (+) edentulous    Pulmonary - normal exam    breath sounds clear to auscultation  (+) a smoker Current, COPD,  Cardiovascular - normal exam  Exercise tolerance: good (4-7 METS)    ECG reviewed  Rhythm: regular  Rate: normal    (+) hypertension, hyperlipidemia      Neuro/Psych- negative ROS  GI/Hepatic/Renal/Endo    (+) hepatitis, renal disease- CRI, diabetes mellitus, thyroid problem     Musculoskeletal     Abdominal    Substance History      OB/GYN          Other        ROS/Med Hx Other: PAT Nursing Notes unavailable.                Anesthesia Plan    ASA 3     general     (Patient understands anesthesia not responsible for dental damage.)  intravenous induction     Anesthetic plan, risks, benefits, and alternatives have been provided, discussed and informed consent has been obtained with: patient.    CODE STATUS:

## 2023-12-15 NOTE — OP NOTE
LUMBAR LAMINECTOMY DISCECTOMY MINIMALLY INVASIVE  Procedure Report    Patient Name:  Odalis Espino  YOB: 1950    Date of Surgery:  12/15/2023     Indications: Lumbar 4-lumbar 5 and lumbar 5-sacral 1 stenosis with right greater than left leg pain    Pre-op Diagnosis:   Spinal stenosis, lumbar region, with neurogenic claudication [M48.062]       Post-Op Diagnosis Codes:     * Spinal stenosis, lumbar region, with neurogenic claudication [M48.062]    Procedure/CPT® Codes:  63227, 40353    Procedure(s):  MINIMALLY INVASIVE LUMBAR LAMINECTOMY, right approach, lumbar 4-lumbar  5 and lumbar 5-sacral 1    Staff:  Surgeon(s):  Garry Ford MD    Assistant: Osorio Kowalski    Anesthesia: General    Estimated Blood Loss:  30 mL      Specimen:          Lamina and ligament (none to pathology)        Findings: Lateral recess stenosis at both levels    Complications: No apparent intraoperative complications    Description of Procedure: After informed consent was obtained, the patient was brought to the operating room.  After induction of adequate general endotracheal anesthesia the patient was placed in the prone position on the Logan table utilizing the [place] frame.  All pressure points were padded.  The back was prepped and draped in the typical fashion.  The midline was marked and a line approximately 2-1/2 cm to the right of midline was drawn.  On this line the L3-4 and L4-5 interspaces were localized using a spinal needle and the C arm.  A 2 cm incision was then made over each level.  The Boss tubular retractor system was used to dilate the tissue docking at L4-5.  The level was again confirmed with fluoroscopy.  The microscope was brought in and used for the remainder of the case for improved magnification and illumination.  The lamina was cleared of soft tissue and the Kerrison punches were used to remove the lamina above the insertion of the ligamentum flavum.  The ligamentum flavum was then resected  inferiorly and laterally undercutting the medial facet.  After undercutting the medial facet and ligament, the ball probe passed freely along the right L5 nerve root.  The tubular retractor was tilted medially to allow exposure of the patient's left lateral recess.  The ligament was removed from its insertion at L5 and the left lateral recess was then undercut using the Kerrison punches until a ball probe passed freely along the left L5 nerve root.  After decompression at the L4-5 level the tubular tractor was removed and replaced at the L3-4 level.  The identical procedure was performed.  At both levels there was seem to be right greater than left lateral recess stenosis.  After decompression was felt to be adequate, hemostasis was obtained using the bipolar electrocautery as well as Gelfoam.  The wound was irrigated with normal saline.    The tubular retractor was removed and hemostasis assured in the soft tissue.  The incision was reapproximated using interrupted 0 Vicryl in the fascia and a 2-0 Vicryl in the subcutaneous tissue.  The wound was dressed with Mastisol, Steri-Strips, Telfa and a Tegaderm.     Assistant: Osorio Kowalski  was responsible for performing the following activities: Retraction, Suction, and Placing Dressing and their skilled assistance was necessary for the success of this case.    aGrry Ford MD     Date: 12/15/2023  Time: 12:58 EST

## 2023-12-15 NOTE — ANESTHESIA POSTPROCEDURE EVALUATION
Patient: Odalis Espino    Procedure Summary       Date: 12/15/23 Room / Location: [facility] OR 05 / [facility]    Anesthesia Start: 1108 Anesthesia Stop: 1302    Procedure: MINIMALLY INVASIVE LUMBAR LAMINECTOMY, right approach, lumbar 4-lumbar  5 and lumbar 5-sacral 1 (Right: Back) Diagnosis:       Spinal stenosis, lumbar region, with neurogenic claudication      (Spinal stenosis, lumbar region, with neurogenic claudication [M48.062])    Surgeons: Garry Ford MD Provider: Steve Paniagua MD    Anesthesia Type: general ASA Status: 3            Anesthesia Type: general    Vitals  Vitals Value Taken Time   /50 12/15/23 1330   Temp     Pulse 64 12/15/23 1333   Resp 19 12/15/23 1325   SpO2 93 % 12/15/23 1333   Vitals shown include unfiled device data.        Post Anesthesia Care and Evaluation    Patient location during evaluation: bedside  Patient participation: complete - patient participated  Level of consciousness: awake  Pain management: adequate    Airway patency: patent  PONV Status: none  Cardiovascular status: acceptable and stable  Respiratory status: acceptable  Hydration status: acceptable    Comments: An Anesthesiologist personally participated in the most demanding procedures (including induction and emergence if applicable) in the anesthesia plan, monitored the course of anesthesia administration at frequent intervals and remained physically present and available for immediate diagnosis and treatment of emergencies.

## 2023-12-16 ENCOUNTER — HOSPITAL ENCOUNTER (EMERGENCY)
Facility: HOSPITAL | Age: 73
Discharge: HOME | End: 2023-12-17
Attending: EMERGENCY MEDICINE
Payer: MEDICARE

## 2023-12-16 DIAGNOSIS — M54.50 ACUTE LOW BACK PAIN, UNSPECIFIED BACK PAIN LATERALITY, UNSPECIFIED WHETHER SCIATICA PRESENT: Primary | ICD-10-CM

## 2023-12-16 LAB
ALBUMIN SERPL-MCNC: 3.9 G/DL (ref 3.5–5.2)
ALBUMIN/GLOB SERPL: 1.2 G/DL
ALP SERPL-CCNC: 71 U/L (ref 39–117)
ALT SERPL W P-5'-P-CCNC: 5 U/L (ref 1–33)
ANION GAP SERPL CALCULATED.3IONS-SCNC: 15.5 MMOL/L (ref 5–15)
AST SERPL-CCNC: 13 U/L (ref 1–32)
BASOPHILS # BLD AUTO: 0.03 10*3/MM3 (ref 0–0.2)
BASOPHILS NFR BLD AUTO: 0.3 % (ref 0–1.5)
BILIRUB SERPL-MCNC: 0.4 MG/DL (ref 0–1.2)
BUN SERPL-MCNC: 16 MG/DL (ref 8–23)
BUN/CREAT SERPL: 16.3 (ref 7–25)
CALCIUM SPEC-SCNC: 9.1 MG/DL (ref 8.6–10.5)
CHLORIDE SERPL-SCNC: 93 MMOL/L (ref 98–107)
CO2 SERPL-SCNC: 27.5 MMOL/L (ref 22–29)
CREAT SERPL-MCNC: 0.98 MG/DL (ref 0.57–1)
DEPRECATED RDW RBC AUTO: 41.8 FL (ref 37–54)
EGFRCR SERPLBLD CKD-EPI 2021: 60.3 ML/MIN/1.73
EOSINOPHIL # BLD AUTO: 0.02 10*3/MM3 (ref 0–0.4)
EOSINOPHIL NFR BLD AUTO: 0.2 % (ref 0.3–6.2)
ERYTHROCYTE [DISTWIDTH] IN BLOOD BY AUTOMATED COUNT: 13.3 % (ref 12.3–15.4)
GLOBULIN UR ELPH-MCNC: 3.2 GM/DL
GLUCOSE SERPL-MCNC: 228 MG/DL (ref 65–99)
HCT VFR BLD AUTO: 27.6 % (ref 34–46.6)
HGB BLD-MCNC: 9.1 G/DL (ref 12–15.9)
HOLD SPECIMEN: NORMAL
HOLD SPECIMEN: NORMAL
IMM GRANULOCYTES # BLD AUTO: 0.05 10*3/MM3 (ref 0–0.05)
IMM GRANULOCYTES NFR BLD AUTO: 0.4 % (ref 0–0.5)
LYMPHOCYTES # BLD AUTO: 0.76 10*3/MM3 (ref 0.7–3.1)
LYMPHOCYTES NFR BLD AUTO: 6.5 % (ref 19.6–45.3)
MCH RBC QN AUTO: 28.3 PG (ref 26.6–33)
MCHC RBC AUTO-ENTMCNC: 33 G/DL (ref 31.5–35.7)
MCV RBC AUTO: 86 FL (ref 79–97)
MONOCYTES # BLD AUTO: 1.08 10*3/MM3 (ref 0.1–0.9)
MONOCYTES NFR BLD AUTO: 9.3 % (ref 5–12)
NEUTROPHILS NFR BLD AUTO: 83.3 % (ref 42.7–76)
NEUTROPHILS NFR BLD AUTO: 9.69 10*3/MM3 (ref 1.7–7)
NRBC BLD AUTO-RTO: 0 /100 WBC (ref 0–0.2)
PLATELET # BLD AUTO: 143 10*3/MM3 (ref 140–450)
PMV BLD AUTO: 9.5 FL (ref 6–12)
POTASSIUM SERPL-SCNC: 2.9 MMOL/L (ref 3.5–5.2)
PROT SERPL-MCNC: 7.1 G/DL (ref 6–8.5)
QT INTERVAL: 427 MS
QTC INTERVAL: 434 MS
RBC # BLD AUTO: 3.21 10*6/MM3 (ref 3.77–5.28)
SODIUM SERPL-SCNC: 136 MMOL/L (ref 136–145)
WBC NRBC COR # BLD AUTO: 11.63 10*3/MM3 (ref 3.4–10.8)
WHOLE BLOOD HOLD COAG: NORMAL
WHOLE BLOOD HOLD SPECIMEN: NORMAL

## 2023-12-16 PROCEDURE — 80053 COMPREHEN METABOLIC PANEL: CPT | Performed by: EMERGENCY MEDICINE

## 2023-12-16 PROCEDURE — 85025 COMPLETE CBC W/AUTO DIFF WBC: CPT

## 2023-12-16 PROCEDURE — 96372 THER/PROPH/DIAG INJ SC/IM: CPT

## 2023-12-16 PROCEDURE — 25010000002 DEXAMETHASONE SODIUM PHOSPHATE 10 MG/ML SOLUTION: Performed by: EMERGENCY MEDICINE

## 2023-12-16 PROCEDURE — 99283 EMERGENCY DEPT VISIT LOW MDM: CPT

## 2023-12-16 PROCEDURE — 25010000002 KETOROLAC TROMETHAMINE PER 15 MG: Performed by: EMERGENCY MEDICINE

## 2023-12-16 RX ORDER — SODIUM CHLORIDE 0.9 % (FLUSH) 0.9 %
10 SYRINGE (ML) INJECTION AS NEEDED
Status: DISCONTINUED | OUTPATIENT
Start: 2023-12-16 | End: 2023-12-17 | Stop reason: HOSPADM

## 2023-12-16 RX ORDER — KETOROLAC TROMETHAMINE 30 MG/ML
30 INJECTION, SOLUTION INTRAMUSCULAR; INTRAVENOUS ONCE
Status: COMPLETED | OUTPATIENT
Start: 2023-12-16 | End: 2023-12-16

## 2023-12-16 RX ORDER — DEXAMETHASONE SODIUM PHOSPHATE 10 MG/ML
10 INJECTION, SOLUTION INTRAMUSCULAR; INTRAVENOUS ONCE
Status: COMPLETED | OUTPATIENT
Start: 2023-12-16 | End: 2023-12-16

## 2023-12-16 RX ADMIN — DEXAMETHASONE SODIUM PHOSPHATE 10 MG: 10 INJECTION INTRAMUSCULAR; INTRAVENOUS at 23:51

## 2023-12-16 RX ADMIN — KETOROLAC TROMETHAMINE 30 MG: 30 INJECTION, SOLUTION INTRAMUSCULAR; INTRAVENOUS at 23:52

## 2023-12-17 VITALS
TEMPERATURE: 99.6 F | RESPIRATION RATE: 22 BRPM | BODY MASS INDEX: 22.55 KG/M2 | WEIGHT: 135.36 LBS | DIASTOLIC BLOOD PRESSURE: 88 MMHG | HEART RATE: 72 BPM | OXYGEN SATURATION: 93 % | SYSTOLIC BLOOD PRESSURE: 145 MMHG | HEIGHT: 65 IN

## 2023-12-17 NOTE — ED PROVIDER NOTES
Time: 11:12 PM EST  Date of encounter:  12/16/2023  Independent Historian/Clinical History and Information was obtained by:   Patient and Family    History is limited by: N/A    Chief Complaint: Back pain      History of Present Illness:  Patient is a 73 y.o. year old female who presents to the emergency department for evaluation of back pain.  Reports that she had surgery yesterday by Dr. Ford.  States that she was doing okay yesterday but today started having worsening back pain radiating down both her legs.  Has had significant pain which is caused her to not be able to walk and she also feels weak in her legs.  She also states that she had a fall trying to get to the bathroom because her legs were so weak and was having pain.  Denies fever.  No other complaints this time.    HPI    Patient Care Team  Primary Care Provider: Karina Langford MD    Past Medical History:     Allergies   Allergen Reactions    Azithromycin Hives     Past Medical History:   Diagnosis Date    Anxiety     Arthritis     Centrilobular emphysema     Diabetes mellitus     AVERAGE 'S    GERD (gastroesophageal reflux disease)     Hepatitis     REPORTS OVER 30 YEARS AGO HAD TREATMENT AND DENIES ANY CURRENT ISSUES    HL (hearing loss)     Hyperlipidemia     Hypertension     Hypothyroidism     Lung nodule     (-)  ION BRONCH DONE 9/2023    Pain management     [facility]    Spinal stenosis     Thyroiditis     DENIES ANY CURRENT ISSUES    Visual impairment      Past Surgical History:   Procedure Laterality Date    BRONCHOSCOPY WITH ION ROBOTIC ASSIST N/A 9/25/2023    Procedure: BRONCHOSCOPY WITH ION ROBOT, REBUS, EBUS, NEEDLE ASPIRATE, BIOPSIES, WASHINGS, BRUSHINGS, BAL;  Surgeon: Chiki Morse MD;  Location: [facility] OR;  Service: Robotics - Pulmonary;  Laterality: N/A;    EYE SURGERY      HYSTERECTOMY      LUMBAR LAMINECTOMY Right 12/15/2023    Procedure: MINIMALLY INVASIVE LUMBAR LAMINECTOMY, right approach, lumbar 4-lumbar   5 and lumbar 5-sacral 1;  Surgeon: Garry Ford MD;  Location: [facility] MAIN OR;  Service: Neurosurgery;  Laterality: Right;    TONSILLECTOMY       Family History   Problem Relation Age of Onset    Cancer Father     Cancer Sister     Diabetes Brother     Asthma Other     Seizures Other     Anxiety disorder Mother     Arthritis Daughter        Home Medications:  Prior to Admission medications    Medication Sig Start Date End Date Taking? Authorizing Provider   albuterol sulfate  (90 Base) MCG/ACT inhaler Inhale 1 puff Every 4 (Four) Hours As Needed for Wheezing or Shortness of Air. Refill needed. 7/17/23  Yes Karina Langford MD   amLODIPine (NORVASC) 5 MG tablet Take 1 tablet by mouth Daily. 7/21/23  Yes Karina Langford MD   atorvastatin (LIPITOR) 20 MG tablet TAKE 1 TABLET EVERY NIGHT 6/19/23  Yes Karina Langford MD   gabapentin (NEURONTIN) 600 MG tablet TAKE 1 TABLET 3 TIMES A DAY 10/20/23  Yes Karina Langford MD   hydroCHLOROthiazide (HYDRODIURIL) 12.5 MG tablet Take 1 tablet by mouth Daily. 7/17/23  Yes Karina Langford MD   HYDROcodone-acetaminophen (NORCO) 5-325 MG per tablet Take 1 tablet by mouth Every 4 (Four) Hours As Needed (Pain). 12/15/23  Yes Garry Ford MD   hydrOXYzine pamoate (VISTARIL) 25 MG capsule Take 1 capsule by mouth 3 (Three) Times a Day As Needed for Itching or Anxiety. 4/20/23  Yes Steve Cochran MD   levothyroxine (SYNTHROID, LEVOTHROID) 88 MCG tablet Take 1 tablet by mouth Daily. 7/17/23  Yes Karina Langford MD   meloxicam (MOBIC) 15 MG tablet Take 1 tablet by mouth Daily. 10/20/23  Yes Karina Langford MD   omeprazole (priLOSEC) 20 MG capsule TAKE 1 CAPSULE DAILY 8/4/23  Yes Karina Langford MD   sertraline (ZOLOFT) 100 MG tablet TAKE 1 TABLET DAILY 9/25/23  Yes Karina Langford MD   vitamin B-12 (CYANOCOBALAMIN) 1000 MCG tablet Take 1 tablet by mouth Daily.   Yes Provider, MD Buddy   Blood Glucose Monitoring Suppl device  "1 each Daily. 7/17/23   Karina Langford MD   glucose blood test strip Use as instructed to check blood sugar daily 7/17/23   Karina Langford MD   Lancet Device misc 1 each Daily. Use as directed; check blood sugar once daily 7/17/23   Karina Langford MD   Lancets (OneTouch Delica Plus [id number]) misc  7/19/23   Provider, MD Buddy   metFORMIN (GLUCOPHAGE) 500 MG tablet TAKE 2 TABLETS TWICE A DAY  Patient taking differently: Take  by mouth 2 (Two) Times a Day With Meals. INSTRUCTED PER ANESTHESIA PROTOCOL NONE AFTER 6 PM ON 12/14/23 11/29/23   Karina Langford MD        Social History:   Social History     Tobacco Use    Smoking status: Every Day     Packs/day: 0.25     Years: 40.00     Additional pack years: 0.00     Total pack years: 10.00     Types: Cigarettes     Start date: 1964    Smokeless tobacco: Never    Tobacco comments:     REPORTS HAS BEEN TRYING TO QUIT AND IS DOWN TO 4-5 CIGS A DAY, LAST 12/14/23   Vaping Use    Vaping Use: Never used   Substance Use Topics    Alcohol use: Yes     Alcohol/week: 1.0 standard drink of alcohol     Types: 1 Glasses of wine per week     Comment: occasional    Drug use: Never         Review of Systems:  Review of Systems   Musculoskeletal:  Positive for back pain.        Physical Exam:  /88 (BP Location: Left arm, Patient Position: Lying)   Pulse 73   Temp 99.6 °F (37.6 °C) (Oral)   Resp 22   Ht 165.1 cm (65\")   Wt 61.4 kg (135 lb 5.8 oz)   SpO2 94%   BMI 22.53 kg/m²     Physical Exam  Vitals and nursing note reviewed.   Constitutional:       Appearance: Normal appearance.   HENT:      Head: Normocephalic and atraumatic.   Eyes:      General: No scleral icterus.  Cardiovascular:      Rate and Rhythm: Normal rate and regular rhythm.      Heart sounds: Normal heart sounds.   Pulmonary:      Effort: Pulmonary effort is normal.      Breath sounds: Normal breath sounds.   Abdominal:      Palpations: Abdomen is soft.      Tenderness: There is " no abdominal tenderness.   Musculoskeletal:         General: Normal range of motion.      Cervical back: Normal range of motion.      Comments: There is a healing small wound noted to the lumbar spine.  There is some tenderness palpation in the lumbar paraspinal muscles.  There is good strength bilaterally in her lower extremities.  She is not able to raise her legs without significant pain but does have plantarflexion and dorsiflexion of her feet with good strength.   Skin:     Findings: No rash.   Neurological:      General: No focal deficit present.      Mental Status: She is alert.                  Procedures:  Procedures      Medical Decision Making:      Comorbidities that affect care:    Diabetes, Hypertension    External Notes reviewed:    Reviewed note from yesterday      The following orders were placed and all results were independently analyzed by me:  Orders Placed This Encounter   Procedures    Comprehensive Metabolic Panel    [place] Draw    CBC Auto Differential    IP Consult to Neurosurgery    Insert peripheral IV    CBC & Differential    Green Top (Gel)    Lavender Top    Gold Top - SST    Light Blue Top       Medications Given in the Emergency Department:  Medications   sodium chloride 0.9 % flush 10 mL (has no administration in time range)   dexAMETHasone sodium phosphate injection 10 mg (10 mg Intramuscular Given 12/16/23 2351)   ketorolac (TORADOL) injection 30 mg (30 mg Intramuscular Given 12/16/23 2352)        ED Course:    ED Course as of 12/17/23 0105   Sat Dec 16, 2023   2325 Spoke with Dr. Ford who recommended Toradol as well as dexamethasone.  Recheck.  No imaging needed at this time if she is got good strength in her legs. [MA]      ED Course User Index  [MA] Elkin Tran MD       Labs:    Lab Results (last 24 hours)       Procedure Component Value Units Date/Time    CBC & Differential [[id number]]  (Abnormal) Collected: 12/16/23 2217    Specimen: Blood Updated: 12/16/23 2226     Narrative:      The following orders were created for panel order CBC & Differential.  Procedure                               Abnormality         Status                     ---------                               -----------         ------                     CBC Auto Differential[[id number]]        Abnormal            Final result                 Please view results for these tests on the individual orders.    Comprehensive Metabolic Panel [[id number]]  (Abnormal) Collected: 12/16/23 2217    Specimen: Blood Updated: 12/16/23 2319     Glucose 228 mg/dL      BUN 16 mg/dL      Creatinine 0.98 mg/dL      Sodium 136 mmol/L      Potassium 2.9 mmol/L      Chloride 93 mmol/L      CO2 27.5 mmol/L      Calcium 9.1 mg/dL      Total Protein 7.1 g/dL      Albumin 3.9 g/dL      ALT (SGPT) 5 U/L      AST (SGOT) 13 U/L      Alkaline Phosphatase 71 U/L      Total Bilirubin 0.4 mg/dL      Globulin 3.2 gm/dL      A/G Ratio 1.2 g/dL      BUN/Creatinine Ratio 16.3     Anion Gap 15.5 mmol/L      eGFR 60.3 mL/min/1.73     Narrative:      GFR Normal >60  Chronic Kidney Disease <60  Kidney Failure <15    The GFR formula is only valid for adults with stable renal function between ages 18 and 70.    CBC Auto Differential [[id number]]  (Abnormal) Collected: 12/16/23 2217    Specimen: Blood Updated: 12/16/23 2226     WBC 11.63 10*3/mm3      RBC 3.21 10*6/mm3      Hemoglobin 9.1 g/dL      Hematocrit 27.6 %      MCV 86.0 fL      MCH 28.3 pg      MCHC 33.0 g/dL      RDW 13.3 %      RDW-SD 41.8 fl      MPV 9.5 fL      Platelets 143 10*3/mm3      Neutrophil % 83.3 %      Lymphocyte % 6.5 %      Monocyte % 9.3 %      Eosinophil % 0.2 %      Basophil % 0.3 %      Immature Grans % 0.4 %      Neutrophils, Absolute 9.69 10*3/mm3      Lymphocytes, Absolute 0.76 10*3/mm3      Monocytes, Absolute 1.08 10*3/mm3      Eosinophils, Absolute 0.02 10*3/mm3      Basophils, Absolute 0.03 10*3/mm3      Immature Grans, Absolute 0.05 10*3/mm3      nRBC 0.0 /100 WBC               Imaging:    No Radiology Exams Resulted Within Past 24 Hours      Differential Diagnosis and Discussion:    Back Pain: The patient presents with back pain. My differential diagnosis includes but is not limited to acute spinal epidural abscess, acute spinal epidural bleed, cauda equina syndrome, abdominal aortic aneurysm, aortic dissection, kidney stone, pyelonephritis, musculoskeletal back pain, spinal fracture, and osteoarthritis.     All labs were reviewed and interpreted by me.    MDM     Amount and/or Complexity of Data Reviewed  Clinical lab tests: reviewed       Patient is 73-year-old female who presents with complaints of back pain.  Reports that she just had surgery yesterday and has had worsening pain.  Denies any loss of bowel or bladder function, weakness.  States that she is just having pain in her low back and also having difficulty getting around.  Spoke with her surgeon who recommended pain meds as well as steroids.  She had improvement of her symptoms here.  She will need to follow-up as an outpatient.  She is otherwise well-appearing at this time.          Patient Care Considerations:          Consultants/Shared Management Plan:    Consultant: I have discussed the case with Dr. Ford who states request steroids and pain meds and reassessment.    Social Determinants of Health:    Patient is independent, reliable, and has access to care.       Disposition and Care Coordination:    Discharged: The patient is suitable and stable for discharge with no need for consideration of observation or admission.    I have explained the patient´s condition, diagnoses and treatment plan based on the information available to me at this time. I have answered questions and addressed any concerns. The patient has a good  understanding of the patient´s diagnosis, condition, and treatment plan as can be expected at this point. The vital signs have been stable. The patient´s condition is stable and  appropriate for discharge from the emergency department.      The patient will pursue further outpatient evaluation with the primary care physician or other designated or consulting physician as outlined in the discharge instructions. They are agreeable to this plan of care and follow-up instructions have been explained in detail. The patient has received these instructions in written format and have expressed an understanding of the discharge instructions. The patient is aware that any significant change in condition or worsening of symptoms should prompt an immediate return to this or the closest emergency department or call to 911.      Final diagnoses:   Acute low back pain, unspecified back pain laterality, unspecified whether sciatica present        ED Disposition       ED Disposition   Discharge    Condition   Stable    Comment   --               This medical record created using voice recognition software.             Elkin Tran MD  12/17/23 0105

## 2023-12-21 ENCOUNTER — TELEPHONE (OUTPATIENT)
Dept: NEUROSURGERY | Facility: CLINIC | Age: 73
End: 2023-12-21
Payer: MEDICARE

## 2023-12-21 DIAGNOSIS — E78.00 PURE HYPERCHOLESTEROLEMIA: ICD-10-CM

## 2023-12-21 DIAGNOSIS — Z98.890 STATUS POST LUMBAR SURGERY: ICD-10-CM

## 2023-12-21 DIAGNOSIS — F41.9 ANXIETY: ICD-10-CM

## 2023-12-21 DIAGNOSIS — M48.062 SPINAL STENOSIS, LUMBAR REGION, WITH NEUROGENIC CLAUDICATION: Primary | ICD-10-CM

## 2023-12-21 RX ORDER — METHYLPREDNISOLONE 4 MG/1
TABLET ORAL
Qty: 21 TABLET | Refills: 0 | Status: SHIPPED | OUTPATIENT
Start: 2023-12-21

## 2023-12-21 NOTE — TELEPHONE ENCOUNTER
Patient called in and advise that hydrocodne is not helping with the pain. She went to the ER on Saturday and they gave her steroids and a pain shot. Pt adv hasn't been able to get out of the bed since then. She can only walk around with a walker for very little time. She is experiencing leg weakness, pain that shoots down her legs she would describe it like a muscle spasm, when pt sits down it is excruciating. Pt is taking her hydrocodone 10 that she was already prescribed and had. Pt states hydrocodone doesn't last the amount of time it should. Is there anything else that can be prescribed to help pt with the pain?    Pt is also requesting an order or rx put in for a walker that has a seat on it to be called in. Pt requesting this to go to [facility] pharmacy.

## 2023-12-21 NOTE — TELEPHONE ENCOUNTER
See earlier encounter. I have already spoken to patient and forwarded messages to Dr. Ford and Maicol Noland.

## 2023-12-21 NOTE — TELEPHONE ENCOUNTER
I expect this is all post-operative pain, which is not uncommon after this type of surgery. I can't increase the pain medication or change her to a different pain medication, unfortunately.    At most, I could offer a steroid pack for her to take short-term that might help manage the acute post-operative pain. I don't know how helpful this will be if the toradol and dexamethasone at the ER was not helpful, however.    Generally, I would expect the pain to be gradually improving.    Otherwise I would recommend that she be restricted in activity. I am recommending the following restrictions: No heavy lifting greater than 10 lb, limit twisting and bending at the waist, avoidance of strenuous activity.    I wouldn't do Rx for walker, but she may discuss with her PCP.

## 2023-12-22 RX ORDER — AMLODIPINE BESYLATE 5 MG/1
5 TABLET ORAL DAILY
Qty: 90 TABLET | Refills: 1 | Status: SHIPPED | OUTPATIENT
Start: 2023-12-22

## 2023-12-22 RX ORDER — SERTRALINE HYDROCHLORIDE 100 MG/1
TABLET, FILM COATED ORAL
Qty: 90 TABLET | Refills: 0 | Status: SHIPPED | OUTPATIENT
Start: 2023-12-22

## 2023-12-22 RX ORDER — ATORVASTATIN CALCIUM 20 MG/1
TABLET, FILM COATED ORAL
Qty: 90 TABLET | Refills: 1 | Status: SHIPPED | OUTPATIENT
Start: 2023-12-22

## 2023-12-22 NOTE — TELEPHONE ENCOUNTER
Fine to write. Talked with ER when she visited and obviously at that point Neuro intact.  On longer term norco 10 so I'm guessing tolerant to pain meds. If not on previously, might get some relief with flexeril.

## 2024-01-05 ENCOUNTER — OFFICE VISIT (OUTPATIENT)
Dept: NEUROSURGERY | Facility: CLINIC | Age: 74
End: 2024-01-05
Payer: MEDICARE

## 2024-01-05 VITALS
HEART RATE: 120 BPM | DIASTOLIC BLOOD PRESSURE: 75 MMHG | WEIGHT: 128 LBS | BODY MASS INDEX: 21.33 KG/M2 | SYSTOLIC BLOOD PRESSURE: 108 MMHG | HEIGHT: 65 IN

## 2024-01-05 DIAGNOSIS — M48.062 SPINAL STENOSIS, LUMBAR REGION, WITH NEUROGENIC CLAUDICATION: Primary | ICD-10-CM

## 2024-01-05 DIAGNOSIS — Z98.890 STATUS POST LUMBAR SURGERY: ICD-10-CM

## 2024-01-05 PROCEDURE — 99024 POSTOP FOLLOW-UP VISIT: CPT | Performed by: PHYSICIAN ASSISTANT

## 2024-01-05 PROCEDURE — 1160F RVW MEDS BY RX/DR IN RCRD: CPT | Performed by: PHYSICIAN ASSISTANT

## 2024-01-05 PROCEDURE — 1159F MED LIST DOCD IN RCRD: CPT | Performed by: PHYSICIAN ASSISTANT

## 2024-01-05 PROCEDURE — 3074F SYST BP LT 130 MM HG: CPT | Performed by: PHYSICIAN ASSISTANT

## 2024-01-05 PROCEDURE — 3078F DIAST BP <80 MM HG: CPT | Performed by: PHYSICIAN ASSISTANT

## 2024-01-05 NOTE — PROGRESS NOTES
"Patient being seen for today for Post-op  .    Subjective    Odalis Espino is a 73 y.o. female that presents with Post-op  .    HPI  Previously: She is status post minimally invasive lumbar laminectomy using a right approach at L4-L5 and L5-S1 on 12/15/2023.  She previously complained of pain in the right leg to the big toe.  She had ER visit on 12/16/2023 for acute back pain following surgery.  At that time there was a consultation with Dr. Ford and she was neuro intact.  She was given Toradol and dexamethasone in the ER Dr. Ford's recommendation.  Per the ER note she did have improvement of her symptoms in the ER. Later there was Rx for medrol dose pack from out office. There was Rx by Dr. Ford for a rollator walker.    Today: She reports she is \"good\" today. The leg pain is resolved. She does have some numbness and tingling in the leg still, but not significant today.    She does have some pain in the back, but no more than typical for her.    She is walking as tolerated. Otherwise she has been restricted.     reports that she has been smoking cigarettes. She started smoking about 60 years ago. She has a 10.00 pack-year smoking history. She has never used smokeless tobacco.    Review of Systems   Musculoskeletal:  Positive for back pain.   Neurological:  Positive for numbness. Negative for weakness.       Objective   Vitals:    01/05/24 1109   BP: 108/75   Pulse: 120        Physical Exam  Constitutional:       Appearance: Normal appearance.   Pulmonary:      Effort: Pulmonary effort is normal.   Musculoskeletal:         General: No tenderness.      Comments: SLR negative bilaterally   Skin:     Comments: Right lumbar incision is well-healed without erythema, discharge or drainage.   Neurological:      General: No focal deficit present.      Mental Status: She is alert and oriented to person, place, and time.      Sensory: No sensory deficit.      Motor: No weakness.      Deep Tendon Reflexes: Reflexes " normal.   Psychiatric:         Mood and Affect: Mood normal.         Behavior: Behavior normal.          Result Review   None.     Assessment and Plan {CC Problem List  Visit Diagnosis  ROS  Review (Popup)  [facility] [place]  Quality  BestPractice  Medications  SmartSets  SnapShot Encounters  Media :23}   Diagnoses and all orders for this visit:    1. Spinal stenosis, lumbar region, with neurogenic claudication (Primary)    2. Status post lumbar surgery    She has seen improvement of the leg pain after surgery. She does still have some mild numbness and tingling. We discussed that I expect this is residual nerve irritation and it will likely resolve with time.    She will continue physical restrictions at least for the next 3 weeks, then resume normal activity.    I am recommending the following restrictions: No heavy lifting greater than 10 lb, limit twisting and bending at the waist, avoidance of strenuous activity.    She will continue with pain management for long-term medication management.    She will follow-up here PRN for failure to improve or worsening symptoms.  Follow Up {Instructions Charge Capture  Follow-up Communications :23}   Return if symptoms worsen or fail to improve.

## 2024-01-09 ENCOUNTER — OFFICE VISIT (OUTPATIENT)
Dept: FAMILY MEDICINE CLINIC | Age: 74
End: 2024-01-09
Payer: MEDICARE

## 2024-01-09 VITALS
SYSTOLIC BLOOD PRESSURE: 134 MMHG | DIASTOLIC BLOOD PRESSURE: 79 MMHG | WEIGHT: 129.8 LBS | OXYGEN SATURATION: 92 % | HEART RATE: 78 BPM | HEIGHT: 65 IN | TEMPERATURE: 98.2 F | BODY MASS INDEX: 21.63 KG/M2

## 2024-01-09 DIAGNOSIS — N18.32 STAGE 3B CHRONIC KIDNEY DISEASE (CKD): ICD-10-CM

## 2024-01-09 DIAGNOSIS — M51.36 DEGENERATION OF LUMBAR INTERVERTEBRAL DISC: ICD-10-CM

## 2024-01-09 DIAGNOSIS — G89.29 CHRONIC RIGHT-SIDED LOW BACK PAIN WITH RIGHT-SIDED SCIATICA: ICD-10-CM

## 2024-01-09 DIAGNOSIS — D51.3 OTHER DIETARY VITAMIN B12 DEFICIENCY ANEMIA: ICD-10-CM

## 2024-01-09 DIAGNOSIS — E03.9 ACQUIRED HYPOTHYROIDISM: ICD-10-CM

## 2024-01-09 DIAGNOSIS — E78.00 PURE HYPERCHOLESTEROLEMIA: ICD-10-CM

## 2024-01-09 DIAGNOSIS — E11.41 TYPE 2 DIABETES MELLITUS WITH DIABETIC MONONEUROPATHY, WITHOUT LONG-TERM CURRENT USE OF INSULIN: ICD-10-CM

## 2024-01-09 DIAGNOSIS — R26.89 POOR BALANCE: ICD-10-CM

## 2024-01-09 DIAGNOSIS — R29.898 LEG WEAKNESS, BILATERAL: ICD-10-CM

## 2024-01-09 DIAGNOSIS — Z79.899 LONG-TERM USE OF HIGH-RISK MEDICATION: Primary | ICD-10-CM

## 2024-01-09 DIAGNOSIS — M54.41 CHRONIC RIGHT-SIDED LOW BACK PAIN WITH RIGHT-SIDED SCIATICA: ICD-10-CM

## 2024-01-09 DIAGNOSIS — I10 ESSENTIAL HYPERTENSION: ICD-10-CM

## 2024-01-09 DIAGNOSIS — Z23 ENCOUNTER FOR IMMUNIZATION: ICD-10-CM

## 2024-01-09 DIAGNOSIS — H91.93 BILATERAL HEARING LOSS, UNSPECIFIED HEARING LOSS TYPE: ICD-10-CM

## 2024-01-09 DIAGNOSIS — D50.8 IRON DEFICIENCY ANEMIA SECONDARY TO INADEQUATE DIETARY IRON INTAKE: ICD-10-CM

## 2024-01-09 LAB
AMPHET+METHAMPHET UR QL: NEGATIVE
AMPHETAMINES UR QL: NEGATIVE
BARBITURATES UR QL SCN: NEGATIVE
BENZODIAZ UR QL SCN: NEGATIVE
BUPRENORPHINE SERPL-MCNC: NEGATIVE NG/ML
CANNABINOIDS SERPL QL: NEGATIVE
COCAINE UR QL: NEGATIVE
EXPIRATION DATE: ABNORMAL
Lab: ABNORMAL
MDMA UR QL SCN: NEGATIVE
METHADONE UR QL SCN: NEGATIVE
MORPHINE/OPIATES SCREEN, URINE: POSITIVE
OXYCODONE UR QL SCN: NEGATIVE
PCP UR QL SCN: NEGATIVE

## 2024-01-09 RX ORDER — MELOXICAM 15 MG/1
15 TABLET ORAL DAILY
Qty: 90 TABLET | Refills: 1 | Status: SHIPPED | OUTPATIENT
Start: 2024-01-09

## 2024-01-09 RX ORDER — LEVOTHYROXINE SODIUM 88 UG/1
88 TABLET ORAL DAILY
Qty: 90 TABLET | Refills: 1 | Status: SHIPPED | OUTPATIENT
Start: 2024-01-09

## 2024-01-09 RX ORDER — HYDROCHLOROTHIAZIDE 12.5 MG/1
12.5 TABLET ORAL DAILY
Qty: 90 TABLET | Refills: 1 | Status: SHIPPED | OUTPATIENT
Start: 2024-01-09

## 2024-01-09 NOTE — PROGRESS NOTES
Odalis Espino presents to [facility].    Chief Complaint:  f/u for back surgery and med refills    Subjective     History of Present Illness:  Diabetes  Pertinent negatives for hypoglycemia include no dizziness or nervousness/anxiousness. Pertinent negatives for diabetes include no blurred vision and no chest pain.            Amaris presents for f/u for her recent minimally invasive lumbar laminectomy L4/5 and L5/S1 with Dr Garry Ford at [facility] on 12/15/2023.  S/P surgery the next day she had severe pain and became shaky all over and had to go to the ER.  She also stated her pain was radiating down her legs bilaterally and she was unable to walk or feel hurt in her legs.  While in the ER the physician contacted Dr. Ford who recommended treatment with Toradol and dexamethasone.  This significantly helped improve her pain and she was discharged home.  She is doing great today.  Incision is clean dry and intact without signs of infection.  She still has some back pain but overall she is significantly improved and is not having the radicular pain or lower extremity numbness or tingling.  She needs a walker for LBP with mild weakness and for balance.She is going to pain management  ([facility]) and pending MRI lumbar back. Her current regimen includes gabapentin 600 mg 3 times daily, Norco  mg 4 times daily and Mobic 15 mg daily.  She denies weakness or numbness/tingling except for mild tingling in her right great toe.  She denies saddle anesthesia or bowel/bladder incontinence.  She did not participate in PT.  BM good, appetite good, moods are good, sleeping well.       Labs in ER showed glucose of 228, creatinine 0.98, sodium 136, potassium 2.9, calcium 9.1, ALT 5, AST 13, alkaline phosphatase 71, GFR 60.3, WBC 11.63, hemoglobin 9.1, hematocrit 27.6, platelets 143    She presents with hashimotos thyroiditis, sees Dr Fernández, has slightly enlarged thyroid  with no lymphadenopathy or palpable nodules. She she is currently taking Synthroid, 88 mcg daily, last TSH removed and was normal     She presents with chronic anxiety remains stable on Zoloft 100 mg twice daily and Klonopin 1 mg daily as needed.  She says her moods are stable without depression is stable. She denies SI or HI.        She presents for f/u with her essential (primary) hypertension is controlled on her current cardiac medication regimen includes a diuretic ( HCTZ 25 mg daily ) and a calcium channel blocker ( Amlodipine 5 mg daily ).  Compliance with treatment has been good.  She is tolerating the medication well without side effects.  She does not kept a blood pressure diary.   Denies headache, blurry vision, chest pain, palpiations, shortness of breath or edema      Hyperlipidemia: stable on Lipitor 20 mg daily. Labs reviewed, chol well controlled       Type 2 diabetes is stable and well controlled, she needs to check her BS daily, BS reported as consistently < 120.  Her current regimen includes Metformin 500 mg twice daily.  Her last A1c was 6.5%.  No current foot issues.  Last eye exam about 1 year.        Result Review   The following data was reviewed by Karina Langford MD on 01/09/2024.  Lab Results   Component Value Date    WBC 11.63 (H) 12/16/2023    HGB 9.1 (L) 12/16/2023    HCT 27.6 (L) 12/16/2023    MCV 86.0 12/16/2023     12/16/2023     Lab Results   Component Value Date    GLUCOSE 228 (H) 12/16/2023    BUN 16 12/16/2023    CREATININE 0.98 12/16/2023    EGFR 60.3 12/16/2023    BCR 16.3 12/16/2023    K 2.9 (L) 12/16/2023    CO2 27.5 12/16/2023    CALCIUM 9.1 12/16/2023    ALBUMIN 3.9 12/16/2023    BILITOT 0.4 12/16/2023    AST 13 12/16/2023    ALT 5 12/16/2023     Lab Results   Component Value Date    CHOL 148 07/21/2023    CHLPL 164 12/17/2020    TRIG 64 07/21/2023    HDL 68 (H) 07/21/2023    LDL 67 07/21/2023     Lab Results   Component Value Date    TSH 2.820 09/15/2023     Lab  "Results   Component Value Date    HGBA1C 6.50 (H) 07/21/2023     No results found for: \"PSA\"  Lab Results   Component Value Date    IRON 44 07/21/2023      No results found for: \"[id number]\"            Assessment and Plan:   Diagnoses and all orders for this visit:    1. Long-term use of high-risk medication (Primary)  -     POC Medline 12 Panel Urine Drug Screen    2. Essential hypertension  Comments:  Elevated today.  Order given for BP cuff, she is to check home BP and call if > 140/90 and I will adjust meds.  She is on amlodipine/HCTZ and tolerates meds.  Orders:  -     hydroCHLOROthiazide (HYDRODIURIL) 12.5 MG tablet; Take 1 tablet by mouth Daily.  Dispense: 90 tablet; Refill: 1  -     Comprehensive Metabolic Panel; Future  -     CBC (No Diff); Future  -     Miscellaneous DME    3. Acquired hypothyroidism  Comments:  Stable on levothyroxine 88 mcg daily.  Meds refilled.  Due for labs.  Will adjust medication pending results  Orders:  -     levothyroxine (SYNTHROID, LEVOTHROID) 88 MCG tablet; Take 1 tablet by mouth Daily.  Dispense: 90 tablet; Refill: 1  -     TSH+Free T4; Future    4. Chronic right-sided low back pain with right-sided sciatica  Comments:  gabapentin refilled, cont pain management  Orders:  -     meloxicam (MOBIC) 15 MG tablet; Take 1 tablet by mouth Daily.  Dispense: 90 tablet; Refill: 1  -     Miscellaneous DME    5. Degeneration of lumbar intervertebral disc  Comments:  Status posts minimally invasive laminectomy.  She tolerated procedure well.  She needs a rollator for ambulation and balance.  Incision clean dry and intact    6. Pure hypercholesterolemia  Comments:  Due for labs.  She is stable on Lipitor 20 mg daily and tolerates med well.  Will adjust medication dosing pending results  Orders:  -     Lipid Panel; Future    7. Iron deficiency anemia secondary to inadequate dietary iron intake  Comments:  She is not currently on iron.Will check B12 levels and a CBC today and adjust " supplementation pending results  Orders:  -     Iron Profile; Future  -     CBC (No Diff); Future    8. Type 2 diabetes mellitus with diabetic mononeuropathy, without long-term current use of insulin  Comments:  Stable on metformin, tolerates med well.  Continue low-carb diet, she needs her eye exam updated, no acute foot issues  Orders:  -     Lipid Panel; Future  -     Hemoglobin A1c; Future  -     Microalbumin / Creatinine Urine Ratio - Urine, Clean Catch; Future    9. Stage 3b chronic kidney disease (CKD)  Comments:  She is to avoid NSAIDs, is on meloxicam so I am monitoring her closely, will check creatinine level today.  Recommend she push fluids 64 ounces a day    10. Other dietary vitamin B12 deficiency anemia  Comments:  Will check B12 levels and a CBC today and adjust supplementation pending results  Orders:  -     Vitamin B12; Future    11. Leg weakness, bilateral  Comments:  Significantly improved status post lower back surgery.  She needs a walker to help with her balance and ongoing weakness with sit to stand  Orders:  -     Miscellaneous DME    12. Poor balance  Comments:  Walker ordered  Orders:  -     Miscellaneous DME    13. Bilateral hearing loss, unspecified hearing loss type  Comments:  referral to [facility] for testing and hearing aids  Orders:  -     Ambulatory Referral to Audiology    14. Encounter for immunization  -     Fluzone High-Dose 65+yrs (2311-4260)    Other orders  -     metFORMIN (GLUCOPHAGE) 500 MG tablet; Take 1 tablet by mouth 2 (Two) Times a Day With Meals. INSTRUCTED PER ANESTHESIA PROTOCOL NONE AFTER 6 PM ON 12/14/23              Objective     Medications:  Current Outpatient Medications   Medication Instructions    albuterol sulfate  (90 Base) MCG/ACT inhaler 1 puff, Inhalation, Every 4 Hours PRN, Refill needed.     amLODIPine (NORVASC) 5 mg, Oral, Daily    atorvastatin (LIPITOR) 20 MG tablet TAKE 1 TABLET EVERY NIGHT    Blood Glucose Monitoring Suppl device 1  "each, Does not apply, Daily    gabapentin (NEURONTIN) 600 mg, Oral, 3 Times Daily    glucose blood test strip Use as instructed to check blood sugar daily    hydroCHLOROthiazide (HYDRODIURIL) 12.5 mg, Oral, Daily    HYDROcodone-acetaminophen (NORCO) 5-325 MG per tablet 1 tablet, Oral, Every 4 Hours PRN    hydrOXYzine pamoate (VISTARIL) 25 mg, Oral, 3 Times Daily PRN    Lancet Device misc 1 each, Does not apply, Daily, Use as directed; check blood sugar once daily    Lancets (OneTouch Delica Plus [id number]) misc     levothyroxine (SYNTHROID, LEVOTHROID) 88 mcg, Oral, Daily    meloxicam (MOBIC) 15 mg, Oral, Daily    metFORMIN (GLUCOPHAGE) 500 mg, Oral, 2 Times Daily With Meals, INSTRUCTED PER ANESTHESIA PROTOCOL NONE AFTER 6 PM ON 12/14/23    omeprazole (priLOSEC) 20 MG capsule TAKE 1 CAPSULE DAILY    sertraline (ZOLOFT) 100 MG tablet TAKE 1 TABLET DAILY    vitamin B-12 (CYANOCOBALAMIN) 1,000 mcg, Oral, Daily        Vital Signs:   /79 (BP Location: Right arm, Patient Position: Sitting)   Pulse 78   Temp 98.2 °F (36.8 °C) (Oral)   Ht 165.1 cm (65\")   Wt 58.9 kg (129 lb 12.8 oz)   SpO2 92%   BMI 21.60 kg/m²     BMI is within normal parameters. No other follow-up for BMI required.       Physical Exam:  Physical Exam  Vitals and nursing note reviewed.   Constitutional:       General: She is not in acute distress.     Appearance: Normal appearance. She is not ill-appearing, toxic-appearing or diaphoretic.   HENT:      Head: Normocephalic and atraumatic.      Right Ear: Tympanic membrane, ear canal and external ear normal.      Left Ear: Tympanic membrane, ear canal and external ear normal.      Nose: No congestion or rhinorrhea.      Mouth/Throat:      Mouth: Mucous membranes are moist.      Pharynx: Oropharynx is clear. No oropharyngeal exudate or posterior oropharyngeal erythema.   Eyes:      Extraocular Movements: Extraocular movements intact.      Conjunctiva/sclera: Conjunctivae normal.      Pupils: Pupils " are equal, round, and reactive to light.   Cardiovascular:      Rate and Rhythm: Normal rate and regular rhythm.      Heart sounds: Normal heart sounds.   Pulmonary:      Effort: Pulmonary effort is normal.      Breath sounds: Normal breath sounds. No wheezing, rhonchi or rales.   Abdominal:      General: Abdomen is flat.      Palpations: Abdomen is soft. There is no mass.      Tenderness: There is no abdominal tenderness.      Hernia: No hernia is present.   Musculoskeletal:      Cervical back: Neck supple. No rigidity.      Right lower leg: No edema.      Left lower leg: No edema.   Lymphadenopathy:      Cervical: No cervical adenopathy.   Skin:     General: Skin is warm and dry.   Neurological:      General: No focal deficit present.      Mental Status: She is alert and oriented to person, place, and time. Mental status is at baseline.   Psychiatric:         Mood and Affect: Mood normal.         Behavior: Behavior normal.         Thought Content: Thought content normal.         Judgment: Judgment normal.           Review of Systems:  Review of Systems   Constitutional:  Negative for chills and fever.   HENT:  Negative for ear pain, sinus pressure and sore throat.    Eyes:  Negative for blurred vision and double vision.   Respiratory:  Negative for cough, shortness of breath and wheezing.    Cardiovascular:  Negative for chest pain and palpitations.   Gastrointestinal:  Negative for abdominal pain, blood in stool, constipation, diarrhea, nausea and vomiting.   Skin:  Negative for rash.   Neurological:  Negative for dizziness and headache.   Psychiatric/Behavioral:  Negative for sleep disturbance, suicidal ideas and depressed mood. The patient is not nervous/anxious.               Follow Up   No follow-ups on file.    Part of this note may be an electronic transcription/translation of spoken language to printed   text using the Dragon Dictation System.            Medical History:  Medications Discontinued During  This Encounter   Medication Reason    metFORMIN (GLUCOPHAGE) 500 MG tablet Reorder    levothyroxine (SYNTHROID, LEVOTHROID) 88 MCG tablet Reorder    hydroCHLOROthiazide (HYDRODIURIL) 12.5 MG tablet Reorder    meloxicam (MOBIC) 15 MG tablet Reorder      Past Medical History:    Anxiety    Arthritis    Centrilobular emphysema    Diabetes mellitus    AVERAGE 'S    GERD (gastroesophageal reflux disease)    Hepatitis    REPORTS OVER 30 YEARS AGO HAD TREATMENT AND DENIES ANY CURRENT ISSUES    HL (hearing loss)    Hyperlipidemia    Hypertension    Hypothyroidism    Lung nodule    (-)  ION BRONCH DONE 9/2023    Pain management    [facility]    Spinal stenosis    Thyroiditis    DENIES ANY CURRENT ISSUES    Visual impairment     Past Surgical History:    BRONCHOSCOPY WITH ION ROBOTIC ASSIST    Procedure: BRONCHOSCOPY WITH ION ROBOT, REBUS, EBUS, NEEDLE ASPIRATE, BIOPSIES, WASHINGS, BRUSHINGS, BAL;  Surgeon: Chiki Morse MD;  Location: [facility] MAIN OR;  Service: Robotics - Pulmonary;  Laterality: N/A;    EYE SURGERY    HYSTERECTOMY    LUMBAR LAMINECTOMY    Procedure: MINIMALLY INVASIVE LUMBAR LAMINECTOMY, right approach, lumbar 4-lumbar  5 and lumbar 5-sacral 1;  Surgeon: Garry Ford MD;  Location: [facility] MAIN OR;  Service: Neurosurgery;  Laterality: Right;    TONSILLECTOMY      Family History   Problem Relation Age of Onset    Cancer Father     Cancer Sister     Diabetes Brother     Asthma Other     Seizures Other     Anxiety disorder Mother     Arthritis Daughter      Social History     Tobacco Use    Smoking status: Every Day     Packs/day: 0.25     Years: 40.00     Additional pack years: 0.00     Total pack years: 10.00     Types: Cigarettes     Start date: 1964    Smokeless tobacco: Never    Tobacco comments:     REPORTS HAS BEEN TRYING TO QUIT AND IS DOWN TO 4-5 CIGS A DAY, LAST 12/14/23   Substance Use Topics    Alcohol use: Yes     Alcohol/week: 1.0 standard drink of alcohol     Types: 1 Glasses of wine  per week     Comment: occasional       There are no preventive care reminders to display for this patient.     Immunization History   Administered Date(s) Administered    COVID-19 (MODERNA) 1st,2nd,3rd Dose Monovalent 03/17/2021, 04/15/2021, 11/03/2021    COVID-19 (PFIZER) BIVALENT 12+YRS 08/08/2023    Covid-19 (Pfizer) Gray Cap Monovalent 07/14/2022    Flu Vaccine Quad PF >36MO 10/09/2018    Fluzone (or Fluarix & Flulaval for VFC) >6mos 10/09/2018    Fluzone High Dose =>65 Years ([employer] ONLY) 11/13/2017, 07/17/2023    Fluzone High-Dose 65+yrs 11/11/2021, 01/09/2024    Pneumococcal Conjugate 13-Valent (PCV13) 10/18/2018    Pneumococcal Polysaccharide (PPSV23) 01/01/2016    Shingrix 07/17/2023    Tdap 07/17/2023       Allergies   Allergen Reactions    Azithromycin Hives

## 2024-01-22 ENCOUNTER — TELEPHONE (OUTPATIENT)
Dept: FAMILY MEDICINE CLINIC | Age: 74
End: 2024-01-22
Payer: MEDICARE

## 2024-01-22 DIAGNOSIS — I10 ESSENTIAL HYPERTENSION: ICD-10-CM

## 2024-01-22 DIAGNOSIS — G89.29 CHRONIC RIGHT-SIDED LOW BACK PAIN WITH RIGHT-SIDED SCIATICA: ICD-10-CM

## 2024-01-22 DIAGNOSIS — M54.41 CHRONIC RIGHT-SIDED LOW BACK PAIN WITH RIGHT-SIDED SCIATICA: ICD-10-CM

## 2024-01-22 DIAGNOSIS — F41.9 ANXIETY: ICD-10-CM

## 2024-01-22 DIAGNOSIS — E03.9 ACQUIRED HYPOTHYROIDISM: ICD-10-CM

## 2024-01-22 RX ORDER — MELOXICAM 15 MG/1
15 TABLET ORAL DAILY
Qty: 30 TABLET | Refills: 0 | Status: SHIPPED | OUTPATIENT
Start: 2024-01-22 | End: 2024-01-23 | Stop reason: SDUPTHER

## 2024-01-22 RX ORDER — LEVOTHYROXINE SODIUM 88 UG/1
88 TABLET ORAL DAILY
Qty: 30 TABLET | Refills: 0 | Status: SHIPPED | OUTPATIENT
Start: 2024-01-22 | End: 2024-01-23 | Stop reason: SDUPTHER

## 2024-01-22 RX ORDER — HYDROCHLOROTHIAZIDE 12.5 MG/1
12.5 TABLET ORAL DAILY
Qty: 30 TABLET | Refills: 0 | Status: SHIPPED | OUTPATIENT
Start: 2024-01-22 | End: 2024-01-23 | Stop reason: SDUPTHER

## 2024-01-22 RX ORDER — SERTRALINE HYDROCHLORIDE 100 MG/1
100 TABLET, FILM COATED ORAL DAILY
Qty: 30 TABLET | Refills: 0 | Status: SHIPPED | OUTPATIENT
Start: 2024-01-22 | End: 2024-01-23 | Stop reason: SDUPTHER

## 2024-01-22 NOTE — TELEPHONE ENCOUNTER
"   Caller: Odalis Espino \"Amaris\"    Relationship: Self    Best call back number: [phone]    What is the best time to reach you: ANYTIME     Who are you requesting to speak with (clinical staff, provider,  specific staff member): DIXIE        What was the call regarding:     THE PATIENT IS WANTING TO DISCUSS HER MEDICATION AND SHE HAS SOME QUESTIONS TO DISCUSS AS WELL.      "

## 2024-01-23 ENCOUNTER — LAB (OUTPATIENT)
Dept: LAB | Facility: HOSPITAL | Age: 74
End: 2024-01-23
Payer: MEDICARE

## 2024-01-23 DIAGNOSIS — G89.29 CHRONIC RIGHT-SIDED LOW BACK PAIN WITH RIGHT-SIDED SCIATICA: ICD-10-CM

## 2024-01-23 DIAGNOSIS — E11.41 TYPE 2 DIABETES MELLITUS WITH DIABETIC MONONEUROPATHY, WITHOUT LONG-TERM CURRENT USE OF INSULIN: ICD-10-CM

## 2024-01-23 DIAGNOSIS — E03.9 ACQUIRED HYPOTHYROIDISM: ICD-10-CM

## 2024-01-23 DIAGNOSIS — I10 ESSENTIAL HYPERTENSION: ICD-10-CM

## 2024-01-23 DIAGNOSIS — E78.00 PURE HYPERCHOLESTEROLEMIA: ICD-10-CM

## 2024-01-23 DIAGNOSIS — M54.41 CHRONIC RIGHT-SIDED LOW BACK PAIN WITH RIGHT-SIDED SCIATICA: ICD-10-CM

## 2024-01-23 DIAGNOSIS — F41.9 ANXIETY: ICD-10-CM

## 2024-01-23 DIAGNOSIS — D50.8 IRON DEFICIENCY ANEMIA SECONDARY TO INADEQUATE DIETARY IRON INTAKE: ICD-10-CM

## 2024-01-23 DIAGNOSIS — D51.3 OTHER DIETARY VITAMIN B12 DEFICIENCY ANEMIA: ICD-10-CM

## 2024-01-23 LAB
ALBUMIN SERPL-MCNC: 4.2 G/DL (ref 3.5–5.2)
ALBUMIN/GLOB SERPL: 1.4 G/DL
ALP SERPL-CCNC: 74 U/L (ref 39–117)
ALT SERPL W P-5'-P-CCNC: 7 U/L (ref 1–33)
ANION GAP SERPL CALCULATED.3IONS-SCNC: 13 MMOL/L (ref 5–15)
AST SERPL-CCNC: 12 U/L (ref 1–32)
BILIRUB SERPL-MCNC: 0.3 MG/DL (ref 0–1.2)
BUN SERPL-MCNC: 16 MG/DL (ref 8–23)
BUN/CREAT SERPL: 12.9 (ref 7–25)
CALCIUM SPEC-SCNC: 9.7 MG/DL (ref 8.6–10.5)
CHLORIDE SERPL-SCNC: 101 MMOL/L (ref 98–107)
CHOLEST SERPL-MCNC: 167 MG/DL (ref 0–200)
CO2 SERPL-SCNC: 28 MMOL/L (ref 22–29)
CREAT SERPL-MCNC: 1.24 MG/DL (ref 0.57–1)
DEPRECATED RDW RBC AUTO: 43.2 FL (ref 37–54)
EGFRCR SERPLBLD CKD-EPI 2021: 45.5 ML/MIN/1.73
ERYTHROCYTE [DISTWIDTH] IN BLOOD BY AUTOMATED COUNT: 13.3 % (ref 12.3–15.4)
GLOBULIN UR ELPH-MCNC: 2.9 GM/DL
GLUCOSE SERPL-MCNC: 149 MG/DL (ref 65–99)
HBA1C MFR BLD: 7.2 % (ref 4.8–5.6)
HCT VFR BLD AUTO: 30.8 % (ref 34–46.6)
HDLC SERPL-MCNC: 66 MG/DL (ref 40–60)
HGB BLD-MCNC: 9.9 G/DL (ref 12–15.9)
IRON 24H UR-MRATE: 62 MCG/DL (ref 37–145)
IRON SATN MFR SERPL: 14 % (ref 20–50)
LDLC SERPL CALC-MCNC: 84 MG/DL (ref 0–100)
LDLC/HDLC SERPL: 1.24 {RATIO}
MCH RBC QN AUTO: 28 PG (ref 26.6–33)
MCHC RBC AUTO-ENTMCNC: 32.1 G/DL (ref 31.5–35.7)
MCV RBC AUTO: 87 FL (ref 79–97)
PLATELET # BLD AUTO: 156 10*3/MM3 (ref 140–450)
PMV BLD AUTO: 9.6 FL (ref 6–12)
POTASSIUM SERPL-SCNC: 3.1 MMOL/L (ref 3.5–5.2)
PROT SERPL-MCNC: 7.1 G/DL (ref 6–8.5)
RBC # BLD AUTO: 3.54 10*6/MM3 (ref 3.77–5.28)
SODIUM SERPL-SCNC: 142 MMOL/L (ref 136–145)
T4 FREE SERPL-MCNC: 1.28 NG/DL (ref 0.93–1.7)
TIBC SERPL-MCNC: 443 MCG/DL (ref 298–536)
TRANSFERRIN SERPL-MCNC: 297 MG/DL (ref 200–360)
TRIGL SERPL-MCNC: 96 MG/DL (ref 0–150)
TSH SERPL DL<=0.05 MIU/L-ACNC: 8.04 UIU/ML (ref 0.27–4.2)
VIT B12 BLD-MCNC: >2000 PG/ML (ref 211–946)
VLDLC SERPL-MCNC: 17 MG/DL (ref 5–40)
WBC NRBC COR # BLD AUTO: 5.82 10*3/MM3 (ref 3.4–10.8)

## 2024-01-23 PROCEDURE — 82607 VITAMIN B-12: CPT

## 2024-01-23 PROCEDURE — 83036 HEMOGLOBIN GLYCOSYLATED A1C: CPT

## 2024-01-23 PROCEDURE — 36415 COLL VENOUS BLD VENIPUNCTURE: CPT

## 2024-01-23 PROCEDURE — 84466 ASSAY OF TRANSFERRIN: CPT

## 2024-01-23 PROCEDURE — 84443 ASSAY THYROID STIM HORMONE: CPT

## 2024-01-23 PROCEDURE — 80061 LIPID PANEL: CPT

## 2024-01-23 PROCEDURE — 83540 ASSAY OF IRON: CPT

## 2024-01-23 PROCEDURE — 85027 COMPLETE CBC AUTOMATED: CPT

## 2024-01-23 PROCEDURE — 84439 ASSAY OF FREE THYROXINE: CPT

## 2024-01-23 PROCEDURE — 80053 COMPREHEN METABOLIC PANEL: CPT

## 2024-01-23 RX ORDER — LEVOTHYROXINE SODIUM 88 UG/1
88 TABLET ORAL DAILY
Qty: 90 TABLET | Refills: 0 | Status: SHIPPED | OUTPATIENT
Start: 2024-01-23 | End: 2024-01-25

## 2024-01-23 RX ORDER — MELOXICAM 15 MG/1
15 TABLET ORAL DAILY
Qty: 90 TABLET | Refills: 0 | Status: SHIPPED | OUTPATIENT
Start: 2024-01-23

## 2024-01-23 RX ORDER — HYDROCHLOROTHIAZIDE 12.5 MG/1
12.5 TABLET ORAL DAILY
Qty: 90 TABLET | Refills: 0 | Status: SHIPPED | OUTPATIENT
Start: 2024-01-23

## 2024-01-23 RX ORDER — SERTRALINE HYDROCHLORIDE 100 MG/1
100 TABLET, FILM COATED ORAL DAILY
Qty: 90 TABLET | Refills: 0 | Status: SHIPPED | OUTPATIENT
Start: 2024-01-23

## 2024-01-25 DIAGNOSIS — E03.9 ACQUIRED HYPOTHYROIDISM: Primary | ICD-10-CM

## 2024-01-25 DIAGNOSIS — I10 ESSENTIAL HYPERTENSION: ICD-10-CM

## 2024-01-25 DIAGNOSIS — E87.6 LOW POTASSIUM SYNDROME: ICD-10-CM

## 2024-01-25 DIAGNOSIS — D50.8 IRON DEFICIENCY ANEMIA SECONDARY TO INADEQUATE DIETARY IRON INTAKE: ICD-10-CM

## 2024-01-25 RX ORDER — POTASSIUM CHLORIDE 750 MG/1
TABLET, FILM COATED, EXTENDED RELEASE ORAL
Qty: 45 TABLET | Refills: 0 | Status: SHIPPED | OUTPATIENT
Start: 2024-01-25

## 2024-01-25 RX ORDER — LEVOTHYROXINE SODIUM 112 UG/1
112 TABLET ORAL DAILY
Qty: 90 TABLET | Refills: 1 | Status: SHIPPED | OUTPATIENT
Start: 2024-01-25

## 2024-02-15 ENCOUNTER — PATIENT MESSAGE (OUTPATIENT)
Dept: FAMILY MEDICINE CLINIC | Age: 74
End: 2024-02-15
Payer: MEDICARE

## 2024-02-20 DIAGNOSIS — E03.9 ACQUIRED HYPOTHYROIDISM: ICD-10-CM

## 2024-02-20 DIAGNOSIS — I10 ESSENTIAL HYPERTENSION: Primary | ICD-10-CM

## 2024-02-20 RX ORDER — LEVOTHYROXINE SODIUM 112 UG/1
112 TABLET ORAL DAILY
Qty: 90 TABLET | Refills: 1 | Status: SHIPPED | OUTPATIENT
Start: 2024-02-20

## 2024-02-20 NOTE — TELEPHONE ENCOUNTER
I only recommend vitamin C 500 mg daily not 1000, I will send in her thyroid medication to [facility].  I can write her an order for a BP monitor.  Dr. Langford

## 2024-03-10 DIAGNOSIS — K21.9 GASTROESOPHAGEAL REFLUX DISEASE WITHOUT ESOPHAGITIS: ICD-10-CM

## 2024-03-10 DIAGNOSIS — E78.00 PURE HYPERCHOLESTEROLEMIA: ICD-10-CM

## 2024-03-10 DIAGNOSIS — M51.36 DEGENERATION OF LUMBAR INTERVERTEBRAL DISC: ICD-10-CM

## 2024-03-10 DIAGNOSIS — F41.9 ANXIETY: ICD-10-CM

## 2024-03-10 DIAGNOSIS — M54.41 CHRONIC RIGHT-SIDED LOW BACK PAIN WITH RIGHT-SIDED SCIATICA: ICD-10-CM

## 2024-03-10 DIAGNOSIS — G89.29 CHRONIC RIGHT-SIDED LOW BACK PAIN WITH RIGHT-SIDED SCIATICA: ICD-10-CM

## 2024-03-11 RX ORDER — ATORVASTATIN CALCIUM 20 MG/1
20 TABLET, FILM COATED ORAL NIGHTLY
Qty: 90 TABLET | Refills: 1 | Status: SHIPPED | OUTPATIENT
Start: 2024-03-11

## 2024-03-11 RX ORDER — HYDROXYZINE PAMOATE 25 MG/1
25 CAPSULE ORAL 3 TIMES DAILY PRN
Qty: 90 CAPSULE | Refills: 0 | Status: SHIPPED | OUTPATIENT
Start: 2024-03-11

## 2024-03-11 RX ORDER — OMEPRAZOLE 20 MG/1
20 CAPSULE, DELAYED RELEASE ORAL DAILY
Qty: 90 CAPSULE | Refills: 1 | Status: SHIPPED | OUTPATIENT
Start: 2024-03-11

## 2024-03-11 RX ORDER — GABAPENTIN 600 MG/1
600 TABLET ORAL 3 TIMES DAILY
Qty: 270 TABLET | Refills: 1 | Status: SHIPPED | OUTPATIENT
Start: 2024-03-11

## 2024-03-11 RX ORDER — AMLODIPINE BESYLATE 5 MG/1
5 TABLET ORAL DAILY
Qty: 90 TABLET | Refills: 1 | Status: SHIPPED | OUTPATIENT
Start: 2024-03-11

## 2024-03-11 RX ORDER — MELOXICAM 15 MG/1
15 TABLET ORAL DAILY
Qty: 90 TABLET | Refills: 0 | Status: SHIPPED | OUTPATIENT
Start: 2024-03-11

## 2024-03-11 NOTE — TELEPHONE ENCOUNTER
Mobic LF 1/23/24 #90  Amlodipine LF 12/22/23 #90  Gabapentin LF 10/2/23 #270  LOV 1/9/24  UDS 1/9/24

## 2024-04-03 ENCOUNTER — HOSPITAL ENCOUNTER (OUTPATIENT)
Dept: CT IMAGING | Facility: HOSPITAL | Age: 74
Discharge: HOME | End: 2024-04-03
Admitting: NURSE PRACTITIONER
Payer: MEDICARE

## 2024-04-03 DIAGNOSIS — R91.1 LUNG NODULE: ICD-10-CM

## 2024-04-03 PROCEDURE — 71250 CT THORAX DX C-: CPT

## 2024-04-05 ENCOUNTER — TELEPHONE (OUTPATIENT)
Dept: PULMONOLOGY | Facility: CLINIC | Age: 74
End: 2024-04-05

## 2024-04-05 DIAGNOSIS — R91.1 LUNG NODULE: Primary | ICD-10-CM

## 2024-04-11 ENCOUNTER — OFFICE VISIT (OUTPATIENT)
Dept: PULMONOLOGY | Facility: CLINIC | Age: 74
End: 2024-04-11
Payer: MEDICARE

## 2024-04-11 VITALS
HEIGHT: 65 IN | BODY MASS INDEX: 21.33 KG/M2 | SYSTOLIC BLOOD PRESSURE: 163 MMHG | HEART RATE: 71 BPM | OXYGEN SATURATION: 98 % | TEMPERATURE: 97.9 F | DIASTOLIC BLOOD PRESSURE: 72 MMHG | RESPIRATION RATE: 19 BRPM | WEIGHT: 128 LBS

## 2024-04-11 DIAGNOSIS — I25.10 CORONARY ARTERY CALCIFICATION SEEN ON CT SCAN: ICD-10-CM

## 2024-04-11 DIAGNOSIS — R91.1 LUNG NODULE: Primary | ICD-10-CM

## 2024-04-11 DIAGNOSIS — F17.210 SMOKING GREATER THAN 40 PACK YEARS: ICD-10-CM

## 2024-04-11 DIAGNOSIS — J43.2 CENTRILOBULAR EMPHYSEMA: ICD-10-CM

## 2024-04-11 PROCEDURE — 3078F DIAST BP <80 MM HG: CPT | Performed by: NURSE PRACTITIONER

## 2024-04-11 PROCEDURE — 1159F MED LIST DOCD IN RCRD: CPT | Performed by: NURSE PRACTITIONER

## 2024-04-11 PROCEDURE — 99214 OFFICE O/P EST MOD 30 MIN: CPT | Performed by: NURSE PRACTITIONER

## 2024-04-11 PROCEDURE — 1160F RVW MEDS BY RX/DR IN RCRD: CPT | Performed by: NURSE PRACTITIONER

## 2024-04-11 PROCEDURE — 3077F SYST BP >= 140 MM HG: CPT | Performed by: NURSE PRACTITIONER

## 2024-04-11 NOTE — PROGRESS NOTES
Primary Care Provider  Karina Langford MD   Referring Provider  No ref. provider found    Patient Complaint  Centrilobular emphysema, Lung Nodule, and Follow-up (6 month/CT)      SUBJECTIVE    History of Presenting Illness  Odalis Espino is a pleasant 73 y.o. female who presents to [facility] for follow-up appointment.  I last saw the patient 10/10/2023.  Patient has a diagnosis of centrilobular emphysema, lung nodule, tobacco abuse.  At last visit patient was post to get a pulmonary function test and 6-minute walk however she was a no-show for her appointment.  In addition patient had CT scan of her chest which showed a left upper lobe 8 mm pulmonary nodule stable with development of a new 7 mm left upper lobe pulmonary nodule, coronary artery calcifications, moderate emphysema.  Patient also has some multilevel disc narrowing at her spine.  Follow-up CT in 3 months to monitor status of lung nodules has been ordered.    Patient is only on albuterol as needed for shortness of air or wheeze at this time she is not on a maintenance inhaler.  Denies dyspnea, coughing, wheezing, headaches, chest pain, weight loss or hemoptysis. Denies fevers, chills and night sweats. Odalis Espino is able to perform ADLs without difficulties and denies any swollen glands/lymph nodes in the head or neck.    I have personally reviewed the review of systems, past family, social, medical and surgical histories; and agree with their findings.    Review of Systems  Constitutional symptoms:  Denied complaints   Ear, nose, throat: Denied complaints  Cardiovascular:  Denied complaints  Respiratory: Denied complaints  Gastrointestinal: Denied complaints  Musculoskeletal: Denied complaints    Family History   Problem Relation Age of Onset    Cancer Father     Cancer Sister     Diabetes Brother     Asthma Other     Seizures Other     Anxiety disorder Mother     Arthritis Daughter          Social History     Socioeconomic History    Marital status:    Tobacco Use    Smoking status: Every Day     Current packs/day: 0.25     Average packs/day: 0.3 packs/day for 60.3 years (15.1 ttl pk-yrs)     Types: Cigarettes     Start date: 1964    Smokeless tobacco: Never    Tobacco comments:     REPORTS HAS BEEN TRYING TO QUIT AND IS DOWN TO 4-5 CIGS A DAY, LAST 12/14/23   Vaping Use    Vaping status: Never Used   Substance and Sexual Activity    Alcohol use: Yes     Alcohol/week: 1.0 standard drink of alcohol     Types: 1 Glasses of wine per week     Comment: occasional    Drug use: Never    Sexual activity: Defer        Past Medical History:   Diagnosis Date    Anxiety     Arthritis     Centrilobular emphysema     Diabetes mellitus     AVERAGE 'S    GERD (gastroesophageal reflux disease)     Hepatitis     REPORTS OVER 30 YEARS AGO HAD TREATMENT AND DENIES ANY CURRENT ISSUES    HL (hearing loss)     Hyperlipidemia     Hypertension     Hypothyroidism     Lung nodule     (-)  ION BRONCH DONE 9/2023    Pain management     [facility]    Spinal stenosis     Thyroiditis     DENIES ANY CURRENT ISSUES    Visual impairment         Immunization History   Administered Date(s) Administered    COVID-19 (MODERNA) 1st,2nd,3rd Dose Monovalent 03/17/2021, 04/15/2021, 11/03/2021    COVID-19 (PFIZER) BIVALENT 12+YRS 08/08/2023    Covid-19 (Pfizer) Gray Cap Monovalent 07/14/2022    Flu Vaccine Quad PF >36MO 10/09/2018    Fluzone (or Fluarix & Flulaval for VFC) >6mos 10/09/2018    Fluzone High Dose =>65 Years ([employer] ONLY) 11/13/2017, 07/17/2023    Fluzone High-Dose 65+yrs 11/11/2021, 01/09/2024    Pneumococcal Conjugate 13-Valent (PCV13) 10/18/2018    Pneumococcal Polysaccharide (PPSV23) 01/01/2016    Shingrix 07/17/2023    Tdap 07/17/2023       Allergies   Allergen Reactions    Azithromycin Hives          Current Outpatient Medications:     albuterol sulfate  (90 Base) MCG/ACT inhaler, Inhale 1 puff Every  4 (Four) Hours As Needed for Wheezing or Shortness of Air. Refill needed., Disp: 18 g, Rfl: 2    amLODIPine (NORVASC) 5 MG tablet, Take 1 tablet by mouth Daily., Disp: 90 tablet, Rfl: 1    atorvastatin (LIPITOR) 20 MG tablet, Take 1 tablet by mouth Every Night., Disp: 90 tablet, Rfl: 1    Blood Glucose Monitoring Suppl device, 1 each Daily., Disp: 1 each, Rfl: 0    gabapentin (NEURONTIN) 600 MG tablet, Take 1 tablet by mouth 3 (Three) Times a Day., Disp: 270 tablet, Rfl: 1    glucose blood test strip, Use as instructed to check blood sugar daily, Disp: 100 each, Rfl: 3    hydroCHLOROthiazide (HYDRODIURIL) 12.5 MG tablet, Take 1 tablet by mouth Daily., Disp: 90 tablet, Rfl: 0    HYDROcodone-acetaminophen (NORCO) 5-325 MG per tablet, Take 1 tablet by mouth Every 4 (Four) Hours As Needed (Pain)., Disp: 30 tablet, Rfl: 0    hydrOXYzine pamoate (VISTARIL) 25 MG capsule, Take 1 capsule by mouth 3 (Three) Times a Day As Needed for Itching or Anxiety., Disp: 90 capsule, Rfl: 0    Lancet Device misc, 1 each Daily. Use as directed; check blood sugar once daily, Disp: 100 each, Rfl: 3    Lancets (OneTouch Delica Plus [id number]) misc, , Disp: , Rfl:     levothyroxine (SYNTHROID, LEVOTHROID) 112 MCG tablet, Take 1 tablet by mouth Daily., Disp: 90 tablet, Rfl: 1    meloxicam (MOBIC) 15 MG tablet, Take 1 tablet by mouth Daily., Disp: 90 tablet, Rfl: 0    metFORMIN (GLUCOPHAGE) 500 MG tablet, Take 1 tablet by mouth 2 (Two) Times a Day With Meals. INSTRUCTED PER ANESTHESIA PROTOCOL NONE AFTER 6 PM ON 12/14/23, Disp: , Rfl:     omeprazole (priLOSEC) 20 MG capsule, Take 1 capsule by mouth Daily., Disp: 90 capsule, Rfl: 1    potassium chloride 10 MEQ CR tablet, Take 1 p.o. Monday Wednesday Friday, Disp: 45 tablet, Rfl: 0    sertraline (ZOLOFT) 100 MG tablet, Take 1 tablet by mouth Daily., Disp: 90 tablet, Rfl: 0    vitamin B-12 (CYANOCOBALAMIN) 1000 MCG tablet, Take 1 tablet by mouth Daily., Disp: , Rfl:      "Budeson-Glycopyrrol-Formoterol (BREZTRI) 160-9-4.8 MCG/ACT aerosol inhaler, Inhale 2 puffs 2 (Two) Times a Day., Disp: 3 each, Rfl: 3           Vital Signs   /72 (BP Location: Right arm, Patient Position: Sitting, Cuff Size: Adult)   Pulse 71   Temp 97.9 °F (36.6 °C) (Oral)   Resp 19   Ht 165.1 cm (65\")   Wt 58.1 kg (128 lb)   SpO2 98% Comment: room air  BMI 21.30 kg/m²       OBJECTIVE    Physical Exam  Vital Signs Reviewed   WDWN, Alert, NAD.    HEENT:  PERRL, EOMI.  OP, nares clear  Neck:  Supple, no JVD, no thyromegaly  Chest:  good aeration, clear to auscultation bilaterally, tympanic to percussion bilaterally, no work of breathing noted  CV: RRR, no MGR, pulses 2+, equal.  Abd:  Soft, NT, ND, + BS, no HSM  EXT:  no clubbing, no cyanosis, no edema  Neuro:  A&Ox3, CN grossly intact, no focal deficits.  Skin: No rashes or lesions noted    Results Review  I have personally reviewed the prior office notes, hospital records, labs, and diagnostics.  CT Chest Without Contrast Diagnostic [[id number]] (Order [id number])  Order  Status: Final result     Study Notes     Janneth Hernandez on 4/3/2024  1:01 PM EDT   FOLLOW UP LUNG NODULE  SMOKER SINCE AGE 14, 1 PPD  NO CA  NO INSULIN PUMP     Appointment Information    Display Notes    PREREG'D 4/1                   PACS Images     Radiology Images      Study Result    Narrative & Impression      DATE OF EXAM:  4/3/2024 12:54 PM     PROCEDURE:  CT CHEST WO CONTRAST DIAGNOSTIC-     INDICATIONS:   pulmonary nodule; R91.1-Solitary pulmonary nodule     COMPARISON:   CT chest without contrast 9/25/2023     TECHNIQUE:  Routine transaxial slices were obtained through the chest without the  administration of intravenous contrast. Reconstructed coronal and  sagittal images were also obtained. Automated exposure control and  iterative construction methods were used.      FINDINGS:  Noncontrast soft tissues of the lower neck are without acute  abnormality. Heart size normal. " Coronary artery calcifications are  present. No pericardial or pleural effusion. Main pulmonary artery is  mildly dilated up to 3.3 cm. Scattered mediastinal lymph nodes are below  size criteria. No hilar adenopathy. No axillary adenopathy.     Trachea and mainstem bronchi patent. Moderate emphysema. No  consolidation. No pneumothorax. Calcified granuloma in the right middle  lobe. Within the left upper lobe abutting the pulmonary fissure there is  a stable 8 mm nodule (204/38). There is a new nodule within the slightly  more superior left upper lobe abutting the pulmonary fissure measuring 7  mm (204/33). Stable small subpleural 3 mm left upper lobe nodule  (204/29).     Upper abdomen demonstrates normal noncontrast visualized portions of the  liver, spleen, adrenal glands, and pancreas. No free fluid in the upper  abdomen. Extensive upper abdominal aortic atherosclerotic  calcifications. Convex right dextrocurvature of the lumbar spine  partially imaged. Multilevel disc narrowing in the spine most pronounced  in the lower thoracic spine at the T10-11 and T11-12 levels and at the  visualized upper lumbar spine at the L2-3 level. No aggressive osseous  lesion or acute fracture.     IMPRESSION:  1. Left upper lobe 8 mm pulmonary nodule stable with development of a  new 7 mm left upper lobe pulmonary nodule, recommend CT follow-up in 3  months.  2. Coronary artery calcifications, moderate emphysema, and additional  chronic findings above.     Electronically Signed By-Jose Wilkinson MD On:4/4/2024 9:21 AM       ASSESSMENT         Patient Active Problem List   Diagnosis    Encounter for annual wellness exam in Medicare patient    Degeneration of lumbar intervertebral disc    Acquired hypothyroidism    Anxiety    Essential hypertension    Other hyperlipidemia    Type 2 diabetes mellitus without complication, without long-term current use of insulin    Gastroesophageal reflux disease without esophagitis    Stage 3b chronic  kidney disease (CKD)    Hypothyroidism due to Hashimoto's thyroiditis    Tobacco abuse    Smoking greater than 40 pack years    Lung nodule    Centrilobular emphysema       Encounter Diagnoses   Name Primary?    Lung nodule Yes    Centrilobular emphysema     Smoking greater than 40 pack years     Coronary artery calcification seen on CT scan       PLAN  -followup CT scan without contrast of chest in 3 months has been ordered to monitor status of pulmonary nodules  -PFT and 6  minute walk ordered today to be done when patient has CT scan  -samples of Breztri provided today with instruction in use, rinsing her mouth out to prevent oral thrush  -Rx for Breztri sent to mail order pharmacy  -followup in 3 months or sooner if needed      Diagnoses and all orders for this visit:    1. Lung nodule (Primary)  -     Complete PFT - Pre & Post Bronchodilator; Future  -     Budeson-Glycopyrrol-Formoterol (BREZTRI) 160-9-4.8 MCG/ACT aerosol inhaler; Inhale 2 puffs 2 (Two) Times a Day.  Dispense: 3 each; Refill: 3  -     6 Minute Walk Test; Future    2. Centrilobular emphysema  -     Complete PFT - Pre & Post Bronchodilator; Future  -     Discontinue: tiotropium bromide-olodaterol (STIOLTO RESPIMAT) 2.5-2.5 MCG/ACT aerosol solution inhaler; Inhale 2 puffs Daily.  Dispense: 3 each; Refill: 3  -     Budeson-Glycopyrrol-Formoterol (BREZTRI) 160-9-4.8 MCG/ACT aerosol inhaler; Inhale 2 puffs 2 (Two) Times a Day.  Dispense: 3 each; Refill: 3  -     6 Minute Walk Test; Future    3. Smoking greater than 40 pack years  -     Complete PFT - Pre & Post Bronchodilator; Future  -     Budeson-Glycopyrrol-Formoterol (BREZTRI) 160-9-4.8 MCG/ACT aerosol inhaler; Inhale 2 puffs 2 (Two) Times a Day.  Dispense: 3 each; Refill: 3  -     6 Minute Walk Test; Future    4. Coronary artery calcification seen on CT scan  -     Ambulatory Referral to Cardiology  -     Complete PFT - Pre & Post Bronchodilator; Future  -     Budeson-Glycopyrrol-Formoterol  (BREZTRI) 160-9-4.8 MCG/ACT aerosol inhaler; Inhale 2 puffs 2 (Two) Times a Day.  Dispense: 3 each; Refill: 3  -     6 Minute Walk Test; Future           Smoking status:current  Vaccination status:reviewed  Medications personally reviewed    Follow Up  Return in about 3 months (around 7/22/2024) for after CT and PFT.    Patient was given instructions and counseling regarding her condition or for health maintenance advice. Please see specific information pulled into the AVS if appropriate.

## 2024-04-23 ENCOUNTER — TELEPHONE (OUTPATIENT)
Dept: PULMONOLOGY | Facility: CLINIC | Age: 74
End: 2024-04-23

## 2024-04-23 NOTE — TELEPHONE ENCOUNTER
"Caller: Odalis Espino \"[employer]\"      Relationship: SELF     Best call back number: [phone]      Who are you requesting to speak with (clinical staff, provider,  specific staff member):      What was the call regarding:PT CALLED STATING PHARMACY GAVE HER 2 INHALERS AND SHE'S NOT SURE WHAT TO TAKE SHE STATES MARICEL ONLY TOLD HER AB ONE. SHE ALSO STATES THAT SHE WOULD LIKE TO SPEAK WITH SOMEONE REGARDING HER BREZTRI MAKING HER DIZZY. WOULD LIKE A CALL BACK.   "

## 2024-04-24 ENCOUNTER — TELEPHONE (OUTPATIENT)
Dept: PULMONOLOGY | Facility: CLINIC | Age: 74
End: 2024-04-24

## 2024-04-24 NOTE — TELEPHONE ENCOUNTER
"Caller: Odalis Espino \"[employer]\"      Relationship: self     Best call back number: [phone]      Who are you requesting to speak with (clinical staff, provider,  specific staff member): jelly     What was the call regarding: returning call to jelly  "

## 2024-04-24 NOTE — TELEPHONE ENCOUNTER
Pt stated she ended up with 2 inhaler when she got to her pharmacy. Stiolto and Breztri. Pt stated she is using the Breztri; however it makes her feel dizzy and light headed. Pt stated it makes her feel really weird. I have advised pt to stop the Breztri as of now and I will discuss with Estela.

## 2024-04-25 NOTE — TELEPHONE ENCOUNTER
Pt has been called. Pt stated she had never taken Stiolto before neither. Pt stated she will try the Stiolto and see if it helps. Pt stated she will stop the Breztri as she did yesterday when we discussed. Pt ask if I would add the Breztri to her allergy list.

## 2024-05-08 DIAGNOSIS — M51.36 DEGENERATION OF LUMBAR INTERVERTEBRAL DISC: ICD-10-CM

## 2024-05-08 DIAGNOSIS — G89.29 CHRONIC RIGHT-SIDED LOW BACK PAIN WITH RIGHT-SIDED SCIATICA: ICD-10-CM

## 2024-05-08 DIAGNOSIS — M54.41 CHRONIC RIGHT-SIDED LOW BACK PAIN WITH RIGHT-SIDED SCIATICA: ICD-10-CM

## 2024-05-08 RX ORDER — GABAPENTIN 600 MG/1
600 TABLET ORAL 3 TIMES DAILY
Qty: 270 TABLET | Refills: 0 | Status: SHIPPED | OUTPATIENT
Start: 2024-05-08

## 2024-05-20 ENCOUNTER — LAB (OUTPATIENT)
Dept: LAB | Facility: HOSPITAL | Age: 74
End: 2024-05-20
Payer: MEDICARE

## 2024-05-20 DIAGNOSIS — E11.41 TYPE 2 DIABETES MELLITUS WITH DIABETIC MONONEUROPATHY, WITHOUT LONG-TERM CURRENT USE OF INSULIN: ICD-10-CM

## 2024-05-20 DIAGNOSIS — D50.8 IRON DEFICIENCY ANEMIA SECONDARY TO INADEQUATE DIETARY IRON INTAKE: ICD-10-CM

## 2024-05-20 DIAGNOSIS — E78.00 PURE HYPERCHOLESTEROLEMIA: ICD-10-CM

## 2024-05-20 DIAGNOSIS — D51.3 OTHER DIETARY VITAMIN B12 DEFICIENCY ANEMIA: ICD-10-CM

## 2024-05-20 DIAGNOSIS — I10 ESSENTIAL HYPERTENSION: ICD-10-CM

## 2024-05-20 DIAGNOSIS — E03.9 ACQUIRED HYPOTHYROIDISM: ICD-10-CM

## 2024-05-20 DIAGNOSIS — E87.6 LOW POTASSIUM SYNDROME: ICD-10-CM

## 2024-05-20 LAB
ALBUMIN SERPL-MCNC: 4.3 G/DL (ref 3.5–5.2)
ALBUMIN UR-MCNC: <1.2 MG/DL
ALBUMIN/GLOB SERPL: 1.5 G/DL
ALP SERPL-CCNC: 65 U/L (ref 39–117)
ALT SERPL W P-5'-P-CCNC: 6 U/L (ref 1–33)
ANION GAP SERPL CALCULATED.3IONS-SCNC: 9 MMOL/L (ref 5–15)
AST SERPL-CCNC: 11 U/L (ref 1–32)
BILIRUB SERPL-MCNC: 0.2 MG/DL (ref 0–1.2)
BUN SERPL-MCNC: 28 MG/DL (ref 8–23)
BUN/CREAT SERPL: 21.7 (ref 7–25)
CALCIUM SPEC-SCNC: 8.9 MG/DL (ref 8.6–10.5)
CHLORIDE SERPL-SCNC: 102 MMOL/L (ref 98–107)
CO2 SERPL-SCNC: 31 MMOL/L (ref 22–29)
CREAT SERPL-MCNC: 1.29 MG/DL (ref 0.57–1)
CREAT UR-MCNC: 38.1 MG/DL
EGFRCR SERPLBLD CKD-EPI 2021: 43.4 ML/MIN/1.73
GLOBULIN UR ELPH-MCNC: 2.9 GM/DL
GLUCOSE SERPL-MCNC: 107 MG/DL (ref 65–99)
IRON 24H UR-MRATE: 46 MCG/DL (ref 37–145)
IRON SATN MFR SERPL: 12 % (ref 20–50)
MICROALBUMIN/CREAT UR: NORMAL MG/G{CREAT}
POTASSIUM SERPL-SCNC: 3.2 MMOL/L (ref 3.5–5.2)
PROT SERPL-MCNC: 7.2 G/DL (ref 6–8.5)
SODIUM SERPL-SCNC: 142 MMOL/L (ref 136–145)
TIBC SERPL-MCNC: 392 MCG/DL (ref 298–536)
TRANSFERRIN SERPL-MCNC: 263 MG/DL (ref 200–360)
TSH SERPL DL<=0.05 MIU/L-ACNC: 1.01 UIU/ML (ref 0.27–4.2)

## 2024-05-20 PROCEDURE — 84443 ASSAY THYROID STIM HORMONE: CPT

## 2024-05-20 PROCEDURE — 82043 UR ALBUMIN QUANTITATIVE: CPT

## 2024-05-20 PROCEDURE — 84466 ASSAY OF TRANSFERRIN: CPT

## 2024-05-20 PROCEDURE — 83540 ASSAY OF IRON: CPT

## 2024-05-20 PROCEDURE — 80053 COMPREHEN METABOLIC PANEL: CPT

## 2024-05-20 PROCEDURE — 36415 COLL VENOUS BLD VENIPUNCTURE: CPT

## 2024-05-20 PROCEDURE — 82570 ASSAY OF URINE CREATININE: CPT

## 2024-05-24 ENCOUNTER — OFFICE VISIT (OUTPATIENT)
Dept: CARDIOLOGY | Facility: CLINIC | Age: 74
End: 2024-05-24
Payer: MEDICARE

## 2024-05-24 VITALS
SYSTOLIC BLOOD PRESSURE: 156 MMHG | HEART RATE: 72 BPM | HEIGHT: 65 IN | DIASTOLIC BLOOD PRESSURE: 81 MMHG | BODY MASS INDEX: 21.16 KG/M2 | WEIGHT: 127 LBS

## 2024-05-24 DIAGNOSIS — I25.84 CORONARY ARTERY CALCIFICATION: ICD-10-CM

## 2024-05-24 DIAGNOSIS — I25.10 CORONARY ARTERY CALCIFICATION: ICD-10-CM

## 2024-05-24 DIAGNOSIS — I10 ESSENTIAL HYPERTENSION: Primary | ICD-10-CM

## 2024-05-24 DIAGNOSIS — Z72.0 TOBACCO ABUSE: ICD-10-CM

## 2024-05-24 DIAGNOSIS — E78.49 OTHER HYPERLIPIDEMIA: ICD-10-CM

## 2024-05-24 PROCEDURE — 3078F DIAST BP <80 MM HG: CPT | Performed by: INTERNAL MEDICINE

## 2024-05-24 PROCEDURE — 3077F SYST BP >= 140 MM HG: CPT | Performed by: INTERNAL MEDICINE

## 2024-05-24 PROCEDURE — 99204 OFFICE O/P NEW MOD 45 MIN: CPT | Performed by: INTERNAL MEDICINE

## 2024-05-24 RX ORDER — HYDROCODONE BITARTRATE AND ACETAMINOPHEN 10; 325 MG/1; MG/1
1 TABLET ORAL EVERY 6 HOURS PRN
COMMUNITY

## 2024-05-24 NOTE — PROGRESS NOTES
"Chief Complaint  CAC    Subjective        Odalis Espino presents to [facility] CARDIOLOGY  History of present illness:    Patient is a 73-year-old female with long smoking history and COPD.  Her pulmonary doctor got a CAT scan of her chest as they are watching a left upper lobe nodule that apparently was negative for cancer via biopsy.  The CAT scan showed coronary artery calcification.  She states she is not very active as she is \"lazy.\"  She does know when she is walking around that her breathing is fine and she notes no chest pain.  She is still smoking 1 pack/day.  She has tried Chantix with no help.  She notes no alcohol use.  Mother and father have no heart disease.      Past Medical History:   Diagnosis Date    Anxiety     Arthritis     Centrilobular emphysema     Diabetes mellitus     AVERAGE 'S    GERD (gastroesophageal reflux disease)     Hepatitis     REPORTS OVER 30 YEARS AGO HAD TREATMENT AND DENIES ANY CURRENT ISSUES    HL (hearing loss)     Hyperlipidemia     Hypertension     Hypothyroidism     Lung nodule     (-)  ION BRONCH DONE 9/2023    Pain management     [facility]    Spinal stenosis     Thyroiditis     DENIES ANY CURRENT ISSUES    Visual impairment          Past Surgical History:   Procedure Laterality Date    BRONCHOSCOPY WITH ION ROBOTIC ASSIST N/A 9/25/2023    Procedure: BRONCHOSCOPY WITH ION ROBOT, REBUS, EBUS, NEEDLE ASPIRATE, BIOPSIES, WASHINGS, BRUSHINGS, BAL;  Surgeon: Chiki Morse MD;  Location: [facility] MAIN OR;  Service: Robotics - Pulmonary;  Laterality: N/A;    EYE SURGERY      HYSTERECTOMY      LUMBAR LAMINECTOMY Right 12/15/2023    Procedure: MINIMALLY INVASIVE LUMBAR LAMINECTOMY, right approach, lumbar 4-lumbar  5 and lumbar 5-sacral 1;  Surgeon: Garry Ford MD;  Location: [facility] MAIN OR;  Service: Neurosurgery;  Laterality: Right;    TONSILLECTOMY            Social History     Socioeconomic History    Marital status:    Tobacco Use    " Smoking status: Every Day     Current packs/day: 0.25     Average packs/day: 0.3 packs/day for 60.4 years (15.1 ttl pk-yrs)     Types: Cigarettes     Start date: 1964    Smokeless tobacco: Never    Tobacco comments:     REPORTS HAS BEEN TRYING TO QUIT AND IS DOWN TO 4-5 CIGS A DAY, LAST 12/14/23   Vaping Use    Vaping status: Never Used   Substance and Sexual Activity    Alcohol use: Yes     Alcohol/week: 1.0 standard drink of alcohol     Types: 1 Glasses of wine per week     Comment: occasional    Drug use: Never    Sexual activity: Defer         Family History   Problem Relation Age of Onset    Cancer Father     Cancer Sister     Diabetes Brother     Asthma Other     Seizures Other     Anxiety disorder Mother     Arthritis Daughter           Allergies   Allergen Reactions    Azithromycin Hives    Breztri Aerosphere [Budeson-Glycopyrrol-Formoterol] Other (See Comments)     Pt stated makes her light headed and she felt dizzy after using Breztri.             Current Outpatient Medications:     albuterol sulfate  (90 Base) MCG/ACT inhaler, Inhale 1 puff Every 4 (Four) Hours As Needed for Wheezing or Shortness of Air. Refill needed., Disp: 18 g, Rfl: 2    amLODIPine (NORVASC) 5 MG tablet, Take 1 tablet by mouth Daily., Disp: 90 tablet, Rfl: 1    atorvastatin (LIPITOR) 20 MG tablet, Take 1 tablet by mouth Every Night., Disp: 90 tablet, Rfl: 1    Blood Glucose Monitoring Suppl device, 1 each Daily., Disp: 1 each, Rfl: 0    Budeson-Glycopyrrol-Formoterol (BREZTRI) 160-9-4.8 MCG/ACT aerosol inhaler, Inhale 2 puffs 2 (Two) Times a Day., Disp: 3 each, Rfl: 3    gabapentin (NEURONTIN) 600 MG tablet, Take 1 tablet by mouth 3 (Three) Times a Day., Disp: 270 tablet, Rfl: 0    glucose blood test strip, Use as instructed to check blood sugar daily, Disp: 100 each, Rfl: 3    hydroCHLOROthiazide (HYDRODIURIL) 12.5 MG tablet, Take 1 tablet by mouth Daily., Disp: 90 tablet, Rfl: 0    HYDROcodone-acetaminophen (NORCO)  MG  "per tablet, Take 1 tablet by mouth Every 6 (Six) Hours As Needed for Moderate Pain., Disp: , Rfl:     hydrOXYzine pamoate (VISTARIL) 25 MG capsule, Take 1 capsule by mouth 3 (Three) Times a Day As Needed for Itching or Anxiety., Disp: 90 capsule, Rfl: 0    Lancet Device misc, 1 each Daily. Use as directed; check blood sugar once daily, Disp: 100 each, Rfl: 3    Lancets (OneTouch Delica Plus [id number]) misc, , Disp: , Rfl:     levothyroxine (SYNTHROID, LEVOTHROID) 112 MCG tablet, Take 1 tablet by mouth Daily., Disp: 90 tablet, Rfl: 1    meloxicam (MOBIC) 15 MG tablet, Take 1 tablet by mouth Daily., Disp: 90 tablet, Rfl: 0    metFORMIN (GLUCOPHAGE) 500 MG tablet, Take 1 tablet by mouth 2 (Two) Times a Day With Meals. INSTRUCTED PER ANESTHESIA PROTOCOL NONE AFTER 6 PM ON 12/14/23, Disp: , Rfl:     omeprazole (priLOSEC) 20 MG capsule, Take 1 capsule by mouth Daily., Disp: 90 capsule, Rfl: 1    sertraline (ZOLOFT) 100 MG tablet, Take 1 tablet by mouth Daily., Disp: 90 tablet, Rfl: 0    vitamin B-12 (CYANOCOBALAMIN) 1000 MCG tablet, Take 1 tablet by mouth Daily., Disp: , Rfl:       ROS:  Cardiac review of systems negative.    Objective     /81   Pulse 72   Ht 165.1 cm (65\")   Wt 57.6 kg (127 lb)   BMI 21.13 kg/m²       General Appearance:   well developed  well nourished  HENT:   oropharynx moist  lips not cyanotic  Respiratory:  no respiratory distress  normal breath sounds  no rales  Cardiovascular:  no jugular venous distention  regular rhythm  S1 normal, S2 normal  no S3, no S4   no murmur  no rub, no thrill  No carotid bruit  pedal pulses normal  lower extremity edema: none    Musculoskeletal:  no clubbing of fingers.   normocephalic, head atraumatic  Skin:   warm, dry  Psychiatric:  judgement and insight appropriate  normal mood and affect    ECHO:    STRESS:    CATH:  No results found for this or any previous visit.    BMP:     Glucose   Date Value Ref Range Status   05/20/2024 107 (H) 65 - 99 mg/dL Final "     BUN   Date Value Ref Range Status   05/20/2024 28 (H) 8 - 23 mg/dL Final     Creatinine   Date Value Ref Range Status   05/20/2024 1.29 (H) 0.57 - 1.00 mg/dL Final     Sodium   Date Value Ref Range Status   05/20/2024 142 136 - 145 mmol/L Final     Potassium   Date Value Ref Range Status   05/20/2024 3.2 (L) 3.5 - 5.2 mmol/L Final     Chloride   Date Value Ref Range Status   05/20/2024 102 98 - 107 mmol/L Final     CO2   Date Value Ref Range Status   05/20/2024 31.0 (H) 22.0 - 29.0 mmol/L Final     Calcium   Date Value Ref Range Status   05/20/2024 8.9 8.6 - 10.5 mg/dL Final     BUN/Creatinine Ratio   Date Value Ref Range Status   05/20/2024 21.7 7.0 - 25.0 Final     Anion Gap   Date Value Ref Range Status   05/20/2024 9.0 5.0 - 15.0 mmol/L Final     eGFR   Date Value Ref Range Status   05/20/2024 43.4 (L) >60.0 mL/min/1.73 Final     LIPIDS:  Total Cholesterol   Date Value Ref Range Status   01/23/2024 167 0 - 200 mg/dL Final     Triglycerides   Date Value Ref Range Status   01/23/2024 96 0 - 150 mg/dL Final     HDL Cholesterol   Date Value Ref Range Status   01/23/2024 66 (H) 40 - 60 mg/dL Final     LDL Cholesterol    Date Value Ref Range Status   01/23/2024 84 0 - 100 mg/dL Final     VLDL Cholesterol   Date Value Ref Range Status   01/23/2024 17 5 - 40 mg/dL Final     LDL/HDL Ratio   Date Value Ref Range Status   01/23/2024 1.24  Final         Procedures             ASSESSMENT:  Diagnoses and all orders for this visit:    1. Essential hypertension (Primary)    2. Other hyperlipidemia    3. Coronary artery calcification    4. Tobacco abuse         PLAN:    1.  Patient had a CAT scan of the lungs by the pulmonologist which showed coronary artery calcification.  She notes no exertional chest pain or shortness of breath.  We just want to make sure her risk factors are under good control.  2.  Encourage smoking cessation.  The patient does have Chantix at home.  She states that she is not at the point where she  wants to quit.  3.  Blood pressure is elevated but the last couple times has been fine.  I did ask her to follow regularly with her primary care physician to monitor her blood pressure.  If needed could go up on the amlodipine or the hydrochlorothiazide.  4.  Told patient I would recommend an 81 mg aspirin.  5.  Continue the Lipitor.  Patient cholesterol checked 1/23/2024 with LDL 84, HDL 66, and triglycerides 96.  I do think these are under fair control.  6.  Encouraged the patient to slowly increase her activity up to 30 minutes 5 days a week she will call us if any chest pain.  I do think from a cardiac standpoint she could probably just see us as needed and would continue to work to get her modifiable risk factors under excellent control through primary care physician.      Return if symptoms worsen or fail to improve.     Patient was given instructions and counseling regarding her condition or for health maintenance advice. Please see specific information pulled into the AVS if appropriate.         Ren Lee MD   5/24/2024  13:47 EDT

## 2024-05-28 ENCOUNTER — HOSPITAL ENCOUNTER (OUTPATIENT)
Dept: RESPIRATORY THERAPY | Facility: HOSPITAL | Age: 74
Discharge: HOME | End: 2024-05-28
Payer: MEDICARE

## 2024-05-28 DIAGNOSIS — I25.10 CORONARY ARTERY CALCIFICATION SEEN ON CT SCAN: ICD-10-CM

## 2024-05-28 DIAGNOSIS — R91.1 LUNG NODULE: ICD-10-CM

## 2024-05-28 DIAGNOSIS — F17.210 SMOKING GREATER THAN 40 PACK YEARS: ICD-10-CM

## 2024-05-28 DIAGNOSIS — G89.29 CHRONIC RIGHT-SIDED LOW BACK PAIN WITH RIGHT-SIDED SCIATICA: ICD-10-CM

## 2024-05-28 DIAGNOSIS — M54.41 CHRONIC RIGHT-SIDED LOW BACK PAIN WITH RIGHT-SIDED SCIATICA: ICD-10-CM

## 2024-05-28 DIAGNOSIS — E03.9 ACQUIRED HYPOTHYROIDISM: ICD-10-CM

## 2024-05-28 DIAGNOSIS — J43.2 CENTRILOBULAR EMPHYSEMA: ICD-10-CM

## 2024-05-28 DIAGNOSIS — J45.20 MILD INTERMITTENT ASTHMA WITHOUT COMPLICATION: ICD-10-CM

## 2024-05-28 DIAGNOSIS — F41.9 ANXIETY: ICD-10-CM

## 2024-05-28 DIAGNOSIS — I10 ESSENTIAL HYPERTENSION: ICD-10-CM

## 2024-05-28 DIAGNOSIS — M51.36 DEGENERATION OF LUMBAR INTERVERTEBRAL DISC: ICD-10-CM

## 2024-05-28 PROCEDURE — 94060 EVALUATION OF WHEEZING: CPT

## 2024-05-28 PROCEDURE — 94729 DIFFUSING CAPACITY: CPT

## 2024-05-28 PROCEDURE — 94618 PULMONARY STRESS TESTING: CPT

## 2024-05-28 PROCEDURE — 94726 PLETHYSMOGRAPHY LUNG VOLUMES: CPT

## 2024-05-28 RX ORDER — ALBUTEROL SULFATE 2.5 MG/3ML
2.5 SOLUTION RESPIRATORY (INHALATION) ONCE
Status: COMPLETED | OUTPATIENT
Start: 2024-05-28 | End: 2024-05-28

## 2024-05-28 RX ADMIN — ALBUTEROL SULFATE 2.5 MG: 2.5 SOLUTION RESPIRATORY (INHALATION) at 11:38

## 2024-05-28 NOTE — PROGRESS NOTES
Exercise Oximetry    Patient Name:Odalis Espino   MRN: [id number]   Date: 05/28/24             ROOM AIR BASELINE   SpO2% 96   Heart Rate 81   Blood Pressure      EXERCISE ON ROOM AIR SpO2% EXERCISE ON O2 @  LPM SpO2%   1 MINUTE 93 1 MINUTE    2 MINUTES 94 2 MINUTES    3 MINUTES 95 3 MINUTES    4 MINUTES 96 4 MINUTES    5 MINUTES 95 5 MINUTES    6 MINUTES 95 6 MINUTES               Distance Walked  1,200 Distance Walked   Dyspnea (Reji Scale)  0 Dyspnea (Reji Scale)   Fatigue (Reji Scale)  3 Fatigue (Reji Scale)   SpO2% Post Exercise  95 SpO2% Post Exercise   HR Post Exercise  84 HR Post Exercise   Time to Recovery  5 Minute Time to Recovery     Comments:

## 2024-05-29 RX ORDER — GABAPENTIN 600 MG/1
600 TABLET ORAL 3 TIMES DAILY
Qty: 270 TABLET | Refills: 0 | Status: CANCELLED | OUTPATIENT
Start: 2024-05-29

## 2024-05-29 RX ORDER — LEVOTHYROXINE SODIUM 112 UG/1
112 TABLET ORAL DAILY
Qty: 90 TABLET | Refills: 1 | Status: SHIPPED | OUTPATIENT
Start: 2024-05-29

## 2024-05-29 RX ORDER — MELOXICAM 15 MG/1
15 TABLET ORAL DAILY
Qty: 90 TABLET | Refills: 0 | Status: SHIPPED | OUTPATIENT
Start: 2024-05-29

## 2024-05-29 RX ORDER — HYDROCHLOROTHIAZIDE 12.5 MG/1
12.5 TABLET ORAL DAILY
Qty: 90 TABLET | Refills: 0 | Status: SHIPPED | OUTPATIENT
Start: 2024-05-29

## 2024-05-29 RX ORDER — HYDROXYZINE PAMOATE 25 MG/1
25 CAPSULE ORAL 3 TIMES DAILY PRN
Qty: 90 CAPSULE | Refills: 0 | Status: SHIPPED | OUTPATIENT
Start: 2024-05-29

## 2024-05-29 RX ORDER — ALBUTEROL SULFATE 90 UG/1
1 AEROSOL, METERED RESPIRATORY (INHALATION) EVERY 4 HOURS PRN
Qty: 18 G | Refills: 2 | Status: SHIPPED | OUTPATIENT
Start: 2024-05-29

## 2024-05-30 DIAGNOSIS — R06.09 DYSPNEA ON EXERTION: ICD-10-CM

## 2024-05-30 DIAGNOSIS — J43.2 CENTRILOBULAR EMPHYSEMA: Primary | ICD-10-CM

## 2024-06-13 ENCOUNTER — TELEPHONE (OUTPATIENT)
Dept: PULMONOLOGY | Facility: CLINIC | Age: 74
End: 2024-06-13
Payer: MEDICARE

## 2024-06-13 NOTE — TELEPHONE ENCOUNTER
"Patient stated scheduling is calling her to set up an Echo she was unsure why it was ordered because her heart doctor hasn't mentioned anything abnormal. I looked back at her PFT results and saw that Estela said based on those results she ordered an Echo. I read off the result note to her again and she had questions about what \"her diffusion capacity is severely reduced.\" Please advise, thank you.   "

## 2024-06-20 ENCOUNTER — TRANSCRIBE ORDERS (OUTPATIENT)
Dept: GENERAL RADIOLOGY | Facility: HOSPITAL | Age: 74
End: 2024-06-20
Payer: MEDICARE

## 2024-06-20 ENCOUNTER — HOSPITAL ENCOUNTER (OUTPATIENT)
Dept: GENERAL RADIOLOGY | Facility: HOSPITAL | Age: 74
Discharge: HOME | End: 2024-06-20
Admitting: PHYSICIAN ASSISTANT
Payer: MEDICARE

## 2024-06-20 DIAGNOSIS — M25.551 RIGHT HIP PAIN: Primary | ICD-10-CM

## 2024-06-20 DIAGNOSIS — M25.551 RIGHT HIP PAIN: ICD-10-CM

## 2024-06-20 PROCEDURE — 73502 X-RAY EXAM HIP UNI 2-3 VIEWS: CPT

## 2024-07-10 ENCOUNTER — HOSPITAL ENCOUNTER (OUTPATIENT)
Dept: CARDIOLOGY | Facility: HOSPITAL | Age: 74
Discharge: HOME | End: 2024-07-10
Admitting: NURSE PRACTITIONER
Payer: MEDICARE

## 2024-07-10 DIAGNOSIS — J43.2 CENTRILOBULAR EMPHYSEMA: ICD-10-CM

## 2024-07-10 DIAGNOSIS — R06.09 DYSPNEA ON EXERTION: ICD-10-CM

## 2024-07-10 PROCEDURE — 93306 TTE W/DOPPLER COMPLETE: CPT

## 2024-07-11 LAB
AORTIC DIMENSIONLESS INDEX: 0.92 (DI)
ASCENDING AORTA: 3.4 CM
BH CV ECHO MEAS - AO MAX PG: 6.2 MMHG
BH CV ECHO MEAS - AO MEAN PG: 3 MMHG
BH CV ECHO MEAS - AO ROOT DIAM: 2.8 CM
BH CV ECHO MEAS - AO V2 MAX: 124 CM/SEC
BH CV ECHO MEAS - AO V2 VTI: 32.5 CM
BH CV ECHO MEAS - AVA(I,D): 2.9 CM2
BH CV ECHO MEAS - EDV(CUBED): 48.6 ML
BH CV ECHO MEAS - EDV(MOD-SP2): 50.4 ML
BH CV ECHO MEAS - EDV(MOD-SP4): 68.4 ML
BH CV ECHO MEAS - EF(MOD-BP): 59.4 %
BH CV ECHO MEAS - EF(MOD-SP2): 59.5 %
BH CV ECHO MEAS - EF(MOD-SP4): 57.9 %
BH CV ECHO MEAS - ESV(CUBED): 12.6 ML
BH CV ECHO MEAS - ESV(MOD-SP2): 20.4 ML
BH CV ECHO MEAS - ESV(MOD-SP4): 28.8 ML
BH CV ECHO MEAS - FS: 36.2 %
BH CV ECHO MEAS - IVS/LVPW: 1.77 CM
BH CV ECHO MEAS - IVSD: 1.45 CM
BH CV ECHO MEAS - LA DIMENSION: 3.5 CM
BH CV ECHO MEAS - LAT PEAK E' VEL: 8.4 CM/SEC
BH CV ECHO MEAS - LV MASS(C)D: 132.8 GRAMS
BH CV ECHO MEAS - LV MAX PG: 5.4 MMHG
BH CV ECHO MEAS - LV MEAN PG: 3 MMHG
BH CV ECHO MEAS - LV V1 MAX: 116 CM/SEC
BH CV ECHO MEAS - LV V1 VTI: 29.8 CM
BH CV ECHO MEAS - LVIDD: 3.7 CM
BH CV ECHO MEAS - LVIDS: 2.33 CM
BH CV ECHO MEAS - LVOT AREA: 3.1 CM2
BH CV ECHO MEAS - LVOT DIAM: 2 CM
BH CV ECHO MEAS - LVPWD: 0.82 CM
BH CV ECHO MEAS - MED PEAK E' VEL: 5.1 CM/SEC
BH CV ECHO MEAS - MV A MAX VEL: 100 CM/SEC
BH CV ECHO MEAS - MV DEC TIME: 0.35 SEC
BH CV ECHO MEAS - MV E MAX VEL: 81.2 CM/SEC
BH CV ECHO MEAS - MV E/A: 0.81
BH CV ECHO MEAS - PA V2 MAX: 83.4 CM/SEC
BH CV ECHO MEAS - RVDD: 2.6 CM
BH CV ECHO MEAS - SV(LVOT): 93.6 ML
BH CV ECHO MEAS - SV(MOD-SP2): 30 ML
BH CV ECHO MEAS - SV(MOD-SP4): 39.6 ML
BH CV ECHO MEAS - TAPSE (>1.6): 2.8 CM
BH CV ECHO MEASUREMENTS AVERAGE E/E' RATIO: 12.03
BH CV XLRA - TDI S': 13.5 CM/SEC
IVRT: 69 MS

## 2024-07-15 ENCOUNTER — HOSPITAL ENCOUNTER (OUTPATIENT)
Dept: CT IMAGING | Facility: HOSPITAL | Age: 74
Discharge: HOME | End: 2024-07-15
Admitting: NURSE PRACTITIONER
Payer: MEDICARE

## 2024-07-15 DIAGNOSIS — R91.1 LUNG NODULE: ICD-10-CM

## 2024-07-15 PROCEDURE — 71250 CT THORAX DX C-: CPT

## 2024-07-16 ENCOUNTER — TELEPHONE (OUTPATIENT)
Dept: PULMONOLOGY | Facility: CLINIC | Age: 74
End: 2024-07-16
Payer: MEDICARE

## 2024-07-16 DIAGNOSIS — R91.1 LUNG NODULE: Primary | ICD-10-CM

## 2024-07-16 NOTE — TELEPHONE ENCOUNTER
Patient is scheduled to have PET scan 7/24 arrive at 1:45pm at [facility]. If you could please notify patient when going over ct results with her. The following are instructions.    -Refrain from strenuous excerise and caffeine the day of appointment  -Do no eat for 4-6 hours prior to appointment, this includes gum, candy, mints, and cough drops  -You may ONLY have plain water during this 4-6 hour period due to needing to hydrate. No Gatorade, propel, or flavored johnson  -You may take your medication as long as you can tolerate on an empty stomach  -Your appointment could be 90 minutes to 2 hours    If Diabetic follow the additional instructions:  -The goal is to obtain a blood glucose range of <200   - Eat a small, high protein, low carb meal a minimum of 4 hours prior to appointment  - You roverto ONLY have plain water (no lemon) during this 4 hours due to needing to stay hydrated  - If your diabetes is controlled by ORAL medication only, you may take theses with your small, high protein, low carb meal minimum of 4 hours prior to appointment  - If your diabetes is controlled by INSULIN, please take your insulin as prescribed at least 4 hours prior to your appointment and closely monitor your glucose level.

## 2024-07-31 ENCOUNTER — HOSPITAL ENCOUNTER (OUTPATIENT)
Dept: PET IMAGING | Facility: HOSPITAL | Age: 74
Discharge: HOME | End: 2024-07-31
Payer: MEDICARE

## 2024-07-31 DIAGNOSIS — R91.1 LUNG NODULE: ICD-10-CM

## 2024-07-31 PROCEDURE — 78815 PET IMAGE W/CT SKULL-THIGH: CPT

## 2024-07-31 PROCEDURE — A9552 F18 FDG: HCPCS | Performed by: NURSE PRACTITIONER

## 2024-07-31 PROCEDURE — 0 FLUDEOXYGLUCOSE F18 SOLUTION: Performed by: NURSE PRACTITIONER

## 2024-07-31 RX ADMIN — FLUDEOXYGLUCOSE F 18 1 DOSE: 200 INJECTION, SOLUTION INTRAVENOUS at 08:12

## 2024-08-03 DIAGNOSIS — G89.29 CHRONIC RIGHT-SIDED LOW BACK PAIN WITH RIGHT-SIDED SCIATICA: ICD-10-CM

## 2024-08-03 DIAGNOSIS — M54.41 CHRONIC RIGHT-SIDED LOW BACK PAIN WITH RIGHT-SIDED SCIATICA: ICD-10-CM

## 2024-08-03 DIAGNOSIS — I10 ESSENTIAL HYPERTENSION: ICD-10-CM

## 2024-08-05 ENCOUNTER — OFFICE VISIT (OUTPATIENT)
Dept: PULMONOLOGY | Facility: CLINIC | Age: 74
End: 2024-08-05
Payer: MEDICARE

## 2024-08-05 VITALS
WEIGHT: 132 LBS | HEIGHT: 65 IN | BODY MASS INDEX: 21.99 KG/M2 | OXYGEN SATURATION: 92 % | SYSTOLIC BLOOD PRESSURE: 145 MMHG | DIASTOLIC BLOOD PRESSURE: 65 MMHG | HEART RATE: 74 BPM | TEMPERATURE: 98.7 F | RESPIRATION RATE: 16 BRPM

## 2024-08-05 DIAGNOSIS — J45.20 MILD INTERMITTENT ASTHMA WITHOUT COMPLICATION: ICD-10-CM

## 2024-08-05 DIAGNOSIS — J43.2 CENTRILOBULAR EMPHYSEMA: ICD-10-CM

## 2024-08-05 DIAGNOSIS — R91.8 ABNORMAL CT SCAN OF LUNG: ICD-10-CM

## 2024-08-05 DIAGNOSIS — R06.09 DYSPNEA ON EXERTION: ICD-10-CM

## 2024-08-05 DIAGNOSIS — R91.1 LUNG NODULE: Primary | ICD-10-CM

## 2024-08-05 PROCEDURE — 3077F SYST BP >= 140 MM HG: CPT | Performed by: NURSE PRACTITIONER

## 2024-08-05 PROCEDURE — 3078F DIAST BP <80 MM HG: CPT | Performed by: NURSE PRACTITIONER

## 2024-08-05 PROCEDURE — 1159F MED LIST DOCD IN RCRD: CPT | Performed by: NURSE PRACTITIONER

## 2024-08-05 PROCEDURE — 1160F RVW MEDS BY RX/DR IN RCRD: CPT | Performed by: NURSE PRACTITIONER

## 2024-08-05 PROCEDURE — 99214 OFFICE O/P EST MOD 30 MIN: CPT | Performed by: NURSE PRACTITIONER

## 2024-08-05 RX ORDER — ASPIRIN 81 MG/1
81 TABLET ORAL DAILY
COMMUNITY

## 2024-08-05 RX ORDER — TIOTROPIUM BROMIDE AND OLODATEROL 3.124; 2.736 UG/1; UG/1
SPRAY, METERED RESPIRATORY (INHALATION)
COMMUNITY
Start: 2024-04-11 | End: 2024-08-05 | Stop reason: SDUPTHER

## 2024-08-05 RX ORDER — POTASSIUM CHLORIDE 750 MG/1
TABLET, FILM COATED, EXTENDED RELEASE ORAL
COMMUNITY

## 2024-08-05 RX ORDER — TIOTROPIUM BROMIDE AND OLODATEROL 3.124; 2.736 UG/1; UG/1
2 SPRAY, METERED RESPIRATORY (INHALATION) DAILY
Qty: 4 G | Refills: 11 | Status: SHIPPED | OUTPATIENT
Start: 2024-08-05

## 2024-08-05 RX ORDER — ALBUTEROL SULFATE 90 UG/1
1 AEROSOL, METERED RESPIRATORY (INHALATION) EVERY 4 HOURS PRN
Qty: 18 G | Refills: 2 | Status: SHIPPED | OUTPATIENT
Start: 2024-08-05

## 2024-08-05 NOTE — PROGRESS NOTES
Primary Care Provider  Karina Langford MD   Referring Provider  No ref. provider found    Patient Complaint  Results (PET scan)      SUBJECTIVE    History of Presenting Illness  Odalis Espino is a pleasant 74 y.o. female who presents to [facility] for follow-up after PET scan.  Patient underwent PET scan f on 7/31/2024 or abnormal CT of chest.  Findings include diffuse metabolic activity in the thyroid.  1.1 cm left upper lobe nodule enlarging and hypermetabolic most likely neoplasm recommend biopsy.  0.8 cm left upper lobe nodule not hypermetabolic.  This nodule has been stable.  Several other smaller nodules in the left upper lobe and right lower lobe are too small to characterize. Recommend continued follow-up. Postoperative changes in the lower lumbar spine with previous laminectomy at L4 and L5 on the right. There is metabolic activity in this region. Additional metabolic activity in the facets and spinous process of L5 max SUV 3.2. Inflammatory activity is favored over neoplasm.    I last saw the patient 4/11/2024. Patient has a diagnosis of centrilobular emphysema, lung nodule, tobacco abuse.  PFT from 5/28/2024 shows no evidence of obstruction or restriction her diffusion capacity is severely reduced. Echocardiogram from 7/11/2024 with normal ejection fraction and no significant valvular disease.  6-minute walk conducted 5/28/2024 with no desaturation less than 93% on room air.  Patient was given samples of Breztri and prescription sent to her pharmacy at last visit.  Patient also has albuterol as needed for shortness of air or wheeze.    Patient previously had bronchoscopy by Dr. Morse September 25, 2023 for left upper lobe lung nodule.      Denies dyspnea, coughing, wheezing, headaches, chest pain, weight loss or hemoptysis. Denies fevers, chills and night sweats. Odalis Espino is able to perform ADLs without difficulties and denies any swollen  glands/lymph nodes in the head or neck.    I have personally reviewed the review of systems, past family, social, medical and surgical histories; and agree with their findings.    Review of Systems  Constitutional symptoms:  Denied complaints   Ear, nose, throat: Denied complaints  Cardiovascular:  Denied complaints  Respiratory: Denied complaints  Gastrointestinal: Denied complaints  Musculoskeletal: Denied complaints    Family History   Problem Relation Age of Onset    Cancer Father     Cancer Sister     Diabetes Brother     Asthma Other     Seizures Other     Anxiety disorder Mother     Arthritis Daughter     Diabetes Brother         Social History     Socioeconomic History    Marital status:    Tobacco Use    Smoking status: Every Day     Current packs/day: 0.25     Average packs/day: 0.5 packs/day for 95.5 years (50.0 ttl pk-yrs)     Types: Cigarettes     Start date: 1964    Smokeless tobacco: Never    Tobacco comments:     Really don’t remember when I started   Vaping Use    Vaping status: Never Used   Substance and Sexual Activity    Alcohol use: Not Currently     Alcohol/week: 1.0 standard drink of alcohol     Types: 1 Glasses of wine per week     Comment: occasional    Drug use: Never    Sexual activity: Not Currently     Partners: Male     Birth control/protection: None, Hysterectomy        Past Medical History:   Diagnosis Date    Anxiety     Arthritis     Centrilobular emphysema     Diabetes mellitus     AVERAGE 'S    Emphysema of lung Aug 23    None    GERD (gastroesophageal reflux disease)     Hepatitis     REPORTS OVER 30 YEARS AGO HAD TREATMENT AND DENIES ANY CURRENT ISSUES    HL (hearing loss)     Hyperlipidemia     Hypertension     Hypothyroidism     Lung nodule     (-)  ION BRONCH DONE 9/2023    Pain management     [facility]    Pulmonary arterial hypertension Aug 23    Spinal stenosis     Thyroiditis     DENIES ANY CURRENT ISSUES    Visual impairment         Immunization History    Administered Date(s) Administered    COVID-19 (MODERNA) 1st,2nd,3rd Dose Monovalent 03/17/2021, 04/15/2021, 11/03/2021    COVID-19 (PFIZER) BIVALENT 12+YRS 08/08/2023    Covid-19 (Pfizer) Gray Cap Monovalent 07/14/2022    Flu Vaccine Quad PF >36MO 10/09/2018    Fluzone (or Fluarix & Flulaval for VFC) >6mos 10/09/2018    Fluzone High Dose =>65 Years ([employer] ONLY) 11/13/2017, 07/17/2023    Fluzone High-Dose 65+yrs 11/11/2021, 01/09/2024    Pneumococcal Conjugate 13-Valent (PCV13) 10/18/2018    Pneumococcal Polysaccharide (PPSV23) 01/01/2016    Shingrix 07/17/2023    Tdap 07/17/2023       Allergies   Allergen Reactions    Azithromycin Hives    Breztri Aerosphere [Budeson-Glycopyrrol-Formoterol] Other (See Comments)     Pt stated makes her light headed and she felt dizzy after using Breztri.           Current Outpatient Medications:     albuterol sulfate  (90 Base) MCG/ACT inhaler, Inhale 1 puff Every 4 (Four) Hours As Needed for Wheezing or Shortness of Air. Refill needed., Disp: 18 g, Rfl: 2    amLODIPine (NORVASC) 5 MG tablet, Take 1 tablet by mouth Daily., Disp: 90 tablet, Rfl: 1    aspirin 81 MG EC tablet, Take 1 tablet by mouth Daily., Disp: , Rfl:     atorvastatin (LIPITOR) 20 MG tablet, Take 1 tablet by mouth Every Night., Disp: 90 tablet, Rfl: 1    Blood Glucose Monitoring Suppl device, 1 each Daily., Disp: 1 each, Rfl: 0    gabapentin (NEURONTIN) 600 MG tablet, Take 1 tablet by mouth 3 (Three) Times a Day., Disp: 270 tablet, Rfl: 0    glucose blood test strip, Use as instructed to check blood sugar daily, Disp: 100 each, Rfl: 3    hydroCHLOROthiazide 12.5 MG tablet, Take 1 tablet by mouth Daily., Disp: 90 tablet, Rfl: 0    HYDROcodone-acetaminophen (NORCO)  MG per tablet, Take 1 tablet by mouth Every 6 (Six) Hours As Needed for Moderate Pain., Disp: , Rfl:     hydrOXYzine pamoate (VISTARIL) 25 MG capsule, Take 1 capsule by mouth 3 (Three) Times a Day As Needed for Itching or Anxiety., Disp: 90  "capsule, Rfl: 0    Lancet Device mis, 1 each Daily. Use as directed; check blood sugar once daily, Disp: 100 each, Rfl: 3    Lancets (OneTouch Delica Plus Erngoc48H) misc, , Disp: , Rfl:     levothyroxine (SYNTHROID, LEVOTHROID) 112 MCG tablet, Take 1 tablet by mouth Daily., Disp: 90 tablet, Rfl: 1    meloxicam (MOBIC) 15 MG tablet, Take 1 tablet by mouth Daily., Disp: 90 tablet, Rfl: 0    metFORMIN (GLUCOPHAGE) 500 MG tablet, Take 1 tablet by mouth 2 (Two) Times a Day With Meals. INSTRUCTED PER ANESTHESIA PROTOCOL NONE AFTER 6 PM ON 12/14/23, Disp: , Rfl:     omeprazole (priLOSEC) 20 MG capsule, Take 1 capsule by mouth Daily., Disp: 90 capsule, Rfl: 1    sertraline (ZOLOFT) 100 MG tablet, Take 1 tablet by mouth Daily., Disp: 90 tablet, Rfl: 0    Stiolto Respimat 2.5-2.5 MCG/ACT aerosol solution inhaler, Inhale 2 puffs Daily., Disp: 4 g, Rfl: 11    vitamin B-12 (CYANOCOBALAMIN) 1000 MCG tablet, Take 1 tablet by mouth Daily., Disp: , Rfl:     Budeson-Glycopyrrol-Formoterol (BREZTRI) 160-9-4.8 MCG/ACT aerosol inhaler, Inhale 2 puffs 2 (Two) Times a Day. (Patient not taking: Reported on 8/5/2024), Disp: 3 each, Rfl: 3    potassium chloride 10 MEQ CR tablet, , Disp: , Rfl:            Vital Signs   /65 (BP Location: Left arm, Patient Position: Sitting, Cuff Size: Adult)   Pulse 74   Temp 98.7 °F (37.1 °C)   Resp 16   Ht 165.1 cm (65\")   Wt 59.9 kg (132 lb)   SpO2 92% Comment: room air  BMI 21.97 kg/m²       OBJECTIVE    Physical Exam  Vital Signs Reviewed   WDWN, Alert, NAD.    HEENT:  PERRL, EOMI.  OP, nares clear  Neck:  Supple, no JVD, no thyromegaly  Chest:  good aeration, clear to auscultation bilaterally, tympanic to percussion bilaterally, no work of breathing noted  CV: RRR, no MGR, pulses 2+, equal.  Abd:  Soft, NT, ND, + BS, no HSM  EXT:  no clubbing, no cyanosis, no edema  Neuro:  A&Ox3, CN grossly intact, no focal deficits.  Skin: No rashes or lesions noted    Results Review  I have personally " reviewed the prior office notes, hospital records, labs, and diagnostics.  NM PET/CT Skull Base to Mid Thigh [[id number]] (Order [id number])  Order  Status: Final result     Study Notes     Heron Castillo on 7/31/2024  8:50 AM EDT   PET/CT performed with 11.4 mCi F18 FDG injected in right AC IV at 0800  Diagnosis: Lung nodule  Uptake: 47 minutes  Blood glucose: 163 mg/dL     Appointment Information    PACS Images     Radiology Images  Study Result    Narrative & Impression      NM PET/CT SKULL BASE TO MID THIGH     Date of Exam: 7/31/2024 7:58 AM EDT     Indication: abnormal CT of chest.     Comparison: None available.      Technique:  PET/CT Head to Mid Thigh imaging was performed with positron emission tomography (PET with concurrently acquired computed tomography (CT) for attenuation correction and anatomical localization); field of view imaged was the skull base to   midthigh.    Images were obtained after one hour of equilibrium.     RADIONUCLITIDE: 11.4 MCI    F18 FDG - LV.     Blood Glucose 163 mg/dl     Uptake Time: 47 min.     Mediastinal background Uptake: SUV max. 2.3  SUV Normalization method: Body weight     FINDINGS:    Patient's head is rotated. There are vascular calcifications. There is increased diffuse metabolic activity in the thyroid which appears mildly enlarged.      There is a focus of metabolic activity adjacent to the left humeral head more likely inflammatory. No hypermetabolic mediastinal or hilar adenopathy. There is moderate vascular calcifications. Coronary artery calcifications.     On image 94 there is a 0.8 cm nodule adjacent to the major fissure in the left upper lobe max SUV 0.5.     On image 86 there is a 1.1 cm left upper lobe nodule adjacent to the major fissure max SUV 3.7.     On image 79 there is a small nodular density in the left upper lobe measuring 0.4 cm max SUV 0.9.     Small 3 mm subpleural right lower lobe nodule on image 107 Max SUV 1.2 with similar to adjacent  atelectatic change.     Mild emphysematous changes. There are a few granulomas in the spleen. No suspicious adrenal findings. No obstructive uropathy is seen. Mild diverticulosis. There is no enlarged adenopathy. No definite abnormal metabolic activity.     Postoperative changes seen in the lower lumbar spine with laminectomy at L4 and L5. There is hypermetabolic activity at these levels. There is additional hypermetabolic activity in the facets and spinous process of L5 max SUV about 3.2. Degenerative   changes in the spine. There is dextrocurvature in the upper lumbar spine.     IMPRESSION:  1.1 cm left upper lobe lung nodule on image 86 is enlarging and hypermetabolic max SUV 3.7 most likely neoplasm. Recommend biopsy.     0.8 cm left upper lobe nodule not hypermetabolic. This nodule has been stable. Several other smaller nodules in the left upper lobe and right lower lobe are too small to characterize. Recommend continued follow-up.     Diffuse thyroid activity likely thyroiditis.     Postoperative changes in the lower lumbar spine with previous laminectomy at L4 and L5 on the right. There is metabolic activity in this region. Additional metabolic activity in the facets and spinous process of L5 max SUV 3.2. Inflammatory activity is   favored over neoplasm.           Electronically Signed: Guevara Sotelo MD    7/31/2024 4:37 PM EDT    Workstation ID: [id number]   CT Chest Without Contrast Diagnostic [[id number]] (Order [id number])  Order  Status: Final result     Study Notes     Janneth Hernandez on 7/15/2024 10:18 AM EDT   Prev online     Appointment Information    Display Notes    [facility] 07.11.24                  PACS Images     Radiology Images      Study Result    Narrative & Impression   CT CHEST WO CONTRAST DIAGNOSTIC     Date of Exam: 7/15/2024 10:18 AM EDT     Indication: lung nodules.     Comparison: 4/3/2024     Technique: Axial CT images were obtained of the chest without contrast administration.  Reconstructed  coronal and sagittal images were also obtained. Automated exposure control and iterative construction methods were used.        Findings:  There is mild motion artifact. Moderate emphysema is present. There is no mediastinal, axillary or hilar adenopathy. No evidence of pleural or pericardial effusion. The thoracic aorta is of normal caliber. Coronary artery calcification is present. Images   of the unenhanced upper abdomen are unremarkable. Scoliosis and degenerative change are present in the thoracic spine. There is an enlarging 1.4 cm x 1.1 cm irregular noncalcified nodule laterally in the left upper lobe abutting the pleura and major   fissure. Stable 4 mm noncalcified nodule laterally in the left upper lobe along the pleura. Stable 1 cm x 4 mm irregular noncalcified nodule inferiorly in the left upper lobe abutting the major fissure. Stable 3 mm noncalcified nodule laterally in the   right lower lobe coronal image 120. Stable 4 mm noncalcified nodule medially in the right upper lobe coronal image 89.     IMPRESSION:  Impression:     1. Enlarging 1.4 cm x 1.1 cm irregular nodule laterally in the left upper lobe suspicious for malignancy. There are stable indeterminate nodules inferiorly in the left upper lobe and laterally in the left upper lobe as above. Recommend a PET/CT scan for   further evaluation.     2. Moderate emphysema. Coronary calcification.           Electronically Signed: Chong Lea MD    7/16/2024 11:22 AM EDT    Workstation ID: [id number]     Complete Transthoracic Echocardiogram with Complete Doppler and Color Flow    Accession Number: [id number]   Date of Study: 7/10/24   Ordering Provider: Estela Ramirez APRN   Clinical Indications: Dyspnea        [facility]  Performing Staff   Cardiology: Ren Lee MD    Tech: Ashley Escobedo        Patient Hx Of Height, Weight, and Vitals    Height Weight BSA (Calculated - sq m) BMI (Calculated) Retired BMI (kg/m2) Pulse BP                [facility] PACS Images     Show images for Adult Transthoracic Echo Complete W/ Cont if Necessary Per Protocol   Clinical Indication    Dyspnea   Dx: Centrilobular emphysema [J43.2 (ICD-10-CM)]; Dyspnea on exertion [R06.09 (ICD-10-CM)]     Interpretation Summary         Left ventricular ejection fraction appears to be 61 - 65%.    Left ventricular wall thickness is consistent with septal asymmetric hypertrophy.    Left ventricular diastolic function is consistent with (grade I) impaired relaxation and age.    No significant valvular disease.  ASSESSMENT         Patient Active Problem List   Diagnosis    Encounter for annual wellness exam in Medicare patient    Degeneration of lumbar intervertebral disc    Acquired hypothyroidism    Anxiety    Essential hypertension    Other hyperlipidemia    Type 2 diabetes mellitus without complication, without long-term current use of insulin    Gastroesophageal reflux disease without esophagitis    Stage 3b chronic kidney disease (CKD)    Hypothyroidism due to Hashimoto's thyroiditis    Tobacco abuse    Smoking greater than 40 pack years    Lung nodule    Centrilobular emphysema       Encounter Diagnoses   Name Primary?    Lung nodule Yes    Abnormal CT scan of lung     Centrilobular emphysema     Dyspnea on exertion     Mild intermittent asthma without complication       PLAN   I have obtained consent for Ion Robotic Bronchoscopy with brushings, biopsies, and bronchioloalveolar lavage.  Risks and benefits of bronchoscopy discussed with patient today.  Risks include pneumothorax, hemothorax, bleeding, hypoxia, required mechanical ventilation and death.  The patient recognizes these findings, acknowledges these findings and is agreeable to the procedure. Patient is currently on baby aspirin, 81 mg, daily.  Advised to hold baby aspirin 1 day prior to procedure.  -Continue follow-up with PCP regarding thyroid  -Continue with pain management for her back with lumbosacral spondylosis  without myelopathy and degeneration of lumbar intervertebral disc, spinal stenosis of lumbar region  -followup CT scan of chest to  monitor status of lung nodules    Diagnoses and all orders for this visit:    1. Lung nodule (Primary)  -     CT Chest Without Contrast; Future  -     Case Request; Standing  -     Follow Anesthesia Guidlines / Standing Orders; Standing  -     Obtain Informed Consent; Standing  -     Case Request    2. Abnormal CT scan of lung  -     CT Chest Without Contrast; Future  -     Case Request; Standing  -     Follow Anesthesia Guidlines / Standing Orders; Standing  -     Obtain Informed Consent; Standing  -     Case Request    3. Centrilobular emphysema  -     Stiolto Respimat 2.5-2.5 MCG/ACT aerosol solution inhaler; Inhale 2 puffs Daily.  Dispense: 4 g; Refill: 11    4. Dyspnea on exertion  -     CT Chest Without Contrast; Future  -     Case Request; Standing  -     Follow Anesthesia Guidlines / Standing Orders; Standing  -     Obtain Informed Consent; Standing  -     Case Request  -     Stiolto Respimat 2.5-2.5 MCG/ACT aerosol solution inhaler; Inhale 2 puffs Daily.  Dispense: 4 g; Refill: 11    5. Mild intermittent asthma without complication  Comments:  Stable with as needed albuterol use.  She needs med refills today  Orders:  -     albuterol sulfate  (90 Base) MCG/ACT inhaler; Inhale 1 puff Every 4 (Four) Hours As Needed for Wheezing or Shortness of Air. Refill needed.  Dispense: 18 g; Refill: 2           Smoking status:current  Vaccination status:reviewed  Medications personally reviewed    Follow Up  Return for 1 week after ion robotic bronchoscopy.    Patient was given instructions and counseling regarding her condition or for health maintenance advice. Please see specific information pulled into the AVS if appropriate.

## 2024-08-05 NOTE — H&P (VIEW-ONLY)
Primary Care Provider  Karina Langford MD   Referring Provider  No ref. provider found    Patient Complaint  Results (PET scan)      SUBJECTIVE    History of Presenting Illness  Odalis Espino is a pleasant 74 y.o. female who presents to [facility] for follow-up after PET scan.  Patient underwent PET scan f on 7/31/2024 or abnormal CT of chest.  Findings include diffuse metabolic activity in the thyroid.  1.1 cm left upper lobe nodule enlarging and hypermetabolic most likely neoplasm recommend biopsy.  0.8 cm left upper lobe nodule not hypermetabolic.  This nodule has been stable.  Several other smaller nodules in the left upper lobe and right lower lobe are too small to characterize. Recommend continued follow-up. Postoperative changes in the lower lumbar spine with previous laminectomy at L4 and L5 on the right. There is metabolic activity in this region. Additional metabolic activity in the facets and spinous process of L5 max SUV 3.2. Inflammatory activity is favored over neoplasm.    I last saw the patient 4/11/2024. Patient has a diagnosis of centrilobular emphysema, lung nodule, tobacco abuse.  PFT from 5/28/2024 shows no evidence of obstruction or restriction her diffusion capacity is severely reduced. Echocardiogram from 7/11/2024 with normal ejection fraction and no significant valvular disease.  6-minute walk conducted 5/28/2024 with no desaturation less than 93% on room air.  Patient was given samples of Breztri and prescription sent to her pharmacy at last visit.  Patient also has albuterol as needed for shortness of air or wheeze.    Patient previously had bronchoscopy by Dr. Morse September 25, 2023 for left upper lobe lung nodule.      Denies dyspnea, coughing, wheezing, headaches, chest pain, weight loss or hemoptysis. Denies fevers, chills and night sweats. Odalis Espino is able to perform ADLs without difficulties and denies any swollen  glands/lymph nodes in the head or neck.    I have personally reviewed the review of systems, past family, social, medical and surgical histories; and agree with their findings.    Review of Systems  Constitutional symptoms:  Denied complaints   Ear, nose, throat: Denied complaints  Cardiovascular:  Denied complaints  Respiratory: Denied complaints  Gastrointestinal: Denied complaints  Musculoskeletal: Denied complaints    Family History   Problem Relation Age of Onset    Cancer Father     Cancer Sister     Diabetes Brother     Asthma Other     Seizures Other     Anxiety disorder Mother     Arthritis Daughter     Diabetes Brother         Social History     Socioeconomic History    Marital status:    Tobacco Use    Smoking status: Every Day     Current packs/day: 0.25     Average packs/day: 0.5 packs/day for 95.5 years (50.0 ttl pk-yrs)     Types: Cigarettes     Start date: 1964    Smokeless tobacco: Never    Tobacco comments:     Really don’t remember when I started   Vaping Use    Vaping status: Never Used   Substance and Sexual Activity    Alcohol use: Not Currently     Alcohol/week: 1.0 standard drink of alcohol     Types: 1 Glasses of wine per week     Comment: occasional    Drug use: Never    Sexual activity: Not Currently     Partners: Male     Birth control/protection: None, Hysterectomy        Past Medical History:   Diagnosis Date    Anxiety     Arthritis     Centrilobular emphysema     Diabetes mellitus     AVERAGE 'S    Emphysema of lung Aug 23    None    GERD (gastroesophageal reflux disease)     Hepatitis     REPORTS OVER 30 YEARS AGO HAD TREATMENT AND DENIES ANY CURRENT ISSUES    HL (hearing loss)     Hyperlipidemia     Hypertension     Hypothyroidism     Lung nodule     (-)  ION BRONCH DONE 9/2023    Pain management     [facility]    Pulmonary arterial hypertension Aug 23    Spinal stenosis     Thyroiditis     DENIES ANY CURRENT ISSUES    Visual impairment         Immunization History    Administered Date(s) Administered    COVID-19 (MODERNA) 1st,2nd,3rd Dose Monovalent 03/17/2021, 04/15/2021, 11/03/2021    COVID-19 (PFIZER) BIVALENT 12+YRS 08/08/2023    Covid-19 (Pfizer) Gray Cap Monovalent 07/14/2022    Flu Vaccine Quad PF >36MO 10/09/2018    Fluzone (or Fluarix & Flulaval for VFC) >6mos 10/09/2018    Fluzone High Dose =>65 Years ([employer] ONLY) 11/13/2017, 07/17/2023    Fluzone High-Dose 65+yrs 11/11/2021, 01/09/2024    Pneumococcal Conjugate 13-Valent (PCV13) 10/18/2018    Pneumococcal Polysaccharide (PPSV23) 01/01/2016    Shingrix 07/17/2023    Tdap 07/17/2023       Allergies   Allergen Reactions    Azithromycin Hives    Breztri Aerosphere [Budeson-Glycopyrrol-Formoterol] Other (See Comments)     Pt stated makes her light headed and she felt dizzy after using Breztri.           Current Outpatient Medications:     albuterol sulfate  (90 Base) MCG/ACT inhaler, Inhale 1 puff Every 4 (Four) Hours As Needed for Wheezing or Shortness of Air. Refill needed., Disp: 18 g, Rfl: 2    amLODIPine (NORVASC) 5 MG tablet, Take 1 tablet by mouth Daily., Disp: 90 tablet, Rfl: 1    aspirin 81 MG EC tablet, Take 1 tablet by mouth Daily., Disp: , Rfl:     atorvastatin (LIPITOR) 20 MG tablet, Take 1 tablet by mouth Every Night., Disp: 90 tablet, Rfl: 1    Blood Glucose Monitoring Suppl device, 1 each Daily., Disp: 1 each, Rfl: 0    gabapentin (NEURONTIN) 600 MG tablet, Take 1 tablet by mouth 3 (Three) Times a Day., Disp: 270 tablet, Rfl: 0    glucose blood test strip, Use as instructed to check blood sugar daily, Disp: 100 each, Rfl: 3    hydroCHLOROthiazide 12.5 MG tablet, Take 1 tablet by mouth Daily., Disp: 90 tablet, Rfl: 0    HYDROcodone-acetaminophen (NORCO)  MG per tablet, Take 1 tablet by mouth Every 6 (Six) Hours As Needed for Moderate Pain., Disp: , Rfl:     hydrOXYzine pamoate (VISTARIL) 25 MG capsule, Take 1 capsule by mouth 3 (Three) Times a Day As Needed for Itching or Anxiety., Disp: 90  "capsule, Rfl: 0    Lancet Device mis, 1 each Daily. Use as directed; check blood sugar once daily, Disp: 100 each, Rfl: 3    Lancets (OneTouch Delica Plus Inkjnp94L) misc, , Disp: , Rfl:     levothyroxine (SYNTHROID, LEVOTHROID) 112 MCG tablet, Take 1 tablet by mouth Daily., Disp: 90 tablet, Rfl: 1    meloxicam (MOBIC) 15 MG tablet, Take 1 tablet by mouth Daily., Disp: 90 tablet, Rfl: 0    metFORMIN (GLUCOPHAGE) 500 MG tablet, Take 1 tablet by mouth 2 (Two) Times a Day With Meals. INSTRUCTED PER ANESTHESIA PROTOCOL NONE AFTER 6 PM ON 12/14/23, Disp: , Rfl:     omeprazole (priLOSEC) 20 MG capsule, Take 1 capsule by mouth Daily., Disp: 90 capsule, Rfl: 1    sertraline (ZOLOFT) 100 MG tablet, Take 1 tablet by mouth Daily., Disp: 90 tablet, Rfl: 0    Stiolto Respimat 2.5-2.5 MCG/ACT aerosol solution inhaler, Inhale 2 puffs Daily., Disp: 4 g, Rfl: 11    vitamin B-12 (CYANOCOBALAMIN) 1000 MCG tablet, Take 1 tablet by mouth Daily., Disp: , Rfl:     Budeson-Glycopyrrol-Formoterol (BREZTRI) 160-9-4.8 MCG/ACT aerosol inhaler, Inhale 2 puffs 2 (Two) Times a Day. (Patient not taking: Reported on 8/5/2024), Disp: 3 each, Rfl: 3    potassium chloride 10 MEQ CR tablet, , Disp: , Rfl:            Vital Signs   /65 (BP Location: Left arm, Patient Position: Sitting, Cuff Size: Adult)   Pulse 74   Temp 98.7 °F (37.1 °C)   Resp 16   Ht 165.1 cm (65\")   Wt 59.9 kg (132 lb)   SpO2 92% Comment: room air  BMI 21.97 kg/m²       OBJECTIVE    Physical Exam  Vital Signs Reviewed   WDWN, Alert, NAD.    HEENT:  PERRL, EOMI.  OP, nares clear  Neck:  Supple, no JVD, no thyromegaly  Chest:  good aeration, clear to auscultation bilaterally, tympanic to percussion bilaterally, no work of breathing noted  CV: RRR, no MGR, pulses 2+, equal.  Abd:  Soft, NT, ND, + BS, no HSM  EXT:  no clubbing, no cyanosis, no edema  Neuro:  A&Ox3, CN grossly intact, no focal deficits.  Skin: No rashes or lesions noted    Results Review  I have personally " reviewed the prior office notes, hospital records, labs, and diagnostics.  NM PET/CT Skull Base to Mid Thigh [[id number]] (Order [id number])  Order  Status: Final result     Study Notes     Heron Castillo on 7/31/2024  8:50 AM EDT   PET/CT performed with 11.4 mCi F18 FDG injected in right AC IV at 0800  Diagnosis: Lung nodule  Uptake: 47 minutes  Blood glucose: 163 mg/dL     Appointment Information    PACS Images     Radiology Images  Study Result    Narrative & Impression      NM PET/CT SKULL BASE TO MID THIGH     Date of Exam: 7/31/2024 7:58 AM EDT     Indication: abnormal CT of chest.     Comparison: None available.      Technique:  PET/CT Head to Mid Thigh imaging was performed with positron emission tomography (PET with concurrently acquired computed tomography (CT) for attenuation correction and anatomical localization); field of view imaged was the skull base to   midthigh.    Images were obtained after one hour of equilibrium.     RADIONUCLITIDE: 11.4 MCI    F18 FDG - LV.     Blood Glucose 163 mg/dl     Uptake Time: 47 min.     Mediastinal background Uptake: SUV max. 2.3  SUV Normalization method: Body weight     FINDINGS:    Patient's head is rotated. There are vascular calcifications. There is increased diffuse metabolic activity in the thyroid which appears mildly enlarged.      There is a focus of metabolic activity adjacent to the left humeral head more likely inflammatory. No hypermetabolic mediastinal or hilar adenopathy. There is moderate vascular calcifications. Coronary artery calcifications.     On image 94 there is a 0.8 cm nodule adjacent to the major fissure in the left upper lobe max SUV 0.5.     On image 86 there is a 1.1 cm left upper lobe nodule adjacent to the major fissure max SUV 3.7.     On image 79 there is a small nodular density in the left upper lobe measuring 0.4 cm max SUV 0.9.     Small 3 mm subpleural right lower lobe nodule on image 107 Max SUV 1.2 with similar to adjacent  atelectatic change.     Mild emphysematous changes. There are a few granulomas in the spleen. No suspicious adrenal findings. No obstructive uropathy is seen. Mild diverticulosis. There is no enlarged adenopathy. No definite abnormal metabolic activity.     Postoperative changes seen in the lower lumbar spine with laminectomy at L4 and L5. There is hypermetabolic activity at these levels. There is additional hypermetabolic activity in the facets and spinous process of L5 max SUV about 3.2. Degenerative   changes in the spine. There is dextrocurvature in the upper lumbar spine.     IMPRESSION:  1.1 cm left upper lobe lung nodule on image 86 is enlarging and hypermetabolic max SUV 3.7 most likely neoplasm. Recommend biopsy.     0.8 cm left upper lobe nodule not hypermetabolic. This nodule has been stable. Several other smaller nodules in the left upper lobe and right lower lobe are too small to characterize. Recommend continued follow-up.     Diffuse thyroid activity likely thyroiditis.     Postoperative changes in the lower lumbar spine with previous laminectomy at L4 and L5 on the right. There is metabolic activity in this region. Additional metabolic activity in the facets and spinous process of L5 max SUV 3.2. Inflammatory activity is   favored over neoplasm.           Electronically Signed: Guevara Sotelo MD    7/31/2024 4:37 PM EDT    Workstation ID: [id number]   CT Chest Without Contrast Diagnostic [[id number]] (Order [id number])  Order  Status: Final result     Study Notes     Janneth Hernandez on 7/15/2024 10:18 AM EDT   Prev online     Appointment Information    Display Notes    [facility] - [facility] 07.11.24                  PACS Images     Radiology Images      Study Result    Narrative & Impression   CT CHEST WO CONTRAST DIAGNOSTIC     Date of Exam: 7/15/2024 10:18 AM EDT     Indication: lung nodules.     Comparison: 4/3/2024     Technique: Axial CT images were obtained of the chest without contrast administration.  Reconstructed  coronal and sagittal images were also obtained. Automated exposure control and iterative construction methods were used.        Findings:  There is mild motion artifact. Moderate emphysema is present. There is no mediastinal, axillary or hilar adenopathy. No evidence of pleural or pericardial effusion. The thoracic aorta is of normal caliber. Coronary artery calcification is present. Images   of the unenhanced upper abdomen are unremarkable. Scoliosis and degenerative change are present in the thoracic spine. There is an enlarging 1.4 cm x 1.1 cm irregular noncalcified nodule laterally in the left upper lobe abutting the pleura and major   fissure. Stable 4 mm noncalcified nodule laterally in the left upper lobe along the pleura. Stable 1 cm x 4 mm irregular noncalcified nodule inferiorly in the left upper lobe abutting the major fissure. Stable 3 mm noncalcified nodule laterally in the   right lower lobe coronal image 120. Stable 4 mm noncalcified nodule medially in the right upper lobe coronal image 89.     IMPRESSION:  Impression:     1. Enlarging 1.4 cm x 1.1 cm irregular nodule laterally in the left upper lobe suspicious for malignancy. There are stable indeterminate nodules inferiorly in the left upper lobe and laterally in the left upper lobe as above. Recommend a PET/CT scan for   further evaluation.     2. Moderate emphysema. Coronary calcification.           Electronically Signed: Chong Lea MD    7/16/2024 11:22 AM EDT    Workstation ID: [id number]     Complete Transthoracic Echocardiogram with Complete Doppler and Color Flow    Accession Number: [id number]   Date of Study: 7/10/24   Ordering Provider: Estela Ramirez APRN   Clinical Indications: Dyspnea        [facility]  Performing Staff   Cardiology: Ren Lee MD    Tech: Ashley Escobedo        Patient Hx Of Height, Weight, and Vitals    Height Weight BSA (Calculated - sq m) BMI (Calculated) Retired BMI (kg/m2) Pulse BP                [facility] PACS Images     Show images for Adult Transthoracic Echo Complete W/ Cont if Necessary Per Protocol   Clinical Indication    Dyspnea   Dx: Centrilobular emphysema [J43.2 (ICD-10-CM)]; Dyspnea on exertion [R06.09 (ICD-10-CM)]     Interpretation Summary         Left ventricular ejection fraction appears to be 61 - 65%.    Left ventricular wall thickness is consistent with septal asymmetric hypertrophy.    Left ventricular diastolic function is consistent with (grade I) impaired relaxation and age.    No significant valvular disease.  ASSESSMENT         Patient Active Problem List   Diagnosis    Encounter for annual wellness exam in Medicare patient    Degeneration of lumbar intervertebral disc    Acquired hypothyroidism    Anxiety    Essential hypertension    Other hyperlipidemia    Type 2 diabetes mellitus without complication, without long-term current use of insulin    Gastroesophageal reflux disease without esophagitis    Stage 3b chronic kidney disease (CKD)    Hypothyroidism due to Hashimoto's thyroiditis    Tobacco abuse    Smoking greater than 40 pack years    Lung nodule    Centrilobular emphysema       Encounter Diagnoses   Name Primary?    Lung nodule Yes    Abnormal CT scan of lung     Centrilobular emphysema     Dyspnea on exertion     Mild intermittent asthma without complication       PLAN   I have obtained consent for Ion Robotic Bronchoscopy with brushings, biopsies, and bronchioloalveolar lavage.  Risks and benefits of bronchoscopy discussed with patient today.  Risks include pneumothorax, hemothorax, bleeding, hypoxia, required mechanical ventilation and death.  The patient recognizes these findings, acknowledges these findings and is agreeable to the procedure. Patient is currently on baby aspirin, 81 mg, daily.  Advised to hold baby aspirin 1 day prior to procedure.  -Continue follow-up with PCP regarding thyroid  -Continue with pain management for her back with lumbosacral spondylosis  without myelopathy and degeneration of lumbar intervertebral disc, spinal stenosis of lumbar region  -followup CT scan of chest to  monitor status of lung nodules    Diagnoses and all orders for this visit:    1. Lung nodule (Primary)  -     CT Chest Without Contrast; Future  -     Case Request; Standing  -     Follow Anesthesia Guidlines / Standing Orders; Standing  -     Obtain Informed Consent; Standing  -     Case Request    2. Abnormal CT scan of lung  -     CT Chest Without Contrast; Future  -     Case Request; Standing  -     Follow Anesthesia Guidlines / Standing Orders; Standing  -     Obtain Informed Consent; Standing  -     Case Request    3. Centrilobular emphysema  -     Stiolto Respimat 2.5-2.5 MCG/ACT aerosol solution inhaler; Inhale 2 puffs Daily.  Dispense: 4 g; Refill: 11    4. Dyspnea on exertion  -     CT Chest Without Contrast; Future  -     Case Request; Standing  -     Follow Anesthesia Guidlines / Standing Orders; Standing  -     Obtain Informed Consent; Standing  -     Case Request  -     Stiolto Respimat 2.5-2.5 MCG/ACT aerosol solution inhaler; Inhale 2 puffs Daily.  Dispense: 4 g; Refill: 11    5. Mild intermittent asthma without complication  Comments:  Stable with as needed albuterol use.  She needs med refills today  Orders:  -     albuterol sulfate  (90 Base) MCG/ACT inhaler; Inhale 1 puff Every 4 (Four) Hours As Needed for Wheezing or Shortness of Air. Refill needed.  Dispense: 18 g; Refill: 2           Smoking status:current  Vaccination status:reviewed  Medications personally reviewed    Follow Up  Return for 1 week after ion robotic bronchoscopy.    Patient was given instructions and counseling regarding her condition or for health maintenance advice. Please see specific information pulled into the AVS if appropriate.

## 2024-08-06 PROBLEM — R91.8 ABNORMAL CT SCAN OF LUNG: Status: ACTIVE | Noted: 2024-08-05

## 2024-08-06 RX ORDER — HYDROCHLOROTHIAZIDE 12.5 MG/1
12.5 TABLET ORAL DAILY
Qty: 90 TABLET | Refills: 0 | Status: SHIPPED | OUTPATIENT
Start: 2024-08-06

## 2024-08-06 RX ORDER — MELOXICAM 15 MG/1
15 TABLET ORAL DAILY
Qty: 90 TABLET | Refills: 0 | Status: SHIPPED | OUTPATIENT
Start: 2024-08-06

## 2024-08-08 ENCOUNTER — OFFICE VISIT (OUTPATIENT)
Dept: FAMILY MEDICINE CLINIC | Age: 74
End: 2024-08-08
Payer: MEDICARE

## 2024-08-08 VITALS
HEART RATE: 70 BPM | TEMPERATURE: 98 F | BODY MASS INDEX: 21.49 KG/M2 | WEIGHT: 129 LBS | HEIGHT: 65 IN | SYSTOLIC BLOOD PRESSURE: 120 MMHG | DIASTOLIC BLOOD PRESSURE: 61 MMHG | OXYGEN SATURATION: 98 %

## 2024-08-08 DIAGNOSIS — E11.41 TYPE 2 DIABETES MELLITUS WITH DIABETIC MONONEUROPATHY, WITHOUT LONG-TERM CURRENT USE OF INSULIN: ICD-10-CM

## 2024-08-08 DIAGNOSIS — I10 ESSENTIAL HYPERTENSION: ICD-10-CM

## 2024-08-08 DIAGNOSIS — G89.29 CHRONIC RIGHT-SIDED LOW BACK PAIN WITH RIGHT-SIDED SCIATICA: ICD-10-CM

## 2024-08-08 DIAGNOSIS — Z78.0 POSTMENOPAUSAL STATE: ICD-10-CM

## 2024-08-08 DIAGNOSIS — F41.9 ANXIETY: ICD-10-CM

## 2024-08-08 DIAGNOSIS — R26.89 POOR BALANCE: ICD-10-CM

## 2024-08-08 DIAGNOSIS — E78.00 PURE HYPERCHOLESTEROLEMIA: ICD-10-CM

## 2024-08-08 DIAGNOSIS — K21.9 GASTROESOPHAGEAL REFLUX DISEASE WITHOUT ESOPHAGITIS: ICD-10-CM

## 2024-08-08 DIAGNOSIS — D50.8 IRON DEFICIENCY ANEMIA SECONDARY TO INADEQUATE DIETARY IRON INTAKE: ICD-10-CM

## 2024-08-08 DIAGNOSIS — E55.9 VITAMIN D DEFICIENCY: ICD-10-CM

## 2024-08-08 DIAGNOSIS — H91.93 BILATERAL HEARING LOSS, UNSPECIFIED HEARING LOSS TYPE: ICD-10-CM

## 2024-08-08 DIAGNOSIS — N18.32 STAGE 3B CHRONIC KIDNEY DISEASE (CKD): ICD-10-CM

## 2024-08-08 DIAGNOSIS — Z12.11 COLON CANCER SCREENING: ICD-10-CM

## 2024-08-08 DIAGNOSIS — E03.9 ACQUIRED HYPOTHYROIDISM: ICD-10-CM

## 2024-08-08 DIAGNOSIS — Z23 ENCOUNTER FOR IMMUNIZATION: ICD-10-CM

## 2024-08-08 DIAGNOSIS — Z00.00 ENCOUNTER FOR ANNUAL WELLNESS EXAM IN MEDICARE PATIENT: Primary | ICD-10-CM

## 2024-08-08 DIAGNOSIS — D51.3 OTHER DIETARY VITAMIN B12 DEFICIENCY ANEMIA: ICD-10-CM

## 2024-08-08 DIAGNOSIS — Z12.31 SCREENING MAMMOGRAM FOR BREAST CANCER: ICD-10-CM

## 2024-08-08 DIAGNOSIS — M54.41 CHRONIC RIGHT-SIDED LOW BACK PAIN WITH RIGHT-SIDED SCIATICA: ICD-10-CM

## 2024-08-08 PROCEDURE — 1126F AMNT PAIN NOTED NONE PRSNT: CPT | Performed by: FAMILY MEDICINE

## 2024-08-08 PROCEDURE — 3074F SYST BP LT 130 MM HG: CPT | Performed by: FAMILY MEDICINE

## 2024-08-08 PROCEDURE — 3078F DIAST BP <80 MM HG: CPT | Performed by: FAMILY MEDICINE

## 2024-08-08 PROCEDURE — G0439 PPPS, SUBSEQ VISIT: HCPCS | Performed by: FAMILY MEDICINE

## 2024-08-08 PROCEDURE — 1159F MED LIST DOCD IN RCRD: CPT | Performed by: FAMILY MEDICINE

## 2024-08-08 PROCEDURE — 1160F RVW MEDS BY RX/DR IN RCRD: CPT | Performed by: FAMILY MEDICINE

## 2024-08-08 PROCEDURE — 1170F FXNL STATUS ASSESSED: CPT | Performed by: FAMILY MEDICINE

## 2024-08-08 PROCEDURE — 99214 OFFICE O/P EST MOD 30 MIN: CPT | Performed by: FAMILY MEDICINE

## 2024-08-08 NOTE — PROGRESS NOTES
The ABCs of the Annual Wellness Visit  Medicare Wellness Visit    Subjective    Odalis Espino is a 74 y.o. female who presents for a Medicare Wellness Visit.    The following portions of the patient's history were reviewed and   updated as appropriate: allergies, current medications, past family history, past medical history, past social history, past surgical history, and problem list.    Compared to one year ago, the patient feels her physical   health is the same.    Compared to one year ago, the patient feels her mental   health is the same.    Recent Hospitalizations:  She was not admitted to the hospital during the last year.       Advance Care Planning  Advance Directive is on file.  ACP discussion was held with the patient during this visit. Patient has an advance directive in EMR which is still valid.     Does the patient have evidence of cognitive impairment? Yes    Aspirin is on active medication list. Aspirin use is indicated based on review of current medical condition/s. Pros and cons of this therapy have been discussed today. Benefits of this medication outweigh potential harm.  Patient has been encouraged to continue taking this medication.  .      Patient Active Problem List   Diagnosis    Encounter for annual wellness exam in Medicare patient    Degeneration of lumbar intervertebral disc    Acquired hypothyroidism    Anxiety    Essential hypertension    Other hyperlipidemia    Type 2 diabetes mellitus without complication, without long-term current use of insulin    Gastroesophageal reflux disease without esophagitis    Stage 3b chronic kidney disease (CKD)    Hypothyroidism due to Hashimoto's thyroiditis    Tobacco abuse    Smoking greater than 40 pack years    Lung nodule    Centrilobular emphysema    Abnormal CT scan of lung       Current Medical Providers:  Patient Care Team:  Karina Langford MD as PCP - General (Family Medicine)  Estela Ramirez APRN as Nurse Practitioner (Pulmonary  "Disease)    Opioid medication/s are on active medication list.  and I have evaluated her active treatment plan and pain score trends (see table).  There were no vitals filed for this visit.  I have reviewed the chart for potential of high risk medication and harmful drug interactions in the elderly.               Objective    Vitals:    24 1601   BP: 120/61   BP Location: Left arm   Patient Position: Sitting   Cuff Size: Adult   Pulse: 70   Temp: 98 °F (36.7 °C)   TempSrc: Oral   SpO2: 98%   Weight: 58.5 kg (129 lb)   Height: 165.1 cm (65\")     Estimated body mass index is 21.47 kg/m² as calculated from the following:    Height as of this encounter: 165.1 cm (65\").    Weight as of this encounter: 58.5 kg (129 lb).    BMI is within normal parameters. No other follow-up for BMI required.      Gait and Balance Evaluation: Loss of Balance          HEALTH RISK ASSESSMENT    Smoking Status:  Social History     Tobacco Use   Smoking Status Every Day    Current packs/day: 0.25    Average packs/day: 0.5 packs/day for 95.5 years (50.1 ttl pk-yrs)    Types: Cigarettes    Start date:    Smokeless Tobacco Never   Tobacco Comments    Really don’t remember when I started     Alcohol Consumption:  Social History     Substance and Sexual Activity   Alcohol Use Not Currently    Alcohol/week: 1.0 standard drink of alcohol    Types: 1 Glasses of wine per week    Comment: occasional     Fall Risk Screen:    DARELL Fall Risk Assessment was completed, and patient is at LOW risk for falls.Assessment completed on:2024    Depression Screenin/8/2024     4:05 PM   PHQ-2/PHQ-9 Depression Screening   Little Interest or Pleasure in Doing Things 0-->not at all   Feeling Down, Depressed or Hopeless 1-->several days   PHQ-9: Brief Depression Severity Measure Score 1       Health Habits and Functional and Cognitive Screenin/8/2024     4:06 PM   Functional & Cognitive Status   Do you have difficulty preparing food and " eating? No   Do you have difficulty bathing yourself, getting dressed or grooming yourself? No   Do you have difficulty using the toilet? No   Do you have difficulty moving around from place to place? No   Do you have trouble with steps or getting out of a bed or a chair? No   Current Diet Well Balanced Diet   Dental Exam Up to date   Eye Exam Up to date   Exercise (times per week) 0 times per week   Current Exercises Include No Regular Exercise;Yard Work;Walking   Do you need help using the phone?  No   Are you deaf or do you have serious difficulty hearing?  Yes   Do you need help to go to places out of walking distance? No   Do you need help shopping? No   Do you need help preparing meals?  No   Do you need help with housework?  No   Do you need help with laundry? No   Do you need help taking your medications? No   Do you need help managing money? No   Do you ever drive or ride in a car without wearing a seat belt? No   Have you felt unusual stress, anger or loneliness in the last month? No   Who do you live with? Alone   If you need help, do you have trouble finding someone available to you? No   Have you been bothered in the last four weeks by sexual problems? No   Do you have difficulty concentrating, remembering or making decisions? No       Visual Acuity:             Age-appropriate Screening Schedule:  Refer to the list below for future screening recommendations based on patient's age, sex and/or medical conditions. Orders for these recommended tests are listed in the plan section. The patient has been provided with a written plan.    Health Maintenance   Topic Date Due    RSV Vaccine - Adults (1 - 1-dose 60+ series) Never done    COVID-19 Vaccine (6 - 2023-24 season) 10/03/2023    HEMOGLOBIN A1C  07/23/2024    DIABETIC EYE EXAM  08/04/2024    INFLUENZA VACCINE  08/01/2024    ZOSTER VACCINE (2 of 2) 01/09/2025 (Originally 9/11/2023)    LIPID PANEL  01/23/2025    URINE MICROALBUMIN  05/20/2025    LUNG CANCER  SCREENING  07/15/2025    DXA SCAN  08/01/2025    ANNUAL WELLNESS VISIT  08/08/2025    TDAP/TD VACCINES (2 - Td or Tdap) 07/17/2033    HEPATITIS C SCREENING  Completed    Pneumococcal Vaccine 65+  Completed    COLORECTAL CANCER SCREENING  Discontinued              Outpatient Medications Prior to Visit   Medication Sig Dispense Refill    albuterol sulfate  (90 Base) MCG/ACT inhaler Inhale 1 puff Every 4 (Four) Hours As Needed for Wheezing or Shortness of Air. Refill needed. 18 g 2    amLODIPine (NORVASC) 5 MG tablet Take 1 tablet by mouth Daily. 90 tablet 1    aspirin 81 MG EC tablet Take 1 tablet by mouth Daily.      atorvastatin (LIPITOR) 20 MG tablet Take 1 tablet by mouth Every Night. 90 tablet 1    Blood Glucose Monitoring Suppl device 1 each Daily. 1 each 0    gabapentin (NEURONTIN) 600 MG tablet Take 1 tablet by mouth 3 (Three) Times a Day. 270 tablet 0    glucose blood test strip Use as instructed to check blood sugar daily 100 each 3    hydroCHLOROthiazide 12.5 MG tablet Take 1 tablet by mouth Daily. 90 tablet 0    HYDROcodone-acetaminophen (NORCO)  MG per tablet Take 1 tablet by mouth Every 6 (Six) Hours As Needed for Moderate Pain.      hydrOXYzine pamoate (VISTARIL) 25 MG capsule Take 1 capsule by mouth 3 (Three) Times a Day As Needed for Itching or Anxiety. 90 capsule 0    Lancet Device misc 1 each Daily. Use as directed; check blood sugar once daily 100 each 3    Lancets (OneTouch Delica Plus [id number]) misc       levothyroxine (SYNTHROID, LEVOTHROID) 112 MCG tablet Take 1 tablet by mouth Daily. 90 tablet 1    meloxicam (MOBIC) 15 MG tablet Take 1 tablet by mouth Daily. 90 tablet 0    metFORMIN (GLUCOPHAGE) 500 MG tablet Take 1 tablet by mouth 2 (Two) Times a Day With Meals. INSTRUCTED PER ANESTHESIA PROTOCOL NONE AFTER 6 PM ON 12/14/23      omeprazole (priLOSEC) 20 MG capsule Take 1 capsule by mouth Daily. 90 capsule 1    sertraline (ZOLOFT) 100 MG tablet Take 1 tablet by mouth Daily. 90  tablet 0    Stiolto Respimat 2.5-2.5 MCG/ACT aerosol solution inhaler Inhale 2 puffs Daily. 4 g 11    vitamin B-12 (CYANOCOBALAMIN) 1000 MCG tablet Take 1 tablet by mouth Daily.      Budeson-Glycopyrrol-Formoterol (BREZTRI) 160-9-4.8 MCG/ACT aerosol inhaler Inhale 2 puffs 2 (Two) Times a Day. (Patient not taking: Reported on 8/5/2024) 3 each 3    potassium chloride 10 MEQ CR tablet  (Patient not taking: Reported on 8/5/2024)       No facility-administered medications prior to visit.       CMS Preventative Services Quick Reference  Risk Factors Identified During Encounter  Chronic Pain: OTC analgesics as needed. Proper dosing schedule discussed.   Depression/Dysphoria: Current medication is effective, no change recommended  Hearing Problem:  she has hearing aids  Immunizations Discussed/Encouraged: Influenza, Shingrix, COVID19, and RSV (Respiratory Syncytial Virus)  Dental Screening Recommended  Vision Screening Recommended  The above risks/problems have been discussed with the patient.  Pertinent information has been shared with the patient in the After Visit Summary.  An After Visit Summary and PPPS were made available to the patient.    Follow Up:   Next Medicare Wellness visit to be scheduled in 1 year.       Additional E&M Note during same encounter follows:  Patient has multiple medical problems which are significant and separately identifiable that require additional work above and beyond the Medicare Wellness Visit.         Odalis Espino presents to [facility].    Chief Complaint: Diabetes follow-up        Subjective   History of Present Illness:  HPI  Lung nodule, seeing pulmonology and pending nodule biopsy.  She is ready to quit smoking.     Amaris is status post minimally invasive lumbar laminectomy L4/5 and L5/S1 with Dr Garry Ford at [facility] on 12/15/2023.  She still has some back pain and R leg dull ache, but overall she is significantly improved and is not having  the radicular pain or lower extremity numbness or tingling.  She is on hydrocodone for the pain and goes to [facility] pain management.  She has a walker that she can use when she has mild weakness and balance issues.  In addition to hydrocodone she is on Mobic and gabapentin She denies saddle anesthesia or bowel/bladder incontinence.  She did not participate in PT.         Mild L LE edema    PET scan showed that her thyroid lit up secondary to thyroiditis.  She has known chronic hashimotos thyroiditis, sees Dr Fernández, has slightly enlarged thyroid with no lymphadenopathy or palpable nodules. She she is currently taking Synthroid 112mcg daily, last TSH removed and was normal 1.01     She presents with chronic intermittent anxiety remains stable on Zoloft 100 mg twice daily and hydroxyzine daily as needed.  She says her moods are stable without depression is stable. She denies SI or HI.        She presents  with her essential (primary) hypertension is controlled on her current cardiac medication regimen includes a diuretic ( HCTZ 25 mg daily ) and a calcium channel blocker ( Amlodipine 5 mg daily ).  Compliance with treatment has been good.  She is tolerating the medication well without side effects.  She does not kept a blood pressure diary.   Denies headache, blurry vision, chest pain, palpiations, shortness of breath or edema      She presents with hypercholesterolemia, she is on Lipitor and tolerates med well.  She also follows a low-cholesterol diet      She presents with chronic type 2 diabetes is stable and well controlled, she needs to check her BS daily, BS reported as consistently < 120.  Her current regimen includes Metformin 500 mg twice daily.  Her last A1c was 7.2%.  No current foot issues.  Last eye exam about 1 year.                 Result Review   The following data was reviewed by Karina Langford MD on 08/08/2024.  Lab Results   Component Value Date    WBC 5.82 01/23/2024    HGB 9.9 (L)  "01/23/2024    HCT 30.8 (L) 01/23/2024    MCV 87.0 01/23/2024     01/23/2024     Lab Results   Component Value Date    GLUCOSE 107 (H) 05/20/2024    BUN 28 (H) 05/20/2024    CREATININE 1.29 (H) 05/20/2024     05/20/2024    K 3.2 (L) 05/20/2024     05/20/2024    CALCIUM 8.9 05/20/2024    PROTEINTOT 7.2 05/20/2024    ALBUMIN 4.3 05/20/2024    ALT 6 05/20/2024    AST 11 05/20/2024    ALKPHOS 65 05/20/2024    BILITOT 0.2 05/20/2024    GLOB 2.9 05/20/2024    AGRATIO 1.5 05/20/2024    BCR 21.7 05/20/2024    ANIONGAP 9.0 05/20/2024    EGFR 43.4 (L) 05/20/2024     Lab Results   Component Value Date    CHOL 167 01/23/2024    CHLPL 164 12/17/2020    TRIG 96 01/23/2024    HDL 66 (H) 01/23/2024    LDL 84 01/23/2024     Lab Results   Component Value Date    TSH 1.010 05/20/2024     Lab Results   Component Value Date    HGBA1C 7.20 (H) 01/23/2024     No results found for: \"PSA\"  Lab Results   Component Value Date    IRON 46 05/20/2024      No results found for: \"[id number]\"          Assessment and Plan:   Diagnoses and all orders for this visit:    1. Encounter for annual wellness exam in Medicare patient (Primary)    2. Screening mammogram for breast cancer  Comments:  Screening mammogram ordered, breast exam WNL  Orders:  -     Mammo Screening Digital Tomosynthesis Bilateral With CAD; Future    3. Encounter for immunization  Comments:  Recommend COVID, Shingrix RSV and flu vaccine    4. Colon cancer screening  Comments:  Colonoscopy is up-to-date    5. Postmenopausal state  Comments:  DEXA is up-to-date    6. Essential hypertension  Comments:  Blood pressure stable and very well-controlled.  No changes in current meds, recommend she avoid NA in diet  Orders:  -     Comprehensive Metabolic Panel; Future  -     CBC (No Diff); Future    7. Chronic right-sided low back pain with right-sided sciatica  Comments:  Acute worsening.  Recent surgery.  Recommend pain management reeval and physical therapy.  She will " follow-up with pain management but defers PT    8. Acquired hypothyroidism  Comments:  Stable on levothyroxine.  Will check labs and adjust Tx plan pending results  Orders:  -     TSH+Free T4; Future    9. Anxiety  Comments:  Overall stable and well-controlled.  She takes hydroxyzine as needed panic attacks and Zoloft daily, tolerates meds well.  Sleeping okay.  No SI/HI/depression    10. Gastroesophageal reflux disease without esophagitis  Comments:  Stable on omeprazole.  She tolerates med well.  She is to avoid spicy and acidic foods in her diet    11. Pure hypercholesterolemia  Comments:  Well-controlled on atorvastatin, she tolerates meds well.  Will check labs and adjust Tx plan pending results  Orders:  -     Lipid Panel; Future    12. Type 2 diabetes mellitus with diabetic mononeuropathy, without long-term current use of insulin  Comments:  Stable and well-controlled with low carb diet, on metformin and tolerates med well.  Recommend yearly eye exams.  Foot exam WNL  Orders:  -     Comprehensive Metabolic Panel; Future  -     Lipid Panel; Future  -     Hemoglobin A1c; Future  -     Microalbumin / Creatinine Urine Ratio - Urine, Clean Catch; Future    13. Iron deficiency anemia secondary to inadequate dietary iron intake  Comments:  Not currently on iron supplementation.  Will check labs and adjust Tx plan pending results  Orders:  -     CBC (No Diff); Future    14. Stage 3b chronic kidney disease (CKD)  Comments:  She is to avoid NSAIDs and push fluids 64 ounces a day    15. Poor balance  Comments:  Recommend she do strengthening exercises, chair exercise handout given today.  Recommend she always use her cane for balance    16. Bilateral hearing loss, unspecified hearing loss type  Comments:  She has hearing aids    17. Other dietary vitamin B12 deficiency anemia  Comments:  Currently on B12 supplementation.  Will check labs and adjust Tx plan pending results    18. Vitamin D deficiency  Comments:  Will  check vitamin D levels.  She is not currently on vitamin D supplementation  Orders:  -     Vitamin D,25-Hydroxy; Future                    Medications:      Current Outpatient Medications:     albuterol sulfate  (90 Base) MCG/ACT inhaler, Inhale 1 puff Every 4 (Four) Hours As Needed for Wheezing or Shortness of Air. Refill needed., Disp: 18 g, Rfl: 2    amLODIPine (NORVASC) 5 MG tablet, Take 1 tablet by mouth Daily., Disp: 90 tablet, Rfl: 1    aspirin 81 MG EC tablet, Take 1 tablet by mouth Daily., Disp: , Rfl:     atorvastatin (LIPITOR) 20 MG tablet, Take 1 tablet by mouth Every Night., Disp: 90 tablet, Rfl: 1    Blood Glucose Monitoring Suppl device, 1 each Daily., Disp: 1 each, Rfl: 0    gabapentin (NEURONTIN) 600 MG tablet, Take 1 tablet by mouth 3 (Three) Times a Day., Disp: 270 tablet, Rfl: 0    glucose blood test strip, Use as instructed to check blood sugar daily, Disp: 100 each, Rfl: 3    hydroCHLOROthiazide 12.5 MG tablet, Take 1 tablet by mouth Daily., Disp: 90 tablet, Rfl: 0    HYDROcodone-acetaminophen (NORCO)  MG per tablet, Take 1 tablet by mouth Every 6 (Six) Hours As Needed for Moderate Pain., Disp: , Rfl:     hydrOXYzine pamoate (VISTARIL) 25 MG capsule, Take 1 capsule by mouth 3 (Three) Times a Day As Needed for Itching or Anxiety., Disp: 90 capsule, Rfl: 0    Lancet Device misc, 1 each Daily. Use as directed; check blood sugar once daily, Disp: 100 each, Rfl: 3    Lancets (OneTouch Delica Plus [id number]) misc, , Disp: , Rfl:     levothyroxine (SYNTHROID, LEVOTHROID) 112 MCG tablet, Take 1 tablet by mouth Daily., Disp: 90 tablet, Rfl: 1    meloxicam (MOBIC) 15 MG tablet, Take 1 tablet by mouth Daily., Disp: 90 tablet, Rfl: 0    metFORMIN (GLUCOPHAGE) 500 MG tablet, Take 1 tablet by mouth 2 (Two) Times a Day With Meals. INSTRUCTED PER ANESTHESIA PROTOCOL NONE AFTER 6 PM ON 12/14/23, Disp: , Rfl:     omeprazole (priLOSEC) 20 MG capsule, Take 1 capsule by mouth Daily., Disp: 90 capsule,  "Rfl: 1    sertraline (ZOLOFT) 100 MG tablet, Take 1 tablet by mouth Daily., Disp: 90 tablet, Rfl: 0    Stiolto Respimat 2.5-2.5 MCG/ACT aerosol solution inhaler, Inhale 2 puffs Daily., Disp: 4 g, Rfl: 11    vitamin B-12 (CYANOCOBALAMIN) 1000 MCG tablet, Take 1 tablet by mouth Daily., Disp: , Rfl:     Budeson-Glycopyrrol-Formoterol (BREZTRI) 160-9-4.8 MCG/ACT aerosol inhaler, Inhale 2 puffs 2 (Two) Times a Day. (Patient not taking: Reported on 8/5/2024), Disp: 3 each, Rfl: 3    potassium chloride 10 MEQ CR tablet, , Disp: , Rfl:        Objective   Vital Signs:   /61 (BP Location: Left arm, Patient Position: Sitting, Cuff Size: Adult)   Pulse 70   Temp 98 °F (36.7 °C) (Oral)   Ht 165.1 cm (65\")   Wt 58.5 kg (129 lb)   SpO2 98%   BMI 21.47 kg/m²       Physical Exam:  Physical Exam  Vitals and nursing note reviewed.   Constitutional:       General: She is not in acute distress.     Appearance: Normal appearance. She is not ill-appearing, toxic-appearing or diaphoretic.   HENT:      Head: Normocephalic and atraumatic.      Right Ear: Tympanic membrane, ear canal and external ear normal.      Left Ear: Tympanic membrane, ear canal and external ear normal.      Nose: No congestion or rhinorrhea.      Mouth/Throat:      Mouth: Mucous membranes are moist.      Pharynx: Oropharynx is clear. No oropharyngeal exudate or posterior oropharyngeal erythema.   Eyes:      Extraocular Movements: Extraocular movements intact.      Conjunctiva/sclera: Conjunctivae normal.      Pupils: Pupils are equal, round, and reactive to light.   Cardiovascular:      Rate and Rhythm: Normal rate and regular rhythm.      Pulses:           Dorsalis pedis pulses are 2+ on the right side and 2+ on the left side.      Heart sounds: Normal heart sounds.   Pulmonary:      Effort: Pulmonary effort is normal.      Breath sounds: Normal breath sounds. No wheezing, rhonchi or rales.   Chest:   Breasts:     Right: Normal.   Abdominal:      General: " Abdomen is flat.      Palpations: Abdomen is soft. There is no mass.      Tenderness: There is no abdominal tenderness.      Hernia: No hernia is present.   Musculoskeletal:      Cervical back: Neck supple. No rigidity.      Right lower leg: No edema.      Left lower leg: No edema.   Feet:      Right foot:      Protective Sensation: 7 sites tested.  7 sites sensed.      Skin integrity: Skin integrity normal. No ulcer or blister.      Toenail Condition: Right toenails are normal.      Left foot:      Protective Sensation: 7 sites tested.  7 sites sensed.      Skin integrity: Skin integrity normal. No ulcer or blister.      Toenail Condition: Left toenails are normal.      Comments: Diabetic Foot Exam Performed and Monofilament Test Performed     Lymphadenopathy:      Cervical: No cervical adenopathy.   Skin:     General: Skin is warm and dry.   Neurological:      General: No focal deficit present.      Mental Status: She is alert and oriented to person, place, and time. Mental status is at baseline.   Psychiatric:         Mood and Affect: Mood normal.         Behavior: Behavior normal.         Thought Content: Thought content normal.         Judgment: Judgment normal.                   Review of Systems:  Review of Systems   Constitutional:  Negative for chills, fatigue and fever.   HENT:  Negative for ear pain, sinus pressure and sore throat.    Eyes:  Negative for blurred vision and double vision.   Respiratory:  Negative for cough, shortness of breath and wheezing.    Cardiovascular:  Negative for chest pain, palpitations and leg swelling.   Gastrointestinal:  Negative for abdominal pain, blood in stool, constipation, diarrhea, nausea and vomiting.   Musculoskeletal:  Positive for back pain.   Skin:  Negative for rash.   Neurological:  Negative for dizziness and headache.   Psychiatric/Behavioral:  Negative for sleep disturbance, suicidal ideas and depressed mood. The patient is not nervous/anxious.              Follow Up   Return in about 6 months (around 2/8/2025) for Recheck.    Part of this note may be an electronic transcription/translation of spoken language to printed   text using the Dragon Dictation System.            Medical History:  Past Medical History:    Anxiety    Arthritis    Centrilobular emphysema    Diabetes mellitus    AVERAGE 'S    Emphysema of lung    None    GERD (gastroesophageal reflux disease)    Hepatitis    REPORTS OVER 30 YEARS AGO HAD TREATMENT AND DENIES ANY CURRENT ISSUES    HL (hearing loss)    Hyperlipidemia    Hypertension    Hypothyroidism    Lung nodule    (-)  ION BRONCH DONE 9/2023    Pain management    [facility]    Pulmonary arterial hypertension    Spinal stenosis    Thyroiditis    DENIES ANY CURRENT ISSUES    Visual impairment     Past Surgical History:    BRONCHOSCOPY WITH ION ROBOTIC ASSIST    Procedure: BRONCHOSCOPY WITH ION ROBOT, REBUS, EBUS, NEEDLE ASPIRATE, BIOPSIES, WASHINGS, BRUSHINGS, BAL;  Surgeon: Chiki Morse MD;  Location: [facility] MAIN OR;  Service: Robotics - Pulmonary;  Laterality: N/A;    EYE SURGERY    HYSTERECTOMY    LUMBAR LAMINECTOMY    Procedure: MINIMALLY INVASIVE LUMBAR LAMINECTOMY, right approach, lumbar 4-lumbar  5 and lumbar 5-sacral 1;  Surgeon: Garry Ford MD;  Location: [facility] MAIN OR;  Service: Neurosurgery;  Laterality: Right;    LUNG BIOPSY    TONSILLECTOMY      Family History   Problem Relation Age of Onset    Cancer Father     Cancer Sister     Diabetes Brother     Asthma Other     Seizures Other     Anxiety disorder Mother     Arthritis Daughter     Diabetes Brother      Social History     Tobacco Use    Smoking status: Every Day     Current packs/day: 0.25     Average packs/day: 0.5 packs/day for 95.5 years (50.1 ttl pk-yrs)     Types: Cigarettes     Start date: 1966    Smokeless tobacco: Never    Tobacco comments:     Really don’t remember when I started   Substance Use Topics    Alcohol use: Not Currently      Alcohol/week: 1.0 standard drink of alcohol     Types: 1 Glasses of wine per week     Comment: occasional       Health Maintenance Due   Topic Date Due    RSV Vaccine - Adults (1 - 1-dose 60+ series) Never done    COVID-19 Vaccine (6 - 2023-24 season) 10/03/2023    HEMOGLOBIN A1C  07/23/2024    DIABETIC EYE EXAM  08/04/2024    INFLUENZA VACCINE  08/01/2024        Immunization History   Administered Date(s) Administered    COVID-19 (MODERNA) 1st,2nd,3rd Dose Monovalent 03/17/2021, 04/15/2021, 11/03/2021    COVID-19 (PFIZER) BIVALENT 12+YRS 08/08/2023    Covid-19 (Pfizer) Gray Cap Monovalent 07/14/2022    Flu Vaccine Quad PF >36MO 10/09/2018    Fluzone (or Fluarix & Flulaval for VFC) >6mos 10/09/2018    Fluzone High Dose =>65 Years ([facility] ONLY) 11/13/2017, 07/17/2023    Fluzone High-Dose 65+yrs 11/11/2021, 01/09/2024    Pneumococcal Conjugate 13-Valent (PCV13) 10/18/2018    Pneumococcal Polysaccharide (PPSV23) 01/01/2016    Shingrix 07/17/2023    Tdap 07/17/2023       Allergies   Allergen Reactions    Azithromycin Hives    Breztri Aerosphere [Budeson-Glycopyrrol-Formoterol] Other (See Comments)     Pt stated makes her light headed and she felt dizzy after using Breztri.

## 2024-08-12 ENCOUNTER — LAB (OUTPATIENT)
Dept: LAB | Facility: HOSPITAL | Age: 74
End: 2024-08-12
Payer: MEDICARE

## 2024-08-12 DIAGNOSIS — E11.41 TYPE 2 DIABETES MELLITUS WITH DIABETIC MONONEUROPATHY, WITHOUT LONG-TERM CURRENT USE OF INSULIN: ICD-10-CM

## 2024-08-12 DIAGNOSIS — I10 ESSENTIAL HYPERTENSION: ICD-10-CM

## 2024-08-12 DIAGNOSIS — E03.9 ACQUIRED HYPOTHYROIDISM: ICD-10-CM

## 2024-08-12 DIAGNOSIS — E55.9 VITAMIN D DEFICIENCY: ICD-10-CM

## 2024-08-12 DIAGNOSIS — D50.8 IRON DEFICIENCY ANEMIA SECONDARY TO INADEQUATE DIETARY IRON INTAKE: ICD-10-CM

## 2024-08-12 DIAGNOSIS — E78.00 PURE HYPERCHOLESTEROLEMIA: ICD-10-CM

## 2024-08-12 LAB
25(OH)D3 SERPL-MCNC: 22.1 NG/ML (ref 30–100)
ALBUMIN SERPL-MCNC: 4.1 G/DL (ref 3.5–5.2)
ALBUMIN UR-MCNC: 2.3 MG/DL
ALBUMIN/GLOB SERPL: 1.2 G/DL
ALP SERPL-CCNC: 73 U/L (ref 39–117)
ALT SERPL W P-5'-P-CCNC: 6 U/L (ref 1–33)
ANION GAP SERPL CALCULATED.3IONS-SCNC: 12.4 MMOL/L (ref 5–15)
AST SERPL-CCNC: 13 U/L (ref 1–32)
BILIRUB SERPL-MCNC: 0.3 MG/DL (ref 0–1.2)
BUN SERPL-MCNC: 16 MG/DL (ref 8–23)
BUN/CREAT SERPL: 14.2 (ref 7–25)
CALCIUM SPEC-SCNC: 9.9 MG/DL (ref 8.6–10.5)
CHLORIDE SERPL-SCNC: 101 MMOL/L (ref 98–107)
CHOLEST SERPL-MCNC: 149 MG/DL (ref 0–200)
CO2 SERPL-SCNC: 29.6 MMOL/L (ref 22–29)
CREAT SERPL-MCNC: 1.13 MG/DL (ref 0.57–1)
CREAT UR-MCNC: 55.2 MG/DL
DEPRECATED RDW RBC AUTO: 43.3 FL (ref 37–54)
EGFRCR SERPLBLD CKD-EPI 2021: 50.5 ML/MIN/1.73
ERYTHROCYTE [DISTWIDTH] IN BLOOD BY AUTOMATED COUNT: 13.1 % (ref 12.3–15.4)
GLOBULIN UR ELPH-MCNC: 3.3 GM/DL
GLUCOSE SERPL-MCNC: 136 MG/DL (ref 65–99)
HBA1C MFR BLD: 6.7 % (ref 4.8–5.6)
HCT VFR BLD AUTO: 33.5 % (ref 34–46.6)
HDLC SERPL-MCNC: 67 MG/DL (ref 40–60)
HGB BLD-MCNC: 11 G/DL (ref 12–15.9)
LDLC SERPL CALC-MCNC: 68 MG/DL (ref 0–100)
LDLC/HDLC SERPL: 1.02 {RATIO}
MCH RBC QN AUTO: 29.5 PG (ref 26.6–33)
MCHC RBC AUTO-ENTMCNC: 32.8 G/DL (ref 31.5–35.7)
MCV RBC AUTO: 89.8 FL (ref 79–97)
MICROALBUMIN/CREAT UR: 41.7 MG/G (ref 0–29)
PLATELET # BLD AUTO: 173 10*3/MM3 (ref 140–450)
PMV BLD AUTO: 9.7 FL (ref 6–12)
POTASSIUM SERPL-SCNC: 3.2 MMOL/L (ref 3.5–5.2)
PROT SERPL-MCNC: 7.4 G/DL (ref 6–8.5)
RBC # BLD AUTO: 3.73 10*6/MM3 (ref 3.77–5.28)
SODIUM SERPL-SCNC: 143 MMOL/L (ref 136–145)
T4 FREE SERPL-MCNC: 1.24 NG/DL (ref 0.92–1.68)
TRIGL SERPL-MCNC: 68 MG/DL (ref 0–150)
TSH SERPL DL<=0.05 MIU/L-ACNC: 0.59 UIU/ML (ref 0.27–4.2)
VLDLC SERPL-MCNC: 14 MG/DL (ref 5–40)
WBC NRBC COR # BLD AUTO: 5.56 10*3/MM3 (ref 3.4–10.8)

## 2024-08-12 PROCEDURE — 82306 VITAMIN D 25 HYDROXY: CPT

## 2024-08-12 PROCEDURE — 84443 ASSAY THYROID STIM HORMONE: CPT

## 2024-08-12 PROCEDURE — 83036 HEMOGLOBIN GLYCOSYLATED A1C: CPT

## 2024-08-12 PROCEDURE — 82043 UR ALBUMIN QUANTITATIVE: CPT

## 2024-08-12 PROCEDURE — 36415 COLL VENOUS BLD VENIPUNCTURE: CPT

## 2024-08-12 PROCEDURE — 80053 COMPREHEN METABOLIC PANEL: CPT

## 2024-08-12 PROCEDURE — 80061 LIPID PANEL: CPT

## 2024-08-12 PROCEDURE — 85027 COMPLETE CBC AUTOMATED: CPT

## 2024-08-12 PROCEDURE — 82570 ASSAY OF URINE CREATININE: CPT

## 2024-08-12 PROCEDURE — 84439 ASSAY OF FREE THYROXINE: CPT

## 2024-08-13 RX ORDER — VARENICLINE TARTRATE 1 MG/1
1 TABLET, FILM COATED ORAL 2 TIMES DAILY
COMMUNITY
End: 2024-08-16 | Stop reason: HOSPADM

## 2024-08-13 NOTE — PRE-PROCEDURE INSTRUCTIONS
PATIENT INSTRUCTED TO BE:    - NOTHING TO EAT,  DRINK AFTER MIDNIGHT OR CHEW     - TO HOLD ALL VITAMINS, SUPPLEMENTS, NSAIDS FOR ONE WEEK PRIOR TO THEIR SURGICAL PROCEDURE    HOLD ASPIRIN 1  DAY PRIOR TO PROCEDURE    - INSTRUCTED PT TO USE SURGICAL SOAP 1 TIME THE NIGHT PRIOR TO SURGERY ____8/13_______ OR THE AM OF SURGERY _____8/14________   USE THE SOAP FROM NECK TO TOES, AVOID THEIR FACE, HAIR, AND PRIVATE PARTS. IF USE THE SOAP THE NIGHT PRIOR TO SURGERY, CHANGE BED LINENS AND NO PETS IN THE BED.     INSTRUCTED NO LOTIONS, JEWELRY, PIERCINGS,  NAIL POLISH, OR DEODORANT DAY OF SURGERY    - IF DIABETIC, CHECK BLOOD GLUCOSE IF LESS THAN 70 OR HAVING SYMPTOMS CALL THE PREOP AREA FOR INSTRUCTIONS ON AM OF SURGERY ([phone]     -INSTRUCTED TO TAKE THE FOLLOWING MEDICATIONS THE DAY OF SURGERY WITH SIPS OF WATER: AMLODIPINE,  GABAPENTIN, SERTRALINE, CHANITX, HYDROCODONE  IF NEEDED, PRILOSEC, HYDROXYZINE IF NEEDED, INHALERS, LEVOTHYROXINE    - DO NOT BRING ANY MEDICATIONS WITH YOU TO THE HOSPITAL THE DAY OF SURGERY, EXCEPT IF USE INHALERS. BRING INHALERS DAY OF SURGERY       - BRING CPAP OR BIPAP TO THE HOSPITAL ONLY IF YOU ARE SPENDING THE NIGHT    - DO NOT SMOKE OR VAPE 24 HOURS PRIOR TO PROCEDURE PER ANESTHESIA REQUEST     -MAKE SURE YOU HAVE A RIDE HOME OR SOMEONE TO STAY WITH YOU THE DAY OF THE PROCEDURE AFTER YOU GO HOME     - FOLLOW ANY OTHER INSTRUCTIONS GIVEN TO YOU BY YOUR SURGEON'S OFFICE.     - DAY OF SURGERY ___8/14_______, COME TO ELEVATOR A, THIRD FLOOR, CHECK IN AT THE DESK FOR REGISTRATION/SURGERY     - YOU WILL RECEIVE A PHONE CALL THE DAY PRIOR TO SURGERY BETWEEN 1PM AND 4 PM WITH ARRIVAL TIME, IF YOUR SURGERY IS ON A MONDAY YOU WILL RECEIVE A CALL THE FRIDAY PRIOR TO SURGERY DATE    - BRING CASH OR CREDIT CARD FOR COPAYMENT OF MEDICATIONS AFTER SURGERY IF YOU USE THE HOSPITAL PHARMACY (MEDS TO BED)    - PREADMISSION TESTING NURSE HEMANTH BETHEA [phone] IF HAVE ANY QUESTIONS     -PATIENT PROVIDED  THE NUMBER FOR PREOP SURGICAL DEPT IF HAD QUESTIONS AFTER HOURS PRIOR TO SURGERY ([phone]).  INFORMED PT IF NO ANSWER, LEAVE A MESSAGE AND SOMEONE WILL RETURN THEIR CALL       PATIENT VERBALIZED UNDERSTANDING                    Clear Liquid Diet:  Do NOT eat any solid food.  Do NOT eat or suck on mints or candy.  Do NOT chew gum.  Do NOT drink thick liquids like milk or juice with pulp in it.  Do NOT add milk, cream, or anything like soy milk or almond milk to coffee or tea.

## 2024-08-14 ENCOUNTER — HOSPITAL ENCOUNTER (OUTPATIENT)
Facility: HOSPITAL | Age: 74
Setting detail: OBSERVATION
Discharge: HOME | End: 2024-08-16
Attending: EMERGENCY MEDICINE | Admitting: STUDENT IN AN ORGANIZED HEALTH CARE EDUCATION/TRAINING PROGRAM
Payer: MEDICARE

## 2024-08-14 ENCOUNTER — APPOINTMENT (OUTPATIENT)
Dept: CT IMAGING | Facility: HOSPITAL | Age: 74
End: 2024-08-14
Payer: MEDICARE

## 2024-08-14 ENCOUNTER — HOSPITAL ENCOUNTER (OUTPATIENT)
Facility: HOSPITAL | Age: 74
Setting detail: HOSPITAL OUTPATIENT SURGERY
Discharge: HOME | End: 2024-08-14
Attending: INTERNAL MEDICINE | Admitting: INTERNAL MEDICINE
Payer: MEDICARE

## 2024-08-14 ENCOUNTER — ANESTHESIA EVENT (OUTPATIENT)
Dept: PERIOP | Facility: HOSPITAL | Age: 74
End: 2024-08-14
Payer: MEDICARE

## 2024-08-14 ENCOUNTER — APPOINTMENT (OUTPATIENT)
Dept: GENERAL RADIOLOGY | Facility: HOSPITAL | Age: 74
End: 2024-08-14
Payer: MEDICARE

## 2024-08-14 ENCOUNTER — ANESTHESIA (OUTPATIENT)
Dept: PERIOP | Facility: HOSPITAL | Age: 74
End: 2024-08-14
Payer: MEDICARE

## 2024-08-14 VITALS
BODY MASS INDEX: 21.56 KG/M2 | DIASTOLIC BLOOD PRESSURE: 69 MMHG | WEIGHT: 129.41 LBS | TEMPERATURE: 96.4 F | HEIGHT: 65 IN | HEART RATE: 67 BPM | SYSTOLIC BLOOD PRESSURE: 152 MMHG | OXYGEN SATURATION: 92 % | RESPIRATION RATE: 20 BRPM

## 2024-08-14 DIAGNOSIS — R06.09 DYSPNEA ON EXERTION: ICD-10-CM

## 2024-08-14 DIAGNOSIS — R91.8 ABNORMAL CT SCAN OF LUNG: ICD-10-CM

## 2024-08-14 DIAGNOSIS — R56.9 NEW ONSET SEIZURE: Primary | ICD-10-CM

## 2024-08-14 DIAGNOSIS — G89.4 CHRONIC PAIN DISORDER: ICD-10-CM

## 2024-08-14 DIAGNOSIS — N18.32 STAGE 3B CHRONIC KIDNEY DISEASE (CKD): ICD-10-CM

## 2024-08-14 DIAGNOSIS — R56.9 SEIZURE: ICD-10-CM

## 2024-08-14 DIAGNOSIS — R91.1 LUNG NODULE: ICD-10-CM

## 2024-08-14 LAB
ACB CMPLX DNA BAL NAA+NON-PRB-NCNCRNG: NOT DETECTED
ALBUMIN SERPL-MCNC: 4.2 G/DL (ref 3.5–5.2)
ALBUMIN/GLOB SERPL: 1.1 G/DL
ALP SERPL-CCNC: 81 U/L (ref 39–117)
ALT SERPL W P-5'-P-CCNC: 7 U/L (ref 1–33)
ANION GAP SERPL CALCULATED.3IONS-SCNC: 21.3 MMOL/L (ref 5–15)
AST SERPL-CCNC: 17 U/L (ref 1–32)
BASOPHILS # BLD AUTO: 0.01 10*3/MM3 (ref 0–0.2)
BASOPHILS NFR BLD AUTO: 0.1 % (ref 0–1.5)
BILIRUB SERPL-MCNC: 0.3 MG/DL (ref 0–1.2)
BILIRUB UR QL STRIP: NEGATIVE
BLACTX-M ISLT/SPM QL: NORMAL
BLAIMP ISLT/SPM QL: NORMAL
BLAKPC ISLT/SPM QL: NORMAL
BLAOXA-48-LIKE ISLT/SPM QL: NORMAL
BLAVIM ISLT/SPM QL: NORMAL
BUN SERPL-MCNC: 19 MG/DL (ref 8–23)
BUN/CREAT SERPL: 14.7 (ref 7–25)
C PNEUM DNA NPH QL NAA+NON-PROBE: NOT DETECTED
CALCIUM SPEC-SCNC: 9.2 MG/DL (ref 8.6–10.5)
CHLORIDE SERPL-SCNC: 100 MMOL/L (ref 98–107)
CLARITY UR: CLEAR
CO2 SERPL-SCNC: 20.7 MMOL/L (ref 22–29)
COLOR UR: YELLOW
CREAT SERPL-MCNC: 1.29 MG/DL (ref 0.57–1)
DEPRECATED RDW RBC AUTO: 43.1 FL (ref 37–54)
E CLOAC COMP DNA BAL NAA+NON-PRB-NCNCRNG: NOT DETECTED
E COLI DNA BAL NAA+NON-PRB-NCNCRNG: NOT DETECTED
EGFRCR SERPLBLD CKD-EPI 2021: 43.1 ML/MIN/1.73
EOSINOPHIL # BLD AUTO: 0.01 10*3/MM3 (ref 0–0.4)
EOSINOPHIL NFR BLD AUTO: 0.1 % (ref 0.3–6.2)
ERYTHROCYTE [DISTWIDTH] IN BLOOD BY AUTOMATED COUNT: 13.2 % (ref 12.3–15.4)
FLUAV SUBTYP SPEC NAA+PROBE: NOT DETECTED
FLUBV RNA ISLT QL NAA+PROBE: NOT DETECTED
GLOBULIN UR ELPH-MCNC: 3.7 GM/DL
GLUCOSE BLDC GLUCOMTR-MCNC: 131 MG/DL (ref 70–99)
GLUCOSE BLDC GLUCOMTR-MCNC: 143 MG/DL (ref 70–99)
GLUCOSE SERPL-MCNC: 348 MG/DL (ref 65–99)
GLUCOSE UR STRIP-MCNC: ABNORMAL MG/DL
GP B STREP DNA BAL NAA+NON-PRB-NCNCRNG: NOT DETECTED
HADV DNA SPEC NAA+PROBE: NOT DETECTED
HAEM INFLU DNA BAL NAA+NON-PRB-NCNCRNG: NOT DETECTED
HCOV RNA LOWER RESP QL NAA+NON-PROBE: NOT DETECTED
HCT VFR BLD AUTO: 32.3 % (ref 34–46.6)
HGB BLD-MCNC: 10.5 G/DL (ref 12–15.9)
HGB UR QL STRIP.AUTO: NEGATIVE
HMPV RNA NPH QL NAA+NON-PROBE: NOT DETECTED
HPIV RNA LOWER RESP QL NAA+NON-PROBE: NOT DETECTED
IMM GRANULOCYTES # BLD AUTO: 0.05 10*3/MM3 (ref 0–0.05)
IMM GRANULOCYTES NFR BLD AUTO: 0.4 % (ref 0–0.5)
K AEROGENES DNA BAL NAA+NON-PRB-NCNCRNG: NOT DETECTED
K OXYTOCA DNA BAL NAA+NON-PRB-NCNCRNG: NOT DETECTED
K PNEU GRP DNA BAL NAA+NON-PRB-NCNCRNG: NOT DETECTED
KETONES UR QL STRIP: NEGATIVE
L PNEUMO DNA LOWER RESP QL NAA+NON-PROBE: NOT DETECTED
LEUKOCYTE ESTERASE UR QL STRIP.AUTO: NEGATIVE
LYMPHOCYTES # BLD AUTO: 1.1 10*3/MM3 (ref 0.7–3.1)
LYMPHOCYTES NFR BLD AUTO: 8.6 % (ref 19.6–45.3)
LYMPHOCYTES NFR FLD MANUAL: 36 %
M CATARRHALIS DNA BAL NAA+NON-PRB-NCNCRNG: NOT DETECTED
M PNEUMO IGG SER IA-ACNC: NOT DETECTED
MACROPHAGE FLUID: 5 %
MCH RBC QN AUTO: 28.9 PG (ref 26.6–33)
MCHC RBC AUTO-ENTMCNC: 32.5 G/DL (ref 31.5–35.7)
MCV RBC AUTO: 89 FL (ref 79–97)
MECA+MECC ISLT/SPM QL: NORMAL
MONOCYTES # BLD AUTO: 0.23 10*3/MM3 (ref 0.1–0.9)
MONOCYTES NFR BLD AUTO: 1.8 % (ref 5–12)
NDM GENE: NORMAL
NEUTROPHILS NFR BLD AUTO: 11.45 10*3/MM3 (ref 1.7–7)
NEUTROPHILS NFR BLD AUTO: 89 % (ref 42.7–76)
NEUTROPHILS NFR FLD MANUAL: 59 %
NITRITE UR QL STRIP: NEGATIVE
NRBC BLD AUTO-RTO: 0 /100 WBC (ref 0–0.2)
P AERUGINOSA DNA BAL NAA+NON-PRB-NCNCRNG: NOT DETECTED
PH UR STRIP.AUTO: 6 [PH] (ref 5–8)
PLATELET # BLD AUTO: 196 10*3/MM3 (ref 140–450)
PMV BLD AUTO: 9.9 FL (ref 6–12)
POTASSIUM SERPL-SCNC: 3.5 MMOL/L (ref 3.5–5.2)
PROT SERPL-MCNC: 7.9 G/DL (ref 6–8.5)
PROT UR QL STRIP: NEGATIVE
PROTEUS SP DNA BAL NAA+NON-PRB-NCNCRNG: NOT DETECTED
RBC # BLD AUTO: 3.63 10*6/MM3 (ref 3.77–5.28)
RHINOVIRUS RNA SPEC NAA+PROBE: NOT DETECTED
RSV RNA NPH QL NAA+NON-PROBE: NOT DETECTED
S AUREUS DNA BAL NAA+NON-PRB-NCNCRNG: NOT DETECTED
S MARCESCENS DNA BAL NAA+NON-PRB-NCNCRNG: NOT DETECTED
S PNEUM DNA BAL NAA+NON-PRB-NCNCRNG: NOT DETECTED
S PYO DNA BAL NAA+NON-PRB-NCNCRNG: NOT DETECTED
SODIUM SERPL-SCNC: 142 MMOL/L (ref 136–145)
SP GR UR STRIP: 1.02 (ref 1–1.03)
UROBILINOGEN UR QL STRIP: ABNORMAL
VISUAL PRESENCE OF BLOOD: NORMAL
WBC NRBC COR # BLD AUTO: 12.85 10*3/MM3 (ref 3.4–10.8)

## 2024-08-14 PROCEDURE — 99285 EMERGENCY DEPT VISIT HI MDM: CPT

## 2024-08-14 PROCEDURE — 63710000001 INSULIN REGULAR HUMAN PER 5 UNITS: Performed by: EMERGENCY MEDICINE

## 2024-08-14 PROCEDURE — 25010000002 PROPOFOL 500 MG/50ML EMULSION

## 2024-08-14 PROCEDURE — 25010000002 SUGAMMADEX 200 MG/2ML SOLUTION

## 2024-08-14 PROCEDURE — 88173 CYTOPATH EVAL FNA REPORT: CPT | Performed by: INTERNAL MEDICINE

## 2024-08-14 PROCEDURE — 88305 TISSUE EXAM BY PATHOLOGIST: CPT | Performed by: INTERNAL MEDICINE

## 2024-08-14 PROCEDURE — 25010000002 FENTANYL CITRATE (PF) 50 MCG/ML SOLUTION

## 2024-08-14 PROCEDURE — 31624 DX BRONCHOSCOPE/LAVAGE: CPT | Performed by: INTERNAL MEDICINE

## 2024-08-14 PROCEDURE — 99222 1ST HOSP IP/OBS MODERATE 55: CPT | Performed by: STUDENT IN AN ORGANIZED HEALTH CARE EDUCATION/TRAINING PROGRAM

## 2024-08-14 PROCEDURE — 96375 TX/PRO/DX INJ NEW DRUG ADDON: CPT

## 2024-08-14 PROCEDURE — 88108 CYTOPATH CONCENTRATE TECH: CPT | Performed by: INTERNAL MEDICINE

## 2024-08-14 PROCEDURE — 82948 REAGENT STRIP/BLOOD GLUCOSE: CPT | Performed by: ANESTHESIOLOGY

## 2024-08-14 PROCEDURE — 31627 NAVIGATIONAL BRONCHOSCOPY: CPT | Performed by: INTERNAL MEDICINE

## 2024-08-14 PROCEDURE — 25010000002 ONDANSETRON PER 1 MG

## 2024-08-14 PROCEDURE — 83735 ASSAY OF MAGNESIUM: CPT | Performed by: STUDENT IN AN ORGANIZED HEALTH CARE EDUCATION/TRAINING PROGRAM

## 2024-08-14 PROCEDURE — G0378 HOSPITAL OBSERVATION PER HR: HCPCS

## 2024-08-14 PROCEDURE — 87070 CULTURE OTHR SPECIMN AEROBIC: CPT | Performed by: INTERNAL MEDICINE

## 2024-08-14 PROCEDURE — 36415 COLL VENOUS BLD VENIPUNCTURE: CPT

## 2024-08-14 PROCEDURE — 25810000003 SODIUM CHLORIDE 0.9 % SOLUTION: Performed by: EMERGENCY MEDICINE

## 2024-08-14 PROCEDURE — 25810000003 LACTATED RINGERS PER 1000 ML: Performed by: ANESTHESIOLOGY

## 2024-08-14 PROCEDURE — 87633 RESP VIRUS 12-25 TARGETS: CPT | Performed by: INTERNAL MEDICINE

## 2024-08-14 PROCEDURE — C1726 CATH, BAL DIL, NON-VASCULAR: HCPCS | Performed by: INTERNAL MEDICINE

## 2024-08-14 PROCEDURE — 76000 FLUOROSCOPY <1 HR PHYS/QHP: CPT

## 2024-08-14 PROCEDURE — 25010000002 DEXAMETHASONE PER 1 MG

## 2024-08-14 PROCEDURE — 85025 COMPLETE CBC W/AUTO DIFF WBC: CPT | Performed by: EMERGENCY MEDICINE

## 2024-08-14 PROCEDURE — 25010000002 PROPOFOL 10 MG/ML EMULSION

## 2024-08-14 PROCEDURE — 82948 REAGENT STRIP/BLOOD GLUCOSE: CPT

## 2024-08-14 PROCEDURE — 87116 MYCOBACTERIA CULTURE: CPT | Performed by: INTERNAL MEDICINE

## 2024-08-14 PROCEDURE — 71045 X-RAY EXAM CHEST 1 VIEW: CPT

## 2024-08-14 PROCEDURE — 87205 SMEAR GRAM STAIN: CPT | Performed by: INTERNAL MEDICINE

## 2024-08-14 PROCEDURE — 70450 CT HEAD/BRAIN W/O DYE: CPT

## 2024-08-14 PROCEDURE — 87102 FUNGUS ISOLATION CULTURE: CPT | Performed by: INTERNAL MEDICINE

## 2024-08-14 PROCEDURE — 25010000002 LABETALOL 5 MG/ML SOLUTION

## 2024-08-14 PROCEDURE — 31628 BRONCHOSCOPY/LUNG BX EACH: CPT | Performed by: INTERNAL MEDICINE

## 2024-08-14 PROCEDURE — 31629 BRONCHOSCOPY/NEEDLE BX EACH: CPT | Performed by: INTERNAL MEDICINE

## 2024-08-14 PROCEDURE — 80053 COMPREHEN METABOLIC PANEL: CPT | Performed by: EMERGENCY MEDICINE

## 2024-08-14 PROCEDURE — 31653 BRONCH EBUS SAMPLNG 3/> NODE: CPT | Performed by: INTERNAL MEDICINE

## 2024-08-14 PROCEDURE — 88313 SPECIAL STAINS GROUP 2: CPT | Performed by: INTERNAL MEDICINE

## 2024-08-14 PROCEDURE — 89051 BODY FLUID CELL COUNT: CPT | Performed by: INTERNAL MEDICINE

## 2024-08-14 PROCEDURE — 25010000002 MIDAZOLAM PER 1MG: Performed by: ANESTHESIOLOGY

## 2024-08-14 PROCEDURE — 87071 CULTURE AEROBIC QUANT OTHER: CPT | Performed by: INTERNAL MEDICINE

## 2024-08-14 PROCEDURE — 31645 BRNCHSC W/THER ASPIR 1ST: CPT | Performed by: INTERNAL MEDICINE

## 2024-08-14 PROCEDURE — 87206 SMEAR FLUORESCENT/ACID STAI: CPT | Performed by: INTERNAL MEDICINE

## 2024-08-14 PROCEDURE — 25010000002 LEVETRIRACETAM PER 10 MG: Performed by: EMERGENCY MEDICINE

## 2024-08-14 PROCEDURE — 31654 BRONCH EBUS IVNTJ PERPH LES: CPT | Performed by: INTERNAL MEDICINE

## 2024-08-14 PROCEDURE — 81003 URINALYSIS AUTO W/O SCOPE: CPT | Performed by: EMERGENCY MEDICINE

## 2024-08-14 RX ORDER — LABETALOL HYDROCHLORIDE 5 MG/ML
INJECTION, SOLUTION INTRAVENOUS AS NEEDED
Status: DISCONTINUED | OUTPATIENT
Start: 2024-08-14 | End: 2024-08-14 | Stop reason: SURG

## 2024-08-14 RX ORDER — ACETAMINOPHEN 500 MG
1000 TABLET ORAL ONCE
Status: COMPLETED | OUTPATIENT
Start: 2024-08-14 | End: 2024-08-14

## 2024-08-14 RX ORDER — LORAZEPAM 2 MG/ML
1 INJECTION INTRAMUSCULAR
Status: DISCONTINUED | OUTPATIENT
Start: 2024-08-14 | End: 2024-08-16 | Stop reason: HOSPADM

## 2024-08-14 RX ORDER — ONDANSETRON 2 MG/ML
4 INJECTION INTRAMUSCULAR; INTRAVENOUS ONCE AS NEEDED
Status: DISCONTINUED | OUTPATIENT
Start: 2024-08-14 | End: 2024-08-14 | Stop reason: HOSPADM

## 2024-08-14 RX ORDER — SODIUM CHLORIDE, SODIUM LACTATE, POTASSIUM CHLORIDE, CALCIUM CHLORIDE 600; 310; 30; 20 MG/100ML; MG/100ML; MG/100ML; MG/100ML
9 INJECTION, SOLUTION INTRAVENOUS CONTINUOUS PRN
Status: DISCONTINUED | OUTPATIENT
Start: 2024-08-14 | End: 2024-08-14 | Stop reason: HOSPADM

## 2024-08-14 RX ORDER — LIDOCAINE HYDROCHLORIDE 20 MG/ML
INJECTION, SOLUTION EPIDURAL; INFILTRATION; INTRACAUDAL; PERINEURAL AS NEEDED
Status: DISCONTINUED | OUTPATIENT
Start: 2024-08-14 | End: 2024-08-14 | Stop reason: SURG

## 2024-08-14 RX ORDER — LEVETIRACETAM 500 MG/5ML
1000 INJECTION, SOLUTION, CONCENTRATE INTRAVENOUS ONCE
Status: COMPLETED | OUTPATIENT
Start: 2024-08-14 | End: 2024-08-14

## 2024-08-14 RX ORDER — PROMETHAZINE HYDROCHLORIDE 25 MG/1
25 SUPPOSITORY RECTAL ONCE AS NEEDED
Status: DISCONTINUED | OUTPATIENT
Start: 2024-08-14 | End: 2024-08-14 | Stop reason: HOSPADM

## 2024-08-14 RX ORDER — MIDAZOLAM HYDROCHLORIDE 2 MG/2ML
2 INJECTION, SOLUTION INTRAMUSCULAR; INTRAVENOUS ONCE
Status: COMPLETED | OUTPATIENT
Start: 2024-08-14 | End: 2024-08-14

## 2024-08-14 RX ORDER — DEXAMETHASONE SODIUM PHOSPHATE 4 MG/ML
INJECTION, SOLUTION INTRA-ARTICULAR; INTRALESIONAL; INTRAMUSCULAR; INTRAVENOUS; SOFT TISSUE AS NEEDED
Status: DISCONTINUED | OUTPATIENT
Start: 2024-08-14 | End: 2024-08-14 | Stop reason: SURG

## 2024-08-14 RX ORDER — OXYCODONE HYDROCHLORIDE 5 MG/1
5 TABLET ORAL
Status: DISCONTINUED | OUTPATIENT
Start: 2024-08-14 | End: 2024-08-14 | Stop reason: HOSPADM

## 2024-08-14 RX ORDER — ONDANSETRON 2 MG/ML
INJECTION INTRAMUSCULAR; INTRAVENOUS AS NEEDED
Status: DISCONTINUED | OUTPATIENT
Start: 2024-08-14 | End: 2024-08-14 | Stop reason: SURG

## 2024-08-14 RX ORDER — ROCURONIUM BROMIDE 10 MG/ML
INJECTION, SOLUTION INTRAVENOUS AS NEEDED
Status: DISCONTINUED | OUTPATIENT
Start: 2024-08-14 | End: 2024-08-14 | Stop reason: SURG

## 2024-08-14 RX ORDER — FENTANYL CITRATE 50 UG/ML
INJECTION, SOLUTION INTRAMUSCULAR; INTRAVENOUS AS NEEDED
Status: DISCONTINUED | OUTPATIENT
Start: 2024-08-14 | End: 2024-08-14 | Stop reason: SURG

## 2024-08-14 RX ORDER — PROMETHAZINE HYDROCHLORIDE 12.5 MG/1
25 TABLET ORAL ONCE AS NEEDED
Status: DISCONTINUED | OUTPATIENT
Start: 2024-08-14 | End: 2024-08-14 | Stop reason: HOSPADM

## 2024-08-14 RX ORDER — IPRATROPIUM BROMIDE AND ALBUTEROL SULFATE 2.5; .5 MG/3ML; MG/3ML
3 SOLUTION RESPIRATORY (INHALATION) ONCE
Status: COMPLETED | OUTPATIENT
Start: 2024-08-14 | End: 2024-08-14

## 2024-08-14 RX ORDER — PROPOFOL 10 MG/ML
INJECTION, EMULSION INTRAVENOUS AS NEEDED
Status: DISCONTINUED | OUTPATIENT
Start: 2024-08-14 | End: 2024-08-14 | Stop reason: SURG

## 2024-08-14 RX ADMIN — FENTANYL CITRATE 25 MCG: 50 INJECTION, SOLUTION INTRAMUSCULAR; INTRAVENOUS at 14:32

## 2024-08-14 RX ADMIN — MIDAZOLAM HYDROCHLORIDE 2 MG: 1 INJECTION, SOLUTION INTRAMUSCULAR; INTRAVENOUS at 14:01

## 2024-08-14 RX ADMIN — SUGAMMADEX 200 MG: 100 INJECTION, SOLUTION INTRAVENOUS at 15:53

## 2024-08-14 RX ADMIN — INSULIN HUMAN 8 UNITS: 100 INJECTION, SOLUTION PARENTERAL at 23:53

## 2024-08-14 RX ADMIN — SODIUM CHLORIDE, POTASSIUM CHLORIDE, SODIUM LACTATE AND CALCIUM CHLORIDE 9 ML/HR: 600; 310; 30; 20 INJECTION, SOLUTION INTRAVENOUS at 13:52

## 2024-08-14 RX ADMIN — IPRATROPIUM BROMIDE AND ALBUTEROL SULFATE 3 ML: .5; 3 SOLUTION RESPIRATORY (INHALATION) at 13:53

## 2024-08-14 RX ADMIN — FENTANYL CITRATE 25 MCG: 50 INJECTION, SOLUTION INTRAMUSCULAR; INTRAVENOUS at 14:24

## 2024-08-14 RX ADMIN — SODIUM CHLORIDE 1000 ML: 9 INJECTION, SOLUTION INTRAVENOUS at 22:08

## 2024-08-14 RX ADMIN — LIDOCAINE HYDROCHLORIDE 100 MG: 20 INJECTION, SOLUTION EPIDURAL; INFILTRATION; INTRACAUDAL; PERINEURAL at 14:24

## 2024-08-14 RX ADMIN — ACETAMINOPHEN 1000 MG: 500 TABLET ORAL at 13:52

## 2024-08-14 RX ADMIN — ONDANSETRON 4 MG: 2 INJECTION INTRAMUSCULAR; INTRAVENOUS at 14:31

## 2024-08-14 RX ADMIN — FENTANYL CITRATE 50 MCG: 50 INJECTION, SOLUTION INTRAMUSCULAR; INTRAVENOUS at 15:28

## 2024-08-14 RX ADMIN — PROPOFOL 150 MCG/KG/MIN: 10 INJECTION, EMULSION INTRAVENOUS at 14:24

## 2024-08-14 RX ADMIN — LEVETIRACETAM 1000 MG: 100 INJECTION, SOLUTION INTRAVENOUS at 23:55

## 2024-08-14 RX ADMIN — ROCURONIUM BROMIDE 20 MG: 10 INJECTION, SOLUTION INTRAVENOUS at 15:20

## 2024-08-14 RX ADMIN — ROCURONIUM BROMIDE 50 MG: 10 INJECTION, SOLUTION INTRAVENOUS at 14:24

## 2024-08-14 RX ADMIN — LABETALOL HYDROCHLORIDE 5 MG: 5 INJECTION, SOLUTION INTRAVENOUS at 15:40

## 2024-08-14 RX ADMIN — PROPOFOL 100 MG: 10 INJECTION, EMULSION INTRAVENOUS at 14:24

## 2024-08-14 RX ADMIN — DEXAMETHASONE SODIUM PHOSPHATE 8 MG: 4 INJECTION, SOLUTION INTRAMUSCULAR; INTRAVENOUS at 14:31

## 2024-08-14 NOTE — ANESTHESIA PREPROCEDURE EVALUATION
Anesthesia Evaluation     Patient summary reviewed and Nursing notes reviewed   no history of anesthetic complications:   NPO Solid Status: > 8 hours  NPO Liquid Status: > 2 hours           Airway   Mallampati: I  TM distance: >3 FB  Neck ROM: full  Dental    (+) upper dentures and poor dentition    Pulmonary    (+) a smoker Current, COPD,wheezes  Cardiovascular - normal exam  Exercise tolerance: good (4-7 METS)    (+) hypertension, CAD, hyperlipidemia      Neuro/Psych  (+) psychiatric history Anxiety  GI/Hepatic/Renal/Endo    (+) GERD, hepatitis, liver disease, renal disease- CRI, diabetes mellitus, thyroid problem hypothyroidism    Musculoskeletal     Abdominal  - normal exam   Substance History - negative use     OB/GYN negative ob/gyn ROS         Other   arthritis,     ROS/Med Hx Other: PAT Nursing Notes unavailable.                Anesthesia Plan    ASA 3     general     intravenous induction     Anesthetic plan, risks, benefits, and alternatives have been provided, discussed and informed consent has been obtained with: patient.    Plan discussed with CRNA.    CODE STATUS:

## 2024-08-14 NOTE — OP NOTE
Bronchoscopy Procedure Note    Procedure:    1.  Robotic bronchoscopy with Ion procedure  2. Robotic bronchoscopy with radial EBUS guided lung nodule aspiration  3. Robotic bronchoscopy with radial EBUS guided transbronchial biopsy left upper lobe lung nodule  4. Bronchoalveolar lavage left upper lobe with fluroscopy  5.  Endobronchial ultrasound-guided lymph node biopsy  5. Bronchial washings tracheobronchial tree  6. Airway inspection    Pre-Operative Diagnosis: Left upper lobe lung nodule, hypermetabolic    Post-Operative Diagnosis: Same, mildly enlarged mediastinal lymph nodes    Indication: Left upper lobe lung nodule    Anesthesia: General anesthesia, sedated and paralyzed with anesthesia    Procedure Details: Patient was consented for the procedure with all risks and benefits of the procedure explained in detail. Patient was given the opportunity to ask questions and all concerns were answered.  Patient was intubated with size 8.5 ET tube and placed on invasive mechanical ventilator.  Patient was sedated and paralyzed on invasive mechanical ventilator with anesthesia service.    Then regular bronchoscopy was used to suction the secretions through the ET tube.  ET tube was ending in mid trachea.  Robotic bronchoscopy with Ion was started with planning and registration of the patient, navigational mapping then procedure done with radial probe ultrasound and fluoroscopy.  The left upper lobe lung nodule was mapped and navigated with the robotic bronchoscopy.  Fine-needle aspiration biopsy and forceps biopsy were done of the left upper lobe lung nodule area with the help of navigational bronchoscopy, radial probe ultrasound and fluoroscopy. Bronchoalveolar lavage was collected from left upper lobe area with injection of 20 cc saline.  Patient had significant bleeding with decreasing saturations.  Needed to have suctioning of the airway.  Cold saline was used to stop the bleeding.  There was second nodule in the  left upper lobe, not hypermetabolic.  Given her bleeding, the second nodule was not biopsied.  Then scope was switched to endobronchial ultrasound bronchoscope (EBUS), which was inserted into the main airway via the ET tube. An anatomical and mediastinal survey was done of the main airways and the subsegmental bronchus with EBUS scope.  Mildly enlarged right hilar, borderline enlarged subcarinal and enlarged left hilar lymph nodes were seen. Transbronchial aspiration biopsy of lymph nodes were done starting at right hilar (10 R station) with 3 passes, then subcarinal (7 station) with 3 passes, then left hilar (10 L station) with 4 passes. Then scope was changed to regular bronchoscope, airway inspection was done.  No active bleeding was seen.  Bronchial washing was obtained from entire tracheobronchial tree.  Post procedure no active bleeding was seen.    Estimated Blood Loss: 3 mL      Specimens:  -Fine-needle aspiration biopsy left upper lobe lung nodule  -Transbronchial forceps biopsy left upper lobe lung nodule  - Cytology specimen from right hilar, subcarinal and left hilar lymph node stations  -20 cc bronchoalveolar lavage left upper lobe  - Bronchial washing tracheobronchial tree      Complications: No complications during or after the procedure.    Disposition: To home, if stable in recovery. Follow up in clinic in a week.    Patient tolerated the procedure well.    Electronically signed by Chiki Morse MD, 08/14/24, 4:26 PM EDT.

## 2024-08-14 NOTE — ANESTHESIA POSTPROCEDURE EVALUATION
Patient: Odalis Espino    Procedure Summary       Date: 08/14/24 Room / Location: [facility] OR 04 / [facility] MAIN OR    Anesthesia Start: 1417 Anesthesia Stop: 1608    Procedure: BRONCHOSCOPY WITH ION ROBOT REBUS, EBUS, BIOPSIES, NEEDLE ASPIRATES, BAL, AND WASHINGS (Bronchus) Diagnosis:       Lung nodule      Abnormal CT scan of lung      (Lung nodule [R91.1])      (Abnormal CT scan of lung [R91.8])    Surgeons: Chiki Morse MD Provider: Gretta Jordan MD    Anesthesia Type: general ASA Status: 3            Anesthesia Type: general    Vitals  Vitals Value Taken Time   /67 08/14/24 1703   Temp 36.7 °C (98 °F) 08/14/24 1640   Pulse 64 08/14/24 1707   Resp 17 08/14/24 1655   SpO2 91 % 08/14/24 1707   Vitals shown include unfiled device data.        Post Anesthesia Care and Evaluation    Patient location during evaluation: bedside  Patient participation: complete - patient participated  Level of consciousness: awake  Pain management: adequate    Airway patency: patent  PONV Status: none  Cardiovascular status: acceptable and stable  Respiratory status: acceptable  Hydration status: acceptable

## 2024-08-14 NOTE — DISCHARGE INSTRUCTIONS
DISCHARGE INSTRUCTIONS  BRONCHOSCOPY/  ENDOBRONCHIAL ULTRASOUND      For your procedure you had:  General Anesthesia (you may have a sore throat for the first 24 hours)  You may experience dizziness, drowsiness, or lightheadedness for several hours following surgery/procedure.  Do not stay alone today or tonight.  Limit your activity for 24 hours.  You should not drive or operate machinery or drink alcohol for 24 hours or while you are taking pain medication.  You should not sign legally binding documents.  Resume your diet slowly.  Follow any special dietary instructions you may have been given by your doctor.  NOTIFY YOUR DOCTOR IF YOU EXPERIENCE ANY OF THE FOLLOWING:  Temperature greater than 101 degrees Fahrenheit  Shaking Chills  Redness or excessive drainage from incision  Nausea, vomiting and/or pain that is not controlled by prescribed medications  Increase in bleeding or bleeding that is excessive  Unable to urinate in 6 hours after surgery  If unable to reach your doctor, please go to the closest Emergency Room     Following your bronchoscopy, you may experience a mild sore throat or cough up small amounts of blood.  Extremes of either should be reported to your doctor.  If you have sudden shortness of breath, sense of urgency, chest pain, chest pressure that worsens over time or sharp chest pains that worsen with deep breaths or coughs go the ED or call 911.  Smokers are encouraged not to smoke for 6 to 8 hours after the procedure to decrease the risk of coughing and bleeding.  Nausea and vomiting can occur, but is not a common side effect of the procedure you have had today. If you experience any nausea or vomiting, take clear liquids for your next meal.  Coughing (slight dry cough to hacking cough) is completely expected for 3 to 4 days.  Pink/ blood tinged sputum is expected for several days ( or while coughing)  Notify MD or go to the ED if significant blood is found in the sputum.  Missing your  appointment/follow-up could lead to serious complications.    LAST DOSE OF PAIN MEDICATION:  Tylenol (1000mg) last at 1:52pm. Do not exceed 4000mg of tylenol in a 24 hour period.

## 2024-08-15 ENCOUNTER — APPOINTMENT (OUTPATIENT)
Dept: NEUROLOGY | Facility: HOSPITAL | Age: 74
End: 2024-08-15
Payer: MEDICARE

## 2024-08-15 ENCOUNTER — APPOINTMENT (OUTPATIENT)
Dept: MRI IMAGING | Facility: HOSPITAL | Age: 74
End: 2024-08-15
Payer: MEDICARE

## 2024-08-15 LAB
CEA SERPL-MCNC: 8.32 NG/ML
CRP SERPL-MCNC: 0.34 MG/DL (ref 0–0.5)
D DIMER PPP FEU-MCNC: 1.31 MCGFEU/ML (ref 0–0.76)
GLUCOSE BLDC GLUCOMTR-MCNC: 124 MG/DL (ref 70–99)
GLUCOSE BLDC GLUCOMTR-MCNC: 134 MG/DL (ref 70–99)
GLUCOSE BLDC GLUCOMTR-MCNC: 135 MG/DL (ref 70–99)
GLUCOSE BLDC GLUCOMTR-MCNC: 146 MG/DL (ref 70–99)
GLUCOSE BLDC GLUCOMTR-MCNC: 251 MG/DL (ref 70–99)
GLUCOSE BLDC GLUCOMTR-MCNC: 358 MG/DL (ref 70–99)
L PNEUMO1 AG UR QL IA: NEGATIVE
MAGNESIUM SERPL-MCNC: 1.5 MG/DL (ref 1.6–2.4)

## 2024-08-15 PROCEDURE — 86038 ANTINUCLEAR ANTIBODIES: CPT | Performed by: PSYCHIATRY & NEUROLOGY

## 2024-08-15 PROCEDURE — G0378 HOSPITAL OBSERVATION PER HR: HCPCS

## 2024-08-15 PROCEDURE — 86611 BARTONELLA ANTIBODY: CPT | Performed by: PSYCHIATRY & NEUROLOGY

## 2024-08-15 PROCEDURE — 86003 ALLG SPEC IGE CRUDE XTRC EA: CPT | Performed by: PSYCHIATRY & NEUROLOGY

## 2024-08-15 PROCEDURE — 86140 C-REACTIVE PROTEIN: CPT | Performed by: PSYCHIATRY & NEUROLOGY

## 2024-08-15 PROCEDURE — 86769 SARS-COV-2 COVID-19 ANTIBODY: CPT | Performed by: PSYCHIATRY & NEUROLOGY

## 2024-08-15 PROCEDURE — 86788 WEST NILE VIRUS AB IGM: CPT | Performed by: PSYCHIATRY & NEUROLOGY

## 2024-08-15 PROCEDURE — 86665 EPSTEIN-BARR CAPSID VCA: CPT | Performed by: PSYCHIATRY & NEUROLOGY

## 2024-08-15 PROCEDURE — 82378 CARCINOEMBRYONIC ANTIGEN: CPT | Performed by: PSYCHIATRY & NEUROLOGY

## 2024-08-15 PROCEDURE — 95816 EEG AWAKE AND DROWSY: CPT

## 2024-08-15 PROCEDURE — 86606 ASPERGILLUS ANTIBODY: CPT | Performed by: PSYCHIATRY & NEUROLOGY

## 2024-08-15 PROCEDURE — 87476 LYME DIS DNA AMP PROBE: CPT | Performed by: PSYCHIATRY & NEUROLOGY

## 2024-08-15 PROCEDURE — 86664 EPSTEIN-BARR NUCLEAR ANTIGEN: CPT | Performed by: PSYCHIATRY & NEUROLOGY

## 2024-08-15 PROCEDURE — 70551 MRI BRAIN STEM W/O DYE: CPT

## 2024-08-15 PROCEDURE — 86663 EPSTEIN-BARR ANTIBODY: CPT | Performed by: PSYCHIATRY & NEUROLOGY

## 2024-08-15 PROCEDURE — 96372 THER/PROPH/DIAG INJ SC/IM: CPT

## 2024-08-15 PROCEDURE — 83825 ASSAY OF MERCURY: CPT | Performed by: PSYCHIATRY & NEUROLOGY

## 2024-08-15 PROCEDURE — 86757 RICKETTSIA ANTIBODY: CPT | Performed by: PSYCHIATRY & NEUROLOGY

## 2024-08-15 PROCEDURE — 86225 DNA ANTIBODY NATIVE: CPT | Performed by: PSYCHIATRY & NEUROLOGY

## 2024-08-15 PROCEDURE — 86789 WEST NILE VIRUS ANTIBODY: CPT | Performed by: PSYCHIATRY & NEUROLOGY

## 2024-08-15 PROCEDURE — 83655 ASSAY OF LEAD: CPT | Performed by: PSYCHIATRY & NEUROLOGY

## 2024-08-15 PROCEDURE — 87798 DETECT AGENT NOS DNA AMP: CPT | Performed by: PSYCHIATRY & NEUROLOGY

## 2024-08-15 PROCEDURE — 82175 ASSAY OF ARSENIC: CPT | Performed by: PSYCHIATRY & NEUROLOGY

## 2024-08-15 PROCEDURE — 87484 EHRLICHA CHAFFEENSIS AMP PRB: CPT | Performed by: PSYCHIATRY & NEUROLOGY

## 2024-08-15 PROCEDURE — 87449 NOS EACH ORGANISM AG IA: CPT | Performed by: PSYCHIATRY & NEUROLOGY

## 2024-08-15 PROCEDURE — 85379 FIBRIN DEGRADATION QUANT: CPT | Performed by: PSYCHIATRY & NEUROLOGY

## 2024-08-15 PROCEDURE — 25010000002 HEPARIN (PORCINE) PER 1000 UNITS: Performed by: STUDENT IN AN ORGANIZED HEALTH CARE EDUCATION/TRAINING PROGRAM

## 2024-08-15 PROCEDURE — 87469 BABESIA MICROTI AMP PRB: CPT | Performed by: PSYCHIATRY & NEUROLOGY

## 2024-08-15 PROCEDURE — 99232 SBSQ HOSP IP/OBS MODERATE 35: CPT | Performed by: FAMILY MEDICINE

## 2024-08-15 PROCEDURE — 82948 REAGENT STRIP/BLOOD GLUCOSE: CPT

## 2024-08-15 PROCEDURE — 82948 REAGENT STRIP/BLOOD GLUCOSE: CPT | Performed by: STUDENT IN AN ORGANIZED HEALTH CARE EDUCATION/TRAINING PROGRAM

## 2024-08-15 RX ORDER — ACETAMINOPHEN 650 MG/1
650 SUPPOSITORY RECTAL EVERY 4 HOURS PRN
Status: DISCONTINUED | OUTPATIENT
Start: 2024-08-15 | End: 2024-08-16 | Stop reason: HOSPADM

## 2024-08-15 RX ORDER — SODIUM CHLORIDE 0.9 % (FLUSH) 0.9 %
10 SYRINGE (ML) INJECTION EVERY 12 HOURS SCHEDULED
Status: DISCONTINUED | OUTPATIENT
Start: 2024-08-15 | End: 2024-08-16 | Stop reason: HOSPADM

## 2024-08-15 RX ORDER — ONDANSETRON 4 MG/1
4 TABLET, ORALLY DISINTEGRATING ORAL EVERY 6 HOURS PRN
Status: DISCONTINUED | OUTPATIENT
Start: 2024-08-15 | End: 2024-08-16 | Stop reason: HOSPADM

## 2024-08-15 RX ORDER — SODIUM CHLORIDE 9 MG/ML
40 INJECTION, SOLUTION INTRAVENOUS AS NEEDED
Status: DISCONTINUED | OUTPATIENT
Start: 2024-08-15 | End: 2024-08-16 | Stop reason: HOSPADM

## 2024-08-15 RX ORDER — HEPARIN SODIUM 5000 [USP'U]/ML
5000 INJECTION, SOLUTION INTRAVENOUS; SUBCUTANEOUS EVERY 12 HOURS SCHEDULED
Status: DISCONTINUED | OUTPATIENT
Start: 2024-08-15 | End: 2024-08-16 | Stop reason: HOSPADM

## 2024-08-15 RX ORDER — LIDOCAINE 40 MG/G
1 CREAM TOPICAL AS NEEDED
Status: DISCONTINUED | OUTPATIENT
Start: 2024-08-15 | End: 2024-08-16 | Stop reason: HOSPADM

## 2024-08-15 RX ORDER — LEVETIRACETAM 500 MG/1
500 TABLET ORAL EVERY 12 HOURS SCHEDULED
Status: DISCONTINUED | OUTPATIENT
Start: 2024-08-15 | End: 2024-08-16 | Stop reason: HOSPADM

## 2024-08-15 RX ORDER — SERTRALINE HYDROCHLORIDE 100 MG/1
100 TABLET, FILM COATED ORAL DAILY
Status: DISCONTINUED | OUTPATIENT
Start: 2024-08-15 | End: 2024-08-16 | Stop reason: HOSPADM

## 2024-08-15 RX ORDER — ONDANSETRON 2 MG/ML
4 INJECTION INTRAMUSCULAR; INTRAVENOUS EVERY 6 HOURS PRN
Status: DISCONTINUED | OUTPATIENT
Start: 2024-08-15 | End: 2024-08-16 | Stop reason: HOSPADM

## 2024-08-15 RX ORDER — GABAPENTIN 300 MG/1
600 CAPSULE ORAL EVERY 8 HOURS SCHEDULED
Status: DISCONTINUED | OUTPATIENT
Start: 2024-08-15 | End: 2024-08-16 | Stop reason: HOSPADM

## 2024-08-15 RX ORDER — ACETAMINOPHEN 325 MG/1
650 TABLET ORAL EVERY 4 HOURS PRN
Status: DISCONTINUED | OUTPATIENT
Start: 2024-08-15 | End: 2024-08-16 | Stop reason: HOSPADM

## 2024-08-15 RX ORDER — LEVOTHYROXINE SODIUM 112 UG/1
112 TABLET ORAL DAILY
Status: DISCONTINUED | OUTPATIENT
Start: 2024-08-15 | End: 2024-08-16 | Stop reason: HOSPADM

## 2024-08-15 RX ORDER — SODIUM CHLORIDE 0.9 % (FLUSH) 0.9 %
10 SYRINGE (ML) INJECTION AS NEEDED
Status: DISCONTINUED | OUTPATIENT
Start: 2024-08-15 | End: 2024-08-16 | Stop reason: HOSPADM

## 2024-08-15 RX ORDER — DEXTROSE MONOHYDRATE 25 G/50ML
25 INJECTION, SOLUTION INTRAVENOUS
Status: DISCONTINUED | OUTPATIENT
Start: 2024-08-15 | End: 2024-08-16 | Stop reason: HOSPADM

## 2024-08-15 RX ORDER — ACETAMINOPHEN 160 MG/5ML
650 SOLUTION ORAL EVERY 4 HOURS PRN
Status: DISCONTINUED | OUTPATIENT
Start: 2024-08-15 | End: 2024-08-16 | Stop reason: HOSPADM

## 2024-08-15 RX ORDER — NICOTINE POLACRILEX 4 MG
15 LOZENGE BUCCAL
Status: DISCONTINUED | OUTPATIENT
Start: 2024-08-15 | End: 2024-08-16 | Stop reason: HOSPADM

## 2024-08-15 RX ORDER — INSULIN LISPRO 100 [IU]/ML
2-9 INJECTION, SOLUTION INTRAVENOUS; SUBCUTANEOUS
Status: DISCONTINUED | OUTPATIENT
Start: 2024-08-15 | End: 2024-08-16 | Stop reason: HOSPADM

## 2024-08-15 RX ORDER — IBUPROFEN 600 MG/1
1 TABLET ORAL
Status: DISCONTINUED | OUTPATIENT
Start: 2024-08-15 | End: 2024-08-16 | Stop reason: HOSPADM

## 2024-08-15 RX ADMIN — LIDOCAINE 4% 1 APPLICATION: 4 CREAM TOPICAL at 01:40

## 2024-08-15 RX ADMIN — LEVETIRACETAM 500 MG: 500 TABLET, FILM COATED ORAL at 21:48

## 2024-08-15 RX ADMIN — LIDOCAINE 4% 1 APPLICATION: 4 CREAM TOPICAL at 15:10

## 2024-08-15 RX ADMIN — HEPARIN SODIUM 5000 UNITS: 5000 INJECTION INTRAVENOUS; SUBCUTANEOUS at 01:23

## 2024-08-15 RX ADMIN — LIDOCAINE 4% 1 APPLICATION: 4 CREAM TOPICAL at 09:32

## 2024-08-15 RX ADMIN — LIDOCAINE 4% 1 APPLICATION: 4 CREAM TOPICAL at 12:06

## 2024-08-15 RX ADMIN — LEVETIRACETAM 500 MG: 500 TABLET, FILM COATED ORAL at 09:32

## 2024-08-15 RX ADMIN — HEPARIN SODIUM 5000 UNITS: 5000 INJECTION INTRAVENOUS; SUBCUTANEOUS at 09:32

## 2024-08-15 RX ADMIN — HEPARIN SODIUM 5000 UNITS: 5000 INJECTION INTRAVENOUS; SUBCUTANEOUS at 21:48

## 2024-08-15 RX ADMIN — Medication 10 ML: at 01:23

## 2024-08-15 RX ADMIN — GABAPENTIN 600 MG: 300 CAPSULE ORAL at 21:48

## 2024-08-15 RX ADMIN — SERTRALINE HYDROCHLORIDE 100 MG: 100 TABLET ORAL at 17:08

## 2024-08-15 RX ADMIN — LEVOTHYROXINE SODIUM 112 MCG: 0.11 TABLET ORAL at 17:08

## 2024-08-15 RX ADMIN — Medication 10 ML: at 09:32

## 2024-08-15 NOTE — CONSULTS
[facility]   Consult Note      Patient Name: Odalis Espino  : 1950  MRN: [id number]  Primary Care Physician:  Karina Langford MD  Date of admission: 2024    Subjective   Subjective     This 74 years old woman was seen upon the request of Jose Mclean MD for evaluation    Chief Complaint:   Seizures    HPI:  Her daughter is at the bedside.    The information was obtained from her and her daughter and mainly from the chart.  She mentioned that yesterday 2024 while in the porch trying to use her cell phone, the cell phone dropped from her hand.  When she tried to pick her up pick it up, she felt dizzy and passed out.  When she came to herself, she was in the ambulance.  She did not feel good.  She was taken to the emergency room where she had another seizure.  The daughter has not witnessed any of the seizures.  At any rate, she was placed on Keppra 5 mg 2 times a day after having had a CAT scan of the brain which was said to be unremarkable except for involutional changes with chronic microvascular ischemia.    Review of Systems  There was no headache, dizziness, nausea or vomiting.  No chest pains or abdominal pains.      Past Medical History:   Diagnosis Date    Anxiety     Arthritis     Centrilobular emphysema     Coronary artery calcification     E MICHAEL W/MONNIN. INSTRUCTE TO F/U PRN    Diabetes mellitus     AVERAGE 'S    Emphysema of lung Aug 23    None    GERD (gastroesophageal reflux disease)     Hepatitis     REPORTS OVER 30 YEARS AGO HAD TREATMENT AND DENIES ANY CURRENT ISSUES    HL (hearing loss)     Hyperlipidemia     Hypertension     Hypothyroidism     Lung nodule     (-)  ION BRONCH DONE 2023    Pain management     [facility]    Pulmonary arterial hypertension Aug 23    Spinal stenosis     Thyroiditis     DENIES ANY CURRENT ISSUES    Visual impairment        Past Surgical History:   Procedure Laterality Date    BRONCHOSCOPY WITH ION ROBOTIC ASSIST N/A  09/25/2023    Procedure: BRONCHOSCOPY WITH ION ROBOT, REBUS, EBUS, NEEDLE ASPIRATE, BIOPSIES, WASHINGS, BRUSHINGS, BAL;  Surgeon: Chiki Morse MD;  Location: [facility] OR;  Service: Robotics - Pulmonary;  Laterality: N/A;    BRONCHOSCOPY WITH ION ROBOTIC ASSIST N/A 8/14/2024    Procedure: BRONCHOSCOPY WITH ION ROBOT REBUS, EBUS, BIOPSIES, NEEDLE ASPIRATES, BAL, AND WASHINGS;  Surgeon: Chiki Morse MD;  Location: [facility] MAIN OR;  Service: Robotics - Pulmonary;  Laterality: N/A;    EYE SURGERY      HYSTERECTOMY      LUMBAR LAMINECTOMY Right 12/15/2023    Procedure: MINIMALLY INVASIVE LUMBAR LAMINECTOMY, right approach, lumbar 4-lumbar  5 and lumbar 5-sacral 1;  Surgeon: Garry Ford MD;  Location: [facility] OR;  Service: Neurosurgery;  Laterality: Right;    LUNG BIOPSY  Aug 23    TONSILLECTOMY         Family History: family history includes Anxiety disorder in her mother; Arthritis in her daughter; Asthma in an other family member; Cancer in her father and sister; Diabetes in her brother and brother; Seizures in an other family member. Otherwise pertinent FHx was reviewed and not pertinent to current issue.    Social History:  reports that she has been smoking cigarettes. She started smoking about 60 years ago. She has a 50.1 pack-year smoking history. She has never used smokeless tobacco. She reports that she does not currently use alcohol after a past usage of about 1.0 standard drink of alcohol per week. She reports that she does not use drugs.    She started smoking when she was 13 years old.  She smoked a pack of cigarette every day until 2020 when she started smoking and smoking half a pack of cigarette every day.  She does not drink alcoholic beverages and does not use street drugs.    Psychosocial History: She has anxiety and depression    Home Medications:  HYDROcodone-acetaminophen, albuterol sulfate HFA, amLODIPine, aspirin, atorvastatin, gabapentin, hydrOXYzine pamoate, hydroCHLOROthiazide,  levothyroxine, meloxicam, metFORMIN, omeprazole, sertraline, tiotropium bromide-olodaterol, varenicline, and vitamin B-12      Allergies:  Allergies   Allergen Reactions    Azithromycin Hives    Breztri Aerosphere [Budeson-Glycopyrrol-Formoterol] Other (See Comments)     Pt stated makes her light headed and she felt dizzy after using Breztri.        Vitals:   Temp:  [96.4 °F (35.8 °C)-98.7 °F (37.1 °C)] 98.1 °F (36.7 °C)  Heart Rate:  [56-81] 69  Resp:  [11-22] 20  BP: (123-159)/() 137/63  Flow (L/min):  [2] 2  Physical Exam   She was awake and alert was not in any form of distress was well-developed and fairly nourished.    Her heart was regular heart rate was 70/min.  There were no murmurs.  The lungs were clear.    The carotid pulses were 1+ and equal.  There were no bruits on either side.    Neurological Examination:  Her responses were coherent and relevant.  She was aware of what was going on around her.  She has an insight to her condition.  She was able to understand and follow verbal commands.    I did not look into the fundus on either side because of the COVID-19 pandemic social distancing.    The pupils were 3 to 4 mm. They were round and equal reactive to light directly and consensually.  There was no extraocular muscle weakness.    No facial asymmetry.  No facial weakness.  Was able to hear normal conversational speech.    The strength of the sternomastoid and trapezius muscles was normal and symmetrical.  The uvula and the tongue were in the midline.    There was questionable weakness of the right upper extremity.  The strength in the lower extremities normal and equal.    Felt pinprick equal in both sides of the forehead, face, lower jaw, both upper and lower extremities, and both sides of her trunk.    The deep tendon reflexes were hypoactive to absent on both sides.    Did finger-to-nose test fairly well equal on both sides.      Result Review:  I have personally reviewed the results from the  time of this admission to 8/15/2024 11:05 EDT and agree with these findings:  [x]  Laboratory  []  Microbiology  [x]  Radiology  []  EKG/Telemetry   []  Cardiology/Vascular   []  Pathology  []  Old records  []  Other:  Most notable findings include:   August 15, 2024  I reviewed the digital images of the CAT scan of her brain that was done on August 14, 2024.  Study showed no acute changes.  There is mild to moderate cortical atrophy which is more marked in the frontal and temporal regions.  There is mild to moderate subcortical and periventricular white matter changes consistent with old microvascular ischemia.    The chest x-ray showed changes suggestive of a left upper lobe pneumonia.    Urine drug screen was positive for opiates.      Impression:    Seizure disorder  Left upper lobe pneumonia  Renal failure  Diabetes mellitus        Plan:   Will get an MRI of the brain and EEG if not already ordered.  Will also get blood works.    Continue with the Keppra 500 mg 2 times a day.              Please note that portions of this note were completed with a voice recognition program.  Part of this note is an electric or electronic transcription/translation of spoken language to printed text using the dragon dictating system.    Electronically signed by Daksha Rubin Jr., MD, 08/15/24, 11:05 AM EDT.

## 2024-08-15 NOTE — H&P
Patient Care Team:  Karina Langford MD as PCP - General (Family Medicine)  Estela Ramirez APRN as Nurse Practitioner (Pulmonary Disease)    Chief complaint fall    Subjective     Patient is a 74 y.o. female presents after fall at home.  She was sitting in her chair at home when she leaned over to reach for something on the floor and fell over.  Granddaughter found the patient on the floor unconscious.  Patient does not recall whether she was unconscious or not.  She had some right elbow pain however that has improved.  There was question of whether the patient had some convulsions while on the floor witnessed by the granddaughter as well.  Patient did have a witnessed seizure in the emergency department.  This is apparently her first seizure    Review of Systems   Pertinent items are noted in HPI    History  Past Medical History:   Diagnosis Date    Anxiety     Arthritis     Centrilobular emphysema     Coronary artery calcification     E MICHAEL W/MONNIN. INSTRUCTE TO F/U PRN    Diabetes mellitus     AVERAGE 'S    Emphysema of lung Aug 23    None    GERD (gastroesophageal reflux disease)     Hepatitis     REPORTS OVER 30 YEARS AGO HAD TREATMENT AND DENIES ANY CURRENT ISSUES    HL (hearing loss)     Hyperlipidemia     Hypertension     Hypothyroidism     Lung nodule     (-)  ION BRONCH DONE 9/2023    Pain management     [facility]    Pulmonary arterial hypertension Aug 23    Spinal stenosis     Thyroiditis     DENIES ANY CURRENT ISSUES    Visual impairment      Past Surgical History:   Procedure Laterality Date    BRONCHOSCOPY WITH ION ROBOTIC ASSIST N/A 09/25/2023    Procedure: BRONCHOSCOPY WITH ION ROBOT, REBUS, EBUS, NEEDLE ASPIRATE, BIOPSIES, WASHINGS, BRUSHINGS, BAL;  Surgeon: Chiki Morse MD;  Location: [facility];  Service: Robotics - Pulmonary;  Laterality: N/A;    EYE SURGERY      HYSTERECTOMY      LUMBAR LAMINECTOMY Right 12/15/2023    Procedure: MINIMALLY INVASIVE LUMBAR LAMINECTOMY,  Recommended observation. right approach, lumbar 4-lumbar  5 and lumbar 5-sacral 1;  Surgeon: Garry Ford MD;  Location: [facility] MAIN OR;  Service: Neurosurgery;  Laterality: Right;    LUNG BIOPSY  Aug 23    TONSILLECTOMY       Family History   Problem Relation Age of Onset    Anxiety disorder Mother     Cancer Father     Cancer Sister     Diabetes Brother     Diabetes Brother     Arthritis Daughter     Asthma Other     Seizures Other     Malig Hyperthermia Neg Hx      Social History     Tobacco Use    Smoking status: Every Day     Current packs/day: 0.25     Average packs/day: 0.5 packs/day for 95.5 years (50.1 ttl pk-yrs)     Types: Cigarettes     Start date: 1964    Smokeless tobacco: Never    Tobacco comments:     Really don't remember when I started, cutting back did not smoke this am   Vaping Use    Vaping status: Never Used   Substance Use Topics    Alcohol use: Not Currently     Alcohol/week: 1.0 standard drink of alcohol     Types: 1 Glasses of wine per week     Comment: occasional    Drug use: Never     Medications Prior to Admission   Medication Sig Dispense Refill Last Dose    albuterol sulfate  (90 Base) MCG/ACT inhaler Inhale 1 puff Every 4 (Four) Hours As Needed for Wheezing or Shortness of Air. Refill needed. 18 g 2 8/13/2024    amLODIPine (NORVASC) 5 MG tablet Take 1 tablet by mouth Daily. 90 tablet 1 8/13/2024    aspirin 81 MG EC tablet Take 1 tablet by mouth Daily.   8/12/2024    atorvastatin (LIPITOR) 20 MG tablet Take 1 tablet by mouth Every Night. 90 tablet 1 8/13/2024    gabapentin (NEURONTIN) 600 MG tablet Take 1 tablet by mouth 3 (Three) Times a Day. 270 tablet 0 8/13/2024    hydroCHLOROthiazide 12.5 MG tablet Take 1 tablet by mouth Daily. 90 tablet 0 8/13/2024    HYDROcodone-acetaminophen (NORCO)  MG per tablet Take 1 tablet by mouth Every 6 (Six) Hours As Needed for Moderate Pain.   8/14/2024    hydrOXYzine pamoate (VISTARIL) 25 MG capsule Take 1 capsule by mouth 3 (Three) Times a Day As Needed for  Itching or Anxiety. 90 capsule 0 8/13/2024    levothyroxine (SYNTHROID, LEVOTHROID) 112 MCG tablet Take 1 tablet by mouth Daily. 90 tablet 1 8/13/2024    meloxicam (MOBIC) 15 MG tablet Take 1 tablet by mouth Daily. 90 tablet 0 8/13/2024    metFORMIN (GLUCOPHAGE) 500 MG tablet Take 1 tablet by mouth 2 (Two) Times a Day With Meals. INSTRUCTED PER ANESTHESIA PROTOCOL NONE AFTER 6 PM ON 12/14/23 (Patient taking differently: Take 2 tablets by mouth Daily.)   8/13/2024    omeprazole (priLOSEC) 20 MG capsule Take 1 capsule by mouth Daily. 90 capsule 1 8/13/2024    sertraline (ZOLOFT) 100 MG tablet Take 1 tablet by mouth Daily. 90 tablet 0 8/13/2024    Stiolto Respimat 2.5-2.5 MCG/ACT aerosol solution inhaler Inhale 2 puffs Daily. 4 g 11 8/13/2024    varenicline (CHANTIX) 1 MG tablet Take 1 tablet by mouth 2 (Two) Times a Day.   8/14/2024    vitamin B-12 (CYANOCOBALAMIN) 1000 MCG tablet Take 1 tablet by mouth Daily.   8/13/2024     Allergies:  Azithromycin and Breztri aerosphere [budeson-glycopyrrol-formoterol]    Objective     Vital Signs  Temp:  [96.4 °F (35.8 °C)-98.7 °F (37.1 °C)] 98.7 °F (37.1 °C)  Heart Rate:  [56-81] 81  Resp:  [11-20] 20  BP: (128-159)/() 151/47    Physical Exam:      General Appearance:  Alert, cooperative, in no acute distress   Head:  Normocephalic, without obvious abnormality, atraumatic   Eyes:  Lids and lashes normal, conjunctivae and sclerae normal, no icterus, no pallor, corneas clear, PERRLA   Ears:  Ears appear intact with no abnormalities noted   Throat:  No oral lesions, no thrush, oral mucosa moist   Neck:  No adenopathy, supple, trachea midline, no thyromegaly, no carotid bruit, no JVD   Back:  No kyphosis present, no scoliosis present, no skin lesions, erythema or scars, no tenderness to percussion, or palpation, range of motion normal   Lungs:  Clear to auscultation,respirations regular, even and unlabored    Heart:  Regular rhythm and normal rate, normal S1 and S2, no murmur,  no gallop, no rub, no click   Breast Exam:  Deferred   Abdomen:  Normal bowel sounds, no masses, no organomegaly, soft non-tender, non-distended, no guarding, no rebound tenderness   Genitalia:  Deferred   Extremities:  Moves all extremities well, no edema, no cyanosis, no redness   Pulses:  Pulses palpable and equal bilaterally   Skin:  No bleeding, bruising or rash   Lymph nodes:  No palpable adenopathy   Neurologic:  Cranial nerves 2 - 12 grossly intact, sensation intact, DTR present and equal bilaterally       Results Review:    I reviewed the patient's new clinical results.  I reviewed the patient's new imaging results and agree with the interpretation.  I reviewed the patient's other test results and agree with the interpretation  I personally viewed and interpreted the patient's EKG/Telemetry data    Assessment & Plan       Seizure    Essential hypertension    Other hyperlipidemia    Type 2 diabetes mellitus without complication, without long-term current use of insulin    Stage 3b chronic kidney disease (CKD)    Admit for observation  MRI brain as this is a new onset seizure  EEG  Keppra  Seizure precautions  ISS  Diabetic diet  Full code        Andre Juarez MD  08/14/24  23:17 EDT

## 2024-08-15 NOTE — SIGNIFICANT NOTE
08/15/24 1300   Physical Therapy Time and Intention   Session Not Performed patient unavailable for evaluation  (neurologist in room)

## 2024-08-15 NOTE — SIGNIFICANT NOTE
08/15/24 1215   Coping/Psychosocial   Observed Emotional State calm;cooperative   Verbalized Emotional State hopefulness   Trust Relationship/Rapport empathic listening provided   Involvement in Care interacting with patient   Additional Documentation Spiritual Care (Group)   Spiritual Care   Use of Spiritual Resources non-Holiness use of spiritual care   Spiritual Care Source  initiative   Spiritual Care Follow-Up follow-up, none required as presently assessed   Response to Spiritual Care receptive of support   Spiritual Care Interventions supportive conversation provided   Spiritual Care Visit Type initial   Receptivity to Spiritual Care visit welcomed

## 2024-08-15 NOTE — PLAN OF CARE
Goal Outcome Evaluation:           Progress: no change  Outcome Evaluation: Pt has slept on and off all day, has mostly been incontinent and has moved up and down in the bed.

## 2024-08-15 NOTE — ED PROVIDER NOTES
Time: 9:51 PM EDT  Date of encounter:  8/14/2024  Independent Historian/Clinical History and Information was obtained by:   Patient    History is limited by: N/A    Chief Complaint: fall      History of Present Illness:  Patient is a 74 y.o. year old female who presents to the emergency department for evaluation of a fall.  Patient reports she was sitting in her chair at home and leaned over to reach something on the ground and fell over.  Patient's granddaughter found the patient on the floor reportedly unconscious.  She does not recall if she was unconscious or not.  Patient states initially she had some right elbow pain that is resolved.  At time of exam patient is denying any symptoms.      Patient Care Team  Primary Care Provider: Karina Langford MD    Past Medical History:     Allergies   Allergen Reactions    Azithromycin Hives    Breztri Aerosphere [Budeson-Glycopyrrol-Formoterol] Other (See Comments)     Pt stated makes her light headed and she felt dizzy after using Breztri.      Past Medical History:   Diagnosis Date    Anxiety     Arthritis     Centrilobular emphysema     Coronary artery calcification     E MICHAEL W/MONNIN. INSTRUCTE TO F/U PRN    Diabetes mellitus     AVERAGE 'S    Emphysema of lung Aug 23    None    GERD (gastroesophageal reflux disease)     Hepatitis     REPORTS OVER 30 YEARS AGO HAD TREATMENT AND DENIES ANY CURRENT ISSUES    HL (hearing loss)     Hyperlipidemia     Hypertension     Hypothyroidism     Lung nodule     (-)  ION BRONCH DONE 9/2023    [facility]    Pulmonary arterial hypertension Aug 23    Spinal stenosis     Thyroiditis     DENIES ANY CURRENT ISSUES    Visual impairment      Past Surgical History:   Procedure Laterality Date    BRONCHOSCOPY WITH ION ROBOTIC ASSIST N/A 09/25/2023    Procedure: BRONCHOSCOPY WITH ION ROBOT, REBUS, EBUS, NEEDLE ASPIRATE, BIOPSIES, WASHINGS, BRUSHINGS, BAL;  Surgeon: Chiki Morse MD;  Location: [facility] OR;   Service: Robotics - Pulmonary;  Laterality: N/A;    BRONCHOSCOPY WITH ION ROBOTIC ASSIST N/A 8/14/2024    Procedure: BRONCHOSCOPY WITH ION ROBOT REBUS, EBUS, BIOPSIES, NEEDLE ASPIRATES, BAL, AND WASHINGS;  Surgeon: Chiki Morse MD;  Location: [facility] MAIN OR;  Service: Robotics - Pulmonary;  Laterality: N/A;    EYE SURGERY      HYSTERECTOMY      LUMBAR LAMINECTOMY Right 12/15/2023    Procedure: MINIMALLY INVASIVE LUMBAR LAMINECTOMY, right approach, lumbar 4-lumbar  5 and lumbar 5-sacral 1;  Surgeon: Garry Ford MD;  Location: [facility] MAIN OR;  Service: Neurosurgery;  Laterality: Right;    LUNG BIOPSY  Aug 23    TONSILLECTOMY       Family History   Problem Relation Age of Onset    Anxiety disorder Mother     Cancer Father     Cancer Sister     Diabetes Brother     Diabetes Brother     Arthritis Daughter     Asthma Other     Seizures Other     Malig Hyperthermia Neg Hx        Home Medications:  Prior to Admission medications    Medication Sig Start Date End Date Taking? Authorizing Provider   albuterol sulfate  (90 Base) MCG/ACT inhaler Inhale 1 puff Every 4 (Four) Hours As Needed for Wheezing or Shortness of Air. Refill needed. 8/5/24   Estela Ramirez APRN   amLODIPine (NORVASC) 5 MG tablet Take 1 tablet by mouth Daily. 3/11/24   Karina Langford MD   aspirin 81 MG EC tablet Take 1 tablet by mouth Daily.    Provider, MD Buddy   atorvastatin (LIPITOR) 20 MG tablet Take 1 tablet by mouth Every Night. 3/11/24   Karina Langford MD   Blood Glucose Monitoring Suppl device 1 each Daily. 7/17/23   Karina Langford MD   gabapentin (NEURONTIN) 600 MG tablet Take 1 tablet by mouth 3 (Three) Times a Day. 5/8/24   Karina Langford MD   glucose blood test strip Use as instructed to check blood sugar daily 7/17/23   Karina Langford MD   hydroCHLOROthiazide 12.5 MG tablet Take 1 tablet by mouth Daily. 8/6/24   Karina Langford MD   HYDROcodone-acetaminophen (NORCO)  MG per tablet  Take 1 tablet by mouth Every 6 (Six) Hours As Needed for Moderate Pain.    Buddy Banegas MD   hydrOXYzine pamoate (VISTARIL) 25 MG capsule Take 1 capsule by mouth 3 (Three) Times a Day As Needed for Itching or Anxiety. 5/29/24   Karina Langford MD   Lancet Device misc 1 each Daily. Use as directed; check blood sugar once daily 7/17/23   Karina Langford MD   Lancets (OneTouch Delica Plus Sosnyt02R) misc  7/19/23   Buddy Banegas MD   levothyroxine (SYNTHROID, LEVOTHROID) 112 MCG tablet Take 1 tablet by mouth Daily. 5/29/24   Karina Langford MD   meloxicam (MOBIC) 15 MG tablet Take 1 tablet by mouth Daily. 8/6/24   Karina Langford MD   metFORMIN (GLUCOPHAGE) 500 MG tablet Take 1 tablet by mouth 2 (Two) Times a Day With Meals. INSTRUCTED PER ANESTHESIA PROTOCOL NONE AFTER 6 PM ON 12/14/23  Patient taking differently: Take 2 tablets by mouth Daily. 3/11/24   Karina Langford MD   omeprazole (priLOSEC) 20 MG capsule Take 1 capsule by mouth Daily. 3/11/24   Karina Langford MD   sertraline (ZOLOFT) 100 MG tablet Take 1 tablet by mouth Daily. 1/23/24   Karina Langford MD   Stiolto Respimat 2.5-2.5 MCG/ACT aerosol solution inhaler Inhale 2 puffs Daily. 8/5/24   Estela Ramirez APRN   varenicline (CHANTIX) 1 MG tablet Take 1 tablet by mouth 2 (Two) Times a Day.    Buddy Banegas MD   vitamin B-12 (CYANOCOBALAMIN) 1000 MCG tablet Take 1 tablet by mouth Daily.    Buddy Banegas MD        Social History:   Social History     Tobacco Use    Smoking status: Every Day     Current packs/day: 0.25     Average packs/day: 0.5 packs/day for 95.5 years (50.1 ttl pk-yrs)     Types: Cigarettes     Start date: 1964    Smokeless tobacco: Never    Tobacco comments:     Really don't remember when I started, cutting back did not smoke this am   Vaping Use    Vaping status: Never Used   Substance Use Topics    Alcohol use: Not Currently     Alcohol/week: 1.0 standard drink of alcohol  "    Types: 1 Glasses of wine per week     Comment: occasional    Drug use: Never         Review of Systems:  Review of Systems   Musculoskeletal:  Positive for arthralgias.        Physical Exam:  /77 (BP Location: Left arm, Patient Position: Lying)   Pulse 50   Temp 98.2 °F (36.8 °C) (Oral)   Resp 16   Ht 165.1 cm (65\")   Wt 57.8 kg (127 lb 6.8 oz)   SpO2 95%   BMI 21.20 kg/m²     Physical Exam  Vitals and nursing note reviewed.   Constitutional:       General: She is not in acute distress.  HENT:      Head: Normocephalic and atraumatic.   Eyes:      Extraocular Movements: Extraocular movements intact.   Cardiovascular:      Rate and Rhythm: Normal rate and regular rhythm.      Heart sounds: Normal heart sounds.   Pulmonary:      Effort: Pulmonary effort is normal. No respiratory distress.      Breath sounds: Normal breath sounds.   Abdominal:      Palpations: Abdomen is soft.      Tenderness: There is no abdominal tenderness.   Musculoskeletal:         General: Normal range of motion.      Cervical back: Normal range of motion and neck supple.   Skin:     General: Skin is warm and dry.   Neurological:      Mental Status: She is alert and oriented to person, place, and time. Mental status is at baseline.                  Procedures:  Procedures      Medical Decision Making:      Comorbidities that affect care:    Hypertension    External Notes reviewed:    Previous Clinic Note: Patient seen 8/8/2024 for annual wellness exam      The following orders were placed and all results were independently analyzed by me:  Orders Placed This Encounter   Procedures    Commode Chair  Bedside Commode with Fixed Arms    Legionella Antigen, Urine - Urine, Urine, Clean Catch    Lyme Disease, PCR - Blood, Arm, Left    CT Head Without Contrast    MRI Brain Without Contrast    Comprehensive Metabolic Panel    CBC Auto Differential    Magnesium    Urinalysis With Culture If Indicated - Urine, Clean Catch    Basic " Metabolic Panel    Magnesium    Phosphorus    Hepatic Function Panel    EBV Ab Panel    D-dimer, Quantitative    SARS-CoV-2 Antibodies, Nucleocapsid (Natural Immunity)    SARS-CoV-2 Semi-Quantitative IgG Antibody, Anil (Vaccine Immunity)    Stachybotrys Chartarum IgE    Spotted Fever Group AB, IgG/IgM    Heavy Metals, Blood    Bartonella Antibody Panel    CEA    Babeisa microtic+Ehrlichia/PCR    Aspergillus Antibodies    LA NENA    C-reactive Protein    West Nile Antibodies, IgG & IgM    EBV Antibody VCA, IgG    Juan-Barr Virus VCA, IgM    Juan-Barr Virus Early Antigen Antibody, IgG    Juan-Barr Virus Nuclear Antigen Antibody, IgG    CBC Auto Differential    Ambulatory Referral to Neurology    Discharge Follow-up with PCP    Oscillating Positive Expiratory Pressure (OPEP)    POC Glucose STAT    POC Glucose Once    POC Glucose Once    POC Glucose Once    POC Glucose Once    EEG    Initiate Observation Status    Discharge patient    CBC & Differential    CBC & Differential       Medications Given in the Emergency Department:  Medications   sodium chloride 0.9 % bolus 1,000 mL (1,000 mL Intravenous New Bag 8/14/24 2208)   insulin regular (humuLIN R,novoLIN R) injection 8 Units (8 Units Intravenous Given 8/14/24 2353)   levETIRAcetam (KEPPRA) injection 1,000 mg (1,000 mg Intravenous Given 8/14/24 2355)   potassium chloride (MICRO-K/KLOR-CON) CR capsule (40 mEq Oral Given 8/16/24 0816)   magnesium sulfate in D5W 1g/100mL (PREMIX) (0 g Intravenous Stopped 8/16/24 1155)        ED Course:         Labs:    Lab Results (last 24 hours)       Procedure Component Value Units Date/Time    POC Glucose 4x Daily Before Meals & at Bedtime [[id number]]  (Abnormal) Collected: 08/16/24 1152    Specimen: Blood Updated: 08/16/24 1154     Glucose 116 mg/dL      Comment: Serial Number: [id number]:  [id number]                Imaging:    No Radiology Exams Resulted Within Past 24 Hours      Differential Diagnosis and  Discussion:    Trauma:  Differential diagnosis considered but not limited to were subarachnoid hemorrhage, intracranial bleeding, pneumothorax, cardiac contusion, lung contusion, intra-abdominal bleeding, and compartment syndrome of any extremity or other significant traumatic pathology    All labs were reviewed and interpreted by me.  CT scan radiology impression was interpreted by me.    [facility]     09:24 EDT patient just had a witnessed seizure in the emergency department.  Patient does not have any seizure history on chart review.            Patient Care Considerations:        Consultants/Shared Management Plan:    Hospitalist: I have discussed the case with Dr. Juarez who agrees to accept the patient for admission.    Social Determinants of Health:    Patient is independent, reliable, and has access to care.       Disposition and Care Coordination:    Admit:   Through independent evaluation of the patient's history, physical, and imperical data, the patient meets criteria for inpatient admission to the hospital.        Final diagnoses:   New onset seizure        ED Disposition       ED Disposition   Decision to Admit    Condition   --    Comment   Level of Care: Telemetry [5]   Diagnosis: Seizure [[id number]]                 This medical record created using voice recognition software.             Jose Fontaine DO  08/17/24 0963

## 2024-08-16 ENCOUNTER — READMISSION MANAGEMENT (OUTPATIENT)
Dept: CALL CENTER | Facility: HOSPITAL | Age: 74
End: 2024-08-16
Payer: MEDICARE

## 2024-08-16 VITALS
TEMPERATURE: 98.2 F | SYSTOLIC BLOOD PRESSURE: 165 MMHG | WEIGHT: 127.43 LBS | HEIGHT: 65 IN | DIASTOLIC BLOOD PRESSURE: 77 MMHG | BODY MASS INDEX: 21.23 KG/M2 | HEART RATE: 50 BPM | RESPIRATION RATE: 16 BRPM | OXYGEN SATURATION: 95 %

## 2024-08-16 PROBLEM — G89.4 CHRONIC PAIN DISORDER: Status: ACTIVE | Noted: 2020-01-13

## 2024-08-16 LAB
ALBUMIN SERPL-MCNC: 3.4 G/DL (ref 3.5–5.2)
ALP SERPL-CCNC: 63 U/L (ref 39–117)
ALT SERPL W P-5'-P-CCNC: 5 U/L (ref 1–33)
ANA SER QL: POSITIVE
ANION GAP SERPL CALCULATED.3IONS-SCNC: 10.1 MMOL/L (ref 5–15)
AST SERPL-CCNC: 14 U/L (ref 1–32)
BACTERIA SPEC AEROBE CULT: NO GROWTH
BACTERIA SPEC RESP CULT: NORMAL
BASOPHILS # BLD AUTO: 0.02 10*3/MM3 (ref 0–0.2)
BASOPHILS NFR BLD AUTO: 0.4 % (ref 0–1.5)
BILIRUB CONJ SERPL-MCNC: <0.2 MG/DL (ref 0–0.3)
BILIRUB INDIRECT SERPL-MCNC: ABNORMAL MG/DL
BILIRUB SERPL-MCNC: 0.4 MG/DL (ref 0–1.2)
BUN SERPL-MCNC: 15 MG/DL (ref 8–23)
BUN/CREAT SERPL: 15 (ref 7–25)
CALCIUM SPEC-SCNC: 8.8 MG/DL (ref 8.6–10.5)
CHLORIDE SERPL-SCNC: 103 MMOL/L (ref 98–107)
CO2 SERPL-SCNC: 27.9 MMOL/L (ref 22–29)
CREAT SERPL-MCNC: 1 MG/DL (ref 0.57–1)
DEPRECATED RDW RBC AUTO: 43 FL (ref 37–54)
DSDNA AB SER-ACNC: 12 IU/ML (ref 0–9)
EBV EA-D IGG SER-ACNC: >150 U/ML (ref 0–8.9)
EBV NA IGG SER IA-ACNC: 466 U/ML (ref 0–17.9)
EBV VCA IGG SER IA-ACNC: >600 U/ML (ref 0–17.9)
EBV VCA IGM SER IA-ACNC: <36 U/ML (ref 0–35.9)
EGFRCR SERPLBLD CKD-EPI 2021: 58.5 ML/MIN/1.73
EOSINOPHIL # BLD AUTO: 0.13 10*3/MM3 (ref 0–0.4)
EOSINOPHIL NFR BLD AUTO: 2.6 % (ref 0.3–6.2)
ERYTHROCYTE [DISTWIDTH] IN BLOOD BY AUTOMATED COUNT: 13.2 % (ref 12.3–15.4)
GLUCOSE BLDC GLUCOMTR-MCNC: 116 MG/DL (ref 70–99)
GLUCOSE SERPL-MCNC: 111 MG/DL (ref 65–99)
GRAM STN SPEC: NORMAL
HCT VFR BLD AUTO: 26.1 % (ref 34–46.6)
HGB BLD-MCNC: 8.6 G/DL (ref 12–15.9)
IMM GRANULOCYTES # BLD AUTO: 0.01 10*3/MM3 (ref 0–0.05)
IMM GRANULOCYTES NFR BLD AUTO: 0.2 % (ref 0–0.5)
LYMPHOCYTES # BLD AUTO: 1.41 10*3/MM3 (ref 0.7–3.1)
LYMPHOCYTES NFR BLD AUTO: 28.4 % (ref 19.6–45.3)
Lab: ABNORMAL
MAGNESIUM SERPL-MCNC: 1.4 MG/DL (ref 1.6–2.4)
MCH RBC QN AUTO: 29.3 PG (ref 26.6–33)
MCHC RBC AUTO-ENTMCNC: 33 G/DL (ref 31.5–35.7)
MCV RBC AUTO: 88.8 FL (ref 79–97)
MONOCYTES # BLD AUTO: 0.42 10*3/MM3 (ref 0.1–0.9)
MONOCYTES NFR BLD AUTO: 8.5 % (ref 5–12)
NEUTROPHILS NFR BLD AUTO: 2.97 10*3/MM3 (ref 1.7–7)
NEUTROPHILS NFR BLD AUTO: 59.9 % (ref 42.7–76)
NRBC BLD AUTO-RTO: 0 /100 WBC (ref 0–0.2)
PHOSPHATE SERPL-MCNC: 3.1 MG/DL (ref 2.5–4.5)
PLATELET # BLD AUTO: 132 10*3/MM3 (ref 140–450)
PMV BLD AUTO: 9.9 FL (ref 6–12)
POTASSIUM SERPL-SCNC: 3.1 MMOL/L (ref 3.5–5.2)
PROT SERPL-MCNC: 6.4 G/DL (ref 6–8.5)
RBC # BLD AUTO: 2.94 10*6/MM3 (ref 3.77–5.28)
SODIUM SERPL-SCNC: 141 MMOL/L (ref 136–145)
WBC NRBC COR # BLD AUTO: 4.96 10*3/MM3 (ref 3.4–10.8)

## 2024-08-16 PROCEDURE — 83735 ASSAY OF MAGNESIUM: CPT | Performed by: FAMILY MEDICINE

## 2024-08-16 PROCEDURE — 80048 BASIC METABOLIC PNL TOTAL CA: CPT | Performed by: FAMILY MEDICINE

## 2024-08-16 PROCEDURE — G0378 HOSPITAL OBSERVATION PER HR: HCPCS

## 2024-08-16 PROCEDURE — 63710000001 REVEFENACIN 175 MCG/3ML SOLUTION

## 2024-08-16 PROCEDURE — 96372 THER/PROPH/DIAG INJ SC/IM: CPT

## 2024-08-16 PROCEDURE — 85025 COMPLETE CBC W/AUTO DIFF WBC: CPT | Performed by: FAMILY MEDICINE

## 2024-08-16 PROCEDURE — 96365 THER/PROPH/DIAG IV INF INIT: CPT

## 2024-08-16 PROCEDURE — 94799 UNLISTED PULMONARY SVC/PX: CPT

## 2024-08-16 PROCEDURE — 94640 AIRWAY INHALATION TREATMENT: CPT

## 2024-08-16 PROCEDURE — 25010000002 MAGNESIUM SULFATE IN D5W 1G/100ML (PREMIX) 1-5 GM/100ML-% SOLUTION

## 2024-08-16 PROCEDURE — 99239 HOSP IP/OBS DSCHRG MGMT >30: CPT | Performed by: FAMILY MEDICINE

## 2024-08-16 PROCEDURE — 80076 HEPATIC FUNCTION PANEL: CPT | Performed by: FAMILY MEDICINE

## 2024-08-16 PROCEDURE — 96366 THER/PROPH/DIAG IV INF ADDON: CPT

## 2024-08-16 PROCEDURE — 82948 REAGENT STRIP/BLOOD GLUCOSE: CPT | Performed by: STUDENT IN AN ORGANIZED HEALTH CARE EDUCATION/TRAINING PROGRAM

## 2024-08-16 PROCEDURE — 25010000002 HEPARIN (PORCINE) PER 1000 UNITS: Performed by: STUDENT IN AN ORGANIZED HEALTH CARE EDUCATION/TRAINING PROGRAM

## 2024-08-16 PROCEDURE — 84100 ASSAY OF PHOSPHORUS: CPT | Performed by: FAMILY MEDICINE

## 2024-08-16 RX ORDER — MAGNESIUM SULFATE 1 G/100ML
1 INJECTION INTRAVENOUS
Status: COMPLETED | OUTPATIENT
Start: 2024-08-16 | End: 2024-08-16

## 2024-08-16 RX ORDER — LEVETIRACETAM 500 MG/1
500 TABLET ORAL EVERY 12 HOURS SCHEDULED
Qty: 60 TABLET | Refills: 0 | Status: SHIPPED | OUTPATIENT
Start: 2024-08-16 | End: 2024-09-15

## 2024-08-16 RX ORDER — POTASSIUM CHLORIDE 750 MG/1
40 CAPSULE, EXTENDED RELEASE ORAL ONCE
Status: COMPLETED | OUTPATIENT
Start: 2024-08-16 | End: 2024-08-16

## 2024-08-16 RX ADMIN — MAGNESIUM SULFATE HEPTAHYDRATE 1 G: 1 INJECTION, SOLUTION INTRAVENOUS at 08:17

## 2024-08-16 RX ADMIN — MAGNESIUM SULFATE HEPTAHYDRATE 1 G: 1 INJECTION, SOLUTION INTRAVENOUS at 09:48

## 2024-08-16 RX ADMIN — SERTRALINE HYDROCHLORIDE 100 MG: 100 TABLET ORAL at 08:18

## 2024-08-16 RX ADMIN — HEPARIN SODIUM 5000 UNITS: 5000 INJECTION INTRAVENOUS; SUBCUTANEOUS at 08:17

## 2024-08-16 RX ADMIN — LEVOTHYROXINE SODIUM 112 MCG: 0.11 TABLET ORAL at 08:16

## 2024-08-16 RX ADMIN — POTASSIUM CHLORIDE 40 MEQ: 750 CAPSULE, EXTENDED RELEASE ORAL at 08:16

## 2024-08-16 RX ADMIN — Medication 10 ML: at 08:17

## 2024-08-16 RX ADMIN — REVEFENACIN 175 MCG: 175 SOLUTION RESPIRATORY (INHALATION) at 09:22

## 2024-08-16 RX ADMIN — GABAPENTIN 600 MG: 300 CAPSULE ORAL at 05:33

## 2024-08-16 RX ADMIN — LEVETIRACETAM 500 MG: 500 TABLET, FILM COATED ORAL at 08:17

## 2024-08-16 NOTE — DISCHARGE SUMMARY
[facility]         HOSPITALIST  DISCHARGE SUMMARY    Patient Name: Odalis Espino  : 1950  MRN: [id number]    Date of Admission: 2024  Date of Discharge:  2024    Primary Care Physician: Karina Langford MD    Consults       Date and Time Order Name Status Description    8/15/2024  6:28 AM Inpatient Neurology Consult General      2024 11:05 PM Inpatient Hospitalist Consult              Active and Resolved Hospital Problems:    Concern for new onset seizure disorder  Left upper lobe nodule  Essential hypertension  Dyslipidemia  Diabetes mellitus  CKD stage IIIb  Anxiety  COPD    Active Hospital Problems    Diagnosis POA    **Seizure [R56.9] Yes    Stage 3b chronic kidney disease (CKD) [N18.32] Yes    Essential hypertension [I10] Yes    Other hyperlipidemia [E78.49] Yes    Type 2 diabetes mellitus without complication, without long-term current use of insulin [E11.9] Yes      Resolved Hospital Problems   No resolved problems to display.       Hospital Course     Hospital Course:  74-year-old female with history of upper lung left hypermetabolic nodule, status post robotic bronchoscopy with EBUS, pulmonary artery hypertension, history of thyroiditis, hypothyroidism, hypertension, dyslipidemia, history of hepatitis, anxiety, hospitalized on 2024 for chief complaint of falling and having an episode of unconsciousness.  This is in the setting of after having anesthesia for EBUS/bronchoscopy.  The patient was brought back to the emergency room for further evaluation hospitalized for further monitoring and management, neurology consulted, EEG ordered.  CT head was unremarkable.  MRI brain negative.  EEG showed independent medium voltage dig focal slow activity noted in either the frontotemporal region left greater than right, with some diffuse encephalopathic changes.  No epileptiform discharges.  Patient kept on Keppra.  Patient requested discharge home on 2024 to  follow-up with PCP within 1 week, to follow-up with pulmonary and neurology as outpatient.        Day of Discharge     Vital Signs:  Temp:  [97.9 °F (36.6 °C)-98.8 °F (37.1 °C)] 98.2 °F (36.8 °C)  Heart Rate:  [50-70] 50  Resp:  [16-22] 16  BP: (154-175)/(61-78) 165/77  Flow (L/min):  [2] 2  Review of systems:  All systems reviewed and negative set for weakness, fatigue, cough    Physical Exam                         Constitutional: Awake, alert, no acute distress              Eyes: Pupils equal, sclerae anicteric, no conjunctival injection              HENT: NCAT, mucous membranes moist              Neck: Supple, full range of motion              Respiratory: Coarse to auscultation bilaterally, nonlabored respirations               Cardiovascular: RRR, no murmurs, rubs, or gallops, palpable pedal pulses bilaterally              Gastrointestinal: Positive bowel sounds, soft, nontender, nondistended              Musculoskeletal: No bilateral ankle edema, no clubbing or cyanosis to extremities              Psychiatric: Appropriate affect, cooperative              Neurologic: Oriented x 3, strength symmetric in all extremities, Cranial Nerves grossly intact to confrontation, speech clear              Skin: No rashes visible on exposed skin         Discharge Details        Discharge Medications        New Medications        Instructions Start Date   levETIRAcetam 500 MG tablet  Commonly known as: KEPPRA   500 mg, Oral, Every 12 Hours Scheduled             Continue These Medications        Instructions Start Date   albuterol sulfate  (90 Base) MCG/ACT inhaler  Commonly known as: PROVENTIL HFA;VENTOLIN HFA;PROAIR HFA   1 puff, Inhalation, Every 4 Hours PRN, Refill needed.       amLODIPine 5 MG tablet  Commonly known as: NORVASC   5 mg, Oral, Daily      aspirin 81 MG EC tablet   81 mg, Oral, Daily      atorvastatin 20 MG tablet  Commonly known as: LIPITOR   20 mg, Oral, Nightly      gabapentin 600 MG  tablet  Commonly known as: NEURONTIN   600 mg, Oral, 3 Times Daily      hydroCHLOROthiazide 12.5 MG tablet   12.5 mg, Oral, Daily      HYDROcodone-acetaminophen  MG per tablet  Commonly known as: NORCO   1 tablet, Oral, Every 6 Hours PRN      hydrOXYzine pamoate 25 MG capsule  Commonly known as: VISTARIL   25 mg, Oral, 3 Times Daily PRN      levothyroxine 112 MCG tablet  Commonly known as: SYNTHROID, LEVOTHROID   112 mcg, Oral, Daily      meloxicam 15 MG tablet  Commonly known as: MOBIC   15 mg, Oral, Daily      metFORMIN 500 MG tablet  Commonly known as: GLUCOPHAGE   500 mg, Oral, 2 Times Daily With Meals, INSTRUCTED PER ANESTHESIA PROTOCOL NONE AFTER 6 PM ON 12/14/23      omeprazole 20 MG capsule  Commonly known as: priLOSEC   20 mg, Oral, Daily      sertraline 100 MG tablet  Commonly known as: ZOLOFT   100 mg, Oral, Daily      Stiolto Respimat 2.5-2.5 MCG/ACT aerosol solution inhaler  Generic drug: tiotropium bromide-olodaterol   2 puffs, Inhalation, Daily      vitamin B-12 1000 MCG tablet  Commonly known as: CYANOCOBALAMIN   1,000 mcg, Oral, Daily             Stop These Medications      varenicline 1 MG tablet  Commonly known as: CHANTIX              Allergies   Allergen Reactions    Azithromycin Hives    Breztri Aerosphere [Budeson-Glycopyrrol-Formoterol] Other (See Comments)     Pt stated makes her light headed and she felt dizzy after using Breztri.        Discharge Disposition:  Home or Self Care    Diet:  Hospital:  Diet Order   Procedures    Diet: Cardiac, Diabetic; Healthy Heart (2-3 Na+); Consistent Carbohydrate; Fluid Consistency: Thin (IDDSI 0)       Discharge Activity:       CODE STATUS:  Code Status and Medical Interventions: CPR (Attempt to Resuscitate); Full Support   Ordered at: 08/15/24 0057     Code Status (Patient has no pulse and is not breathing):    CPR (Attempt to Resuscitate)     Medical Interventions (Patient has pulse or is breathing):    Full Support         Future Appointments    Date Time Provider Department Center   2024 12:00 PM Karina Langford MD [facility]   2024  2:00 PM Estela Ramirez APRN [facility]       Additional Instructions for the Follow-ups that You Need to Schedule       Discharge Follow-up with PCP   As directed       Currently Documented PCP:    Karina Langford MD    PCP Phone Number:    [phone]     Follow Up Details: 3 to 7 days                Pertinent  and/or Most Recent Results     PROCEDURES:   EEG    Result Date: 8/15/2024  Table formatting from the original result was not included. Images from the original result were not included. [address], [place] [id number] [phone] ELECTROENCEPHALOGRAPHIC REPORT Patient: Odalis Espino EEG # :    DLI-C- : 1950  ID:      [id number]    Age: 74 y.o. female    Primary  Physician: Jose Mclean MD Technician: Saige Ramirez Ordering  Physician: Andre Juarez MD    Recording Date: August 15, 2024 Report  Date: 8/15/2024     History:  Seizures Medications: Albuterol inhaler, Norvasc, aspirin, Lipitor, Neurontin, hydrochlorothiazide, Norco, Vistaril, Synthroid, Mobic, Glucophage, Prilosec, Zoloft, Respimat, Chantix, and vitamin B12. Reading: The EEG was obtained portable in her room at which time she was lethargic and on photic stimulation with video monitoring.  We do not know how much sleep she has had the night before the testing. The dominant background activity consists of symmetric and irregular 20 to 65 µV 10 cps which were prominent on both parieto-occipital and temporal regions.  The background activity was mixed with moderate diffuse symmetric irregular 30 to 135 µV 2 to 4 cps waves which are prominent on both frontocentral regions.There were intermittent and independent 30 to 90 µV focal slow activity noted on either frontotemporal region, left greater than right.  There were no discernible responses on photic stimulation various frequencies.  There  was no amplitude asymmetry.  No paroxysmal discharges. Impression: Abnormal EEG because of: 1.  Independent medium voltage focal slow activity noted on either frontotemporal region, left greater than right. 2.  Excessive slow activity compatible with mild diffuse encephalopathy. 3.  There were no epileptiform discharges noted today. 4.  Neuroradiographic imaging may be of some help to rule out pathology in both sides of the brain more on the left. Electronically signed by Daksha Rubin Jr., MD, 08/15/24, 9:21 PM EDT. Please note that portions of this note were completed with a voice recognition program.  Part of this note is an electric or electronic transcription/translation of spoken language to printed text using the dragon dictating system.     MRI Brain Without Contrast    Result Date: 8/15/2024  MRI BRAIN WO CONTRAST Date of Exam: 8/15/2024 6:14 PM EDT Indication: Seizure disorder.  Comparison: CT head 8/14/2024 Technique:  Routine multiplanar/multisequence sequence images of the brain were obtained without contrast administration. Findings: Diffusion weighted imaging demonstrates no acute restriction abnormality. Midline structures of the brain are grossly unremarkable in appearance. Pituitary and sella structures and craniocervical junction are grossly unremarkable. The ventricles, cisterns and sulci demonstrate mild diffuse atrophy not unexpected for age. No suspicious extra-axial fluid collections are noted. Severe patchy and confluent FLAIR and T2 signal hyperintensity noted within the supratentorial white matter  is compatible with sequela small vessel ischemic changes in this age group. Sequela of demyelinating disease also differential consideration among other etiologies. FLAIR and T2 signal hyperintensities within the merle and midbrain are most compatible with sequela small vessel ischemic disease. No acute intracranial hemorrhage or mass effect is noted. Major intracranial flow voids appear  grossly patent on limited imaging mildly coastal thickening of the paranasal sinuses. Mastoid air cells are clear. The on the lenses noted within the globes bilaterally     Impression: 1. No acute ischemic change 2. Extensive white matter changes most compatible small vessel ischemic disease in this age group Electronically Signed: Mesfin Bradshaw MD  8/15/2024 7:28 PM EDT  Workstation ID: [id number]    CT Head Without Contrast    Result Date: 8/14/2024  CT HEAD WO CONTRAST Date of Exam: 8/14/2024 10:23 PM EDT Indication: Seizure headache. Comparison: None available. Technique: Axial CT images were obtained of the head without contrast administration.  Reconstructed coronal and sagittal images were also obtained. Automated exposure control and iterative construction methods were used. Findings: There is no evidence of hemorrhage. There is no mass effect or midline shift. Age-related involutional changes are visualized. Bilateral periventricular white matter hypodensities are visualized compatible with changes of chronic microvascular ischemia. There is no extra-axial collections. Ventricles are normal in size and configuration for patient's stated age. Posterior fossa is within normal limits. Calvarium and skull base appear intact. Visualized sinuses show no air fluid levels. The mastoid air cells are clear. Visualized orbits are unremarkable.     Impression: No acute intracranial process evident. Age-related involutional changes and findings compatible with changes of chronic microvascular ischemia. Electronically Signed: Som Bravo MD  8/14/2024 10:44 PM EDT  Workstation ID: [id number]    XR Chest 1 View    Result Date: 8/14/2024  XR CHEST 1 VW Date of Exam: 8/14/2024 4:32 PM EDT Indication: Post Bronchoscopy Comparison: None available. Findings: Moderate-sized left upper lobe airspace disease is visualized. There is suggestion of emphysematous changes. Right upper lobe nodule measures 4 mm. There is no evidence  of pneumothorax.  The pulmonary vasculature appears within normal limits.  The cardiac and mediastinal silhouette appear unremarkable.  No acute osseous abnormality identified.     Impression: Findings compatible with left upper lobe pneumonia. Electronically Signed: Som Bravo MD  8/14/2024 4:40 PM EDT  Workstation ID: [id number]    FL Surgery Fluoro    Result Date: 8/14/2024  This procedure was auto-finalized with no dictation required.    NM PET/CT Skull Base to Mid Thigh    Result Date: 7/31/2024  NM PET/CT SKULL BASE TO MID THIGH Date of Exam: 7/31/2024 7:58 AM EDT Indication: abnormal CT of chest. Comparison: None available. Technique:  PET/CT Head to Mid Thigh imaging was performed with positron emission tomography (PET with concurrently acquired computed tomography (CT) for attenuation correction and anatomical localization); field of view imaged was the skull base to midthigh.  Images were obtained after one hour of equilibrium. RADIONUCLITIDE: 11.4 MCI    F18 FDG - LV. Blood Glucose 163 mg/dl Uptake Time: 47 min. Mediastinal background Uptake: SUV max. 2.3 SUV Normalization method: Body weight FINDINGS:  Patient's head is rotated. There are vascular calcifications. There is increased diffuse metabolic activity in the thyroid which appears mildly enlarged. There is a focus of metabolic activity adjacent to the left humeral head more likely inflammatory. No hypermetabolic mediastinal or hilar adenopathy. There is moderate vascular calcifications. Coronary artery calcifications. On image 94 there is a 0.8 cm nodule adjacent to the major fissure in the left upper lobe max SUV 0.5. On image 86 there is a 1.1 cm left upper lobe nodule adjacent to the major fissure max SUV 3.7. On image 79 there is a small nodular density in the left upper lobe measuring 0.4 cm max SUV 0.9. Small 3 mm subpleural right lower lobe nodule on image 107 Max SUV 1.2 with similar to adjacent atelectatic change. Mild emphysematous  changes. There are a few granulomas in the spleen. No suspicious adrenal findings. No obstructive uropathy is seen. Mild diverticulosis. There is no enlarged adenopathy. No definite abnormal metabolic activity. Postoperative changes seen in the lower lumbar spine with laminectomy at L4 and L5. There is hypermetabolic activity at these levels. There is additional hypermetabolic activity in the facets and spinous process of L5 max SUV about 3.2. Degenerative changes in the spine. There is dextrocurvature in the upper lumbar spine.     1.1 cm left upper lobe lung nodule on image 86 is enlarging and hypermetabolic max SUV 3.7 most likely neoplasm. Recommend biopsy. 0.8 cm left upper lobe nodule not hypermetabolic. This nodule has been stable. Several other smaller nodules in the left upper lobe and right lower lobe are too small to characterize. Recommend continued follow-up. Diffuse thyroid activity likely thyroiditis. Postoperative changes in the lower lumbar spine with previous laminectomy at L4 and L5 on the right. There is metabolic activity in this region. Additional metabolic activity in the facets and spinous process of L5 max SUV 3.2. Inflammatory activity is favored over neoplasm. Electronically Signed: Guevara Sotelo MD  7/31/2024 4:37 PM EDT  Workstation ID: [id number]       LAB RESULTS:      Lab 08/16/24  0438 08/15/24  1156 08/14/24 2207 08/12/24  0841   WBC 4.96  --  12.85* 5.56   HEMOGLOBIN 8.6*  --  10.5* 11.0*   HEMATOCRIT 26.1*  --  32.3* 33.5*   PLATELETS 132*  --  196 173   NEUTROS ABS 2.97  --  11.45*  --    IMMATURE GRANS (ABS) 0.01  --  0.05  --    LYMPHS ABS 1.41  --  1.10  --    MONOS ABS 0.42  --  0.23  --    EOS ABS 0.13  --  0.01  --    MCV 88.8  --  89.0 89.8   CRP  --  0.34  --   --    D DIMER QUANT  --  1.31*  --   --          Lab 08/16/24  0438 08/14/24 2207 08/12/24  0841   SODIUM 141 142 143   POTASSIUM 3.1* 3.5 3.2*   CHLORIDE 103 100 101   CO2 27.9 20.7* 29.6*   ANION GAP 10.1  21.3* 12.4   BUN 15 19 16   CREATININE 1.00 1.29* 1.13*   EGFR 58.5* 43.1* 50.5*   GLUCOSE 111* 348* 136*   CALCIUM 8.8 9.2 9.9   MAGNESIUM 1.4* 1.5*  --    PHOSPHORUS 3.1  --   --    HEMOGLOBIN A1C  --   --  6.70*   TSH  --   --  0.588         Lab 08/16/24  0438 08/14/24  2207 08/12/24  0841   TOTAL PROTEIN 6.4 7.9 7.4   ALBUMIN 3.4* 4.2 4.1   GLOBULIN  --  3.7 3.3   ALT (SGPT) 5 7 6   AST (SGOT) 14 17 13   BILIRUBIN 0.4 0.3 0.3   BILIRUBIN DIRECT <0.2  --   --    ALK PHOS 63 81 73             Lab 08/12/24  0841   CHOLESTEROL 149   LDL CHOL 68   HDL CHOL 67*   TRIGLYCERIDES 68             Brief Urine Lab Results  (Last result in the past 365 days)        Color   Clarity   Blood   Leuk Est   Nitrite   Protein   CREAT   Urine HCG        08/14/24 2330 Yellow   Clear   Negative   Negative   Negative   Negative                 Microbiology Results (last 10 days)       Procedure Component Value - Date/Time    Legionella Antigen, Urine - Urine, Urine, Clean Catch [[id number]]  (Normal) Collected: 08/15/24 1721    Lab Status: Final result Specimen: Urine, Clean Catch Updated: 08/15/24 1751     LEGIONELLA ANTIGEN, URINE Negative    Respiratory Culture - Wash, Bronchus [[id number]] Collected: 08/14/24 1552    Lab Status: Final result Specimen: Wash from Bronchus Updated: 08/16/24 1034     Respiratory Culture Scant growth (1+) Normal respiratory talia. No S. aureus or Pseudomonas aeruginosa detected. Final report.     Gram Stain Moderate (3+) WBCs seen      Few (2+) Gram positive cocci in pairs and chains    AFB Culture - Lavage, Lung, Left Upper Lobe [[id number]] Collected: 08/14/24 1517    Lab Status: Preliminary result Specimen: Lavage from Lung, Left Upper Lobe Updated: 08/15/24 1241     AFB Stain No acid fast bacilli seen on direct smear      No acid fast bacilli seen on concentrated smear    BAL Culture, Quantitative - Lavage, Lung, Left Upper Lobe [[id number]] Collected: 08/14/24 1517    Lab Status: Final result Specimen:  Lavage from Lung, Left Upper Lobe Updated: 08/16/24 1029     BAL Culture No growth     Gram Stain No organisms seen    Pneumonia Panel - Lavage, Lung, Left Upper Lobe [[id number]]  (Normal) Collected: 08/14/24 1517    Lab Status: Final result Specimen: Lavage from Lung, Left Upper Lobe Updated: 08/14/24 1728     Escherichia coli PCR Not Detected     Acinetobacter calcoaceticus-baumannii complex PCR Not Detected     Enterobacter cloacae PCR Not Detected     Klebsiella oxytoca PCR Not Detected     Klebsiella pneumoniae group PCR Not Detected     Klebsiella aerogenes PCR Not Detected     Moraxella catarrhalis PCR Not Detected     Proteus species PCR Not Detected     Pseudomonas aeroginosa PCR Not Detected     Serratia marcescens PCR Not Detected     Staphylococcus aureus PCR Not Detected     Streptococcus pyogenes PCR Not Detected     Haemophilus influenzae PCR Not Detected     Streptococcus agalactiae PCR Not Detected     Streptococcus pneumoniae PCR Not Detected     Chlamydophila pneumoniae PCR Not Detected     Legionella pneumophilia PCR Not Detected     Mycoplasma pneumo by PCR Not Detected     ADENOVIRUS, PCR Not Detected     CTX-M Gene N/A     IMP Gene N/A     KPC Gene N/A     mecA/C and MREJ Gene N/A     NDM Gene N/A     OXA-48-like Gene N/A     VIM Gene N/A     Coronavirus Not Detected     Human Metapneumovirus Not Detected     Human Rhinovirus/Enterovirus Not Detected     Influenza A PCR Not Detected     Influenza B PCR Not Detected     RSV, PCR Not Detected     Parainfluenza virus PCR Not Detected            MRI Brain Without Contrast    Result Date: 8/15/2024  Impression: Impression: 1. No acute ischemic change 2. Extensive white matter changes most compatible small vessel ischemic disease in this age group Electronically Signed: Mesfin Bradshaw MD  8/15/2024 7:28 PM EDT  Workstation ID: [id number]    CT Head Without Contrast    Result Date: 8/14/2024  Impression: Impression: No acute intracranial process  evident. Age-related involutional changes and findings compatible with changes of chronic microvascular ischemia. Electronically Signed: Som Bravo MD  8/14/2024 10:44 PM EDT  Workstation ID: [id number]    XR Chest 1 View    Result Date: 8/14/2024  Impression: Impression: Findings compatible with left upper lobe pneumonia. Electronically Signed: Som Bravo MD  8/14/2024 4:40 PM EDT  Workstation ID: [id number]    NM PET/CT Skull Base to Mid Thigh    Result Date: 7/31/2024  Impression: 1.1 cm left upper lobe lung nodule on image 86 is enlarging and hypermetabolic max SUV 3.7 most likely neoplasm. Recommend biopsy. 0.8 cm left upper lobe nodule not hypermetabolic. This nodule has been stable. Several other smaller nodules in the left upper lobe and right lower lobe are too small to characterize. Recommend continued follow-up. Diffuse thyroid activity likely thyroiditis. Postoperative changes in the lower lumbar spine with previous laminectomy at L4 and L5 on the right. There is metabolic activity in this region. Additional metabolic activity in the facets and spinous process of L5 max SUV 3.2. Inflammatory activity is favored over neoplasm. Electronically Signed: Guevara Sotelo MD  7/31/2024 4:37 PM EDT  Workstation ID: [id number]             Results for orders placed during the hospital encounter of 07/10/24    Adult Transthoracic Echo Complete W/ Cont if Necessary Per Protocol    Interpretation Summary    Left ventricular ejection fraction appears to be 61 - 65%.    Left ventricular wall thickness is consistent with septal asymmetric hypertrophy.    Left ventricular diastolic function is consistent with (grade I) impaired relaxation and age.    No significant valvular disease.      Labs Pending at Discharge:  Pending Labs       Order Current Status    LA NENA In process    Aspergillus Antibodies In process    Babeisa microtic+Ehrlichia/PCR In process    Bartonella Antibody Panel In process    EBV Ab Panel In process     EBV Antibody VCA, IgG In process    Juan-Barr Virus Early Antigen Antibody, IgG In process    Juan-Barr Virus Nuclear Antigen Antibody, IgG In process    Juan-Barr Virus VCA, IgM In process    Heavy Metals, Blood In process    Lyme Disease, PCR - Blood, Arm, Left In process    SARS-CoV-2 Antibodies, Nucleocapsid (Natural Immunity) In process    SARS-CoV-2 Semi-Quantitative IgG Antibody, Anil (Vaccine Immunity) In process    Spotted Fever Group AB, IgG/IgM In process    Stachybotrys Chartarum IgE In process    West Nile Antibodies, IgG & IgM In process              Time spent on Discharge including face to face service:  35 minutes    Electronically signed by Jose Mclean MD, 08/16/24, 1:36 PM EDT.    Portions of this documentation were transcribed electronically from a voice recognition software.  I confirm all data accurately represents the service(s) I performed at today's visit.

## 2024-08-16 NOTE — PLAN OF CARE
Goal Outcome Evaluation:           Progress: improving  Outcome Evaluation: No acute events this shift. Patient discharging home with neighbor.

## 2024-08-16 NOTE — OUTREACH NOTE
Prep Survey      Flowsheet Row Responses   [facility] patient discharged from? [facility]   Is LACE score < 7 ? No   Eligibility [facility]   Date of Admission 08/14/24   Date of Discharge 08/16/24   Discharge diagnosis Seizure   Does the patient have one of the following disease processes/diagnoses(primary or secondary)? Other   Prep survey completed? Yes            Nydia GONCALVES - Registered Nurse

## 2024-08-16 NOTE — CONSULTS
Pulmonary / Critical Care Consult Note      Patient Name: Odalis Espino  : 1950  MRN: [id number]  Primary Care Physician:  Karina Langford MD  Referring Physician: Jose Mclean MD  Date of admission: 2024    Subjective   Subjective     Reason for Consult/ Chief Complaint:   S/p bronchoscopy, seizures    HPI:  Odalis Espino is a 74 y.o. female with past medical history of lung nodule, COPD, and CKD presented to the ED for evaluation of witnessed fall and concern for seizure.  In the ED, there was a witness seizure.  Labs were unremarkable.   CXR demonstrating left upper lobe pneumonia.  Service was consulted to assist in the management of treating the patient's acute issues.  Upon assessment, patient lying in bed on 2 L nasal cannula no apparent respiratory distress.  Findings and plan were discussed with the patient.  Patient reporting that she does not really recall what happened.  However, patient denies any chest pain or chest tightness, patient denies being short of breath, patient denies any fever or chills.  Patient recently underwent bronchoscopy on .      Personal History     Past Medical History:   Diagnosis Date    Anxiety     Arthritis     Centrilobular emphysema     Coronary artery calcification     E MICHAEL W/MONNIN. INSTRUCTE TO F/U PRN    Diabetes mellitus     AVERAGE 'S    Emphysema of lung Aug 23    None    GERD (gastroesophageal reflux disease)     Hepatitis     REPORTS OVER 30 YEARS AGO HAD TREATMENT AND DENIES ANY CURRENT ISSUES    HL (hearing loss)     Hyperlipidemia     Hypertension     Hypothyroidism     Lung nodule     (-)  ION [facility] DONE 2023    [facility]    P[facility] arterial hypertension Aug 23    Spinal stenosis     Thyroiditis     DENIES ANY CURRENT ISSUES    Visual impairment        Past Surgical History:   Procedure Laterality Date    BRONCHOSCOPY WITH ION ROBOTIC ASSIST N/A 2023    Procedure: BRONCHOSCOPY WITH ION  ROBOT, REBUS, EBUS, NEEDLE ASPIRATE, BIOPSIES, WASHINGS, BRUSHINGS, BAL;  Surgeon: Chiki Morse MD;  Location: [facility] MAIN OR;  Service: Robotics - Pulmonary;  Laterality: N/A;    BRONCHOSCOPY WITH ION ROBOTIC ASSIST N/A 8/14/2024    Procedure: BRONCHOSCOPY WITH ION ROBOT REBUS, EBUS, BIOPSIES, NEEDLE ASPIRATES, BAL, AND WASHINGS;  Surgeon: Chiki Morse MD;  Location: [facility] MAIN OR;  Service: Robotics - Pulmonary;  Laterality: N/A;    EYE SURGERY      HYSTERECTOMY      LUMBAR LAMINECTOMY Right 12/15/2023    Procedure: MINIMALLY INVASIVE LUMBAR LAMINECTOMY, right approach, lumbar 4-lumbar  5 and lumbar 5-sacral 1;  Surgeon: Garry Ford MD;  Location: [facility] MAIN OR;  Service: Neurosurgery;  Laterality: Right;    LUNG BIOPSY  Aug 23    TONSILLECTOMY         Family History: family history includes Anxiety disorder in her mother; Arthritis in her daughter; Asthma in an other family member; Cancer in her father and sister; Diabetes in her brother and brother; Seizures in an other family member. Otherwise pertinent FHx was reviewed and not pertinent to current issue.    Social History:  reports that she has been smoking cigarettes. She started smoking about 60 years ago. She has a 50.1 pack-year smoking history. She has never used smokeless tobacco. She reports that she does not currently use alcohol after a past usage of about 1.0 standard drink of alcohol per week. She reports that she does not use drugs.    Home Medications:  HYDROcodone-acetaminophen, albuterol sulfate HFA, amLODIPine, aspirin, atorvastatin, gabapentin, hydrOXYzine pamoate, hydroCHLOROthiazide, levETIRAcetam, levothyroxine, meloxicam, metFORMIN, omeprazole, sertraline, tiotropium bromide-olodaterol, varenicline, and vitamin B-12    Allergies:  Allergies   Allergen Reactions    Azithromycin Hives    Breztri Aerosphere [Budeson-Glycopyrrol-Formoterol] Other (See Comments)     Pt stated makes her light headed and she felt dizzy after using  Tiffanie.        Objective    Objective     Vitals:   Temp:  [97.9 °F (36.6 °C)-98.8 °F (37.1 °C)] 98.8 °F (37.1 °C)  Heart Rate:  [53-70] 57  Resp:  [16-22] 18  BP: (143-175)/(61-78) 167/78  Flow (L/min):  [2] 2    Physical Exam:  Vital Signs Reviewed   General:  WDWN, Alert, NAD.  Pleasant elderly female, lying in bed  HEENT:  PERRL, EOMI.  OP, nares clear, no sinus tenderness  Neck:  Supple, no JVD, no thyromegaly  Lymph: no axillary, cervical, supraclavicular lymphadenopathy noted bilaterally  Chest:  good aeration, clear to auscultation bilaterally, no work of breathing noted on 2 L  CV: RRR, no MGR, pulses 2+, equal.  Abd:  Soft, NT, ND, + BS, no HSM  EXT:  no clubbing, no cyanosis, no edema, no joint tenderness  Neuro:  A&Ox3, CN grossly intact, no focal deficits.  Skin: No rashes or lesions noted    Result Review    Result Review:  I have personally reviewed the results from the time of this admission to 8/16/2024 11:16 EDT and agree with these findings:  [x]  Laboratory  [x]  Microbiology  [x]  Radiology  []  EKG/Telemetry   []  Cardiology/Vascular   []  Pathology  []  Old records  []  Other:  Most notable findings include:         Lab 08/16/24  0438 08/14/24  2207 08/12/24  0841   WBC 4.96 12.85* 5.56   HEMOGLOBIN 8.6* 10.5* 11.0*   HEMATOCRIT 26.1* 32.3* 33.5*   PLATELETS 132* 196 173   SODIUM 141 142 143   POTASSIUM 3.1* 3.5 3.2*   CHLORIDE 103 100 101   CO2 27.9 20.7* 29.6*   BUN 15 19 16   CREATININE 1.00 1.29* 1.13*   GLUCOSE 111* 348* 136*   CALCIUM 8.8 9.2 9.9   PHOSPHORUS 3.1  --   --    TOTAL PROTEIN 6.4 7.9 7.4   ALBUMIN 3.4* 4.2 4.1   GLOBULIN  --  3.7 3.3   ;      Assessment & Plan   Assessment / Plan     Active Hospital Problems:  Active Hospital Problems    Diagnosis     **Seizure     Stage 3b chronic kidney disease (CKD)     Essential hypertension     Other hyperlipidemia     Type 2 diabetes mellitus without complication, without long-term current use of insulin    Impression:  Lung nodule  s/p EBUS bronchoscopy  COPD not in exacerbation  CKD  New onset seizures    Plan:  -On 2 L nasal cannula.  Continue to wean supplemental oxygen maintain SpO2 greater than 90%  -8/14 CXR demonstrating left upper lobe pneumonia  -Patient s/p bronchoscopy on 8/14.  Micro negative to date.  Cytology pending  -Continue as needed as needed DuoNebs  -Continue Keppra  -Continue to monitor renal panel and electrolytes.  Replace magnesium intravenously  -Continue bronchopulmonary hygiene.  Encourage I-S and flutter  -Encourage activity as tolerated  -Rest of care per primary  -Patient likely home today  -Follow-up pulmonary clinic 1 to 2 weeks after discharge    VTE Prophylaxis:  Pharmacologic VTE prophylaxis orders are present.    Code Status and Medical Interventions: CPR (Attempt to Resuscitate); Full Support   Ordered at: 08/15/24 0057     Code Status (Patient has no pulse and is not breathing):    CPR (Attempt to Resuscitate)     Medical Interventions (Patient has pulse or is breathing):    Full Support      Labs, imaging, notes and medications personally reviewed.  Discussed with primary and patient    Thank you for involving me in the care of this patient.    Electronically signed by BAMBI Canela, 08/16/24, 11:21 AM EDT.  This visit was performed by both a physician and an APC.  I personally evaluated and examined the patient.  I performed all aspects of MDM as documented.  I have reviewed and confirmed the accuracy of the patient's history as documented in this note and I have reexamined the patient and the results are consistent with the previously documented exam.  I have updated the documentation as necessary. Electronically signed by Moses Carcamo MD, 08/16/24, 11:46 AM EDT.

## 2024-08-16 NOTE — PROGRESS NOTES
Respiratory Therapist Broncho-Pulmonary Hygiene Progress Note      Patient Name:  Odalis Espino  YOB: 1950    Odalis Espino meets the qualification for Level 2 of the [facility]-Pulmonary Hygiene Protocol. This was based on my daily patient assessment and includes review of chest x-ray results, cough ability and quality, oxygenation, secretions or risk for secretion development and patient mobility.     Broncho-Pulmonary Hygiene Assessment:    Level of Movement: Actively changing positions-requires assistance  Disoriented/Follows Commands    Breath Sounds: Diminished and/or coarse rhonchi    Cough: Strong, effective and/or frequent    Chest X-Ray: Possible signs of consolidation and/or atelectasis or clear.     Sputum Productions: Able to produce small to moderate amount, of moderately thick secretions    History and Physical: Chronic condition    SpO2 to Oxygen Need: greater than 92% on room air or  less than 3L nasal canula    Current SpO2 is: 97 on 2L    Based on this information, I have completed the following interventions: Aerobika with bronchodialtor medication or TID      Electronically signed by Lauren Max RRT, 08/16/24, 8:01 AM EDT.

## 2024-08-17 LAB
B BURGDOR DNA SPEC QL NAA+PROBE: NEGATIVE
CYTO UR: NORMAL
CYTO UR: NORMAL
LAB AP CASE REPORT: NORMAL
LAB AP CASE REPORT: NORMAL
LAB AP CLINICAL INFORMATION: NORMAL
LAB AP CLINICAL INFORMATION: NORMAL
LAB AP DIAGNOSIS COMMENT: NORMAL
LAB AP DIAGNOSIS COMMENT: NORMAL
LAB AP SPECIAL STAINS: NORMAL
PATH REPORT.FINAL DX SPEC: NORMAL
PATH REPORT.FINAL DX SPEC: NORMAL
PATH REPORT.GROSS SPEC: NORMAL
PATH REPORT.GROSS SPEC: NORMAL

## 2024-08-18 LAB
A FLAVUS AB SER QL ID: NEGATIVE
A FUMIGATUS AB SER QL ID: NEGATIVE
A NIGER AB SER QL ID: NEGATIVE

## 2024-08-19 ENCOUNTER — TRANSITIONAL CARE MANAGEMENT TELEPHONE ENCOUNTER (OUTPATIENT)
Dept: CALL CENTER | Facility: HOSPITAL | Age: 74
End: 2024-08-19
Payer: MEDICARE

## 2024-08-19 LAB
A PHAGOCYTOPH DNA BLD QL NAA+PROBE: NEGATIVE
B HENSELAE IGG TITR SER IF: NEGATIVE TITER
B HENSELAE IGM TITR SER IF: NEGATIVE TITER
B QUINTANA IGG TITR SER IF: NEGATIVE TITER
B QUINTANA IGM TITR SER IF: NEGATIVE TITER
BABESIA SPEC: NEGATIVE
E CHAFFEENSIS DNA BLD QL NAA+PROBE: NEGATIVE
SARS-COV-2 AB SERPL QL IA: POSITIVE
SARS-COV-2 AB SERPL-IMP: POSITIVE
SARS-COV-2 IGG SERPL IA-ACNC: >800 AU/ML

## 2024-08-19 NOTE — OUTREACH NOTE
Call Center TCM Note      Flowsheet Row Responses   [facility] patient discharged from? [facility]   Does the patient have one of the following disease processes/diagnoses(primary or secondary)? Other   TCM attempt successful? Yes  [VR for Karuna Vee]   Call start time 1247   Call end time 1250   Discharge diagnosis Seizure   Person spoke with today (if not patient) and relationship lex Beckman   Meds reviewed with patient/caregiver? Yes   Is the patient having any side effects they believe may be caused by any medication additions or changes? No   Does the patient have all medications ordered at discharge? Yes   Is the patient taking all medications as directed (includes completed medication regime)? Yes   Medication comments Pt taking keppra as ordered   Comments Hospital f/u 8/20/24@1200pm, Pulmo on 8/23/24   Does the patient have an appointment with their PCP within 7-14 days of discharge? Yes   Has home health visited the patient within 72 hours of discharge? N/A   Psychosocial issues? No   Did the patient receive a copy of their discharge instructions? Yes   Nursing interventions Reviewed instructions with patient   What is the patient's perception of their health status since discharge? Improving  [Reports no further seizure activity, taking new keppra as ordered.  No immediate needs at time of call.]   Is the patient/caregiver able to teach back signs and symptoms related to disease process for when to call PCP? Yes   Is the patient/caregiver able to teach back signs and symptoms related to disease process for when to call 911? Yes   Additional teach back comments Dtr working to schedule with neuro   TCM call completed? Yes   Call end time 1250            Janae James RN    8/19/2024, 12:53 EDT

## 2024-08-20 ENCOUNTER — OFFICE VISIT (OUTPATIENT)
Dept: FAMILY MEDICINE CLINIC | Age: 74
End: 2024-08-20
Payer: MEDICARE

## 2024-08-20 ENCOUNTER — LAB (OUTPATIENT)
Dept: LAB | Facility: HOSPITAL | Age: 74
End: 2024-08-20
Payer: MEDICARE

## 2024-08-20 VITALS
HEIGHT: 65 IN | WEIGHT: 128.2 LBS | DIASTOLIC BLOOD PRESSURE: 90 MMHG | OXYGEN SATURATION: 96 % | HEART RATE: 66 BPM | BODY MASS INDEX: 21.36 KG/M2 | SYSTOLIC BLOOD PRESSURE: 169 MMHG | TEMPERATURE: 98.3 F

## 2024-08-20 DIAGNOSIS — E78.00 PURE HYPERCHOLESTEROLEMIA: ICD-10-CM

## 2024-08-20 DIAGNOSIS — E03.9 ACQUIRED HYPOTHYROIDISM: ICD-10-CM

## 2024-08-20 DIAGNOSIS — R56.9 SEIZURE: Primary | ICD-10-CM

## 2024-08-20 DIAGNOSIS — E11.41 TYPE 2 DIABETES MELLITUS WITH DIABETIC MONONEUROPATHY, WITHOUT LONG-TERM CURRENT USE OF INSULIN: ICD-10-CM

## 2024-08-20 DIAGNOSIS — N18.32 STAGE 3B CHRONIC KIDNEY DISEASE (CKD): ICD-10-CM

## 2024-08-20 DIAGNOSIS — K21.9 GASTROESOPHAGEAL REFLUX DISEASE WITHOUT ESOPHAGITIS: ICD-10-CM

## 2024-08-20 DIAGNOSIS — D62 ACUTE BLOOD LOSS ANEMIA: ICD-10-CM

## 2024-08-20 DIAGNOSIS — F41.9 ANXIETY: ICD-10-CM

## 2024-08-20 DIAGNOSIS — D50.8 IRON DEFICIENCY ANEMIA SECONDARY TO INADEQUATE DIETARY IRON INTAKE: ICD-10-CM

## 2024-08-20 LAB
ARSENIC BLD-MCNC: 4 UG/L (ref 0–9)
DEPRECATED RDW RBC AUTO: 44.2 FL (ref 37–54)
ERYTHROCYTE [DISTWIDTH] IN BLOOD BY AUTOMATED COUNT: 13.4 % (ref 12.3–15.4)
HCT VFR BLD AUTO: 32.2 % (ref 34–46.6)
HGB BLD-MCNC: 10.4 G/DL (ref 12–15.9)
IRON 24H UR-MRATE: 64 MCG/DL (ref 37–145)
IRON SATN MFR SERPL: 14 % (ref 20–50)
LEAD BLDV-MCNC: 1.7 UG/DL (ref 0–3.4)
MCH RBC QN AUTO: 29 PG (ref 26.6–33)
MCHC RBC AUTO-ENTMCNC: 32.3 G/DL (ref 31.5–35.7)
MCV RBC AUTO: 89.7 FL (ref 79–97)
MERCURY BLD-MCNC: <1 UG/L (ref 0–14.9)
PLATELET # BLD AUTO: 191 10*3/MM3 (ref 140–450)
PMV BLD AUTO: 9.3 FL (ref 6–12)
RBC # BLD AUTO: 3.59 10*6/MM3 (ref 3.77–5.28)
TIBC SERPL-MCNC: 459 MCG/DL (ref 298–536)
TRANSFERRIN SERPL-MCNC: 308 MG/DL (ref 200–360)
WBC NRBC COR # BLD AUTO: 7.81 10*3/MM3 (ref 3.4–10.8)
WNV IGG SER QL IA: NEGATIVE
WNV IGM SER QL IA: NEGATIVE

## 2024-08-20 PROCEDURE — 84466 ASSAY OF TRANSFERRIN: CPT

## 2024-08-20 PROCEDURE — 99495 TRANSJ CARE MGMT MOD F2F 14D: CPT | Performed by: FAMILY MEDICINE

## 2024-08-20 PROCEDURE — 1126F AMNT PAIN NOTED NONE PRSNT: CPT | Performed by: FAMILY MEDICINE

## 2024-08-20 PROCEDURE — 1159F MED LIST DOCD IN RCRD: CPT | Performed by: FAMILY MEDICINE

## 2024-08-20 PROCEDURE — 83540 ASSAY OF IRON: CPT

## 2024-08-20 PROCEDURE — 85027 COMPLETE CBC AUTOMATED: CPT

## 2024-08-20 PROCEDURE — 36415 COLL VENOUS BLD VENIPUNCTURE: CPT

## 2024-08-20 PROCEDURE — 3077F SYST BP >= 140 MM HG: CPT | Performed by: FAMILY MEDICINE

## 2024-08-20 PROCEDURE — 1111F DSCHRG MED/CURRENT MED MERGE: CPT | Performed by: FAMILY MEDICINE

## 2024-08-20 PROCEDURE — 3080F DIAST BP >= 90 MM HG: CPT | Performed by: FAMILY MEDICINE

## 2024-08-20 PROCEDURE — 1160F RVW MEDS BY RX/DR IN RCRD: CPT | Performed by: FAMILY MEDICINE

## 2024-08-20 NOTE — PROGRESS NOTES
Odalis Espino presents to [facility].    Chief Complaint: Follow-up for recent lung biopsy and hospitalization    Subjective     History of Present Illness:  HPI    Date of Admission: 8/14/2024  Date of Discharge:  8/16/2024     New onset seizure disorder  Left upper lobe nodule  Essential hypertension  Dyslipidemia  Diabetes mellitus  CKD stage IIIb  Anxiety  COPD       Hospital Course:  I am seeing Amaris today to follow-up for her recent biopsy and robotic bronchoscopy with EBUS due to an upper lung left nodule and a PET scan positive for a left hyper metabolic nodule.  She did have a lot of bleeding postbiopsy and is stable now.  Her hemoglobin dropped from 10 to 8.  She was sent home and while at home she bent over to  a phone and fell back and lost consciousness.  She was brought back to the emergency room for further evaluation hospitalized for further monitoring and management, neurology consulted, EEG ordered.  CT and MRI brain showed microvascular ischemic disease.  EEG showed independent medium voltage dig focal slow activity noted in either the frontotemporal region left greater than right, with some diffuse encephalopathic changes.  No epileptiform discharges.  Patient kept on Keppra and discharged home.  She is to follow-up with neurology later this week.  Of note her blood sugar was very high while in the hospital.  She is diabetic.  She is on metformin 500 mg 2 pills daily and I change this to 1 pill twice a day.  She is also on a low carbohydrate diet.  Her latest hemoglobin A1c was 6.7%.  Chantix was stopped because of current concerns that this could have caused a seizure.  Of note she is on gabapentin 600 mg 3 times a day for peripheral neuropathy pain and her pain is well-controlled with this medication.  Her blood pressure was elevated today and she is to monitor home blood pressures.  She is currently on Norvasc without any signs of lower extremity edema.   She denies chest pain or shortness of air.  She has hypothyroid disease and is stable on levothyroxine and her TSH levels in the hospital were WNL.  She is on Lipitor for hypercholesterolemia and tolerates med well      Her EEG was abnormal and she was initiated by Dr. Rubin on Keppra 500 mg twice a day.  She continued her other medications including her albuterol inhaler, amlodipine 5 mg daily, aspirin 81 mg daily, Lipitor 20 mg daily, gabapentin 600 mg 3 times a day, HCTZ 12.5 mg daily, hydrocodone 10/325 4 times daily as needed, hydroxyzine 25 mg 3 times daily as needed, levothyroxine 112 mcg daily, meloxicam 15 mg daily, metformin 500 mg twice daily, omeprazole 20 mg daily, sertraline 100 mg daily, Stellato inhaler, vitamin D B12.    Her Chantix was stopped due to concerns with a seizure    Her BS are running under good control on metformin 500 mg bid      PROCEDURES:   EEG  Result Date: 8/15/2024  Table formatting from the original result was not included. Images from the original result were not included. [address] [place] [address] [phone] ELECTROENCEPHALOGRAPHIC REPORT Patient: Odalis Espino EEG # :    [facility]- : 1950  ID:      [id number]    Age: 74 y.o. female    Primary  Physician: Jose Mclean MD Technician: Saige Ramirez Ordering  Physician: Andre Juarez MD    Recording Date: August 15, 2024 Report  Date: 8/15/2024     History:  Seizures Medications: Albuterol inhaler, Norvasc, aspirin, Lipitor, Neurontin, hydrochlorothiazide, Norco, Vistaril, Synthroid, Mobic, Glucophage, Prilosec, Zoloft, Respimat, Chantix, and vitamin B12. Reading: The EEG was obtained portable in her room at which time she was lethargic and on photic stimulation with video monitoring.  We do not know how much sleep she has had the night before the testing. The dominant background activity consists of symmetric and irregular 20 to 65 µV 10 cps which were prominent on both parieto-occipital  and temporal regions.  The background activity was mixed with moderate diffuse symmetric irregular 30 to 135 µV 2 to 4 cps waves which are prominent on both frontocentral regions.There were intermittent and independent 30 to 90 µV focal slow activity noted on either frontotemporal region, left greater than right.  There were no discernible responses on photic stimulation various frequencies.  There was no amplitude asymmetry.  No paroxysmal discharges. Impression: Abnormal EEG because of: 1.  Independent medium voltage focal slow activity noted on either frontotemporal region, left greater than right. 2.  Excessive slow activity compatible with mild diffuse encephalopathy. 3.  There were no epileptiform discharges noted today. 4.  Neuroradiographic imaging may be of some help to rule out pathology in both sides of the brain more on the left. Electronically signed by Daksha Rubin Jr., MD      MRI Brain Without Contrast  Result Date: 8/15/2024  MRI BRAIN WO CONTRAST Date of Exam: 8/15/2024 6:14 PM EDT Indication: Seizure disorder.  Comparison: CT head 8/14/2024 Technique:  Routine multiplanar/multisequence sequence images of the brain were obtained without contrast administration. Findings: Diffusion weighted imaging demonstrates no acute restriction abnormality. Midline structures of the brain are grossly unremarkable in appearance. Pituitary and sella structures and craniocervical junction are grossly unremarkable. The ventricles, cisterns and sulci demonstrate mild diffuse atrophy not unexpected for age. No suspicious extra-axial fluid collections are noted. Severe patchy and confluent FLAIR and T2 signal hyperintensity noted within the supratentorial white matter  is compatible with sequela small vessel ischemic changes in this age group. Sequela of demyelinating disease also differential consideration among other etiologies. FLAIR and T2 signal hyperintensities within the merle and midbrain are most  compatible with sequela small vessel ischemic disease. No acute intracranial hemorrhage or mass effect is noted. Major intracranial flow voids appear grossly patent on limited imaging mildly coastal thickening of the paranasal sinuses. Mastoid air cells are clear. The on the lenses noted within the globes bilaterally   Impression: 1. No acute ischemic change 2. Extensive white matter changes most compatible small vessel ischemic disease in this age group Electronically Signed: Mesfin Bradshaw MD  8/15/2024 7:28 PM EDT  Workstation ID: [id number]     CT Head Without Contrast  Result Date: 8/14/2024  CT HEAD WO CONTRAST Date of Exam: 8/14/2024 10:23 PM EDT Indication: Seizure headache. Comparison: None available. Technique: Axial CT images were obtained of the head without contrast administration.  Reconstructed coronal and sagittal images were also obtained. Automated exposure control and iterative construction methods were used. Findings: There is no evidence of hemorrhage. There is no mass effect or midline shift. Age-related involutional changes are visualized. Bilateral periventricular white matter hypodensities are visualized compatible with changes of chronic microvascular ischemia. There is no extra-axial collections. Ventricles are normal in size and configuration for patient's stated age. Posterior fossa is within normal limits. Calvarium and skull base appear intact. Visualized sinuses show no air fluid levels. The mastoid air cells are clear. Visualized orbits are unremarkable.   Impression: No acute intracranial process evident. Age-related involutional changes and findings compatible with changes of chronic microvascular ischemia. Electronically Signed: Som Bravo MD  8/14/2024 10:44 PM EDT  Workstation ID: [id number]     XR Chest 1 View  Result Date: 8/14/2024  XR CHEST 1 VW Date of Exam: 8/14/2024 4:32 PM EDT Indication: Post Bronchoscopy Comparison: None available. Findings: Moderate-sized left upper  lobe airspace disease is visualized. There is suggestion of emphysematous changes. Right upper lobe nodule measures 4 mm. There is no evidence of pneumothorax.  The pulmonary vasculature appears within normal limits.  The cardiac and mediastinal silhouette appear unremarkable.  No acute osseous abnormality identified.   Impression: Findings compatible with left upper lobe pneumonia. Electronically Signed: Som Bravo MD  8/14/2024 4:40 PM EDT  Workstation ID: [id number]     FL Surgery Fluoro  Result Date: 8/14/2024  This procedure was auto-finalized with no dictation required.     NM PET/CT Skull Base to Mid Thigh  Result Date: 7/31/2024  NM PET/CT SKULL BASE TO MID THIGH Date of Exam: 7/31/2024 7:58 AM EDT Indication: abnormal CT of chest. Comparison: None available. Technique:  PET/CT Head to Mid Thigh imaging was performed with positron emission tomography (PET with concurrently acquired computed tomography (CT) for attenuation correction and anatomical localization); field of view imaged was the skull base to midthigh.  Images were obtained after one hour of equilibrium. RADIONUCLITIDE: 11.4 MCI    F18 FDG - LV. Blood Glucose 163 mg/dl Uptake Time: 47 min. Mediastinal background Uptake: SUV max. 2.3 SUV Normalization method: Body weight FINDINGS:  Patient's head is rotated. There are vascular calcifications. There is increased diffuse metabolic activity in the thyroid which appears mildly enlarged. There is a focus of metabolic activity adjacent to the left humeral head more likely inflammatory. No hypermetabolic mediastinal or hilar adenopathy. There is moderate vascular calcifications. Coronary artery calcifications. On image 94 there is a 0.8 cm nodule adjacent to the major fissure in the left upper lobe max SUV 0.5. On image 86 there is a 1.1 cm left upper lobe nodule adjacent to the major fissure max SUV 3.7. On image 79 there is a small nodular density in the left upper lobe measuring 0.4 cm max SUV 0.9.  Small 3 mm subpleural right lower lobe nodule on image 107 Max SUV 1.2 with similar to adjacent atelectatic change. Mild emphysematous changes. There are a few granulomas in the spleen. No suspicious adrenal findings. No obstructive uropathy is seen. Mild diverticulosis. There is no enlarged adenopathy. No definite abnormal metabolic activity. Postoperative changes seen in the lower lumbar spine with laminectomy at L4 and L5. There is hypermetabolic activity at these levels. There is additional hypermetabolic activity in the facets and spinous process of L5 max SUV about 3.2. Degenerative changes in the spine. There is dextrocurvature in the upper lumbar spine.   1.1 cm left upper lobe lung nodule on image 86 is enlarging and hypermetabolic max SUV 3.7 most likely neoplasm. Recommend biopsy. 0.8 cm left upper lobe nodule not hypermetabolic. This nodule has been stable. Several other smaller nodules in the left upper lobe and right lower lobe are too small to characterize. Recommend continued follow-up. Diffuse thyroid activity likely thyroiditis. Postoperative changes in the lower lumbar spine with previous laminectomy at L4 and L5 on the right. There is metabolic activity in this region. Additional metabolic activity in the facets and spinous process of L5 max SUV 3.2. Inflammatory activity is favored over neoplasm. Electronically Signed: Guevara Sotelo MD  7/31/2024 4:37 PM EDT  Workstation ID: [id number]      Labs in the hospital showed a white blood cell count of 4.96, hemoglobin of 8.6 and hematocrit of 26.1, platelets 132, sodium 141, potassium 3.1, creatinine 1.0, GFR 58.5, glucose 111, calcium 8.8, magnesium 1.4, LFTs normal, cholesterol 149, LDL 68, HDL 67, triglycerides 68.  Microbiology lung results negative for Legionella, respiratory culture scant growth 1+ normal respiratory talia, AFB stain negative, BAL culture negative, pneumonia panel negative    Adult Transthoracic Echo Complete W/ Cont if  Necessary Per Protocol     Interpretation Summary    Left ventricular ejection fraction appears to be 61 - 65%.    Left ventricular wall thickness is consistent with septal asymmetric hypertrophy.    Left ventricular diastolic function is consistent with (grade I) impaired relaxation and age.    No significant valvular disease.    LA NENA In process      Aspergillus Antibodies In process     Babeisa microtic+Ehrlichia/PCR In process     Bartonella Antibody Panel In process     EBV Ab Panel In process     EBV Antibody VCA, IgG In process     Juan-Barr Virus Early Antigen Antibody, IgG In process     Juan-Barr Virus Nuclear Antigen Antibody, IgG In process     Juan-Barr Virus VCA, IgM In process     Heavy Metals, Blood In process     Lyme Disease, PCR - Blood, Arm, Left In process     SARS-CoV-2 Antibodies, Nucleocapsid (Natural Immunity) In process     SARS-CoV-2 Semi-Quantitative IgG Antibody, Anil (Vaccine Immunity) In process     Spotted Fever Group AB, IgG/IgM In process     Stachybotrys Chartarum IgE In process     West Nile Antibodies, IgG & IgM In process         Result Review   The following data was reviewed by Karina Langford MD on 08/20/2024.  Lab Results   Component Value Date    WBC 7.81 08/20/2024    HGB 10.4 (L) 08/20/2024    HCT 32.2 (L) 08/20/2024    MCV 89.7 08/20/2024     08/20/2024     Lab Results   Component Value Date    GLUCOSE 111 (H) 08/16/2024    BUN 15 08/16/2024    CREATININE 1.00 08/16/2024     08/16/2024    K 3.1 (L) 08/16/2024     08/16/2024    CALCIUM 8.8 08/16/2024    PROTEINTOT 6.4 08/16/2024    ALBUMIN 3.4 (L) 08/16/2024    ALT 5 08/16/2024    AST 14 08/16/2024    ALKPHOS 63 08/16/2024    BILITOT 0.4 08/16/2024    GLOB 3.7 08/14/2024    AGRATIO 1.1 08/14/2024    BCR 15.0 08/16/2024    ANIONGAP 10.1 08/16/2024    EGFR 58.5 (L) 08/16/2024     Lab Results   Component Value Date    CHOL 149 08/12/2024    CHLPL 164 12/17/2020    TRIG 68 08/12/2024    HDL 67  "(H) 08/12/2024    LDL 68 08/12/2024     Lab Results   Component Value Date    TSH 0.588 08/12/2024     Lab Results   Component Value Date    HGBA1C 6.70 (H) 08/12/2024     No results found for: \"PSA\"  Lab Results   Component Value Date    IRON 46 05/20/2024      Lab Results   Component Value Date    KWLH92GN 22.1 (L) 08/12/2024               Assessment and Plan:   Diagnoses and all orders for this visit:    1. Seizure (Primary)  Comments:  cont keppra and f/u with neurology as directed    2. Type 2 diabetes mellitus with diabetic mononeuropathy, without long-term current use of insulin  Comments:  Change metformin from 500 mg 2 daily to 500 mg twice daily.  Hemoglobin A1c is good.  No changes in current meds Tx plan.  Orders:  -     metFORMIN (GLUCOPHAGE) 500 MG tablet; Take 1 tablet by mouth 2 (Two) Times a Day With Meals. INSTRUCTED PER ANESTHESIA PROTOCOL NONE AFTER 6 PM ON 12/14/23  Dispense: 180 tablet; Refill: 0    3. Iron deficiency anemia secondary to inadequate dietary iron intake  Comments:  Will start her on iron because she has a history of iron deficiency and loss of blood with recent biopsy.    4. Stage 3b chronic kidney disease (CKD)  Comments:  Kidney function is stable.    5. Acute blood loss anemia  Comments:  Will start her on iron supplementation x 1 month and recheck labs in 1 month  Orders:  -     CBC No Differential; Future  -     Iron Profile; Future    6. Anxiety  Comments:  Anxiety is worse but she states she is overall stable on Zoloft and defers changing medication or participating in therapy at this time.  Good family support    7. Acquired hypothyroidism  Comments:  Stable on levothyroxine.  No changes needed current meds or Tx plan    8. Gastroesophageal reflux disease without esophagitis  Comments:  Stable on Prilosec.  She is to avoid spicy and acidic foods in diet    9. Pure hypercholesterolemia  Comments:  Stable on Lipitor and tolerates med well              Objective " "    Medications:  Current Outpatient Medications   Medication Instructions    albuterol sulfate  (90 Base) MCG/ACT inhaler 1 puff, Inhalation, Every 4 Hours PRN, Refill needed.     amLODIPine (NORVASC) 5 mg, Oral, Daily    aspirin 81 mg, Oral, Daily    atorvastatin (LIPITOR) 20 mg, Oral, Nightly    gabapentin (NEURONTIN) 600 mg, Oral, 3 Times Daily    hydroCHLOROthiazide 12.5 mg, Oral, Daily    HYDROcodone-acetaminophen (NORCO)  MG per tablet 1 tablet, Oral, Every 6 Hours PRN    hydrOXYzine pamoate (VISTARIL) 25 mg, Oral, 3 Times Daily PRN    levETIRAcetam (KEPPRA) 500 mg, Oral, Every 12 Hours Scheduled    levothyroxine (SYNTHROID, LEVOTHROID) 112 mcg, Oral, Daily    meloxicam (MOBIC) 15 mg, Oral, Daily    metFORMIN (GLUCOPHAGE) 500 mg, Oral, 2 Times Daily With Meals, INSTRUCTED PER ANESTHESIA PROTOCOL NONE AFTER 6 PM ON 12/14/23    omeprazole (PRILOSEC) 20 mg, Oral, Daily    sertraline (ZOLOFT) 100 mg, Oral, Daily    Stiolto Respimat 2.5-2.5 MCG/ACT aerosol solution inhaler 2 puffs, Inhalation, Daily    vitamin B-12 (CYANOCOBALAMIN) 1,000 mcg, Oral, Daily        Vital Signs:   /90 (BP Location: Right arm, Patient Position: Sitting, Cuff Size: Adult)   Pulse 66   Temp 98.3 °F (36.8 °C) (Oral)   Ht 165.1 cm (65\")   Wt 58.2 kg (128 lb 3.2 oz)   SpO2 96%   BMI 21.33 kg/m²     BMI is within normal parameters. No other follow-up for BMI required.       Physical Exam:  Physical Exam  Vitals and nursing note reviewed.   Constitutional:       General: She is not in acute distress.     Appearance: Normal appearance. She is not ill-appearing, toxic-appearing or diaphoretic.   HENT:      Head: Normocephalic and atraumatic.      Right Ear: Tympanic membrane, ear canal and external ear normal.      Left Ear: Tympanic membrane, ear canal and external ear normal.      Nose: Nose normal. No congestion or rhinorrhea.      Mouth/Throat:      Pharynx: Oropharynx is clear. No oropharyngeal exudate or posterior " oropharyngeal erythema.   Eyes:      Extraocular Movements: Extraocular movements intact.      Conjunctiva/sclera: Conjunctivae normal.      Pupils: Pupils are equal, round, and reactive to light.   Cardiovascular:      Rate and Rhythm: Normal rate.      Pulses: Normal pulses.      Heart sounds: No murmur heard.  Pulmonary:      Effort: Pulmonary effort is normal. No respiratory distress.      Breath sounds: Normal breath sounds. No stridor. No wheezing, rhonchi or rales.   Abdominal:      General: Abdomen is flat.      Palpations: Abdomen is soft.      Tenderness: There is no abdominal tenderness.   Musculoskeletal:         General: Normal range of motion.      Cervical back: Normal range of motion and neck supple. No rigidity.   Lymphadenopathy:      Cervical: No cervical adenopathy.   Skin:     General: Skin is warm and dry.      Capillary Refill: Capillary refill takes less than 2 seconds.   Neurological:      General: No focal deficit present.      Mental Status: She is alert and oriented to person, place, and time.      Cranial Nerves: No cranial nerve deficit.      Sensory: No sensory deficit.      Motor: No weakness.      Gait: Gait normal.      Deep Tendon Reflexes: Reflexes normal.   Psychiatric:         Mood and Affect: Mood normal.         Behavior: Behavior normal.         Thought Content: Thought content normal.         Judgment: Judgment normal.           Review of Systems:  Review of Systems   Constitutional:  Positive for fatigue. Negative for chills and fever.   HENT:  Negative for ear pain, sinus pressure and sore throat.    Eyes:  Negative for blurred vision and double vision.   Respiratory:  Negative for cough, shortness of breath and wheezing.    Cardiovascular:  Negative for chest pain, palpitations and leg swelling.   Gastrointestinal:  Negative for abdominal pain, blood in stool, constipation, diarrhea, nausea and vomiting.   Skin:  Negative for rash.   Neurological:  Negative for dizziness  and headache.   Psychiatric/Behavioral:  Negative for sleep disturbance, suicidal ideas and depressed mood. The patient is not nervous/anxious.               Follow Up   Return if symptoms worsen or fail to improve.    Part of this note may be an electronic transcription/translation of spoken language to printed   text using the Dragon Dictation System.            Medical History:  Medications Discontinued During This Encounter   Medication Reason    metFORMIN (GLUCOPHAGE) 500 MG tablet Reorder      Past Medical History:    Anxiety    Arthritis    Centrilobular emphysema    Coronary artery calcification    E MICHAEL W/MONNIN. INSTRUCTE TO F/U PRN    Diabetes mellitus    AVERAGE 'S    Emphysema of lung    None    GERD (gastroesophageal reflux disease)    Hepatitis    REPORTS OVER 30 YEARS AGO HAD TREATMENT AND DENIES ANY CURRENT ISSUES    HL (hearing loss)    Hyperlipidemia    Hypertension    Hypothyroidism    Lung nodule    (-)  ION BRONCH DONE 9/2023    Pain management    [facility]    Pulmonary arterial hypertension    Spinal stenosis    Thyroiditis    DENIES ANY CURRENT ISSUES    Visual impairment     Past Surgical History:    BRONCHOSCOPY WITH ION ROBOTIC ASSIST    Procedure: BRONCHOSCOPY WITH ION ROBOT, REBUS, EBUS, NEEDLE ASPIRATE, BIOPSIES, WASHINGS, BRUSHINGS, BAL;  Surgeon: Chiki Morse MD;  Location: [facility];  Service: Robotics - Pulmonary;  Laterality: N/A;    BRONCHOSCOPY WITH ION ROBOTIC ASSIST    Procedure: BRONCHOSCOPY WITH ION ROBOT REBUS, EBUS, BIOPSIES, NEEDLE ASPIRATES, BAL, AND WASHINGS;  Surgeon: Chiki Morse MD;  Location: [facility] OR;  Service: Robotics - Pulmonary;  Laterality: N/A;    EYE SURGERY    HYSTERECTOMY    LUMBAR LAMINECTOMY    Procedure: MINIMALLY INVASIVE LUMBAR LAMINECTOMY, right approach, lumbar 4-lumbar  5 and lumbar 5-sacral 1;  Surgeon: Garry Ford MD;  Location: [facility] OR;  Service: Neurosurgery;  Laterality: Right;    LUNG BIOPSY    TONSILLECTOMY       Family History   Problem Relation Age of Onset    Anxiety disorder Mother     Cancer Father     Cancer Sister     Diabetes Brother     Diabetes Brother     Arthritis Daughter     Asthma Other     Seizures Other     Malig Hyperthermia Neg Hx      Social History     Tobacco Use    Smoking status: Every Day     Current packs/day: 0.25     Average packs/day: 0.5 packs/day for 95.5 years (50.1 ttl pk-yrs)     Types: Cigarettes     Start date: 1966    Smokeless tobacco: Never    Tobacco comments:     Really don't remember when I started, cutting back did not smoke this am   Substance Use Topics    Alcohol use: Not Currently     Alcohol/week: 1.0 standard drink of alcohol     Types: 1 Glasses of wine per week     Comment: occasional       Health Maintenance Due   Topic Date Due    COVID-19 Vaccine (6 - 2023-24 season) 10/03/2023    DIABETIC EYE EXAM  08/04/2024    INFLUENZA VACCINE  08/01/2024        Immunization History   Administered Date(s) Administered    COVID-19 (MODERNA) 1st,2nd,3rd Dose Monovalent 03/17/2021, 04/15/2021, 11/03/2021    COVID-19 (PFIZER) BIVALENT 12+YRS 08/08/2023    Covid-19 (Pfizer) Gray Cap Monovalent 07/14/2022    Flu Vaccine Quad PF >36MO 10/09/2018    Fluzone (or Fluarix & Flulaval for VFC) >6mos 10/09/2018    Fluzone High-Dose 65+YRS 11/13/2017, 07/17/2023    Fluzone High-Dose 65+yrs 11/11/2021, 01/09/2024    Pneumococcal Conjugate 13-Valent (PCV13) 10/18/2018    Pneumococcal Polysaccharide (PPSV23) 01/01/2016    Shingrix 07/17/2023    Tdap 07/17/2023       Allergies   Allergen Reactions    Azithromycin Hives    Breztri Aerosphere [Budeson-Glycopyrrol-Formoterol] Other (See Comments)     Pt stated makes her light headed and she felt dizzy after using Breztri.

## 2024-08-21 LAB
FUNGUS WND CULT: NORMAL
MYCOBACTERIUM SPEC CULT: NORMAL
NIGHT BLUE STAIN TISS: NORMAL
NIGHT BLUE STAIN TISS: NORMAL

## 2024-08-22 LAB — S CHARTARUM IGE QN: <0.1 KU/L

## 2024-08-23 ENCOUNTER — OFFICE VISIT (OUTPATIENT)
Dept: PULMONOLOGY | Facility: CLINIC | Age: 74
End: 2024-08-23
Payer: MEDICARE

## 2024-08-23 VITALS
SYSTOLIC BLOOD PRESSURE: 134 MMHG | DIASTOLIC BLOOD PRESSURE: 74 MMHG | HEART RATE: 69 BPM | RESPIRATION RATE: 16 BRPM | OXYGEN SATURATION: 92 % | BODY MASS INDEX: 21.33 KG/M2 | HEIGHT: 65 IN | WEIGHT: 128 LBS | TEMPERATURE: 97.1 F

## 2024-08-23 DIAGNOSIS — J43.2 CENTRILOBULAR EMPHYSEMA: ICD-10-CM

## 2024-08-23 DIAGNOSIS — Z72.0 TOBACCO ABUSE: ICD-10-CM

## 2024-08-23 DIAGNOSIS — R91.1 LUNG NODULE: Primary | ICD-10-CM

## 2024-08-23 PROCEDURE — 1159F MED LIST DOCD IN RCRD: CPT | Performed by: NURSE PRACTITIONER

## 2024-08-23 PROCEDURE — 99214 OFFICE O/P EST MOD 30 MIN: CPT | Performed by: NURSE PRACTITIONER

## 2024-08-23 PROCEDURE — 3075F SYST BP GE 130 - 139MM HG: CPT | Performed by: NURSE PRACTITIONER

## 2024-08-23 PROCEDURE — 3078F DIAST BP <80 MM HG: CPT | Performed by: NURSE PRACTITIONER

## 2024-08-23 PROCEDURE — 1160F RVW MEDS BY RX/DR IN RCRD: CPT | Performed by: NURSE PRACTITIONER

## 2024-08-23 NOTE — PROGRESS NOTES
Primary Care Provider  Karina Langford MD   Referring Provider  No ref. provider found    Patient Complaint  Follow-up and Cough      Subjective       History of Presenting Illness  Odalis Espino is a pleasant 74 y.o. female who presents to [facility] for follow-up appointment.  Patient here with daughter.  Patient underwent bronchoscopy on 8/14/2024 for upper lobe left nodule and positive hypermetabolic COPD on PET scan.  She states she had a lot of bleeding postbiopsy Her hemoglobin dropped from 10 to 8.  She was sent home and while at home she bent over to  a phone and fell back and lost consciousness.  She was brought back to the emergency room for further evaluation hospitalized for further monitoring and management, neurology consulted, EEG ordered.  CT and MRI brain showed microvascular ischemic disease.  EEG showed independent medium voltage dig focal slow activity noted in either the frontotemporal region left greater than right, with some diffuse encephalopathic changes.  No epileptiform discharges.  Patient kept on Keppra and discharged home.  She is to follow-up with neurology.     Bronchoscopy cytology and pathology were negative for malignant cells.  Pneumonia panel was normal respiratory culture, bronchial wash with BAL differential BAL culture all negative.  Fungal culture and AFB no growth to date.    Patient denies dyspnea, coughing, wheezing, headaches, chest pain, weight loss or hemoptysis. Patient denies fevers, chills and night sweats. Odalis Espino is able to perform ADLs without difficulties.    I have personally reviewed the review of systems, past family, social, medical and surgical histories; and agree with their findings.      Review of Systems   Constitutional: Negative.    HENT: Negative.     Respiratory: Negative.     Cardiovascular: Negative.    Musculoskeletal: Negative.    Neurological: Negative.     Psychiatric/Behavioral: Negative.           Family History   Problem Relation Age of Onset    Anxiety disorder Mother     Cancer Father     Cancer Sister     Diabetes Brother     Diabetes Brother     Arthritis Daughter     Asthma Other     Seizures Other     Malig Hyperthermia Neg Hx         Social History     Socioeconomic History    Marital status:    Tobacco Use    Smoking status: Every Day     Current packs/day: 0.25     Average packs/day: 0.5 packs/day for 95.5 years (50.1 ttl pk-yrs)     Types: Cigarettes     Start date: 1964    Smokeless tobacco: Never    Tobacco comments:     Really don't remember when I started, cutting back did not smoke this am   Vaping Use    Vaping status: Never Used   Substance and Sexual Activity    Alcohol use: Not Currently     Alcohol/week: 1.0 standard drink of alcohol     Types: 1 Glasses of wine per week     Comment: occasional    Drug use: Never    Sexual activity: Defer        Past Medical History:   Diagnosis Date    Anxiety     Arthritis     Centrilobular emphysema     Coronary artery calcification     E MICHAEL W/MONNIN. INSTRUCTE TO F/U PRN    Diabetes mellitus     AVERAGE 'S    Emphysema of lung Aug 23    None    GERD (gastroesophageal reflux disease)     Hepatitis     REPORTS OVER 30 YEARS AGO HAD TREATMENT AND DENIES ANY CURRENT ISSUES    HL (hearing loss)     Hyperlipidemia     Hypertension     Hypothyroidism     Lung nodule     (-)  ION BRONCH DONE 9/2023    Pain management     Critical access hospital    Pulmonary arterial hypertension Aug 23    Spinal stenosis     Thyroiditis     DENIES ANY CURRENT ISSUES    Visual impairment         Immunization History   Administered Date(s) Administered    COVID-19 (MODERNA) 1st,2nd,3rd Dose Monovalent 03/17/2021, 04/15/2021, 11/03/2021    COVID-19 (PFIZER) BIVALENT 12+YRS 08/08/2023    Covid-19 (Pfizer) Gray Cap Monovalent 07/14/2022    Flu Vaccine Quad PF >36MO 10/09/2018    Fluzone (or Fluarix & Flulaval for VFC) >6mos  10/09/2018    Fluzone High-Dose 65+YRS 11/13/2017, 07/17/2023    Fluzone High-Dose 65+yrs 11/11/2021, 01/09/2024    Pneumococcal Conjugate 13-Valent (PCV13) 10/18/2018    Pneumococcal Polysaccharide (PPSV23) 01/01/2016    Shingrix 07/17/2023    Tdap 07/17/2023       Allergies   Allergen Reactions    Azithromycin Hives    Breztri Aerosphere [Budeson-Glycopyrrol-Formoterol] Other (See Comments)     Pt stated makes her light headed and she felt dizzy after using Breztri.           Current Outpatient Medications:     albuterol sulfate  (90 Base) MCG/ACT inhaler, Inhale 1 puff Every 4 (Four) Hours As Needed for Wheezing or Shortness of Air. Refill needed., Disp: 18 g, Rfl: 2    amLODIPine (NORVASC) 5 MG tablet, Take 1 tablet by mouth Daily., Disp: 90 tablet, Rfl: 1    aspirin 81 MG EC tablet, Take 1 tablet by mouth Daily., Disp: , Rfl:     atorvastatin (LIPITOR) 20 MG tablet, Take 1 tablet by mouth Every Night., Disp: 90 tablet, Rfl: 1    gabapentin (NEURONTIN) 600 MG tablet, Take 1 tablet by mouth 3 (Three) Times a Day., Disp: 270 tablet, Rfl: 0    hydroCHLOROthiazide 12.5 MG tablet, Take 1 tablet by mouth Daily., Disp: 90 tablet, Rfl: 0    HYDROcodone-acetaminophen (NORCO)  MG per tablet, Take 1 tablet by mouth Every 6 (Six) Hours As Needed for Moderate Pain., Disp: , Rfl:     hydrOXYzine pamoate (VISTARIL) 25 MG capsule, Take 1 capsule by mouth 3 (Three) Times a Day As Needed for Itching or Anxiety., Disp: 90 capsule, Rfl: 0    levETIRAcetam (KEPPRA) 500 MG tablet, Take 1 tablet by mouth Every 12 (Twelve) Hours for 30 days., Disp: 60 tablet, Rfl: 0    levothyroxine (SYNTHROID, LEVOTHROID) 112 MCG tablet, Take 1 tablet by mouth Daily., Disp: 90 tablet, Rfl: 1    meloxicam (MOBIC) 15 MG tablet, Take 1 tablet by mouth Daily., Disp: 90 tablet, Rfl: 0    metFORMIN (GLUCOPHAGE) 500 MG tablet, Take 1 tablet by mouth 2 (Two) Times a Day With Meals. INSTRUCTED PER ANESTHESIA PROTOCOL NONE AFTER 6 PM ON 12/14/23,  "Disp: 180 tablet, Rfl: 0    omeprazole (priLOSEC) 20 MG capsule, Take 1 capsule by mouth Daily., Disp: 90 capsule, Rfl: 1    sertraline (ZOLOFT) 100 MG tablet, Take 1 tablet by mouth Daily., Disp: 90 tablet, Rfl: 0    Stiolto Respimat 2.5-2.5 MCG/ACT aerosol solution inhaler, Inhale 2 puffs Daily., Disp: 4 g, Rfl: 11    vitamin B-12 (CYANOCOBALAMIN) 1000 MCG tablet, Take 1 tablet by mouth Daily., Disp: , Rfl:          Vital Signs   /74 (BP Location: Right arm, Patient Position: Sitting, Cuff Size: Adult)   Pulse 69   Temp 97.1 °F (36.2 °C)   Resp 16   Ht 165.1 cm (65\")   Wt 58.1 kg (128 lb)   SpO2 92% Comment: room air  BMI 21.30 kg/m²       Objective     Physical Exam  Vitals reviewed.   Constitutional:       Appearance: Normal appearance.   HENT:      Head: Normocephalic and atraumatic.      Nose: Nose normal.      Mouth/Throat:      Mouth: Mucous membranes are moist.      Pharynx: Oropharynx is clear.   Eyes:      Extraocular Movements: Extraocular movements intact.      Conjunctiva/sclera: Conjunctivae normal.      Pupils: Pupils are equal, round, and reactive to light.   Cardiovascular:      Rate and Rhythm: Normal rate and regular rhythm.      Pulses: Normal pulses.      Heart sounds: Normal heart sounds.   Pulmonary:      Effort: Pulmonary effort is normal.      Breath sounds: Normal breath sounds.   Abdominal:      General: Bowel sounds are normal.   Musculoskeletal:         General: Normal range of motion.      Cervical back: Normal range of motion and neck supple.   Skin:     General: Skin is warm and dry.   Neurological:      Mental Status: She is alert and oriented to person, place, and time.   Psychiatric:         Behavior: Behavior normal.         Results Review  I have personally reviewed the prior office notes, hospital records, labs, and diagnostics.  Tissue Pathology Exam: [id number]  Order: [id number]  Status: Final result       Visible to patient: Yes (not seen)       Next appt: Today " "at 02:00 PM in Pulmonology (Estela Ramirez, B[facility])       Dx: Lung nodule; Abnormal CT scan of lung    Specimen Information: Lung, Left Upper Lobe; Tissue   0 Result Notes      Component    Case Report   Surgical Pathology Report                         Case: [id number]                                   Authorizing Provider:  Chiki Morse MD         Collected:           08/14/2024 01:10 PM           Ordering Location:     [facility] MAIN Received:            08/15/2024 10:14 AM                                  OR                                                                           Pathologist:           El Crocker MD                                                             Specimen:    Lung, Left Upper Lobe, LAURA BIOPSIES                                                        Clinical Information    Lung nodule  Abnormal CT scan of lung   Final Diagnosis   Lung, left upper lobe, biopsy:               - Negative for malignant cells   Electronically signed by El Crocker MD on 8/17/2024 at 1448   Comment    Please also see correlating cytology case [id number].   Gross Description    1. Lung, Left Upper Lobe.  Received in formalin and labeled \" left upper lobe\" is a 0.7 cm aggregate of tan soft tissue fragments. The specimen is entirely submitted in one cassette.   YUNIER   Special Stains    A special stain for iron is performed to further characterize pigmented macrophages and is positive, consistent with hemosiderin laden macrophages.  The control stains appropriately.   Microscopic Description    Microscopic examination performed.   Resulting Agency [facility]              Specimen Collected: 08/14/24 13:10 EDT Last Resulted: 08/17/24 14:48 EDT      Non-gynecologic Cytology: [id number]  Order: [id number]  Status: Final result       Visible to patient: Yes (not seen)       Next appt: Today at 02:00 PM in Pulmonology (B[facility]BI Bell)       Dx: Lung nodule; Abnormal CT scan of lung    Specimen " Information: C: Mediastinum; Lymph Node    D: Mediastinum; Lymph Node    E: Mediastinum; Lymph Node    F: Lung, Left Upper Lobe; Lavage    [id number] 5: Lung, Left Upper Lobe; Lymph Node   0 Result Notes      Component    Case Report   Medical Cytology Report                           Case: [id number]                                   Authorizing Provider:  Chiki Morse MD         Collected:           08/14/2024 03:31 PM           Ordering Location:     [facility] MAIN Received:            08/15/2024 10:12 AM                                  OR                                                                           Pathologist:           El Crocker MD                                                             Specimens:   1) - Mediastinum, STATION 10R NEEDLE ASPIRATE                                                        2) - Mediastinum, STATION 7 NEEDLE ASPIRATE                                                          3) - Mediastinum, STATION 10L NEEDLE ASPIRATE                                                        4) - Lung, Left Upper Lobe, LAURA BAL                                                                  5) - Lung, Left Upper Lobe, LAURA MASS NEEDLE ASPIRATE - CELL BLOCK                          Final Diagnosis   1. Lymph node, station 10R, FNA:               - Negative for malignant cells               - Negative for granulomas               - Lymphoid tissue present        2. Lymph node, station 7, FNA:               - Negative for malignant cells               - Negative for granulomas               - Lymphoid tissue present        3. Lymph node, station 10L, FNA:               - Negative for malignant cells               - Negative for granulomas               - Lymphoid tissue present        4. Lung, left upper lobe, BAL:               - Negative for malignant cells        5. Lung, left upper lobe mass, FNA:               - Negative for malignant cells      Electronically signed  by El Crocker MD on 8/17/2024 at 1447   Clinical Information    Pre-Operative Diagnosis: Left upper lobe lung nodule, hypermetabolic.     Post-Operative Diagnosis: Same, mildly enlarged mediastinal lymph nodes.   Comment    Please also see correlating biopsy case [id number].   Gross Description    1. Mediastinum.  BFNA, Station 10R Needle Aspirate       43 cc total volume in cytofixative received (1 cytospin prep and a cell block prepared).     2. Mediastinum.  BFNA, Station 7 Needle Aspirate       41 cc total volume in cytofixative received (1 cytospin prep and a cell block prepared).     3. Mediastinum.  BFNA, Station 10L Needle Aspirate       42 cc total volume in cytofixative received (1 cytospin prep and a cell block prepared).     4. Lung, Left Upper Lobe.  BAL, left upper lobe       12 cc of tan, bloody, partially clotted fluid received (1 cytospin prep prepared).     5. Lung, Left Upper Lobe.  Ion-Guided Fine Needle Aspiration, left upper lobe mass       2 rapid Diff-Quik stained slides reviewed in OR by the cytotechnologist for the determination of cellular adequacy.  In addition to the 2 rapid Diff-Quik stained slides, 2 additional pap stained slides were collected in ETOH and a cytofixative vial containing multiple passes for cell block preparation are submitted for Cytology (a cell block is prepared in addition to the 4 total prepared smears).        Cell blocks placed in formalin @ 11:10 on 8/15/24.  LB      Microscopic Description    Microscopic examination performed.   Resulting Agency [facility]              Specimen Collected: 08/14/24 15:17 EDT Last Resulted: 08/17/24 14:47 EDT          Fungus Culture - Lavage, Lung, Left Upper Lobe  Order: [id number]  Status: Preliminary result       Visible to patient: No (not released)       Next appt: Today at 02:00 PM in Pulmonology (B[facility]BI Bell)       Dx: Lung nodule; Abnormal CT scan of lung    Specimen Information: Lung, Left Upper Lobe; Lavage    0 Result Notes  Fungus Culture No fungus isolated at 1 week           Resulting Agency: [facility]           Specimen Collected: 08/14/24 15:17 EDT Last Resulted: 08/21/24 16:15 EDT      AFB Culture - Lavage, Lung, Left Upper Lobe  Order: [id number]  Status: Preliminary result       Visible to patient: No (not released)       Next appt: Today at 02:00 PM in Pulmonology (BAMBI Bell)       Dx: Lung nodule; Abnormal CT scan of lung    Specimen Information: Lung, Left Upper Lobe; Lavage   0 Result Notes  AFB Culture No AFB isolated at 1 week               AFB Stain No acid fast bacilli seen on direct smear      No acid fast bacilli seen on concentrated smear              Resulting Agency: [facility]           Specimen Collected: 08/14/24 15:17 EDT Last Resulted: 08/21/24 16:15 EDT          BAL Culture, Quantitative - Lavage, Lung, Left Upper Lobe  Order: [id number]  Status: Final result       Visible to patient: Yes (not seen)       Next appt: Today at 02:00 PM in Pulmonology (BAMBI Bell)       Dx: Lung nodule; Abnormal CT scan of lung    Specimen Information: Lung, Left Upper Lobe; Lavage   0 Result Notes  BAL Culture   Lab   No growth  [facility]               Gram Stain  Lab   No organisms seen [facility]                    Specimen Collected: 08/14/24 15:17 EDT Last Resulted: 08/16/24 10:29 EDT      Pneumonia Panel - Lavage, Lung, Left Upper Lobe  Order: [id number]  Status: Final result       Visible to patient: Yes (not seen)       Next appt: Today at 02:00 PM in Pulmonology (BAMBI Bell)       Dx: Lung nodule; Abnormal CT scan of lung    Specimen Information: Lung, Left Upper Lobe; Lavage   0 Result Notes      Component  Ref Range & Units    Escherichia coli PCR  Not Detected Not Detected   Acinetobacter calcoaceticus-baumannii complex PCR  Not Detected Not Detected   Enterobacter cloacae PCR  Not Detected Not Detected   Klebsiella oxytoca PCR  Not Detected Not Detected   Klebsiella pneumoniae  group PCR  Not Detected Not Detected   Klebsiella aerogenes PCR  Not Detected Not Detected   Moraxella catarrhalis PCR  Not Detected Not Detected   Proteus species PCR  Not Detected Not Detected   Pseudomonas aeroginosa PCR  Not Detected Not Detected   Serratia marcescens PCR  Not Detected Not Detected   Staphylococcus aureus PCR  Not Detected Not Detected   Streptococcus pyogenes PCR  Not Detected Not Detected   Haemophilus influenzae PCR  Not Detected Not Detected   Streptococcus agalactiae PCR  Not Detected Not Detected   Streptococcus pneumoniae PCR  Not Detected Not Detected   Chlamydophila pneumoniae PCR  Not Detected Not Detected   Legionella pneumophilia PCR  Not Detected Not Detected   Mycoplasma pneumo by PCR  Not Detected Not Detected   ADENOVIRUS, PCR  Not Detected Not Detected   CTX-M Gene  Not Detected, N/A N/A   IMP Gene  Not Detected, N/A N/A   KPC Gene  Not Detected, N/A N/A   mecA/C and MREJ Gene  Not Detected, N/A N/A   NDM Gene  Not Detected, N/A N/A   OXA-48-like Gene  Not Detected, N/A N/A   VIM Gene  Not Detected, N/A N/A   Coronavirus  Not Detected Not Detected   Human Metapneumovirus  Not Detected Not Detected   Human Rhinovirus/Enterovirus  Not Detected Not Detected   Influenza A PCR  Not Detected Not Detected   Influenza B PCR  Not Detected Not Detected   RSV, PCR  Not Detected Not Detected   Parainfluenza virus PCR  Not Detected Not Detected   Resulting Agency [facility]              Specimen Collected: 08/14/24 15:17 EDT Last Resulted: 08/14/24 17:28 EDT      Bronch Wash/BAL Differential - Lavage, Lung, Left Upper Lobe  Order: [id number]  Status: Final result       Visible to patient: Yes (seen)       Next appt: Today at 02:00 PM in Pulmonology (BAMBI Bell)       Dx: Lung nodule; Abnormal CT scan of lung    Specimen Information: Lung, Left Upper Lobe; Lavage   0 Result Notes       Component  Ref Range & Units 9 d ago 11 mo ago   Visual Presence of Blood  Large/3+   Lymphocytes,  Fluid  % 36 21   Neutrophils, Fluid  % 59 76   Macrophage, Fluid  % 5    Resulting Agency [facility]              Narrative  Performed by: [facility]  Normal Adults (Nonsmokers)    Alveolar Macrophages       >85%  Lymphocytes              10-15%  Neutrophils              <or=3%  Eosinophils              <or=1%  Bronchial Lining Cells   <or=5%      Clinical Interpretations for BAL    Eosinophils >or=25%       Eosinophilic Pneumoniae  Lymphocytes >or=50%       Consider Drug Rxn,Acute HP  Bloody lavage             Diffuse Alveolar Hemorrhage  Other Findings            Specific Dx or Non-diagnostic      Specimen Collected: 08/14/24 15:17 EDT Last Resulted: 08/14/24 18:03 EDT      Respiratory Culture - Wash, Bronchus  Order: [id number]  Status: Final result       Visible to patient: Yes (not seen)       Next appt: Today at 02:00 PM in Pulmonology (BAMBI Bell)       Dx: Lung nodule; Abnormal CT scan of lung    Specimen Information: Bronchus; Wash   0 Result Notes  Respiratory Culture   Lab   Scant growth (1+) Normal respiratory talia. No S. aureus or Pseudomonas aeruginosa detected. Final report.  [facility]               Gram Stain  Lab   Moderate (3+) WBCs seen  [facility]      Few (2+) Gram positive cocci in pairs and chains [facility]                    Specimen Collected: 08/14/24 15:52 EDT Last Resulted: 08/16/24 10:34 EDT          Assessment         Patient Active Problem List   Diagnosis    Encounter for annual wellness exam in Medicare patient    Degeneration of lumbar intervertebral disc    Acquired hypothyroidism    Anxiety    Essential hypertension    Other hyperlipidemia    Type 2 diabetes mellitus without complication, without long-term current use of insulin    Gastroesophageal reflux disease without esophagitis    Stage 3b chronic kidney disease (CKD)    Hypothyroidism due to Hashimoto's thyroiditis    Tobacco abuse    Smoking greater than 40 pack years    Lung nodule    Centrilobular  emphysema    Abnormal CT scan of lung    Seizure    Chronic pain disorder        Plan     Diagnoses and all orders for this visit:    1. Lung nodule (Primary)  Comments:  followup CT of chest 10/2024    2. Centrilobular emphysema  Comments:  continue with Stiolto and Albuterol as prescribed    3. Tobacco abuse  Comments:  continues to smoke, stopped Chantix due to possible seizure activity             Smoking status:  reports that she has been smoking cigarettes. She started smoking about 60 years ago. She has a 50.1 pack-year smoking history. She has never used smokeless tobacco.  Vaccination status: Reviewed  Immunization History   Administered Date(s) Administered    COVID-19 (MODERNA) 1st,2nd,3rd Dose Monovalent 03/17/2021, 04/15/2021, 11/03/2021    COVID-19 (PFIZER) BIVALENT 12+YRS 08/08/2023    Covid-19 (Pfizer) Gray Cap Monovalent 07/14/2022    Flu Vaccine Quad PF >36MO 10/09/2018    Fluzone (or Fluarix & Flulaval for VFC) >6mos 10/09/2018    Fluzone High-Dose 65+YRS 11/13/2017, 07/17/2023    Fluzone High-Dose 65+yrs 11/11/2021, 01/09/2024    Pneumococcal Conjugate 13-Valent (PCV13) 10/18/2018    Pneumococcal Polysaccharide (PPSV23) 01/01/2016    Shingrix 07/17/2023    Tdap 07/17/2023      Medications personally reviewed    Follow Up  Return in about 2 months (around 11/4/2024) for after CT scan.    Patient was given instructions and counseling regarding her condition or for health maintenance advice. Please see specific information pulled into the AVS if appropriate.     I spent 22 minutes caring for Odalis Espino on this date of service. This time includes time spent by me in the following activities:preparing for the visit, reviewing tests, obtaining and/or reviewing a separately obtained history, performing a medically appropriate examination and/or evaluation, counseling and educating the patient/family/caregiver, ordering medications, tests, or procedures, documenting information in the medical record,  independently interpreting results and communicating that information with the patient/family/caregiver and answered questions family members, discuss medications.

## 2024-08-27 LAB
RESULT COMMENT:: NORMAL
RICK SF IGG TITR SER: NORMAL {TITER}
RICK SF IGM TITR SER: NORMAL {TITER}

## 2024-08-28 ENCOUNTER — OFFICE VISIT (OUTPATIENT)
Dept: NEUROLOGY | Facility: CLINIC | Age: 74
End: 2024-08-28
Payer: MEDICARE

## 2024-08-28 VITALS
SYSTOLIC BLOOD PRESSURE: 158 MMHG | HEIGHT: 65 IN | WEIGHT: 130 LBS | BODY MASS INDEX: 21.66 KG/M2 | OXYGEN SATURATION: 98 % | HEART RATE: 88 BPM | DIASTOLIC BLOOD PRESSURE: 84 MMHG

## 2024-08-28 DIAGNOSIS — R56.9 SEIZURE: Primary | ICD-10-CM

## 2024-08-28 RX ORDER — LEVETIRACETAM 500 MG/1
1000 TABLET, FILM COATED, EXTENDED RELEASE ORAL DAILY
Qty: 180 TABLET | Refills: 0 | Status: SHIPPED | OUTPATIENT
Start: 2024-08-28

## 2024-08-28 NOTE — ASSESSMENT & PLAN NOTE
74 year old right handed woman with seizures.  Patient had lung biopsy on 8/14/2024 due to positive PET scan of her lung for further evaluation of possible lung cancer.  The biopsy results were reportedly negative and patient had some complications from the biopsy with significant blood loss and her Hgb dropped from 10.5 to 8.6.  Her blood glucose was also elevated at 358 from 143 within 4 hours.  She was discharged home and was home for about an hour she was sitting in plastic chair and her phone fell to the floor when she was talking on it and when she went to pick it up she fell out of the chair and she hit her elbow but not sure if she hit her head or not.  When her grand daughter found her on the floor she was trembling lightly but was not jerking or stiff and moaning and drooling and was confused and 30-45 seconds later she was able to tell her that she dropped phone and fell out of chair.  No loss of bowel or bladder or tongue biting.  EMS was contacted and patient was taken back to the hospital and in the ED she was trying to get a glass of water but was having a hard time getting it and reportedly had a second seizure witnessed by the ED physician.  She was evaluated by Neurology at . She had a negative head CT scan followed by a brain MRI scan which I reviewed the images independently today and does not demonstrate any acute intracranial abnormalities.  She had a routine EEG with frontal temporal slowing bilaterally left greater than right and mild encephalopathy without any epileptiform discharges.  She was started on levetiracetam 500 mg BID which she has been taking but makes her fatigued and no other side effects. They think the two seizures were  by 3-4 hours in duration.  She has not had any further seizure like activity.  No known history of seizures.  No history of meningitis or encephalitis.  No history of head trauma.  They are planning on reevaluating with serial CT scans of her chest  every 3 months.  She typically takes gabapentin 600 mg BID for neuropathy but had not taken the medicine due to the procedure.  Her Hgb is back up to 10.4 and she has been taking iron supplements.  She had been taking Chantix for about a month as well.  She is not driving.  I spoke with them at length with regards to the above findings and multiple possible contributing factors to her seizures including including low Hgb, Chantix (which has been discontinued since the seizures), possibly post traumatic as she may have hit her head when she fell out of chair but at this time I will switch the levetiracetam to XR formulation that she can take at bedtime so hopefully she will have less fatigue taking the medicine once a day at bedtime.  Discussed seizure precautions at length and provided patient education information and advised her to take medication as prescribed as seizures can be potentially fatal.

## 2024-08-28 NOTE — PATIENT INSTRUCTIONS
In general, we recommend using good judgement when you are doing certain activities and to avoid those activities that if you were to have a seizure, you could harm yourself or others. In the state of [place], it is the law that you cannot drive within 90 days of a seizure. We also recommend not standing over open flames, not getting on high ladders or the roof, not swimming or taking baths by yourself (showers are ok) and not operating heavy machinery or power tools.  [facility]  Villa Martinez MD  Neurology clinic  [phone]    With anti-seizure medications, you may initially notice side effects of fatigue, drowsiness, unsteadiness, and dizziness.  Other possible side effects include nausea, abdominal pain, headache, blurry or double vision, slurred speech and mood changes.  Generally, patients will noticed these symptoms when the medication is first started or with higher doses and will go away with time.    It is import to consistently take your medication every day.  Missing just one dose may put you at risk for a breakthrough seizure.  Consider using reminders on your phone or a pill box.    If you develop a rash, please call the neurology clinic immediately or notify another healthcare professional, as this may be potentially life-threatening.  If you are unable to reach a healthcare professional, go to the emergency room immediately for further evaluation.    If you develop thoughts of wanting to hurt yourself or others, please call the neurology clinic immediately to notify another healthcare professional.  If you are unable to reach a healthcare professional, go to the emergency room immediately for further evaluation.    It is the [place] state law that you cannot drive within 90 days of a seizure.    You should avoid certain activities that if you were to have a seizure, you could harm yourself or others. In general, it is recommended that you avoid operating heavy machinery or  power tools, swimming or taking baths by yourself (showers are ok), don't stand over open flames, don't get on high ladders or the roof.  I also recommend to avoid sleeping on your stomach.    For further information on epilepsy and resources available to patients and their families, please visit the [facility] at [email or web address] or call [phone].    **Check out the [facility]'s monthly Art Group Gathering.  [facility] [address]  Call Ankita Lozada at [phone] or email her at [email or web address] for the dates of future gatherings.**      **If you have having memory problems, consider HOBSCOTCH (Home-Based Self-management and Cognitive Training Changes lives).  It is an 8 week self-management program for adults with epilepsy and memory problems.  The program is free at the [facility].  Contact Silvia Levy at [phone] or [email or web address].**

## 2024-08-28 NOTE — PROGRESS NOTES
Chief Complaint  Seizures (Here with daughter Karuna, granddaughter gabriela, and great granddaughter harinder.  Had sz after getting home from lung biopsy 8/14/2024-keppra taking bid - no new episodes- feeling good- feeling fatigue-  )    Subjective          Odalis Espino presents to [facility] NEUROLOGY for   HISTORY OF PRESENT ILLNESS:    Odalis Espino is a 74 year old right handed woman who presents to neurology clinic with her daughter, Karuna, granddaughter, Gabriela, and great granddaughter, Harinder, for initial evaluation and follow up of hospital visit for seizures.  Patient had lung biopsy on 8/14/2024 due to positive PET scan of her lung for further evaluation of possible lung cancer.  The biopsy results were reportedly negative and patient had some complications from the biopsy with significant blood loss and her Hgb dropped from 10.5 to 8.6.  Her blood glucose was also elevated at 358 from 143 within 4 hours.  She was discharged home and was home for about an hour she was sitting in plastic chair and her phone fell to the floor when she was talking on it and when she went to pick it up she fell out of the chair and she hit her elbow but not sure if she hit her head or not.  When her grand daughter found her on the floor she was trembling lightly but was not jerking or stiff and moaning and drooling and was confused and 30-45 seconds later she was able to tell her that she dropped phone and fell out of chair.  No loss of bowel or bladder or tongue biting.  EMS was contacted and patient was taken back to the hospital and in the ED she was trying to get a glass of water but was having a hard time getting it and reportedly had a second seizure witnessed by the ED physician.  She was evaluated by Neurology at .  She had a negative head CT scan followed by a brain MRI scan which I reviewed the images independently today and does not demonstrate any acute intracranial abnormalities.  She had a  routine EEG with frontal temporal slowing bilaterally left greater than right and mild encephalopathy without any epileptiform discharges.  She was started on levetiracetam 500 mg BID which she has been taking but makes her fatigued and no other side effects. They think the two seizures were  by 3-4 hours in duration.  She has not had any further seizure like activity.  No known history of seizures.  No history of meningitis or encephalitis.  No history of head trauma.  They are planning on reevaluating with serial CT scans of her chest every 3 months.  She typically takes gabapentin 600 mg BID for neuropathy but had not taken the medicine due to the procedure.  Her Hgb is back up to 10.4 and she has been taking iron supplements.  She had been taking Chantix for about a month as well.  She is not driving.        Past Medical History:   Diagnosis Date    Anxiety     Arthritis     Centrilobular emphysema     Coronary artery calcification     E MICHAEL W/MONNIN. INSTRUCTE TO F/U PRN    Diabetes mellitus     AVERAGE 'S    Emphysema of lung Aug 23    None    GERD (gastroesophageal reflux disease)     Hepatitis     REPORTS OVER 30 YEARS AGO HAD TREATMENT AND DENIES ANY CURRENT ISSUES    HL (hearing loss)     Hyperlipidemia     Hypertension     Hypothyroidism     Lung nodule     (-)  ION BRONCH DONE 9/2023    Pain management     [facility]    Pulmonary arterial hypertension Aug 23    Spinal stenosis     Thyroiditis     DENIES ANY CURRENT ISSUES    Visual impairment         Family History   Problem Relation Age of Onset    Anxiety disorder Mother     Cancer Father     Cancer Sister     Diabetes Brother     Diabetes Brother     Arthritis Daughter     Asthma Other     Seizures Other     Malig Hyperthermia Neg Hx         Social History     Socioeconomic History    Marital status:    Tobacco Use    Smoking status: Every Day     Current packs/day: 0.25     Average packs/day: 0.5 packs/day for 95.6 years (50.1  "ttl pk-yrs)     Types: Cigarettes     Start date: 1966    Smokeless tobacco: Never    Tobacco comments:     Really don't remember when I started, cutting back did not smoke this am   Vaping Use    Vaping status: Never Used   Substance and Sexual Activity    Alcohol use: Not Currently     Alcohol/week: 1.0 standard drink of alcohol     Types: 1 Glasses of wine per week     Comment: occasional    Drug use: Never    Sexual activity: Defer        I have reviewed and confirmed the accuracy of the ROS as documented by the MA/LPN/RN Villa Martinez MD   Review of Systems   Constitutional:  Negative for activity change and appetite change.   HENT:  Negative for trouble swallowing and voice change.    Eyes:  Negative for blurred vision, double vision and pain.   Gastrointestinal:  Negative for nausea and vomiting.   Neurological:  Positive for seizures. Negative for dizziness, tremors, syncope, facial asymmetry, speech difficulty, weakness, light-headedness, numbness, headache, memory problem and confusion.   Psychiatric/Behavioral:  Negative for agitation, behavioral problems, decreased concentration, dysphoric mood, hallucinations, self-injury, sleep disturbance, suicidal ideas, negative for hyperactivity, depressed mood and stress. The patient is not nervous/anxious.         Objective   Vital Signs:   /84   Pulse 88   Ht 165.1 cm (65\")   Wt 59 kg (130 lb)   SpO2 98%   BMI 21.63 kg/m²       PHYSICAL EXAM:    General   Mental Status - Alert. General Appearance - Thin, Well groomed, Oriented and Cooperative. Orientation - Oriented X3.       Head and Neck  Head - normocephalic, atraumatic with no lesions or palpable masses.  Neck    Global Assessment - supple.       Eye   Sclera/Conjunctiva - Bilateral - Normal.    ENMT  Mouth and Throat   Oral Cavity/Oropharynx: Oropharynx - the soft palate,uvula and tongue are normal in appearance.    Chest and Lung Exam   Chest - lung clear to auscultation " bilaterally.    Cardiovascular   Cardiovascular examination reveals  - normal heart sounds, regular rate and rhythm.    Neurologic   Mental Status: Speech - Normal. Cognitive function - appropriate fund of knowledge. No impairment of attention, Impairment of concentration, impairment of long term memory or impairment of short term memory.  Cranial Nerves:   II Optic: Visual acuity - Left - Normal. Right - Normal. Visual fields - Normal (to confrontation).  III Oculomotor: Pupillary constriction - Left - Normal. Right - Normal.  VII Facial: - Normal Bilaterally.   IX Glossopharyngeal / X Vagus - Normal.  XI Accessory: Trapezius - Bilateral - Normal. Sternocleidomastoid - Bilateral - Normal.  XII Hypoglossal - Bilateral - Normal.  Eye Movements: - Normal Bilaterally.  Sensory:   Light Touch: Intact - Globally.  Motor:   Bulk and Contour: - Normal.  Tone: - Normal.  Tremor: Not present.  Strength: 5/5 normal muscle strength - All Muscles.   General Assessment of Reflexes: - deep tendon reflexes are normal. Coordination - No Impairment of finger-to-nose or Impairment of rapid alternating movements. Gait - Broad based, uses cane to ambulate.         Result Review :                 Assessment and Plan    Problem List Items Addressed This Visit       Seizure - Primary    Current Assessment & Plan     74 year old right handed woman with seizures.  Patient had lung biopsy on 8/14/2024 due to positive PET scan of her lung for further evaluation of possible lung cancer.  The biopsy results were reportedly negative and patient had some complications from the biopsy with significant blood loss and her Hgb dropped from 10.5 to 8.6.  Her blood glucose was also elevated at 358 from 143 within 4 hours.  She was discharged home and was home for about an hour she was sitting in plastic chair and her phone fell to the floor when she was talking on it and when she went to pick it up she fell out of the chair and she hit her elbow but  not sure if she hit her head or not.  When her grand daughter found her on the floor she was trembling lightly but was not jerking or stiff and moaning and drooling and was confused and 30-45 seconds later she was able to tell her that she dropped phone and fell out of chair.  No loss of bowel or bladder or tongue biting.  EMS was contacted and patient was taken back to the hospital and in the ED she was trying to get a glass of water but was having a hard time getting it and reportedly had a second seizure witnessed by the ED physician.  She had a negative head CT scan followed by a brain MRI scan which I reviewed the images independently today and does not demonstrate any acute intracranial abnormalities.  She had a routine EEG with frontal temporal slowing bilaterally left greater than right and mild encephalopathy without any epileptiform discharges.  She was started on levetiracetam 500 mg BID which she has been taking but makes her fatigued and no other side effects. They think the two seizures were  by 3-4 hours in duration.  She has not had any further seizure like activity.  No known history of seizures.  No history of meningitis or encephalitis.  No history of head trauma.  They are planning on reevaluating with serial CT scans of her chest every 3 months.  She typically takes gabapentin 600 mg BID for neuropathy but had not taken the medicine due to the procedure.  Her Hgb is back up to 10.4 and she has been taking iron supplements.  She had been taking Chantix for about a month as well.  She is not driving.  I spoke with them at length with regards to the above findings and multiple possible contributing factors to her seizures including including low Hgb, Chantix (which has been discontinued since the seizures), possibly post traumatic as she may have hit her head when she fell out of chair but at this time I will switch the levetiracetam to XR formulation that she can take at bedtime so  hopefully she will have less fatigue taking the medicine once a day at bedtime.  Discussed seizure precautions at length and provided patient education information and advised her to take medication as prescribed as seizures can be potentially fatal.           Relevant Medications    levETIRAcetam XR (KEPPRA XR) 500 MG tablet       I spent 40 minutes caring for Odalis on this date of service. This time includes time spent by me in the following activities:preparing for the visit, reviewing tests, obtaining and/or reviewing a separately obtained history, performing a medically appropriate examination and/or evaluation , counseling and educating the patient/family/caregiver, ordering medications, tests, or procedures, documenting information in the medical record, independently interpreting results and communicating that information with the patient/family/caregiver, and care coordination    Follow Up   No follow-ups on file.  Patient was given instructions and counseling regarding her condition or for health maintenance advice. Please see specific information pulled into the AVS if appropriate.

## 2024-09-10 DIAGNOSIS — E11.41 TYPE 2 DIABETES MELLITUS WITH DIABETIC MONONEUROPATHY, WITHOUT LONG-TERM CURRENT USE OF INSULIN: ICD-10-CM

## 2024-09-10 DIAGNOSIS — F41.9 ANXIETY: ICD-10-CM

## 2024-09-10 DIAGNOSIS — I10 ESSENTIAL HYPERTENSION: ICD-10-CM

## 2024-09-10 RX ORDER — HYDROXYZINE PAMOATE 25 MG/1
25 CAPSULE ORAL 3 TIMES DAILY PRN
Qty: 90 CAPSULE | Refills: 0 | Status: SHIPPED | OUTPATIENT
Start: 2024-09-10

## 2024-09-10 RX ORDER — HYDROCHLOROTHIAZIDE 12.5 MG/1
12.5 TABLET ORAL DAILY
Qty: 90 TABLET | Refills: 0 | Status: CANCELLED | OUTPATIENT
Start: 2024-09-10

## 2024-09-10 RX ORDER — SERTRALINE HYDROCHLORIDE 100 MG/1
100 TABLET, FILM COATED ORAL DAILY
Qty: 90 TABLET | Refills: 1 | Status: SHIPPED | OUTPATIENT
Start: 2024-09-10

## 2024-09-18 LAB
MYCOBACTERIUM SPEC CULT: NORMAL
NIGHT BLUE STAIN TISS: NORMAL
NIGHT BLUE STAIN TISS: NORMAL

## 2024-09-25 LAB
MYCOBACTERIUM SPEC CULT: NORMAL
NIGHT BLUE STAIN TISS: NORMAL
NIGHT BLUE STAIN TISS: NORMAL

## 2024-11-08 DIAGNOSIS — I10 ESSENTIAL HYPERTENSION: ICD-10-CM

## 2024-11-08 DIAGNOSIS — M54.41 CHRONIC RIGHT-SIDED LOW BACK PAIN WITH RIGHT-SIDED SCIATICA: ICD-10-CM

## 2024-11-08 DIAGNOSIS — G89.29 CHRONIC RIGHT-SIDED LOW BACK PAIN WITH RIGHT-SIDED SCIATICA: ICD-10-CM

## 2024-11-08 DIAGNOSIS — K21.9 GASTROESOPHAGEAL REFLUX DISEASE WITHOUT ESOPHAGITIS: ICD-10-CM

## 2024-11-11 RX ORDER — HYDROCHLOROTHIAZIDE 12.5 MG/1
12.5 TABLET ORAL DAILY
Qty: 90 TABLET | Refills: 0 | Status: SHIPPED | OUTPATIENT
Start: 2024-11-11

## 2024-11-11 RX ORDER — MELOXICAM 15 MG/1
15 TABLET ORAL DAILY
Qty: 90 TABLET | Refills: 0 | Status: SHIPPED | OUTPATIENT
Start: 2024-11-11

## 2024-11-11 RX ORDER — AMLODIPINE BESYLATE 5 MG/1
5 TABLET ORAL DAILY
Qty: 90 TABLET | Refills: 1 | Status: SHIPPED | OUTPATIENT
Start: 2024-11-11

## 2024-11-14 ENCOUNTER — TELEPHONE (OUTPATIENT)
Dept: FAMILY MEDICINE CLINIC | Age: 74
End: 2024-11-14
Payer: MEDICARE

## 2024-11-14 DIAGNOSIS — E11.9 TYPE 2 DIABETES MELLITUS WITHOUT COMPLICATION, WITHOUT LONG-TERM CURRENT USE OF INSULIN: Primary | ICD-10-CM

## 2024-11-14 NOTE — TELEPHONE ENCOUNTER
"  Caller: Odalis Espino \"ANDI\"    Relationship: Self    Best call back number: [phone]     What is the best time to reach you: ANYTIME     Who are you requesting to speak with (clinical staff, provider,  specific staff member): CLINICAL     What was the call regarding: PATIENT IS CALLING REQUESTING TO REFILL ONE TOUCH TEST STRIPS NOT LISTED IN THE MEDICATION LIST.     "

## 2024-11-25 ENCOUNTER — TELEMEDICINE (OUTPATIENT)
Dept: NEUROLOGY | Facility: CLINIC | Age: 74
End: 2024-11-25
Payer: MEDICARE

## 2024-11-25 DIAGNOSIS — R56.9 SEIZURE: Primary | ICD-10-CM

## 2024-11-25 DIAGNOSIS — E78.00 PURE HYPERCHOLESTEROLEMIA: ICD-10-CM

## 2024-11-25 DIAGNOSIS — E03.9 ACQUIRED HYPOTHYROIDISM: ICD-10-CM

## 2024-11-25 PROCEDURE — 99214 OFFICE O/P EST MOD 30 MIN: CPT | Performed by: PSYCHIATRY & NEUROLOGY

## 2024-11-25 RX ORDER — ATORVASTATIN CALCIUM 20 MG/1
20 TABLET, FILM COATED ORAL NIGHTLY
Qty: 90 TABLET | Refills: 0 | Status: SHIPPED | OUTPATIENT
Start: 2024-11-25

## 2024-11-25 RX ORDER — LEVETIRACETAM 500 MG/1
1000 TABLET, FILM COATED, EXTENDED RELEASE ORAL DAILY
Qty: 180 TABLET | Refills: 3 | Status: SHIPPED | OUTPATIENT
Start: 2024-11-25

## 2024-11-25 RX ORDER — LEVOTHYROXINE SODIUM 112 MCG
112 TABLET ORAL DAILY
Qty: 90 TABLET | Refills: 0 | Status: SHIPPED | OUTPATIENT
Start: 2024-11-25

## 2024-11-25 NOTE — ASSESSMENT & PLAN NOTE
74 year old right handed woman who is being evaluated for follow up of hospital visit for seizures.  Patient had lung biopsy on 8/14/2024 due to positive PET scan of her lung for further evaluation of possible lung cancer which was reportedly negative and patient had some complications from the biopsy with significant blood loss and her Hgb dropped from 10.5 to 8.6.  Her blood glucose was also elevated at 358 from 143 within 4 hours.  She was discharged home and was home for about an hour she was sitting in plastic chair and her phone fell to the floor when she was talking on it and when she went to pick it up she fell out of the chair and she hit her elbow but not sure if she hit her head or not.  When her grand daughter found her on the floor she was trembling lightly but was not jerking or stiff and moaning and drooling and was confused and 30-45 seconds later she was able to tell her that she dropped phone and fell out of chair.  No loss of bowel or bladder or tongue biting.  EMS was contacted and patient was taken back to the hospital and in the ED she was trying to get a glass of water but was having a hard time getting it and reportedly had a second seizure witnessed by the ED physician.  She was evaluated by Neurology at .  She had a negative head CT scan followed by a brain MRI scan which did not demonstrate any acute intracranial abnormalities.  She had a routine EEG with frontal temporal slowing bilaterally left greater than right and mild encephalopathy without any epileptiform discharges.  She was started on levetiracetam 500 mg BID which had made her fatigued so I switched her to the XR formulation which she has been taking 1000 mg at night time without noticeable fatigue during the daytime. They think the two seizures were  by 3-4 hours in duration.  She has not had any further seizure like activity.  No known history of seizures.  No history of meningitis or encephalitis.  No history of  head trauma.  She is taking the gabapentin 600 mg BID for neuropathy.  She is not driving.  I spoke with her about continuing the levetiracetam XR 1000 mg at night time which she is tolerating well without side effects given risk and benefits of potential recurrence of seizure.  Discussed seizure precautions and advised her to take medication as prescribed as seizures can be potentially fatal.  Will continue the medication for patient which she can have refilled by her PCP in the future as it is difficult for her driving to our location and she is agreeable with this decision.

## 2024-11-25 NOTE — PROGRESS NOTES
Chief Complaint  Seizure follow up    Subjective          Odalis Espino presents to [facility] NEUROLOG[facility] for   HISTORY OF PRESENT ILLNESS:    Patient is being evaluated via Telehealth utilizing both Video and Audio.      Odalis Espino is a 74 year old right handed woman who is being evaluated for follow up of hospital visit for seizures.  Patient had lung biopsy on 8/14/2024 due to positive PET scan of her lung for further evaluation of possible lung cancer which was reportedly negative and patient had some complications from the biopsy with significant blood loss and her Hgb dropped from 10.5 to 8.6.  Her blood glucose was also elevated at 358 from 143 within 4 hours.  She was discharged home and was home for about an hour she was sitting in plastic chair and her phone fell to the floor when she was talking on it and when she went to pick it up she fell out of the chair and she hit her elbow but not sure if she hit her head or not.  When her grand daughter found her on the floor she was trembling lightly but was not jerking or stiff and moaning and drooling and was confused and 30-45 seconds later she was able to tell her that she dropped phone and fell out of chair.  No loss of bowel or bladder or tongue biting.  EMS was contacted and patient was taken back to the hospital and in the ED she was trying to get a glass of water but was having a hard time getting it and reportedly had a second seizure witnessed by the ED physician.  She was evaluated by Neurology at .  She had a negative head CT scan followed by a brain MRI scan which did not demonstrate any acute intracranial abnormalities.  She had a routine EEG with frontal temporal slowing bilaterally left greater than right and mild encephalopathy without any epileptiform discharges.  She was started on levetiracetam 500 mg BID which had made her fatigued so I switched her to the XR formulation which she has been taking 1000 mg at night time  without noticeable fatigue during the daytime. They think the two seizures were  by 3-4 hours in duration.  She has not had any further seizure like activity.  No known history of seizures.  No history of meningitis or encephalitis.  No history of head trauma.  She is taking the gabapentin 600 mg BID for neuropathy.  She is not driving.      Past Medical History:   Diagnosis Date    Anxiety     Arthritis     Centrilobular emphysema     Coronary artery calcification     E MICHAEL W/MONNIN. INSTRUCTE TO F/U PRN    Diabetes mellitus     AVERAGE 'S    Emphysema of lung Aug 23    None    GERD (gastroesophageal reflux disease)     Hepatitis     REPORTS OVER 30 YEARS AGO HAD TREATMENT AND DENIES ANY CURRENT ISSUES    HL (hearing loss)     Hyperlipidemia     Hypertension     Hypothyroidism     Lung nodule     (-)  ION BRONCH DONE 9/2023    Pain management     [facility]    Pulmonary arterial hypertension Aug 23    Spinal stenosis     Thyroiditis     DENIES ANY CURRENT ISSUES    Visual impairment         Family History   Problem Relation Age of Onset    Anxiety disorder Mother     Cancer Father     Cancer Sister     Diabetes Brother     Diabetes Brother     Arthritis Daughter     Asthma Other     Seizures Other     Malig Hyperthermia Neg Hx         Social History     Socioeconomic History    Marital status:    Tobacco Use    Smoking status: Every Day     Current packs/day: 0.25     Average packs/day: 0.5 packs/day for 95.8 years (50.1 ttl pk-yrs)     Types: Cigarettes     Start date: 1964    Smokeless tobacco: Never    Tobacco comments:     Really don't remember when I started, cutting back did not smoke this am   Vaping Use    Vaping status: Never Used   Substance and Sexual Activity    Alcohol use: Not Currently     Alcohol/week: 1.0 standard drink of alcohol     Types: 1 Glasses of wine per week     Comment: occasional    Drug use: Never    Sexual activity: Defer        I have personally reviewed the  ROS as stated below.     Review of Systems     Constitutional:  Negative for activity change and appetite change.   HENT:  Negative for trouble swallowing and voice change.    Eyes:  Negative for blurred vision, double vision and pain.   Gastrointestinal:  Negative for nausea and vomiting.   Neurological:  Negative for seizures. Negative for dizziness, tremors, syncope, facial asymmetry, speech difficulty, weakness, light-headedness, numbness, headache, memory problem and confusion.   Psychiatric/Behavioral:  Negative for agitation, behavioral problems, decreased concentration, dysphoric mood, hallucinations, self-injury, sleep disturbance, suicidal ideas, negative for hyperactivity, depressed mood and stress. The patient is not nervous/anxious.      Objective   Vital Signs Not obtained as this is a Telehealth Video Visit    PHYSICAL EXAM:    General   Mental Status - Alert. General Appearance - Well developed, Well groomed, Oriented and Cooperative. Orientation - Oriented X3.       Head and Neck  Head - normocephalic, atraumatic with no lesions or palpable masses.  Neck    Global Assessment - supple.       Eye   Sclera/Conjunctiva - Bilateral - Normal.    ENMT  Mouth and Throat   Oral Cavity/Oropharynx: Oropharynx - the soft palate,uvula and tongue are normal in appearance.    Neurologic   Mental Status: Speech - Normal. Cognitive function - appropriate fund of knowledge. No impairment of attention, Impairment of concentration, impairment of long term memory or impairment of short term memory.  Cranial Nerves:   II Optic: Visual acuity - Left - Normal. Right - Normal.   VII Facial: - Normal Bilaterally.  VIII Acoustic - Bilateral - Hearing normal and (Hearing tested by finger rub).   IX Glossopharyngeal / X Vagus - Normal.  XI Accessory: Trapezius - Bilateral - Normal. Sternocleidomastoid - Bilateral - Normal.  XII Hypoglossal - Bilateral - Normal.  Eye Movements: - Normal Bilaterally.  Sensory:   Light Touch:  Intact - Globally.  Motor:   Bulk and Contour: - Normal.  Tone: - Normal.  Tremor: Not present.  Strength: 5/5 normal muscle strength - All Muscles.   General Assessment: - Coordination - No Impairment of finger-to-nose or Impairment of rapid alternating movements. Gait - Broad based, uses cane to ambulate.       Result Review :                 Assessment and Plan    Problem List Items Addressed This Visit       Seizure - Primary    Current Assessment & Plan     74 year old right handed woman who is being evaluated for follow up of hospital visit for seizures.  Patient had lung biopsy on 8/14/2024 due to positive PET scan of her lung for further evaluation of possible lung cancer which was reportedly negative and patient had some complications from the biopsy with significant blood loss and her Hgb dropped from 10.5 to 8.6.  Her blood glucose was also elevated at 358 from 143 within 4 hours.  She was discharged home and was home for about an hour she was sitting in plastic chair and her phone fell to the floor when she was talking on it and when she went to pick it up she fell out of the chair and she hit her elbow but not sure if she hit her head or not.  When her grand daughter found her on the floor she was trembling lightly but was not jerking or stiff and moaning and drooling and was confused and 30-45 seconds later she was able to tell her that she dropped phone and fell out of chair.  No loss of bowel or bladder or tongue biting.  EMS was contacted and patient was taken back to the hospital and in the ED she was trying to get a glass of water but was having a hard time getting it and reportedly had a second seizure witnessed by the ED physician.  She was evaluated by Neurology at .  She had a negative head CT scan followed by a brain MRI scan which did not demonstrate any acute intracranial abnormalities.  She had a routine EEG with frontal temporal slowing bilaterally left greater than right and mild  encephalopathy without any epileptiform discharges.  She was started on levetiracetam 500 mg BID which had made her fatigued so I switched her to the XR formulation which she has been taking 1000 mg at night time without noticeable fatigue during the daytime. They think the two seizures were  by 3-4 hours in duration.  She has not had any further seizure like activity.  No known history of seizures.  No history of meningitis or encephalitis.  No history of head trauma.  She is taking the gabapentin 600 mg BID for neuropathy.  She is not driving.  I spoke with her about continuing the levetiracetam XR 1000 mg at night time which she is tolerating well without side effects given risk and benefits of potential recurrence of seizure.  Discussed seizure precautions and advised her to take medication as prescribed as seizures can be potentially fatal.  Will continue the medication for patient which she can have refilled by her PCP in the future as it is difficult for her driving to our location and she is agreeable with this decision.                Patient provided consent for Telehealth visit.      This was an audio and video enabled telemedicine encounter.     I performed this clinical encounter via real-time telehealth video connection originating from the patient's location at home and my location at [facility]. Verbal consent to participate in this telehealth video clinical visit was obtained and any questions by the patient regarding the interaction were answered.          Follow Up   No follow-ups on file.  Patient was given instructions and counseling regarding her condition or for health maintenance advice. Please see specific information pulled into the AVS if appropriate.

## 2024-11-25 NOTE — PATIENT INSTRUCTIONS
In general, we recommend using good judgement when you are doing certain activities and to avoid those activities that if you were to have a seizure, you could harm yourself or others. In the state of [place], it is the law that you cannot drive within 90 days of a seizure. We also recommend not standing over open flames, not getting on high ladders or the roof, not swimming or taking baths by yourself (showers are ok) and not operating heavy machinery or power tools.  [facility]  Villa Martinez MD  Neurology clinic  [phone]    With anti-seizure medications, you may initially notice side effects of fatigue, drowsiness, unsteadiness, and dizziness.  Other possible side effects include nausea, abdominal pain, headache, blurry or double vision, slurred speech and mood changes.  Generally, patients will noticed these symptoms when the medication is first started or with higher doses and will go away with time.    It is import to consistently take your medication every day.  Missing just one dose may put you at risk for a breakthrough seizure.  Consider using reminders on your phone or a pill box.    If you develop a rash, please call the neurology clinic immediately or notify another healthcare professional, as this may be potentially life-threatening.  If you are unable to reach a healthcare professional, go to the emergency room immediately for further evaluation.    If you develop thoughts of wanting to hurt yourself or others, please call the neurology clinic immediately to notify another healthcare professional.  If you are unable to reach a healthcare professional, go to the emergency room immediately for further evaluation.    It is the [place] state law that you cannot drive within 90 days of a seizure.    You should avoid certain activities that if you were to have a seizure, you could harm yourself or others. In general, it is recommended that you avoid operating heavy machinery or  power tools, swimming or taking baths by yourself (showers are ok), don't stand over open flames, don't get on high ladders or the roof.  I also recommend to avoid sleeping on your stomach.    For further information on epilepsy and resources available to patients and their families, please visit the [facility] at [email or web address] or call [phone].    **Check out the [facility]'s monthly Art Group Gathering.  [facility] [address].  Call Ankita Lozada at [phone] or email her at [email or web address] for the dates of future gatherings.**      **If you have having memory problems, consider HOBSCOTCH (Home-Based Self-management and Cognitive Training Changes lives).  It is an 8 week self-management program for adults with epilepsy and memory problems.  The program is free at the [facility].  Contact Silvia Levy at [phone] or [email or web address].**

## 2024-12-04 DIAGNOSIS — F41.9 ANXIETY: ICD-10-CM

## 2024-12-04 DIAGNOSIS — M51.369 DEGENERATION OF LUMBAR INTERVERTEBRAL DISC: ICD-10-CM

## 2024-12-04 DIAGNOSIS — G89.29 CHRONIC RIGHT-SIDED LOW BACK PAIN WITH RIGHT-SIDED SCIATICA: ICD-10-CM

## 2024-12-04 DIAGNOSIS — J45.20 MILD INTERMITTENT ASTHMA WITHOUT COMPLICATION: ICD-10-CM

## 2024-12-04 DIAGNOSIS — M54.41 CHRONIC RIGHT-SIDED LOW BACK PAIN WITH RIGHT-SIDED SCIATICA: ICD-10-CM

## 2024-12-04 RX ORDER — ALBUTEROL SULFATE 90 UG/1
1 INHALANT RESPIRATORY (INHALATION) EVERY 4 HOURS PRN
Qty: 54 G | Refills: 3 | Status: SHIPPED | OUTPATIENT
Start: 2024-12-04

## 2024-12-05 DIAGNOSIS — E11.9 TYPE 2 DIABETES MELLITUS WITHOUT COMPLICATION, WITHOUT LONG-TERM CURRENT USE OF INSULIN: ICD-10-CM

## 2024-12-05 RX ORDER — HYDROXYZINE PAMOATE 25 MG/1
25 CAPSULE ORAL 3 TIMES DAILY PRN
Qty: 90 CAPSULE | Refills: 0 | Status: SHIPPED | OUTPATIENT
Start: 2024-12-05

## 2024-12-05 RX ORDER — SERTRALINE HYDROCHLORIDE 100 MG/1
100 TABLET, FILM COATED ORAL DAILY
Qty: 90 TABLET | Refills: 1 | Status: SHIPPED | OUTPATIENT
Start: 2024-12-05

## 2024-12-05 RX ORDER — GABAPENTIN 600 MG/1
600 TABLET ORAL 3 TIMES DAILY
Qty: 90 TABLET | Refills: 0 | Status: SHIPPED | OUTPATIENT
Start: 2024-12-05

## 2024-12-18 DIAGNOSIS — E11.9 TYPE 2 DIABETES MELLITUS WITHOUT COMPLICATION, WITHOUT LONG-TERM CURRENT USE OF INSULIN: Primary | ICD-10-CM

## 2024-12-18 RX ORDER — BLOOD-GLUCOSE METER
1 KIT MISCELLANEOUS DAILY
Qty: 1 EACH | Refills: 0 | Status: SHIPPED | OUTPATIENT
Start: 2024-12-18

## 2024-12-18 RX ORDER — BLOOD-GLUCOSE METER
1 KIT MISCELLANEOUS DAILY
Qty: 1 EACH | Refills: 0 | Status: SHIPPED | OUTPATIENT
Start: 2024-12-18 | End: 2024-12-18

## 2025-02-14 DIAGNOSIS — M54.41 CHRONIC RIGHT-SIDED LOW BACK PAIN WITH RIGHT-SIDED SCIATICA: ICD-10-CM

## 2025-02-14 DIAGNOSIS — E78.00 PURE HYPERCHOLESTEROLEMIA: ICD-10-CM

## 2025-02-14 DIAGNOSIS — G89.29 CHRONIC RIGHT-SIDED LOW BACK PAIN WITH RIGHT-SIDED SCIATICA: ICD-10-CM

## 2025-02-14 DIAGNOSIS — E11.41 TYPE 2 DIABETES MELLITUS WITH DIABETIC MONONEUROPATHY, WITHOUT LONG-TERM CURRENT USE OF INSULIN: ICD-10-CM

## 2025-02-14 DIAGNOSIS — M51.369 DEGENERATION OF LUMBAR INTERVERTEBRAL DISC: ICD-10-CM

## 2025-02-14 DIAGNOSIS — I10 ESSENTIAL HYPERTENSION: ICD-10-CM

## 2025-02-14 DIAGNOSIS — E03.9 ACQUIRED HYPOTHYROIDISM: ICD-10-CM

## 2025-02-14 NOTE — TELEPHONE ENCOUNTER
Controlled refill request:  Requested Prescriptions     Pending Prescriptions Disp Refills    metFORMIN (GLUCOPHAGE) 500 MG tablet 180 tablet 0     Sig: Take 1 tablet by mouth 2 (Two) Times a Day With Meals.    hydroCHLOROthiazide 12.5 MG tablet 90 tablet 0     Sig: Take 1 tablet by mouth Daily.    meloxicam (MOBIC) 15 MG tablet 90 tablet 0     Sig: Take 1 tablet by mouth Daily.    atorvastatin (LIPITOR) 20 MG tablet 90 tablet 0     Sig: Take 1 tablet by mouth Every Night.    levothyroxine (Synthroid) 112 MCG tablet 90 tablet 0     Sig: Take 1 tablet by mouth Daily.    gabapentin (NEURONTIN) 600 MG tablet 90 tablet 0     Sig: Take 1 tablet by mouth 3 (Three) Times a Day.    hydrOXYzine pamoate (VISTARIL) 25 MG capsule 90 capsule 0     Sig: Take 1 capsule by mouth 3 (Three) Times a Day As Needed for Itching or Anxiety.      Last OV:  8/20/2024  Next OV:  2/19/2025  Last fill:  12-5-2024 #90  Last tox:  1-9-2024  Consent: none

## 2025-02-15 RX ORDER — MELOXICAM 15 MG/1
15 TABLET ORAL DAILY
Qty: 90 TABLET | Refills: 0 | Status: SHIPPED | OUTPATIENT
Start: 2025-02-15

## 2025-02-15 RX ORDER — LEVOTHYROXINE SODIUM 112 UG/1
112 TABLET ORAL DAILY
Qty: 90 TABLET | Refills: 0 | Status: SHIPPED | OUTPATIENT
Start: 2025-02-15

## 2025-02-15 RX ORDER — ATORVASTATIN CALCIUM 20 MG/1
20 TABLET, FILM COATED ORAL NIGHTLY
Qty: 90 TABLET | Refills: 0 | Status: SHIPPED | OUTPATIENT
Start: 2025-02-15

## 2025-02-15 RX ORDER — HYDROXYZINE PAMOATE 25 MG/1
25 CAPSULE ORAL 3 TIMES DAILY PRN
Qty: 90 CAPSULE | Refills: 0 | Status: SHIPPED | OUTPATIENT
Start: 2025-02-15

## 2025-02-15 RX ORDER — HYDROCHLOROTHIAZIDE 12.5 MG/1
12.5 TABLET ORAL DAILY
Qty: 90 TABLET | Refills: 0 | Status: SHIPPED | OUTPATIENT
Start: 2025-02-15

## 2025-02-17 RX ORDER — GABAPENTIN 600 MG/1
600 TABLET ORAL 3 TIMES DAILY
Qty: 90 TABLET | Refills: 0 | Status: SHIPPED | OUTPATIENT
Start: 2025-02-17

## 2025-02-20 ENCOUNTER — TELEPHONE (OUTPATIENT)
Dept: FAMILY MEDICINE CLINIC | Age: 75
End: 2025-02-20

## 2025-02-20 NOTE — TELEPHONE ENCOUNTER
Patients appt today was reschedule. She is concerned about her refill request and wants to make sure that we have received the request and are working to get her medications refilled. Patient would like a call back about this.

## 2025-02-24 ENCOUNTER — OFFICE VISIT (OUTPATIENT)
Dept: FAMILY MEDICINE CLINIC | Age: 75
End: 2025-02-24
Payer: MEDICARE

## 2025-02-24 VITALS
OXYGEN SATURATION: 97 % | WEIGHT: 134.2 LBS | SYSTOLIC BLOOD PRESSURE: 141 MMHG | HEART RATE: 65 BPM | BODY MASS INDEX: 22.36 KG/M2 | DIASTOLIC BLOOD PRESSURE: 68 MMHG | TEMPERATURE: 97.6 F | HEIGHT: 65 IN

## 2025-02-24 DIAGNOSIS — E55.9 VITAMIN D DEFICIENCY: ICD-10-CM

## 2025-02-24 DIAGNOSIS — E78.00 PURE HYPERCHOLESTEROLEMIA: ICD-10-CM

## 2025-02-24 DIAGNOSIS — E11.9 TYPE 2 DIABETES MELLITUS WITHOUT COMPLICATION, WITHOUT LONG-TERM CURRENT USE OF INSULIN: ICD-10-CM

## 2025-02-24 DIAGNOSIS — E03.9 ACQUIRED HYPOTHYROIDISM: ICD-10-CM

## 2025-02-24 DIAGNOSIS — D50.8 IRON DEFICIENCY ANEMIA SECONDARY TO INADEQUATE DIETARY IRON INTAKE: ICD-10-CM

## 2025-02-24 DIAGNOSIS — R56.9 SEIZURE: ICD-10-CM

## 2025-02-24 DIAGNOSIS — K21.9 GASTROESOPHAGEAL REFLUX DISEASE WITHOUT ESOPHAGITIS: ICD-10-CM

## 2025-02-24 DIAGNOSIS — Z12.31 SCREENING MAMMOGRAM FOR BREAST CANCER: ICD-10-CM

## 2025-02-24 DIAGNOSIS — F41.9 ANXIETY: ICD-10-CM

## 2025-02-24 DIAGNOSIS — N18.32 STAGE 3B CHRONIC KIDNEY DISEASE (CKD): ICD-10-CM

## 2025-02-24 DIAGNOSIS — I10 ESSENTIAL HYPERTENSION: Primary | ICD-10-CM

## 2025-02-24 PROCEDURE — 3077F SYST BP >= 140 MM HG: CPT | Performed by: FAMILY MEDICINE

## 2025-02-24 PROCEDURE — 3078F DIAST BP <80 MM HG: CPT | Performed by: FAMILY MEDICINE

## 2025-02-24 PROCEDURE — G2211 COMPLEX E/M VISIT ADD ON: HCPCS | Performed by: FAMILY MEDICINE

## 2025-02-24 PROCEDURE — 99214 OFFICE O/P EST MOD 30 MIN: CPT | Performed by: FAMILY MEDICINE

## 2025-02-24 PROCEDURE — 1126F AMNT PAIN NOTED NONE PRSNT: CPT | Performed by: FAMILY MEDICINE

## 2025-02-24 RX ORDER — SYRING-NEEDL,DISP,INSUL,0.3 ML 30 GX5/16"
1 SYRINGE, EMPTY DISPOSABLE MISCELLANEOUS DAILY
Qty: 100 EACH | Refills: 3 | Status: SHIPPED | OUTPATIENT
Start: 2025-02-24

## 2025-02-24 NOTE — ASSESSMENT & PLAN NOTE
Well-controlled on omeprazole, she tolerates well.  She is to avoid spicy acidic food in her diet

## 2025-02-24 NOTE — ASSESSMENT & PLAN NOTE
Borderline elevated.  She will continue current treatment plan and check her home BP and call if no improvement/>140/90.  She is to avoid sodium in her diet    Orders:    Comprehensive Metabolic Panel; Future    CBC (No Diff); Future

## 2025-02-24 NOTE — ASSESSMENT & PLAN NOTE
Overall stable and doing well on Zoloft.  Sleeping okay at night.  Denies sadness/depression/HI/SI

## 2025-02-24 NOTE — ASSESSMENT & PLAN NOTE
Will get a hemoglobin A1c to recent assess how well her blood sugars are controlled on current treatment plan.  Recommend yearly eye exam    Orders:    glucose blood test strip; Use to check blood sugar ONCE DAILY dx e 11.85,   ACCU CHEK METER    Hemoglobin A1c; Future    Microalbumin / Creatinine Urine Ratio - Urine, Clean Catch; Future

## 2025-02-24 NOTE — PROGRESS NOTES
Odalis Espino presents to [facility].    Chief Complaint: Follow-up for med refills and discussion about her seizure disorder concerns    Subjective     History of Present Illness:    Amaris is following up for her new dx of seizure disorder.  She was admitted 8/14-8/16/25 that onset after she has a left upper lobe nodule biopsy (which resulted in bleeding in the lung, hgb dropped from 10 to an 8.  She is seeing neurology and is on keppra.  Her neurologist told her I could fill her meds for her, but b/c this is a new dx, I disagree and she needs to follow up with her specialist about keppra med  management.    In addition she presents with chronic essential hypertension that remains stable on nor, she tolerates medication well     She has chronic dyslipidemia and is on a low-cholesterol diet and atorvastatin 20 mg nightly, she tolerates med well.    She also presents with chronic diabetes mellitus not on insulin with underlying peripheral neuropathy disease and her BS were high in hospital.  She has been unable to test her own blood sugars because she has run out of test strips and she needs my assistance to get her test strips and lancets today.  Her last test hemoglobin A1c was 6.7%.    She is on gabapentin 600 mg 3 times a day for peripheral neuropathy pain and her pain is well-controlled with this medication.     She presents with CKD stage IIIb    She has chronic anxiety that remained stable and well-controlled on Zoloft, she tolerates meds well.  She is sleeping okay at night.  She has no HI/SI.  She denies feeling sadness or depression.    She presents with chronic COPD-and she is still smoking 1/2 PPD, she stopped chantix after the seizure.  She is not ready to quit at this time although she knows that significant risk to her health    CT and MRI brain showed microvascular ischemic disease.      EEG showed independent medium voltage dig focal slow activity noted in either the  "frontotemporal region left greater than right, with some diffuse encephalopathic changes.  No epileptiform discharges.  Patient kept on Keppra and discharged home.      She has hypothyroid disease and is stable on levothyroxine 112 mcg daily and her TSH levels in the hospital were WNL.             Result Review   The following data was reviewed by Karina Langford MD on 02/24/2025.  Lab Results   Component Value Date    WBC 7.81 08/20/2024    HGB 10.4 (L) 08/20/2024    HCT 32.2 (L) 08/20/2024    MCV 89.7 08/20/2024     08/20/2024     Lab Results   Component Value Date    GLUCOSE 111 (H) 08/16/2024    BUN 15 08/16/2024    CREATININE 1.00 08/16/2024     08/16/2024    K 3.1 (L) 08/16/2024     08/16/2024    CALCIUM 8.8 08/16/2024    PROTEINTOT 6.4 08/16/2024    ALBUMIN 3.4 (L) 08/16/2024    ALT 5 08/16/2024    AST 14 08/16/2024    ALKPHOS 63 08/16/2024    BILITOT 0.4 08/16/2024    GLOB 3.7 08/14/2024    AGRATIO 1.1 08/14/2024    BCR 15.0 08/16/2024    ANIONGAP 10.1 08/16/2024    EGFR 58.5 (L) 08/16/2024     Lab Results   Component Value Date    CHOL 149 08/12/2024    CHLPL 164 12/17/2020    TRIG 68 08/12/2024    HDL 67 (H) 08/12/2024    LDL 68 08/12/2024     Lab Results   Component Value Date    TSH 0.588 08/12/2024     Lab Results   Component Value Date    HGBA1C 6.70 (H) 08/12/2024     No results found for: \"PSA\"  Lab Results   Component Value Date    Iron 64 08/20/2024    Iron Saturation (TSAT) 14 (L) 08/20/2024      Lab Results   Component Value Date    XJZE62AI 22.1 (L) 08/12/2024               Assessment and Plan:   Assessment & Plan  Type 2 diabetes mellitus without complication, without long-term current use of insulin  Will get a hemoglobin A1c to recent assess how well her blood sugars are controlled on current treatment plan.  Recommend yearly eye exam    Orders:    glucose blood test strip; Use to check blood sugar ONCE DAILY dx e 11.85,   ACCU CHEK METER    Hemoglobin A1c; Future    " Microalbumin / Creatinine Urine Ratio - Urine, Clean Catch; Future    Essential hypertension  Borderline elevated.  She will continue current treatment plan and check her home BP and call if no improvement/>140/90.  She is to avoid sodium in her diet    Orders:    Comprehensive Metabolic Panel; Future    CBC (No Diff); Future    Acquired hypothyroidism  Stable on current dose of levothyroxine.  Will recheck labs and adjust Tx plan pending results Orders:    TSH+Free T4; Future    Pure hypercholesterolemia  Well-controlled on atorvastatin and she tolerates med well.  No changes to current meds or treatment plan.  Will check labs and adjust Tx plan pending results    Orders:    Lipid Panel; Future    Anxiety  Overall stable and doing well on Zoloft.  Sleeping okay at night.  Denies sadness/depression/HI/SI       Gastroesophageal reflux disease without esophagitis  Well-controlled on omeprazole, she tolerates well.  She is to avoid spicy acidic food in her diet       Seizure  She wants me to manage her new seizure medication Keppra but I told her I cannot do this and she has follow-up with her neurologist.  Would actually like to stop medication and I do not recommend until her neurologist approves.       Iron deficiency anemia secondary to inadequate dietary iron intake  Not currently on iron supplementation.  Will check labs and adjust Tx plan pending results  Orders:    Iron Profile; Future    Stage 3b chronic kidney disease (CKD)  She is on the NSAID meloxicam which can affect her kidney function.  Will recheck labs for close follow-up.  She is to drink plenty of fluids 64 ounces a day         Vitamin D deficiency  Not currently on vitamin D supplementation.  Will check labs and adjust Tx plan pending results  Orders:    Vitamin D,25-Hydroxy; Future    Screening mammogram for breast cancer    Orders:    Mammo Screening Digital Tomosynthesis Bilateral With CAD; Future               Objective  "    Medications:  Current Outpatient Medications   Medication Instructions    albuterol sulfate  (90 Base) MCG/ACT inhaler 1 puff, Inhalation, Every 4 Hours PRN, Refill needed.    amLODIPine (NORVASC) 5 mg, Oral, Daily    aspirin 81 mg, Daily    atorvastatin (LIPITOR) 20 mg, Oral, Nightly    gabapentin (NEURONTIN) 600 mg, Oral, 3 Times Daily    glucose blood test strip Use to check blood sugar ONCE DAILY dx e 11.85,   ACCU CHEK METER    glucose monitor monitoring kit 1 each, Not Applicable, Daily    hydroCHLOROthiazide 12.5 mg, Oral, Daily    HYDROcodone-acetaminophen (NORCO)  MG per tablet 1 tablet, Every 6 Hours PRN    hydrOXYzine pamoate (VISTARIL) 25 mg, Oral, 3 Times Daily PRN    Lancet Device misc 1 each, Not Applicable, Daily, Use as directed; check blood sugar once daily  ACCU CHEK METER    levETIRAcetam XR (KEPPRA XR) 1,000 mg, Oral, Daily    levothyroxine (SYNTHROID) 112 mcg, Oral, Daily    meloxicam (MOBIC) 15 mg, Oral, Daily    metFORMIN (GLUCOPHAGE) 500 mg, Oral, 2 Times Daily With Meals    omeprazole (PRILOSEC) 20 mg, Oral, Daily    sertraline (ZOLOFT) 100 mg, Oral, Daily    Stiolto Respimat 2.5-2.5 MCG/ACT aerosol solution inhaler 2 puffs, Inhalation, Daily        Vital Signs:   /68 (BP Location: Left arm, Patient Position: Sitting, Cuff Size: Adult)   Pulse 65   Temp 97.6 °F (36.4 °C) (Oral)   Ht 165.1 cm (65\")   Wt 60.9 kg (134 lb 3.2 oz)   SpO2 97%   BMI 22.33 kg/m²     BMI is within normal parameters. No other follow-up for BMI required.     BP Readings from Last 3 Encounters:   02/24/25 141/68   08/28/24 158/84   08/23/24 134/74      Wt Readings from Last 3 Encounters:   02/24/25 60.9 kg (134 lb 3.2 oz)   08/28/24 59 kg (130 lb)   08/23/24 58.1 kg (128 lb)        Physical Exam:  Physical Exam  Vitals and nursing note reviewed.   Constitutional:       General: She is not in acute distress.     Appearance: Normal appearance. She is not ill-appearing, toxic-appearing or " diaphoretic.   HENT:      Head: Normocephalic and atraumatic.      Right Ear: Tympanic membrane, ear canal and external ear normal.      Left Ear: Tympanic membrane, ear canal and external ear normal.      Nose: No congestion or rhinorrhea.      Mouth/Throat:      Mouth: Mucous membranes are moist.      Pharynx: Oropharynx is clear. No oropharyngeal exudate or posterior oropharyngeal erythema.   Eyes:      Extraocular Movements: Extraocular movements intact.      Conjunctiva/sclera: Conjunctivae normal.      Pupils: Pupils are equal, round, and reactive to light.   Cardiovascular:      Rate and Rhythm: Normal rate and regular rhythm.      Heart sounds: Normal heart sounds.   Pulmonary:      Effort: Pulmonary effort is normal.      Breath sounds: Normal breath sounds. No wheezing, rhonchi or rales.   Chest:   Breasts:     Right: Normal.      Left: Normal.   Abdominal:      General: Abdomen is flat.      Palpations: Abdomen is soft. There is no mass.      Tenderness: There is no abdominal tenderness.      Hernia: No hernia is present.   Musculoskeletal:      Cervical back: Neck supple. No rigidity.      Right lower leg: No edema.      Left lower leg: No edema.   Lymphadenopathy:      Cervical: No cervical adenopathy.      Upper Body:      Right upper body: No axillary adenopathy.      Left upper body: No axillary adenopathy.   Skin:     General: Skin is warm and dry.   Neurological:      General: No focal deficit present.      Mental Status: She is alert and oriented to person, place, and time. Mental status is at baseline.   Psychiatric:         Mood and Affect: Mood normal.         Behavior: Behavior normal.         Thought Content: Thought content normal.         Judgment: Judgment normal.           Review of Systems:  Review of Systems   Constitutional:  Negative for chills and fever.   HENT:  Negative for ear pain, sinus pressure and sore throat.    Eyes:  Negative for blurred vision and double vision.    Respiratory:  Negative for cough, shortness of breath and wheezing.    Cardiovascular:  Negative for chest pain, palpitations and leg swelling.   Gastrointestinal:  Negative for abdominal pain, blood in stool, constipation, diarrhea, nausea and vomiting.   Skin:  Negative for rash.   Neurological:  Negative for dizziness and headache.   Psychiatric/Behavioral:  Negative for sleep disturbance, suicidal ideas and depressed mood. The patient is nervous/anxious.               Follow Up   Return in about 3 months (around 5/24/2025) for Recheck.    Part of this note may be an electronic transcription/translation of spoken language to printed   text using the Dragon Dictation System.              Health Maintenance   Topic Date Due    ZOSTER VACCINE (2 of 2) 09/11/2023    DIABETIC EYE EXAM  08/04/2024    COVID-19 Vaccine (6 - 2024-25 season) 09/01/2024    HEMOGLOBIN A1C  02/12/2025    RSV Vaccine - Adults (1 - 1-dose 75+ series) 08/20/2025 (Originally 7/13/2023)    LUNG CANCER SCREENING  07/15/2025    DXA SCAN  08/01/2025    ANNUAL WELLNESS VISIT  08/08/2025    LIPID PANEL  08/12/2025    URINE MICROALBUMIN-CREATININE RATIO (uACR)  08/12/2025    TDAP/TD VACCINES (2 - Td or Tdap) 07/17/2033    HEPATITIS C SCREENING  Completed    INFLUENZA VACCINE  Completed    Pneumococcal Vaccine 50+  Completed    MAMMOGRAM  Discontinued    COLORECTAL CANCER SCREENING  Discontinued          Medical History:  Medications Discontinued During This Encounter   Medication Reason    influenza vac split high-dose (Fluzone High-Dose) 0.5 ML suspension prefilled syringe injection *Therapy completed    vitamin B-12 (CYANOCOBALAMIN) 1000 MCG tablet *Therapy completed    glucose blood test strip Reorder      Past Medical History:    Anxiety    Arthritis    Centrilobular emphysema    Coronary artery calcification    E MICHAEL W/MONNIN. INSTRUCTE TO F/U PRN    Diabetes mellitus    AVERAGE 'S    Emphysema of lung    None    GERD (gastroesophageal  reflux disease)    Hepatitis    REPORTS OVER 30 YEARS AGO HAD TREATMENT AND DENIES ANY CURRENT ISSUES    HL (hearing loss)    Hyperlipidemia    Hypertension    Hypothyroidism    Lung nodule    (-)  ION BRONCH DONE 9/2023    Pain management    [facility]    Pulmonary arterial hypertension    Spinal stenosis    Thyroiditis    DENIES ANY CURRENT ISSUES    Visual impairment     Past Surgical History:    BRONCHOSCOPY WITH ION ROBOTIC ASSIST    Procedure: BRONCHOSCOPY WITH ION ROBOT, REBUS, EBUS, NEEDLE ASPIRATE, BIOPSIES, WASHINGS, BRUSHINGS, BAL;  Surgeon: Chiki Morse MD;  Location: [facility] MAIN OR;  Service: Robotics - Pulmonary;  Laterality: N/A;    BRONCHOSCOPY WITH ION ROBOTIC ASSIST    Procedure: BRONCHOSCOPY WITH ION ROBOT REBUS, EBUS, BIOPSIES, NEEDLE ASPIRATES, BAL, AND WASHINGS;  Surgeon: Chiki Morse MD;  Location: [facility] MAIN OR;  Service: Robotics - Pulmonary;  Laterality: N/A;    EYE SURGERY    HYSTERECTOMY    LUMBAR LAMINECTOMY    Procedure: MINIMALLY INVASIVE LUMBAR LAMINECTOMY, right approach, lumbar 4-lumbar  5 and lumbar 5-sacral 1;  Surgeon: Garry Ford MD;  Location: [facility] MAIN OR;  Service: Neurosurgery;  Laterality: Right;    LUNG BIOPSY    TONSILLECTOMY      Family History   Problem Relation Age of Onset    Anxiety disorder Mother     Cancer Father     Cancer Sister     Diabetes Brother     Diabetes Brother     Arthritis Daughter     Asthma Other     Seizures Other     Malig Hyperthermia Neg Hx      Social History     Tobacco Use    Smoking status: Every Day     Current packs/day: 0.25     Average packs/day: 0.5 packs/day for 96.0 years (50.2 ttl pk-yrs)     Types: Cigarettes     Start date: 1966    Smokeless tobacco: Never    Tobacco comments:     Really don't remember when I started, cutting back did not smoke this am   Substance Use Topics    Alcohol use: Not Currently     Alcohol/week: 1.0 standard drink of alcohol     Types: 1 Glasses of wine per week     Comment: occasional        Health Maintenance Due   Topic Date Due    ZOSTER VACCINE (2 of 2) 09/11/2023    DIABETIC EYE EXAM  08/04/2024    COVID-19 Vaccine (6 - 2024-25 season) 09/01/2024    HEMOGLOBIN A1C  02/12/2025        Immunization History   Administered Date(s) Administered    COVID-19 (MODERNA) 1st,2nd,3rd Dose Monovalent 03/17/2021, 04/15/2021, 11/03/2021    COVID-19 (PFIZER) BIVALENT 12+YRS 08/08/2023    Covid-19 (Pfizer) Gray Cap Monovalent 07/14/2022    Flu Vaccine Quad PF >36MO 10/09/2018    Fluzone (or Fluarix & Flulaval for VFC) >6mos 10/09/2018    Fluzone High-Dose 65+YRS 11/13/2017, 07/17/2023, 12/27/2024    Fluzone High-Dose 65+yrs 11/11/2021, 01/09/2024    Pneumococcal Conjugate 13-Valent (PCV13) 10/18/2018    Pneumococcal Polysaccharide (PPSV23) 01/01/2016    Shingrix 07/17/2023    Tdap 07/17/2023       Allergies   Allergen Reactions    Azithromycin Hives    Breztri Aerosphere [Budeson-Glycopyrrol-Formoterol] Other (See Comments)     Pt stated makes her light headed and she felt dizzy after using Breztri.

## 2025-02-24 NOTE — ASSESSMENT & PLAN NOTE
She wants me to manage her new seizure medication Keppra but I told her I cannot do this and she has follow-up with her neurologist.  Would actually like to stop medication and I do not recommend until her neurologist approves.

## 2025-02-24 NOTE — ASSESSMENT & PLAN NOTE
She is on the NSAID meloxicam which can affect her kidney function.  Will recheck labs for close follow-up.  She is to drink plenty of fluids 64 ounces a day

## 2025-02-24 NOTE — ASSESSMENT & PLAN NOTE
Stable on current dose of levothyroxine.  Will recheck labs and adjust Tx plan pending results Orders:    TSH+Free T4; Future

## 2025-03-07 ENCOUNTER — LAB (OUTPATIENT)
Dept: LAB | Facility: HOSPITAL | Age: 75
End: 2025-03-07
Payer: MEDICARE

## 2025-03-07 DIAGNOSIS — E55.9 VITAMIN D DEFICIENCY: ICD-10-CM

## 2025-03-07 DIAGNOSIS — E03.9 ACQUIRED HYPOTHYROIDISM: ICD-10-CM

## 2025-03-07 DIAGNOSIS — I10 ESSENTIAL HYPERTENSION: ICD-10-CM

## 2025-03-07 DIAGNOSIS — E78.00 PURE HYPERCHOLESTEROLEMIA: ICD-10-CM

## 2025-03-07 DIAGNOSIS — D50.8 IRON DEFICIENCY ANEMIA SECONDARY TO INADEQUATE DIETARY IRON INTAKE: ICD-10-CM

## 2025-03-07 DIAGNOSIS — E11.9 TYPE 2 DIABETES MELLITUS WITHOUT COMPLICATION, WITHOUT LONG-TERM CURRENT USE OF INSULIN: ICD-10-CM

## 2025-03-07 LAB
ALBUMIN SERPL-MCNC: 4.2 G/DL (ref 3.5–5.2)
ALBUMIN UR-MCNC: 1.9 MG/DL
ALBUMIN/GLOB SERPL: 1.4 G/DL
ALP SERPL-CCNC: 73 U/L (ref 39–117)
ALT SERPL W P-5'-P-CCNC: 10 U/L (ref 1–33)
ANION GAP SERPL CALCULATED.3IONS-SCNC: 11 MMOL/L (ref 5–15)
AST SERPL-CCNC: 15 U/L (ref 1–32)
BILIRUB SERPL-MCNC: 0.3 MG/DL (ref 0–1.2)
BUN SERPL-MCNC: 21 MG/DL (ref 8–23)
BUN/CREAT SERPL: 17.9 (ref 7–25)
CALCIUM SPEC-SCNC: 9.4 MG/DL (ref 8.6–10.5)
CHLORIDE SERPL-SCNC: 101 MMOL/L (ref 98–107)
CHOLEST SERPL-MCNC: 145 MG/DL (ref 0–200)
CO2 SERPL-SCNC: 29 MMOL/L (ref 22–29)
CREAT SERPL-MCNC: 1.17 MG/DL (ref 0.57–1)
CREAT UR-MCNC: 41.3 MG/DL
DEPRECATED RDW RBC AUTO: 43.7 FL (ref 37–54)
EGFRCR SERPLBLD CKD-EPI 2021: 48.5 ML/MIN/1.73
ERYTHROCYTE [DISTWIDTH] IN BLOOD BY AUTOMATED COUNT: 13.3 % (ref 12.3–15.4)
GLOBULIN UR ELPH-MCNC: 3 GM/DL
GLUCOSE SERPL-MCNC: 114 MG/DL (ref 65–99)
HBA1C MFR BLD: 6.5 % (ref 4.8–5.6)
HCT VFR BLD AUTO: 33.5 % (ref 34–46.6)
HDLC SERPL-MCNC: 66 MG/DL (ref 40–60)
HGB BLD-MCNC: 10.9 G/DL (ref 12–15.9)
IRON 24H UR-MRATE: 59 MCG/DL (ref 37–145)
IRON SATN MFR SERPL: 15 % (ref 20–50)
LDLC SERPL CALC-MCNC: 66 MG/DL (ref 0–100)
LDLC/HDLC SERPL: 1 {RATIO}
MCH RBC QN AUTO: 28.8 PG (ref 26.6–33)
MCHC RBC AUTO-ENTMCNC: 32.5 G/DL (ref 31.5–35.7)
MCV RBC AUTO: 88.4 FL (ref 79–97)
MICROALBUMIN/CREAT UR: 46 MG/G (ref 0–29)
PLATELET # BLD AUTO: 141 10*3/MM3 (ref 140–450)
PMV BLD AUTO: 8.9 FL (ref 6–12)
POTASSIUM SERPL-SCNC: 3.5 MMOL/L (ref 3.5–5.2)
PROT SERPL-MCNC: 7.2 G/DL (ref 6–8.5)
RBC # BLD AUTO: 3.79 10*6/MM3 (ref 3.77–5.28)
SODIUM SERPL-SCNC: 141 MMOL/L (ref 136–145)
T4 FREE SERPL-MCNC: 1.77 NG/DL (ref 0.92–1.68)
TIBC SERPL-MCNC: 402 MCG/DL (ref 298–536)
TRANSFERRIN SERPL-MCNC: 270 MG/DL (ref 200–360)
TRIGL SERPL-MCNC: 66 MG/DL (ref 0–150)
TSH SERPL DL<=0.05 MIU/L-ACNC: 0.99 UIU/ML (ref 0.27–4.2)
VLDLC SERPL-MCNC: 13 MG/DL (ref 5–40)
WBC NRBC COR # BLD AUTO: 5.19 10*3/MM3 (ref 3.4–10.8)

## 2025-03-07 PROCEDURE — 84466 ASSAY OF TRANSFERRIN: CPT

## 2025-03-07 PROCEDURE — 83540 ASSAY OF IRON: CPT

## 2025-03-07 PROCEDURE — 85027 COMPLETE CBC AUTOMATED: CPT

## 2025-03-07 PROCEDURE — 80053 COMPREHEN METABOLIC PANEL: CPT

## 2025-03-07 PROCEDURE — 84443 ASSAY THYROID STIM HORMONE: CPT

## 2025-03-07 PROCEDURE — 80061 LIPID PANEL: CPT

## 2025-03-07 PROCEDURE — 83036 HEMOGLOBIN GLYCOSYLATED A1C: CPT

## 2025-03-07 PROCEDURE — 82570 ASSAY OF URINE CREATININE: CPT

## 2025-03-07 PROCEDURE — 84439 ASSAY OF FREE THYROXINE: CPT

## 2025-03-07 PROCEDURE — 82043 UR ALBUMIN QUANTITATIVE: CPT

## 2025-03-07 PROCEDURE — 36415 COLL VENOUS BLD VENIPUNCTURE: CPT

## 2025-03-07 PROCEDURE — 82306 VITAMIN D 25 HYDROXY: CPT

## 2025-03-08 LAB — 25(OH)D3 SERPL-MCNC: 14.8 NG/ML (ref 30–100)

## 2025-03-10 ENCOUNTER — PATIENT MESSAGE (OUTPATIENT)
Dept: FAMILY MEDICINE CLINIC | Age: 75
End: 2025-03-10
Payer: MEDICARE

## 2025-03-14 ENCOUNTER — TELEPHONE (OUTPATIENT)
Dept: FAMILY MEDICINE CLINIC | Age: 75
End: 2025-03-14
Payer: MEDICARE

## 2025-03-14 NOTE — TELEPHONE ENCOUNTER
Patient on the line states she got up in the middle of the night fell and hit her head, now she has a big knot on her head and is feeling dizzy.    I am not in the office.  Recc she go to the ER for full evaluation, she should not drive, if no one is available to drive her she should call EMS. Dr Langford      I informed her about the ER, she said she will have her daughter take her per

## 2025-05-02 DIAGNOSIS — E11.41 TYPE 2 DIABETES MELLITUS WITH DIABETIC MONONEUROPATHY, WITHOUT LONG-TERM CURRENT USE OF INSULIN: ICD-10-CM

## 2025-05-02 DIAGNOSIS — M51.369 DEGENERATION OF LUMBAR INTERVERTEBRAL DISC: ICD-10-CM

## 2025-05-02 DIAGNOSIS — M54.41 CHRONIC RIGHT-SIDED LOW BACK PAIN WITH RIGHT-SIDED SCIATICA: ICD-10-CM

## 2025-05-02 DIAGNOSIS — G89.29 CHRONIC RIGHT-SIDED LOW BACK PAIN WITH RIGHT-SIDED SCIATICA: ICD-10-CM

## 2025-05-05 RX ORDER — GABAPENTIN 600 MG/1
600 TABLET ORAL 3 TIMES DAILY
Qty: 90 TABLET | Refills: 0 | Status: SHIPPED | OUTPATIENT
Start: 2025-05-05

## 2025-05-05 NOTE — TELEPHONE ENCOUNTER
Controlled refill request:  Requested Prescriptions     Pending Prescriptions Disp Refills    metFORMIN (GLUCOPHAGE) 500 MG tablet 180 tablet 0     Sig: Take 1 tablet by mouth 2 (Two) Times a Day With Meals.    gabapentin (NEURONTIN) 600 MG tablet 90 tablet 0     Sig: Take 1 tablet by mouth 3 (Three) Times a Day.      Last OV:  2/24/2025  Next OV:  8/25/2025  Last fill:  2- #90  Last tox:  1-9-2024

## 2025-05-12 DIAGNOSIS — F41.9 ANXIETY: ICD-10-CM

## 2025-05-12 DIAGNOSIS — I10 ESSENTIAL HYPERTENSION: ICD-10-CM

## 2025-05-12 DIAGNOSIS — E78.00 PURE HYPERCHOLESTEROLEMIA: ICD-10-CM

## 2025-05-12 DIAGNOSIS — E03.9 ACQUIRED HYPOTHYROIDISM: ICD-10-CM

## 2025-05-12 DIAGNOSIS — G89.29 CHRONIC RIGHT-SIDED LOW BACK PAIN WITH RIGHT-SIDED SCIATICA: ICD-10-CM

## 2025-05-12 DIAGNOSIS — M54.41 CHRONIC RIGHT-SIDED LOW BACK PAIN WITH RIGHT-SIDED SCIATICA: ICD-10-CM

## 2025-05-12 DIAGNOSIS — K21.9 GASTROESOPHAGEAL REFLUX DISEASE WITHOUT ESOPHAGITIS: ICD-10-CM

## 2025-05-14 DIAGNOSIS — F41.9 ANXIETY: ICD-10-CM

## 2025-05-14 RX ORDER — MELOXICAM 15 MG/1
15 TABLET ORAL DAILY
Qty: 90 TABLET | Refills: 0 | Status: SHIPPED | OUTPATIENT
Start: 2025-05-14

## 2025-05-14 RX ORDER — SERTRALINE HYDROCHLORIDE 100 MG/1
100 TABLET, FILM COATED ORAL DAILY
Qty: 90 TABLET | Refills: 1 | OUTPATIENT
Start: 2025-05-14

## 2025-05-14 RX ORDER — LEVOTHYROXINE SODIUM 112 UG/1
112 TABLET ORAL DAILY
Qty: 90 TABLET | Refills: 0 | Status: SHIPPED | OUTPATIENT
Start: 2025-05-14

## 2025-05-14 RX ORDER — HYDROCHLOROTHIAZIDE 12.5 MG/1
12.5 TABLET ORAL DAILY
Qty: 90 TABLET | Refills: 0 | Status: SHIPPED | OUTPATIENT
Start: 2025-05-14

## 2025-05-14 RX ORDER — HYDROXYZINE PAMOATE 25 MG/1
25 CAPSULE ORAL 3 TIMES DAILY PRN
Qty: 90 CAPSULE | Refills: 0 | Status: SHIPPED | OUTPATIENT
Start: 2025-05-14

## 2025-05-14 RX ORDER — ATORVASTATIN CALCIUM 20 MG/1
20 TABLET, FILM COATED ORAL NIGHTLY
Qty: 90 TABLET | Refills: 0 | Status: SHIPPED | OUTPATIENT
Start: 2025-05-14

## 2025-05-14 RX ORDER — OMEPRAZOLE 20 MG/1
20 CAPSULE, DELAYED RELEASE ORAL DAILY
Qty: 90 CAPSULE | Refills: 1 | Status: SHIPPED | OUTPATIENT
Start: 2025-05-14

## 2025-05-14 RX ORDER — SERTRALINE HYDROCHLORIDE 100 MG/1
100 TABLET, FILM COATED ORAL DAILY
Qty: 90 TABLET | Refills: 1 | Status: SHIPPED | OUTPATIENT
Start: 2025-05-14

## 2025-05-14 RX ORDER — AMLODIPINE BESYLATE 5 MG/1
5 TABLET ORAL DAILY
Qty: 90 TABLET | Refills: 1 | Status: SHIPPED | OUTPATIENT
Start: 2025-05-14

## 2025-05-15 ENCOUNTER — TELEPHONE (OUTPATIENT)
Dept: FAMILY MEDICINE CLINIC | Age: 75
End: 2025-05-15
Payer: MEDICARE

## 2025-05-15 NOTE — TELEPHONE ENCOUNTER
1st attempt - pt informed of overdue mammo ordered by pcp on 2/24/25. Pt has had three appts arash but has had to cancel, pt does have scheduling number.

## 2025-05-21 ENCOUNTER — OFFICE VISIT (OUTPATIENT)
Dept: FAMILY MEDICINE CLINIC | Age: 75
End: 2025-05-21
Payer: MEDICARE

## 2025-05-21 VITALS
RESPIRATION RATE: 18 BRPM | HEART RATE: 65 BPM | WEIGHT: 134 LBS | SYSTOLIC BLOOD PRESSURE: 156 MMHG | BODY MASS INDEX: 22.33 KG/M2 | OXYGEN SATURATION: 100 % | TEMPERATURE: 97.4 F | DIASTOLIC BLOOD PRESSURE: 73 MMHG | HEIGHT: 65 IN

## 2025-05-21 DIAGNOSIS — I10 ESSENTIAL HYPERTENSION: ICD-10-CM
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
## 2025-05-21 DIAGNOSIS — E11.9 TYPE 2 DIABETES MELLITUS WITHOUT COMPLICATION, WITHOUT LONG-TERM CURRENT USE OF INSULIN: ICD-10-CM

## 2025-05-21 PROCEDURE — 3078F DIAST BP <80 MM HG: CPT | Performed by: STUDENT IN AN ORGANIZED HEALTH CARE EDUCATION/TRAINING PROGRAM

## 2025-05-21 PROCEDURE — 99214 OFFICE O/P EST MOD 30 MIN: CPT | Performed by: STUDENT IN AN ORGANIZED HEALTH CARE EDUCATION/TRAINING PROGRAM

## 2025-05-21 PROCEDURE — 3077F SYST BP >= 140 MM HG: CPT | Performed by: STUDENT IN AN ORGANIZED HEALTH CARE EDUCATION/TRAINING PROGRAM

## 2025-05-21 PROCEDURE — 1126F AMNT PAIN NOTED NONE PRSNT: CPT | Performed by: STUDENT IN AN ORGANIZED HEALTH CARE EDUCATION/TRAINING PROGRAM

## 2025-05-21 PROCEDURE — 1160F RVW MEDS BY RX/DR IN RCRD: CPT | Performed by: STUDENT IN AN ORGANIZED HEALTH CARE EDUCATION/TRAINING PROGRAM

## 2025-05-21 PROCEDURE — 1159F MED LIST DOCD IN RCRD: CPT | Performed by: STUDENT IN AN ORGANIZED HEALTH CARE EDUCATION/TRAINING PROGRAM

## 2025-05-21 RX ORDER — METHYLPREDNISOLONE 4 MG/1
TABLET ORAL
Qty: 21 TABLET | Refills: 0 | Status: SHIPPED | OUTPATIENT
Start: 2025-05-21 | End: 2025-05-21

## 2025-05-21 RX ORDER — METHYLPREDNISOLONE 4 MG/1
TABLET ORAL
Qty: 21 TABLET | Refills: 0 | Status: SHIPPED | OUTPATIENT
Start: 2025-05-21

## 2025-05-25 NOTE — ASSESSMENT & PLAN NOTE
Blood pressure elevated today at 162/65 initially, rechecked prior to leaving office it was 156/73.  Continue taking amlodipine as directed.  Monitor blood pressure at home.

## 2025-05-25 NOTE — ASSESSMENT & PLAN NOTE
Hemoglobin A1c was last checked on 3/7/2025 and was 6.5.  Will prescribe a Medrol Dosepak but instructed patient to keep a close eye on her blood glucose as this could cause it to elevate.

## 2025-05-25 NOTE — PROGRESS NOTES
Chief Complaint     Cyst (Knots left side of neck and back of the neck 5 days. )    History of Present Illness     Odalis Espino is a 74 y.o. female who presents to [facility].     Patient or patient representative verbalized consent for the use of Ambient Listening during the visit with  BAMBI Jones for chart documentation. 5/27/2025  11:35 EDT      History of Present Illness  The patient is a 74-year-old female who presents for evaluation of bumps on her neck.    She reports the presence of palpable nodules on the lateral aspect of her neck, extending to the posterior region. These were first noticed on Friday, 05/16/2025, with two distinct elevations in the specified area. Initially, she suspected a tick infestation due to recent outdoor activities, but her granddaughter suggested a possible spider bite. The lesions have since migrated, with one prominent nodule remaining palpable and another located on the posterior aspect of her neck. She reports no new exposures to medications or detergents, and her primary location has been her yard and porch. She has no known allergies to poison ivy or poison oak. The lesions are tender upon palpation, particularly the one on the back of her neck, which was significantly enlarged two days prior. Mild pruritus is noted upon contact, but there is no associated burning sensation. She reports no subcutaneous pain, burning pain, or shooting pain in the cervical region. She has a history of shingles, which occurred prior to receiving the shingles vaccine.     Blood pressure elevated today at 162/65 initially, rechecked prior to leaving office it was 156/73. She is diabetic and her blood sugar levels are well-controlled. She checks her blood sugar every morning.         History      Past Medical History:   Diagnosis Date    Anxiety     Arthritis     Centrilobular emphysema     Coronary artery calcification     E MICHAEL W/JUAN. INSTRUCTE TO  F/U PRN    Diabetes mellitus     AVERAGE 'S    Emphysema of lung Aug 23    None    GERD (gastroesophageal reflux disease)     Hepatitis     REPORTS OVER 30 YEARS AGO HAD TREATMENT AND DENIES ANY CURRENT ISSUES    HL (hearing loss)     Hyperlipidemia     Hypertension     Hypothyroidism     Lung nodule     (-)  ION BRONCH DONE 9/2023    Pain management     [facility]    Pulmonary arterial hypertension Aug 23    Spinal stenosis     Thyroiditis     DENIES ANY CURRENT ISSUES    Visual impairment        Past Surgical History:   Procedure Laterality Date    BRONCHOSCOPY WITH ION ROBOTIC ASSIST N/A 09/25/2023    Procedure: BRONCHOSCOPY WITH ION ROBOT, REBUS, EBUS, NEEDLE ASPIRATE, BIOPSIES, WASHINGS, BRUSHINGS, BAL;  Surgeon: Chiki Morse MD;  Location: [facility] MAIN OR;  Service: Robotics - Pulmonary;  Laterality: N/A;    BRONCHOSCOPY WITH ION ROBOTIC ASSIST N/A 8/14/2024    Procedure: BRONCHOSCOPY WITH ION ROBOT REBUS, EBUS, BIOPSIES, NEEDLE ASPIRATES, BAL, AND WASHINGS;  Surgeon: Chiki Morse MD;  Location: [facility] MAIN OR;  Service: Robotics - Pulmonary;  Laterality: N/A;    EYE SURGERY      HYSTERECTOMY      LUMBAR LAMINECTOMY Right 12/15/2023    Procedure: MINIMALLY INVASIVE LUMBAR LAMINECTOMY, right approach, lumbar 4-lumbar  5 and lumbar 5-sacral 1;  Surgeon: Garry Ford MD;  Location: [facility] MAIN OR;  Service: Neurosurgery;  Laterality: Right;    LUNG BIOPSY  Aug 23    TONSILLECTOMY         Family History   Problem Relation Age of Onset    Anxiety disorder Mother     Cancer Father     Cancer Sister     Diabetes Brother     Diabetes Brother     Arthritis Daughter     Asthma Other     Seizures Other     Malig Hyperthermia Neg Hx         Current Medications        Current Outpatient Medications:     albuterol sulfate  (90 Base) MCG/ACT inhaler, Inhale 1 puff Every 4 (Four) Hours As Needed for Wheezing or Shortness of Air. Refill needed., Disp: 54 g, Rfl: 3    amLODIPine (NORVASC) 5 MG tablet, Take  1 tablet by mouth Daily., Disp: 90 tablet, Rfl: 1    aspirin 81 MG EC tablet, Take 1 tablet by mouth Daily., Disp: , Rfl:     atorvastatin (LIPITOR) 20 MG tablet, Take 1 tablet by mouth Every Night., Disp: 90 tablet, Rfl: 0    gabapentin (NEURONTIN) 600 MG tablet, Take 1 tablet by mouth 3 (Three) Times a Day., Disp: 90 tablet, Rfl: 0    glucose blood test strip, Use to check blood sugar ONCE DAILY dx e 11.85,   ACCU CHEK METER, Disp: 100 each, Rfl: 3    glucose monitor monitoring kit, Use 1 each Daily., Disp: 1 each, Rfl: 0    hydroCHLOROthiazide 12.5 MG tablet, Take 1 tablet by mouth Daily., Disp: 90 tablet, Rfl: 0    HYDROcodone-acetaminophen (NORCO)  MG per tablet, Take 1 tablet by mouth Every 6 (Six) Hours As Needed for Moderate Pain., Disp: , Rfl:     hydrOXYzine pamoate (VISTARIL) 25 MG capsule, Take 1 capsule by mouth 3 (Three) Times a Day As Needed for Itching or Anxiety., Disp: 90 capsule, Rfl: 0    Lancet Device misc, Use 1 each Daily. Use as directed; check blood sugar once daily  ACCU CHEK METER, Disp: 100 each, Rfl: 3    levETIRAcetam XR (KEPPRA XR) 500 MG tablet, Take 2 tablets by mouth Daily., Disp: 180 tablet, Rfl: 3    levothyroxine (Synthroid) 112 MCG tablet, Take 1 tablet by mouth Daily., Disp: 90 tablet, Rfl: 0    meloxicam (MOBIC) 15 MG tablet, Take 1 tablet by mouth Daily., Disp: 90 tablet, Rfl: 0    metFORMIN (GLUCOPHAGE) 500 MG tablet, Take 1 tablet by mouth 2 (Two) Times a Day With Meals., Disp: 180 tablet, Rfl: 0    omeprazole (priLOSEC) 20 MG capsule, Take 1 capsule by mouth Daily., Disp: 90 capsule, Rfl: 1    sertraline (ZOLOFT) 100 MG tablet, Take 1 tablet by mouth Daily., Disp: 90 tablet, Rfl: 1    Stiolto Respimat 2.5-2.5 MCG/ACT aerosol solution inhaler, Inhale 2 puffs Daily., Disp: 4 g, Rfl: 11    methylPREDNISolone (MEDROL) 4 MG dose pack, Take by mouth as directed on package instructions., Disp: 21 tablet, Rfl: 0     Allergies     Allergies   Allergen Reactions     "Azithromycin Hives    Breztri Aerosphere [Budeson-Glycopyrrol-Formoterol] Other (See Comments)     Pt stated makes her light headed and she felt dizzy after using Breztri.        Social History       Social History     Social History Narrative    Not on file       Immunizations     Immunization:  Immunization History   Administered Date(s) Administered    COVID-19 (MODERNA) 1st,2nd,3rd Dose Monovalent 03/17/2021, 04/15/2021, 11/03/2021    COVID-19 (PFIZER) BIVALENT 12+YRS 08/08/2023    Covid-19 (Pfizer) Gray Cap Monovalent 07/14/2022    Flu Vaccine Quad PF >36MO 10/09/2018    Fluzone (or Fluarix & Flulaval for VFC) >6mos 10/09/2018    Fluzone High-Dose 65+YRS 11/13/2017, 07/17/2023, 12/27/2024    Fluzone High-Dose 65+yrs 11/11/2021, 01/09/2024    Pneumococcal Conjugate 13-Valent (PCV13) 10/18/2018    Pneumococcal Polysaccharide (PPSV23) 01/01/2016    Shingrix 07/17/2023    Tdap 07/17/2023          Objective     Objective     Vital Signs:   /73 (BP Location: Left arm, Patient Position: Sitting, Cuff Size: Adult)   Pulse 65   Temp 97.4 °F (36.3 °C) (Oral)   Resp 18   Ht 165.1 cm (65\")   Wt 60.8 kg (134 lb)   SpO2 100% Comment: room air  BMI 22.30 kg/m²       Physical Exam  Vitals and nursing note reviewed.   Constitutional:       Appearance: Normal appearance. She is normal weight.   HENT:      Head: Normocephalic.   Eyes:      Conjunctiva/sclera: Conjunctivae normal.      Pupils: Pupils are equal, round, and reactive to light.   Neck:      Comments: 5 small lesions located on the neck with 1 being on the posterior aspect of the neck and the other 4 being on the right side.  Cardiovascular:      Rate and Rhythm: Normal rate and regular rhythm.      Pulses: Normal pulses.      Heart sounds: Normal heart sounds.   Pulmonary:      Effort: Pulmonary effort is normal.      Breath sounds: Normal breath sounds.   Abdominal:      General: Bowel sounds are normal.      Palpations: Abdomen is soft. "   Musculoskeletal:         General: Normal range of motion.      Cervical back: Normal range of motion and neck supple.   Skin:     General: Skin is warm and dry.      Findings: Lesion present.   Neurological:      General: No focal deficit present.      Mental Status: She is alert and oriented to person, place, and time.   Psychiatric:         Attention and Perception: Attention normal.         Mood and Affect: Mood and affect normal.         Behavior: Behavior normal. Behavior is cooperative.         Physical Exam  Respiratory: Clear to auscultation, no wheezing, rales or rhonchi  Skin: Multiple raised, inflamed lesions on the right side of the neck, one resembling a vesicle with fluid            Results    The following data was reviewed by: BAMBI Jones on 05/27/25               Results           Assessment and Plan        Assessment and Plan       Essential hypertension    Blood pressure elevated today at 162/65 initially, rechecked prior to leaving office it was 156/73.  Continue taking amlodipine as directed.  Monitor blood pressure at home.       Type 2 diabetes mellitus without complication, without long-term current use of insulin    Hemoglobin A1c was last checked on 3/7/2025 and was 6.5.  Will prescribe a Medrol Dosepak but instructed patient to keep a close eye on her blood glucose as this could cause it to elevate.       Bug bite, initial encounter    Orders:    methylPREDNISolone (MEDROL) 4 MG dose pack; Take by mouth as directed on package instructions.         Assessment & Plan  1. Cervical dermal nodules.  - Differential diagnosis includes shingles, folliculitis, or insect bites. Given the unilateral presentation and lack of significant inflammation, insect bites are the most likely cause.  - No evidence of infection observed, thus antibiotics are not warranted at this time.  - Discussed the condition with a colleague, and the decision was made to treat with steroids. Advised to monitor  blood glucose levels closely due to the potential hyperglycemic effect of steroids.  - Prescription for Medrol Dosepak issued to manage inflammation. Advised to discontinue use if adverse reactions occur and to inform us if lesions persist after completing the steroid course for further evaluation, which may include blood work.    2. Diabetes mellitus.  - Last hemoglobin A1c was 6.5.  - Advised to continue monitoring blood glucose levels daily, especially while on steroids, as they can elevate blood sugar levels.  - Reviewed the importance of maintaining regular blood glucose checks.  - No changes to current diabetes management plan.    Follow-up  The patient will follow up in August with Dr. Langford.        Follow Up        Follow Up   Return for With PCP, Next scheduled follow up, sooner if condition worsens.  Patient was given instructions and counseling regarding her condition or for health maintenance advice. Please see specific information pulled into the AVS if appropriate.

## 2025-06-24 DIAGNOSIS — M51.369 DEGENERATION OF LUMBAR INTERVERTEBRAL DISC: ICD-10-CM

## 2025-06-24 DIAGNOSIS — M54.41 CHRONIC RIGHT-SIDED LOW BACK PAIN WITH RIGHT-SIDED SCIATICA: ICD-10-CM

## 2025-06-24 DIAGNOSIS — G89.29 CHRONIC RIGHT-SIDED LOW BACK PAIN WITH RIGHT-SIDED SCIATICA: ICD-10-CM

## 2025-06-25 RX ORDER — GABAPENTIN 600 MG/1
600 TABLET ORAL 3 TIMES DAILY
Qty: 90 TABLET | Refills: 0 | Status: SHIPPED | OUTPATIENT
Start: 2025-06-25

## 2025-08-04 DIAGNOSIS — E03.9 ACQUIRED HYPOTHYROIDISM: ICD-10-CM

## 2025-08-04 DIAGNOSIS — M51.369 DEGENERATION OF LUMBAR INTERVERTEBRAL DISC: ICD-10-CM

## 2025-08-04 DIAGNOSIS — E78.00 PURE HYPERCHOLESTEROLEMIA: ICD-10-CM

## 2025-08-04 DIAGNOSIS — I10 ESSENTIAL HYPERTENSION: ICD-10-CM

## 2025-08-04 DIAGNOSIS — M54.41 CHRONIC RIGHT-SIDED LOW BACK PAIN WITH RIGHT-SIDED SCIATICA: ICD-10-CM

## 2025-08-04 DIAGNOSIS — E11.41 TYPE 2 DIABETES MELLITUS WITH DIABETIC MONONEUROPATHY, WITHOUT LONG-TERM CURRENT USE OF INSULIN: ICD-10-CM

## 2025-08-04 DIAGNOSIS — G89.29 CHRONIC RIGHT-SIDED LOW BACK PAIN WITH RIGHT-SIDED SCIATICA: ICD-10-CM

## 2025-08-04 RX ORDER — LEVOTHYROXINE SODIUM 112 UG/1
112 TABLET ORAL DAILY
Qty: 90 TABLET | Refills: 0 | Status: SHIPPED | OUTPATIENT
Start: 2025-08-04

## 2025-08-04 RX ORDER — ATORVASTATIN CALCIUM 20 MG/1
20 TABLET, FILM COATED ORAL NIGHTLY
Qty: 90 TABLET | Refills: 0 | Status: SHIPPED | OUTPATIENT
Start: 2025-08-04

## 2025-08-04 RX ORDER — MELOXICAM 15 MG/1
15 TABLET ORAL DAILY
Qty: 90 TABLET | Refills: 0 | Status: SHIPPED | OUTPATIENT
Start: 2025-08-04

## 2025-08-04 RX ORDER — HYDROCHLOROTHIAZIDE 12.5 MG/1
12.5 TABLET ORAL DAILY
Qty: 90 TABLET | Refills: 0 | Status: SHIPPED | OUTPATIENT
Start: 2025-08-04

## 2025-08-04 RX ORDER — GABAPENTIN 600 MG/1
600 TABLET ORAL 3 TIMES DAILY
Qty: 90 TABLET | Refills: 0 | Status: SHIPPED | OUTPATIENT
Start: 2025-08-04

## 2025-08-04 RX ORDER — HYDROXYZINE PAMOATE 25 MG/1
25 CAPSULE ORAL 3 TIMES DAILY PRN
Qty: 90 CAPSULE | Refills: 0 | Status: SHIPPED | OUTPATIENT
Start: 2025-08-04

## 2025-08-18 ENCOUNTER — HOSPITAL ENCOUNTER (OUTPATIENT)
Dept: MAMMOGRAPHY | Facility: HOSPITAL | Age: 75
Discharge: HOME | End: 2025-08-18
Admitting: FAMILY MEDICINE
Payer: MEDICARE

## 2025-08-18 DIAGNOSIS — Z12.31 SCREENING MAMMOGRAM FOR BREAST CANCER: ICD-10-CM

## 2025-08-18 PROCEDURE — 77067 SCR MAMMO BI INCL CAD: CPT

## 2025-08-18 PROCEDURE — 77063 BREAST TOMOSYNTHESIS BI: CPT

## 2025-08-25 ENCOUNTER — LAB (OUTPATIENT)
Dept: LAB | Facility: HOSPITAL | Age: 75
End: 2025-08-25
Payer: MEDICARE

## 2025-08-25 ENCOUNTER — OFFICE VISIT (OUTPATIENT)
Dept: FAMILY MEDICINE CLINIC | Age: 75
End: 2025-08-25
Payer: MEDICARE

## 2025-08-25 VITALS
HEART RATE: 73 BPM | TEMPERATURE: 98.4 F | HEIGHT: 65 IN | SYSTOLIC BLOOD PRESSURE: 160 MMHG | OXYGEN SATURATION: 97 % | WEIGHT: 141.4 LBS | DIASTOLIC BLOOD PRESSURE: 95 MMHG | BODY MASS INDEX: 23.56 KG/M2

## 2025-08-25 DIAGNOSIS — F17.200 TOBACCO DEPENDENCE: ICD-10-CM

## 2025-08-25 DIAGNOSIS — E03.9 ACQUIRED HYPOTHYROIDISM: ICD-10-CM

## 2025-08-25 DIAGNOSIS — I10 ESSENTIAL HYPERTENSION: ICD-10-CM

## 2025-08-25 DIAGNOSIS — E78.00 PURE HYPERCHOLESTEROLEMIA: ICD-10-CM

## 2025-08-25 DIAGNOSIS — E11.41 TYPE 2 DIABETES MELLITUS WITH DIABETIC MONONEUROPATHY, WITHOUT LONG-TERM CURRENT USE OF INSULIN: ICD-10-CM

## 2025-08-25 DIAGNOSIS — F41.9 ANXIETY: ICD-10-CM

## 2025-08-25 DIAGNOSIS — E55.9 VITAMIN D DEFICIENCY: ICD-10-CM

## 2025-08-25 DIAGNOSIS — D50.8 IRON DEFICIENCY ANEMIA SECONDARY TO INADEQUATE DIETARY IRON INTAKE: ICD-10-CM

## 2025-08-25 DIAGNOSIS — R91.1 LUNG NODULE: ICD-10-CM

## 2025-08-25 DIAGNOSIS — J45.20 MILD INTERMITTENT ASTHMA WITHOUT COMPLICATION: ICD-10-CM

## 2025-08-25 DIAGNOSIS — Z00.00 ENCOUNTER FOR ANNUAL WELLNESS EXAM IN MEDICARE PATIENT: Primary | ICD-10-CM

## 2025-08-25 DIAGNOSIS — K21.9 GASTROESOPHAGEAL REFLUX DISEASE WITHOUT ESOPHAGITIS: ICD-10-CM

## 2025-08-25 DIAGNOSIS — M54.41 CHRONIC RIGHT-SIDED LOW BACK PAIN WITH RIGHT-SIDED SCIATICA: ICD-10-CM

## 2025-08-25 DIAGNOSIS — R56.9 SEIZURE: ICD-10-CM

## 2025-08-25 DIAGNOSIS — Z78.0 MENOPAUSE: ICD-10-CM

## 2025-08-25 DIAGNOSIS — Z23 ENCOUNTER FOR IMMUNIZATION: ICD-10-CM

## 2025-08-25 DIAGNOSIS — Z79.899 HIGH RISK MEDICATION USE: ICD-10-CM

## 2025-08-25 DIAGNOSIS — H91.93 BILATERAL HEARING LOSS, UNSPECIFIED HEARING LOSS TYPE: ICD-10-CM

## 2025-08-25 DIAGNOSIS — N18.32 STAGE 3B CHRONIC KIDNEY DISEASE (CKD): ICD-10-CM

## 2025-08-25 DIAGNOSIS — Z12.31 ENCOUNTER FOR SCREENING MAMMOGRAM FOR BREAST CANCER: ICD-10-CM

## 2025-08-25 DIAGNOSIS — Z12.11 COLON CANCER SCREENING: ICD-10-CM

## 2025-08-25 DIAGNOSIS — G89.29 CHRONIC RIGHT-SIDED LOW BACK PAIN WITH RIGHT-SIDED SCIATICA: ICD-10-CM

## 2025-08-25 LAB
25(OH)D3 SERPL-MCNC: 82.2 NG/ML (ref 30–100)
ALBUMIN SERPL-MCNC: 4.3 G/DL (ref 3.5–5.2)
ALBUMIN UR-MCNC: <1.2 MG/DL
ALBUMIN/GLOB SERPL: 1.3 G/DL
ALP SERPL-CCNC: 81 U/L (ref 39–117)
ALT SERPL W P-5'-P-CCNC: 10 U/L (ref 1–33)
AMPHET+METHAMPHET UR QL: NEGATIVE
AMPHETAMINES UR QL: NEGATIVE
ANION GAP SERPL CALCULATED.3IONS-SCNC: 13.8 MMOL/L (ref 5–15)
AST SERPL-CCNC: 18 U/L (ref 1–32)
BARBITURATES UR QL SCN: NEGATIVE
BENZODIAZ UR QL SCN: NEGATIVE
BILIRUB SERPL-MCNC: 0.4 MG/DL (ref 0–1.2)
BUN SERPL-MCNC: 17 MG/DL (ref 8–23)
BUN/CREAT SERPL: 16.5 (ref 7–25)
BUPRENORPHINE SERPL-MCNC: NEGATIVE NG/ML
CALCIUM SPEC-SCNC: 9.6 MG/DL (ref 8.6–10.5)
CANNABINOIDS SERPL QL: NEGATIVE
CHLORIDE SERPL-SCNC: 98 MMOL/L (ref 98–107)
CHOLEST SERPL-MCNC: 157 MG/DL (ref 0–200)
CO2 SERPL-SCNC: 28.2 MMOL/L (ref 22–29)
COCAINE UR QL: NEGATIVE
CREAT SERPL-MCNC: 1.03 MG/DL (ref 0.57–1)
CREAT UR-MCNC: 29 MG/DL
DEPRECATED RDW RBC AUTO: 42.2 FL (ref 37–54)
EGFRCR SERPLBLD CKD-EPI 2021: 56.1 ML/MIN/1.73
ERYTHROCYTE [DISTWIDTH] IN BLOOD BY AUTOMATED COUNT: 13.1 % (ref 12.3–15.4)
EXPIRATION DATE: NORMAL
GLOBULIN UR ELPH-MCNC: 3.2 GM/DL
GLUCOSE SERPL-MCNC: 105 MG/DL (ref 65–99)
HBA1C MFR BLD: 6.3 % (ref 4.8–5.6)
HCT VFR BLD AUTO: 31.9 % (ref 34–46.6)
HDLC SERPL-MCNC: 64 MG/DL (ref 40–60)
HGB BLD-MCNC: 11 G/DL (ref 12–15.9)
LDLC SERPL CALC-MCNC: 75 MG/DL (ref 0–100)
LDLC/HDLC SERPL: 1.14 {RATIO}
Lab: NORMAL
MCH RBC QN AUTO: 30 PG (ref 26.6–33)
MCHC RBC AUTO-ENTMCNC: 34.5 G/DL (ref 31.5–35.7)
MCV RBC AUTO: 86.9 FL (ref 79–97)
MDMA UR QL SCN: NEGATIVE
METHADONE UR QL SCN: NEGATIVE
MICROALBUMIN/CREAT UR: NORMAL MG/G{CREAT}
MORPHINE/OPIATES SCREEN, URINE: NEGATIVE
OXYCODONE UR QL SCN: NEGATIVE
PCP UR QL SCN: NEGATIVE
PLATELET # BLD AUTO: 158 10*3/MM3 (ref 140–450)
PMV BLD AUTO: 9.1 FL (ref 6–12)
POTASSIUM SERPL-SCNC: 3.8 MMOL/L (ref 3.5–5.2)
PROT SERPL-MCNC: 7.5 G/DL (ref 6–8.5)
RBC # BLD AUTO: 3.67 10*6/MM3 (ref 3.77–5.28)
SODIUM SERPL-SCNC: 140 MMOL/L (ref 136–145)
T4 FREE SERPL-MCNC: 1.44 NG/DL (ref 0.92–1.68)
THC INTERNAL CONTROL: NORMAL
TRIGL SERPL-MCNC: 99 MG/DL (ref 0–150)
TSH SERPL DL<=0.05 MIU/L-ACNC: 0.34 UIU/ML (ref 0.27–4.2)
VLDLC SERPL-MCNC: 18 MG/DL (ref 5–40)
WBC NRBC COR # BLD AUTO: 6.02 10*3/MM3 (ref 3.4–10.8)

## 2025-08-25 PROCEDURE — 82306 VITAMIN D 25 HYDROXY: CPT

## 2025-08-25 PROCEDURE — 36415 COLL VENOUS BLD VENIPUNCTURE: CPT

## 2025-08-25 PROCEDURE — 80061 LIPID PANEL: CPT

## 2025-08-25 PROCEDURE — 84443 ASSAY THYROID STIM HORMONE: CPT

## 2025-08-25 PROCEDURE — 82570 ASSAY OF URINE CREATININE: CPT

## 2025-08-25 PROCEDURE — 80053 COMPREHEN METABOLIC PANEL: CPT

## 2025-08-25 PROCEDURE — 83036 HEMOGLOBIN GLYCOSYLATED A1C: CPT

## 2025-08-25 PROCEDURE — 82043 UR ALBUMIN QUANTITATIVE: CPT

## 2025-08-25 PROCEDURE — 85027 COMPLETE CBC AUTOMATED: CPT

## 2025-08-25 PROCEDURE — 84439 ASSAY OF FREE THYROXINE: CPT

## 2025-08-25 RX ORDER — MULTIVIT-MIN/IRON/FOLIC ACID/K 18-600-40
2000 CAPSULE ORAL DAILY
Qty: 90 CAPSULE | Refills: 1 | Status: SHIPPED | OUTPATIENT
Start: 2025-08-25

## (undated) DEVICE — REPROCESSING ACCESSORIES KIT

## (undated) DEVICE — Device

## (undated) DEVICE — REPROCESSING CONSUMABLES KIT

## (undated) DEVICE — SLV SCD KN/LEN ADJ EXPRSS BLENDED MD 1P/U

## (undated) DEVICE — Device: Brand: BALLOON

## (undated) DEVICE — MAJ-1414 SINGLE USE ADPATER BIOPSY VALV: Brand: SINGLE USE ADAPTOR BIOPSY VALVE

## (undated) DEVICE — STERILE POLYISOPRENE POWDER-FREE SURGICAL GLOVES: Brand: PROTEXIS

## (undated) DEVICE — PENCL E/S HNDSWCH ROCKR CB

## (undated) DEVICE — BIOPSY NEEDLE, 21G: Brand: FLEXISION

## (undated) DEVICE — LINER SURG CANSTR SXN S/RIGD 1500CC

## (undated) DEVICE — SINGLE USE ASPIRATION NEEDLE: Brand: SINGLE USE ASPIRATION NEEDLE

## (undated) DEVICE — CUP SPECI 4OZ LF STRL

## (undated) DEVICE — GAMMEX® NON-LATEX SIZE 7.5, STERILE NEOPRENE POWDER-FREE SURGICAL GLOVE: Brand: GAMMEX

## (undated) DEVICE — SINGLE USE BIOPSY VALVE MAJ-210: Brand: SINGLE USE BIOPSY VALVE (STERILE)

## (undated) DEVICE — SWIVEL CONNECTOR

## (undated) DEVICE — TRAP,MUCUS SPECIMEN,40CC: Brand: MEDLINE

## (undated) DEVICE — CATHETER: Brand: ION

## (undated) DEVICE — DISPOSABLE CYTOLOGY BRUSH: Brand: DISPOSABLE CYTOLOGY BRUSH

## (undated) DEVICE — CATHETER GUIDE

## (undated) DEVICE — GLV SURG BIOGEL LTX PF 7 1/2

## (undated) DEVICE — STERILE POLYISOPRENE POWDER-FREE SURGICAL GLOVES WITH EMOLLIENT COATING: Brand: PROTEXIS

## (undated) DEVICE — DRP MICROSCP LECIA W/CLEARLENS 137X381CM

## (undated) DEVICE — STPCK 1WY SPINLOCK FML LL NONDEHP LF .20ML

## (undated) DEVICE — ANTIBACTERIAL UNDYED BRAIDED (POLYGLACTIN 910), SYNTHETIC ABSORBABLE SUTURE: Brand: COATED VICRYL

## (undated) DEVICE — LAMINECTOMY CERVICAL DISC-LF: Brand: MEDLINE INDUSTRIES, INC.

## (undated) DEVICE — KT LINEN QUICKSQUITE SAHARA STD DRW/LIFT 60X78IN

## (undated) DEVICE — SOLIDIFIER LIQLOC PLS 1500CC BT

## (undated) DEVICE — SUT VIC 0/0 UR6 27IN DYED J603H

## (undated) DEVICE — GOWN ISOL OVR/HD TIE THMB/LP/CUF PE XLG BLU

## (undated) DEVICE — FRCP BIOP CAPTURA BRONCH 1.8 110CM BLU

## (undated) DEVICE — VISION PROBE: Brand: ION

## (undated) DEVICE — SINGLE USE SUCTION VALVE MAJ-209: Brand: SINGLE USE SUCTION VALVE (STERILE)

## (undated) DEVICE — REPROCESSING COVER

## (undated) DEVICE — VISION PROBE ADAPTER AND SUCTION ADAPTER

## (undated) DEVICE — SENSOR CONNECTION CLEANER